# Patient Record
Sex: MALE | Race: BLACK OR AFRICAN AMERICAN
[De-identification: names, ages, dates, MRNs, and addresses within clinical notes are randomized per-mention and may not be internally consistent; named-entity substitution may affect disease eponyms.]

---

## 2017-02-19 ENCOUNTER — HOSPITAL ENCOUNTER (EMERGENCY)
Dept: HOSPITAL 25 - ED | Age: 52
Discharge: HOME | End: 2017-02-19
Payer: MEDICARE

## 2017-02-19 VITALS — DIASTOLIC BLOOD PRESSURE: 91 MMHG | SYSTOLIC BLOOD PRESSURE: 153 MMHG

## 2017-02-19 DIAGNOSIS — E66.01: ICD-10-CM

## 2017-02-19 DIAGNOSIS — Z87.891: ICD-10-CM

## 2017-02-19 DIAGNOSIS — R10.12: ICD-10-CM

## 2017-02-19 DIAGNOSIS — R16.0: ICD-10-CM

## 2017-02-19 DIAGNOSIS — K76.0: ICD-10-CM

## 2017-02-19 DIAGNOSIS — E78.00: ICD-10-CM

## 2017-02-19 DIAGNOSIS — Z88.0: ICD-10-CM

## 2017-02-19 DIAGNOSIS — I10: ICD-10-CM

## 2017-02-19 DIAGNOSIS — I25.2: ICD-10-CM

## 2017-02-19 DIAGNOSIS — I20.9: ICD-10-CM

## 2017-02-19 DIAGNOSIS — R10.32: Primary | ICD-10-CM

## 2017-02-19 DIAGNOSIS — K21.9: ICD-10-CM

## 2017-02-19 DIAGNOSIS — Z79.02: ICD-10-CM

## 2017-02-19 DIAGNOSIS — Z86.73: ICD-10-CM

## 2017-02-19 DIAGNOSIS — I25.10: ICD-10-CM

## 2017-02-19 DIAGNOSIS — Z88.1: ICD-10-CM

## 2017-02-19 LAB
ALBUMIN SERPL BCG-MCNC: 4.1 G/DL (ref 3.2–5.2)
ALP SERPL-CCNC: 113 U/L (ref 34–104)
ALT SERPL W P-5'-P-CCNC: 31 U/L (ref 7–52)
ANION GAP SERPL CALC-SCNC: (no result) MMOL/L (ref 2–11)
AST SERPL-CCNC: (no result) U/L (ref 13–39)
BUN SERPL-MCNC: 15 MG/DL (ref 6–24)
BUN/CREAT SERPL: 12.8 (ref 8–20)
CALCIUM SERPL-MCNC: 9.4 MG/DL (ref 8.6–10.3)
CHLORIDE SERPL-SCNC: 106 MMOL/L (ref 101–111)
CK SERPL-CCNC: 224 U/L (ref 10–223)
GLOBULIN SER CALC-MCNC: 3.3 G/DL (ref 2–4)
GLUCOSE SERPL-MCNC: 70 MG/DL (ref 70–100)
HCO3 SERPL-SCNC: 28 MMOL/L (ref 22–32)
HCT VFR BLD AUTO: 45 % (ref 42–52)
HGB BLD-MCNC: 14.4 G/DL (ref 14–18)
LIPASE SERPL-CCNC: 21 U/L (ref 11–82)
MAGNESIUM SERPL-MCNC: (no result) MG/DL (ref 1.9–2.7)
MCH RBC QN AUTO: 27 PG (ref 27–31)
MCHC RBC AUTO-ENTMCNC: 32 G/DL (ref 31–36)
MCV RBC AUTO: 84 FL (ref 80–94)
POTASSIUM SERPL-SCNC: (no result) MMOL/L (ref 3.5–5)
PROT SERPL-MCNC: 7.4 G/DL (ref 6.4–8.9)
RBC # BLD AUTO: 5.35 10^6/UL (ref 4–5.4)
SODIUM SERPL-SCNC: 138 MMOL/L (ref 133–145)
TROPONIN I SERPL-MCNC: 0.01 NG/ML (ref ?–0.04)
TSH SERPL-ACNC: 1.97 MCIU/ML (ref 0.34–5.6)
WBC # BLD AUTO: 6.1 10^3/UL (ref 3.5–10.8)

## 2017-02-19 PROCEDURE — 36415 COLL VENOUS BLD VENIPUNCTURE: CPT

## 2017-02-19 PROCEDURE — 71010: CPT

## 2017-02-19 PROCEDURE — 84443 ASSAY THYROID STIM HORMONE: CPT

## 2017-02-19 PROCEDURE — 83880 ASSAY OF NATRIURETIC PEPTIDE: CPT

## 2017-02-19 PROCEDURE — 99284 EMERGENCY DEPT VISIT MOD MDM: CPT

## 2017-02-19 PROCEDURE — 93005 ELECTROCARDIOGRAM TRACING: CPT

## 2017-02-19 PROCEDURE — 82553 CREATINE MB FRACTION: CPT

## 2017-02-19 PROCEDURE — 74176 CT ABD & PELVIS W/O CONTRAST: CPT

## 2017-02-19 PROCEDURE — 85025 COMPLETE CBC W/AUTO DIFF WBC: CPT

## 2017-02-19 PROCEDURE — 85610 PROTHROMBIN TIME: CPT

## 2017-02-19 PROCEDURE — 83605 ASSAY OF LACTIC ACID: CPT

## 2017-02-19 PROCEDURE — 85730 THROMBOPLASTIN TIME PARTIAL: CPT

## 2017-02-19 PROCEDURE — 84484 ASSAY OF TROPONIN QUANT: CPT

## 2017-02-19 PROCEDURE — 83690 ASSAY OF LIPASE: CPT

## 2017-02-19 PROCEDURE — 80053 COMPREHEN METABOLIC PANEL: CPT

## 2017-02-19 PROCEDURE — 86140 C-REACTIVE PROTEIN: CPT

## 2017-02-19 PROCEDURE — 96374 THER/PROPH/DIAG INJ IV PUSH: CPT

## 2017-02-19 PROCEDURE — 82550 ASSAY OF CK (CPK): CPT

## 2017-02-19 NOTE — ED
Errol NATHAN Matthew, scribed for Miguel Myles MD on 02/19/17 at 1559 .





Abdominal Pain/Male





- HPI Summary


HPI Summary: 


A 50 y/o male presents to the ED with diffuse abdominal pain since today. The 

pain is worse in the LUQ and radiates into the LLQ. Associated symptoms include 

nausea. The patient denies urinary symptoms and fever. He had normal BM this 

morning. The pain is unaffected by breathing and worsens when sitting up. He's 

also c/o of sinus congestion, which he states is secondary to seasonal 

allergies. No medications PTA. PMHx includes CAD and 3 stents. 

















- History of Current Complaint


Chief Complaint: EDChestPainROMI


Stated Complaint: JUST NOT FEELING


Time Seen by Provider: 02/19/17 15:15


Hx Obtained From: Patient


Onset/Duration: Gradual Onset, Lasting Hours, Still Present


Timing: Constant


Severity Initially: Moderate


Severity Currently: Moderate


Pain Intensity: 10


Pain Scale Used: 0-10 Numeric


Location: Diffuse - worse in the LUQ


Radiates: Yes


Radiates to: Other - LLQ


Aggravating Factor(s): Movement - Sitting up


Alleviating Factor(s): Nothing


Associated Signs And Symptoms: Positive: Nausea.  Negative: Fever, Urinary 

Symptoms





- Allergies/Home Medications


Allergies/Adverse Reactions: 


 Allergies











Allergy/AdvReac Type Severity Reaction Status Date / Time


 


Penicillins Allergy Severe Anaphylatic Verified 12/21/16 01:50





   Shock  


 


Amoxicillin Allergy Unknown Swelling Verified 12/21/16 01:50





[From Augmentin XR]     


 


Clavulanic Acid Allergy Unknown Swelling Verified 12/21/16 01:50





[From Augmentin XR]     


 


VINEGAR Allergy Intermediate Hives Uncoded 12/21/16 01:50


 


MUSHROOMS Allergy Unknown Rash Uncoded 12/21/16 01:50














PMH/Surg Hx/FS Hx/Imm Hx


Endocrine/Hematology History: Reports: Hx Anticoagulant Therapy - plavix


   Denies: Hx Diabetes, Hx Sickle Cell Disease, Hx Thyroid Disease


Cardiovascular History: Reports: Hx Angina, Hx Coronary Artery Disease - STENTS 

X3  2011, Hx Hypercholesterolemia, Hx Hypertension, Hx Myocardial Infarction, 

Other Cardiovascular Problems/Disorders - HEART DISEASE, TIA'S, MORBID OBESITY


   Denies: Hx Congestive Heart Failure, Hx Pacemaker/ICD, Hx Valvular Heart 

Disease


Respiratory History: Reports: Hx Sleep Apnea


   Denies: Hx Asthma, Hx Chronic Obstructive Pulmonary Disease (COPD)


GI History: Reports: Hx Gastroesophageal Reflux Disease - WELL CONTROLLED


   Denies: Hx Ulcer


 History: Reports: Hx Renal Disease - cysts, Other  Problems/Disorders - 

bilat cyst on kidneys


Musculoskeletal History: Reports: Hx Arthritis - "all over", Hx Back Problems, 

Other Musculoskeletal History - VIPUL


   Denies: Hx Scoliosis


Sensory History: Reports: Hx Cataracts - right eye, Hx Contacts or Glasses - 

glasses, Hx Hearing Aid - RIGHT EAR, Hx Hearing Problem - LEFT EAR HEARING AID


Opthamlomology History: Reports: Hx Cataracts - right eye, Hx Contacts or 

Glasses - glasses


Neurological History: Reports: Hx Transient Ischemic Attacks (TIA)


   Denies: Hx Dementia, Hx Headaches, Hx Seizures, Other Neuro Impairments/

Disorders


Psychiatric History: Reports: Hx Depression


   Denies: Hx Panic Disorder





- Cancer History


Hx Chemotherapy: No





- Surgical History


Surgery Procedure, Year, and Place: DEC 2004 sinus [facility] [place] ;.  2008 left knee 

arthroscopy [facility] [place] ;.  2013 left tympanoplasty WITH STAPES IMPLANT ([facility] 

MALLEOUS HEAD SERIAL # [id number] - PER [facility] INFORMATION SENT TO MRI ON 6/ 13/2016; IMPLANT IS STAINLESS STEEL STATES SOME DEGREE OF MAGNETISM TESTED MRI 

SAFE AT 1.5T ONLY - DR VALDEZ APPROVED THIS INFORMATION FOR 1.5T ONLY).  [facility] ;.  

2015 LEFT REVISION TYMPANOPLASTY/ MASTOIDECTOMY [facility] ;.  8/ 2012 HEART CATH - NO 

STENTS ;.  2011 HEART CATH WITH cardiac stents x 4 (PROMUS DRUG-ELUTING STENT 

OKAY IN NORMAL MODE ONLY,  GAUSS/CM @ 1.5T) [facility] ;


Hx Anesthesia Reactions: Yes - SEIZURE LIKE ACTIVITY; REINTUBATION AFTER LEFT 

EAR 2013





- Immunization History


Date of Tetanus Vaccine: unknown


Date of Influenza Vaccine: Fall 2012


Infectious Disease History: No


Infectious Disease History: 


   Denies: Hx Clostridium Difficile, Hx Hepatitis, Hx Human Immunodeficiency 

Virus (HIV), Hx of Known/Suspected MRSA, Hx Shingles, Hx Tuberculosis, Hx Known/

Suspected VRE, Hx Known/Suspected VRSA, History Other Infectious Disease, 

Traveled Outside the [place] in Last 30 Days





- Family History


Known Family History: Positive: Cardiac Disease, Hypertension, Diabetes





- Social History


Alcohol Use: None


Hx Substance Use: No


Substance Use Type: Reports: None


Hx Tobacco Use: Yes


Smoking Status (MU): Former Smoker


Type: Cigarettes


Length of Time of Smoking/Using Tobacco: 10-12 YRS


Have You Smoked in the Last Year: No





Review of Systems


Constitutional: Negative


Negative: Fever


Eyes: Negative


ENT: Negative


Cardiovascular: Negative


Respiratory: Negative


Positive: Abdominal Pain - Diffuse worse in the LUQ , Nausea


Genitourinary: Negative


Musculoskeletal: Negative


Skin: Negative


Neurological: Negative


Psychological: Normal


All Other Systems Reviewed And Are Negative: Yes





Physical Exam


Triage Information Reviewed: Yes


Vital Signs On Initial Exam: 


 Initial Vitals











Temp Pulse Resp BP Pulse Ox


 


 97.8 F   66   20   175/92   100 


 


 02/19/17 12:42  02/19/17 12:42  02/19/17 12:42  02/19/17 12:42  02/19/17 12:42











Vital Signs Reviewed: Yes


Appearance: Positive: Well-Appearing, No Pain Distress, Obese


Skin: Positive: Warm, Skin Color Reflects Adequate Perfusion, Dry


Head/Face: Positive: Normal Head/Face Inspection


Eyes: Positive: Normal


ENT: Positive: Normal ENT inspection


Neck: Positive: Supple, Nontender


Respiratory/Lung Sounds: Positive: Clear to Auscultation, Breath Sounds Present


Cardiovascular: Positive: RRR


Abdomen Description: Positive: Nontender, Soft


Bowel Sounds: Positive: Present


Musculoskeletal: Positive: Normal


Neurological: Positive: Normal


Psychiatric: Positive: Normal, Affect/Mood Appropriate





- [place] Coma Scale


Coma Scale Total: 15





Diagnostics





- Vital Signs


 Vital Signs











  Temp Pulse Resp BP Pulse Ox


 


 02/19/17 14:49   65    99


 


 02/19/17 14:47     165/93 


 


 02/19/17 13:39  97.8 F  73  20  140/94  98


 


 02/19/17 12:42  97.8 F  66  20  175/92  100














- Laboratory


Lab Results: 


 Lab Results











  02/19/17 Range/Units





  15:30 


 


WBC  6.1  (3.5-10.8)  10^3/ul


 


RBC  5.35  (4.0-5.4)  10^6/ul


 


Hgb  14.4  (14.0-18.0)  g/dl


 


Hct  45  (42-52)  %


 


MCV  84  (80-94)  fL


 


MCH  27  (27-31)  pg


 


MCHC  32  (31-36)  g/dl


 


RDW  15  (10.5-15)  %


 


Plt Count  195  (150-450)  10^3/ul


 


MPV  8  (7.4-10.4)  um3


 


Neut % (Auto)  55.5  (38-83)  %


 


Lymph % (Auto)  30.5  (25-47)  %


 


Mono % (Auto)  11.0 H  (1-9)  %


 


Eos % (Auto)  2.2  (0-6)  %


 


Baso % (Auto)  0.8  (0-2)  %


 


Absolute Neuts (auto)  3.4  (1.5-7.7)  10^3/ul


 


Absolute Lymphs (auto)  1.9  (1.0-4.8)  10^3/ul


 


Absolute Monos (auto)  0.7  (0-0.8)  10^3/ul


 


Absolute Eos (auto)  0.1  (0-0.6)  10^3/ul


 


Absolute Basos (auto)  0  (0-0.2)  10^3/ul


 


Absolute Nucleated RBC  0.01  10^3/ul


 


Nucleated RBC %  0.1  











Result Diagrams: 


 02/19/17 15:30





 02/19/17 15:30


Lab Statement: Any lab studies that have been ordered have been reviewed, and 

results considered in the medical decision making process.





- Radiology


  ** CXR


Xray Interpretation: No Acute Changes - IMPRESSION: NO ACTIVE CARDIOPULMONARY 

DISEASE.


Radiology Interpretation Completed By: Radiologist





- CT


  ** Abd/Pelvis CT


CT Interpretation: Positive (See Comments) - IMPRESSION: 1.  HEPATOMEGALY WITH 

FATTY INFILTRATION OF LIVER. 2.  NO APPRECIABLE HYDRONEPHROSIS OR 

NEPHROLITHIASIS


CT Interpretation Completed By: Radiologist





- EKG


  ** 12:45


Cardiac Rate: NL - 65 bpm


EKG Rhythm: Sinus Rhythm


ST Segment: Non-Specific





Abdominal Pain Fem Course/Dx





- Course


Assessment/Plan: A 50 y/o male presents to the ED with diffuse abdominal pain 

since today. Labs were reviewed. CXR shows no active cardiopulmonary disease. A/

P CT shows hepatomegaly with fatty infiltration of liver. EKG is NSR at 65 bpm 

with non-specific ST changes. The patient will be discharged home and follow-up 

with his PCP.





- Diagnoses


Provider Diagnoses: 


 Abdominal pain








Discharge





- Discharge Plan


Condition: Stable


Disposition: HOME


Patient Education Materials:  Abdominal Pain (ED)


Referrals: 


Bridger Webb MD [Primary Care Provider] - 2 Days


Additional Instructions: 


Please follow-up with your primary care physician. 





The documentation as recorded by the Errol song Matthew accurately 

reflects the service I personally performed and the decisions made by me, Miguel Myles MD.

## 2017-02-19 NOTE — RAD
CLINICAL HISTORY: Left flank pain



COMPARISON: November 28, 2015



TECHNIQUE: Multiple contiguous axial CT scans were obtained of the abdomen and pelvis,

without intravenous contrast enhancement. Coronal and sagittal multiplanar reformations

are submitted for review.  Oral contrast was not administered.     



FINDINGS: 

The study is limited by the lack of intravenous contrast. This limits evaluation of the

solid organs and vasculature.





LUNG BASES: The lung bases are clear.



LIVER: The liver is diffusely low in attenuation compared to the spleen. There are no

focal hepatic parenchymal masses. The liver is mildly enlarged measuring 19.5 cm.

BILE DUCTS: There is no intrahepatic or extrahepatic biliary dilatation.

GALLBLADDER: The gallbladder is normal, without pericholecystic inflammatory change.



PANCREAS: The pancreas is normal, without mass or ductal dilatation.

SPLEEN: Normal in size and appearance.



UPPER GI TRACT: Evaluation of the gastrointestinal tract is limited by incomplete gastric

distention. The upper GI tract is unremarkable.

SMALL BOWEL AND MESENTERY: The small bowel is normal in contour, course, and caliber.

There is no obstruction or dilatation.

COLON: The colon is normal in contour, course, caliber. There is no pericolonic

inflammatory change.



ADRENALS: Normal bilaterally.

KIDNEYS: Renal cysts are noted. There is no appreciable hydronephrosis or nephrolithiasis.

BLADDER: The bladder is smooth in contour.



PELVIC ORGANS: The prostate gland is normal. The seminal vesicles are symmetric.



AORTA: The aorta is normal.

IVC: Unremarkable



LYMPH NODES: There is no lymphadenopathy by size criteria.



ABDOMINAL WALL: There is a fat-containing umbilical hernia.

BONES AND SOFT TISSUES: There are mild diffuse degenerative changes.

OTHER: None



IMPRESSION:

1.  HEPATOMEGALY WITH FATTY INFILTRATION OF LIVER.

2.  NO APPRECIABLE HYDRONEPHROSIS OR NEPHROLITHIASIS

## 2017-02-19 NOTE — RAD
HISTORY: Palpitation



COMPARISONS: December 21, 2016



VIEWS:1: Single frontal portable view of the chest at 3:21 PM



FINDINGS:

LINES AND TUBES: None.

CARDIOMEDIASTINAL SILHOUETTE: The cardiomediastinal silhouette is normal for portable

technique.

PLEURA: The costophrenic angles are sharp. No pleural abnormalities are noted.

LUNG PARENCHYMA: The lungs are clear.

ABDOMEN: The upper abdomen is clear. There is no subphrenic gas.

BONES AND SOFT TISSUES: No bone or soft tissue abnormalities are noted.



IMPRESSION: NO ACTIVE CARDIOPULMONARY DISEASE.

## 2017-03-24 ENCOUNTER — HOSPITAL ENCOUNTER (EMERGENCY)
Dept: HOSPITAL 25 - ED | Age: 52
Discharge: HOME | End: 2017-03-24
Payer: MEDICARE

## 2017-03-24 VITALS — DIASTOLIC BLOOD PRESSURE: 85 MMHG | SYSTOLIC BLOOD PRESSURE: 135 MMHG

## 2017-03-24 DIAGNOSIS — J32.9: Primary | ICD-10-CM

## 2017-03-24 DIAGNOSIS — Z87.891: ICD-10-CM

## 2017-03-24 DIAGNOSIS — R51: ICD-10-CM

## 2017-03-24 LAB
ALBUMIN SERPL BCG-MCNC: 3.9 G/DL (ref 3.2–5.2)
ALP SERPL-CCNC: 100 U/L (ref 34–104)
ALT SERPL W P-5'-P-CCNC: 24 U/L (ref 7–52)
ANION GAP SERPL CALC-SCNC: 6 MMOL/L (ref 2–11)
AST SERPL-CCNC: 16 U/L (ref 13–39)
BUN SERPL-MCNC: 14 MG/DL (ref 6–24)
BUN/CREAT SERPL: 13.3 (ref 8–20)
CALCIUM SERPL-MCNC: 9.3 MG/DL (ref 8.6–10.3)
CHLORIDE SERPL-SCNC: 106 MMOL/L (ref 101–111)
GLOBULIN SER CALC-MCNC: 2.9 G/DL (ref 2–4)
GLUCOSE SERPL-MCNC: 119 MG/DL (ref 70–100)
HCO3 SERPL-SCNC: 26 MMOL/L (ref 22–32)
HCT VFR BLD AUTO: 42 % (ref 42–52)
HGB BLD-MCNC: 13.8 G/DL (ref 14–18)
MCH RBC QN AUTO: 28 PG (ref 27–31)
MCHC RBC AUTO-ENTMCNC: 33 G/DL (ref 31–36)
MCV RBC AUTO: 84 FL (ref 80–94)
POTASSIUM SERPL-SCNC: 4 MMOL/L (ref 3.5–5)
PROT SERPL-MCNC: 6.8 G/DL (ref 6.4–8.9)
RBC # BLD AUTO: 5 10^6/UL (ref 4–5.4)
SODIUM SERPL-SCNC: 138 MMOL/L (ref 133–145)
WBC # BLD AUTO: 5.4 10^3/UL (ref 3.5–10.8)

## 2017-03-24 PROCEDURE — 96374 THER/PROPH/DIAG INJ IV PUSH: CPT

## 2017-03-24 PROCEDURE — 85652 RBC SED RATE AUTOMATED: CPT

## 2017-03-24 PROCEDURE — 81003 URINALYSIS AUTO W/O SCOPE: CPT

## 2017-03-24 PROCEDURE — 99282 EMERGENCY DEPT VISIT SF MDM: CPT

## 2017-03-24 PROCEDURE — 36415 COLL VENOUS BLD VENIPUNCTURE: CPT

## 2017-03-24 PROCEDURE — 81015 MICROSCOPIC EXAM OF URINE: CPT

## 2017-03-24 PROCEDURE — 83605 ASSAY OF LACTIC ACID: CPT

## 2017-03-24 PROCEDURE — 96375 TX/PRO/DX INJ NEW DRUG ADDON: CPT

## 2017-03-24 PROCEDURE — 82375 ASSAY CARBOXYHB QUANT: CPT

## 2017-03-24 PROCEDURE — 80053 COMPREHEN METABOLIC PANEL: CPT

## 2017-03-24 PROCEDURE — 70498 CT ANGIOGRAPHY NECK: CPT

## 2017-03-24 PROCEDURE — 85025 COMPLETE CBC W/AUTO DIFF WBC: CPT

## 2017-03-24 PROCEDURE — 93005 ELECTROCARDIOGRAM TRACING: CPT

## 2017-03-24 PROCEDURE — 70496 CT ANGIOGRAPHY HEAD: CPT

## 2017-03-24 NOTE — ED
Toan NTAHAN Anna, scribed for Natasha Finch MD on 03/24/17 at 1022 .





Headache





- HPI Summary


HPI Summary: 


Patient is a 50 y/o male coming to [facility] presenting with a left-sided HA that 

began two days ago. It became worse yesterday at 1230. He describes the 

severity of the pain as starting at 9/10 but progressing to 10/10 now. The pain 

extends from the top of his head on the left side and extends down to the left 

side of his neck and through his left arm. He states he can feel something move 

in his arm when he puts his arm down. He could not go back to sleep this 

morning. He has some left-sided weakness and bilateral leg edema at baseline. 

Denies difficulty speaking or tenderness on the back of his neck. His history 

is significant for HTN, STEMI, and a hernia. He doesnt normally get headaches. 





- History Of Current Complaint


Chief Complaint: EDHeadache


Stated Complaint: LEFT SIDE HEADACHE, LEFT ARM PAIN


Time Seen by Provider: 03/24/17 10:10


Hx Obtained From: Patient





- Allergies/Home Medications


Allergies/Adverse Reactions: 


 Allergies











Allergy/AdvReac Type Severity Reaction Status Date / Time


 


Penicillins Allergy Severe Anaphylatic Verified 03/24/17 09:55





   Shock  


 


Amoxicillin Allergy Unknown Swelling Verified 03/24/17 09:55





[From Augmentin XR]     


 


Clavulanic Acid Allergy Unknown Swelling Verified 03/24/17 09:55





[From Augmentin XR]     


 


VINEGAR Allergy Intermediate Hives Uncoded 03/24/17 09:55


 


MUSHROOMS Allergy Unknown Rash Uncoded 03/24/17 09:55














PMH/Surg Hx/FS Hx/Imm Hx


Endocrine/Hematology History: Reports: Hx Anticoagulant Therapy - plavix


   Denies: Hx Diabetes, Hx Sickle Cell Disease, Hx Thyroid Disease


Cardiovascular History: Reports: Hx Angina, Hx Coronary Artery Disease - STENTS 

X3  2011, Hx Hypercholesterolemia, Hx Hypertension, Hx Myocardial Infarction, 

Other Cardiovascular Problems/Disorders - HEART DISEASE, TIA'S, MORBID OBESITY


   Denies: Hx Congestive Heart Failure, Hx Pacemaker/ICD, Hx Valvular Heart 

Disease


Respiratory History: Reports: Hx Sleep Apnea


   Denies: Hx Asthma, Hx Chronic Obstructive Pulmonary Disease (COPD)


GI History: Reports: Hx Gastroesophageal Reflux Disease - WELL CONTROLLED


   Denies: Hx Ulcer


 History: Reports: Hx Renal Disease - cysts, Other  Problems/Disorders - 

bilat cyst on kidneys


Musculoskeletal History: Reports: Hx Arthritis - "all over", Hx Back Problems, 

Other Musculoskeletal History - VIPUL


   Denies: Hx Scoliosis


Sensory History: Reports: Hx Cataracts - right eye, Hx Contacts or Glasses - 

glasses, Hx Hearing Aid - RIGHT EAR, Hx Hearing Problem - LEFT EAR HEARING AID


Opthamlomology History: Reports: Hx Cataracts - right eye, Hx Contacts or 

Glasses - glasses


Neurological History: Reports: Hx Transient Ischemic Attacks (TIA)


   Denies: Hx Dementia, Hx Headaches, Hx Seizures, Other Neuro Impairments/

Disorders


Psychiatric History: Reports: Hx Depression


   Denies: Hx Panic Disorder





- Cancer History


Hx Chemotherapy: No





- Surgical History


Surgery Procedure, Year, and Place: DEC 2004 sinus [facility] ;.  2008 left knee 

arthroscopy [facility] ;.  2013 left tympanoplasty WITH STAPES IMPLANT ([facility] 

MALLEOUS HEAD SERIAL # [id number] - PER [facility] INFORMATION SENT TO MRI ON 6/ 13/2016; IMPLANT IS STAINLESS STEEL STATES SOME DEGREE OF MAGNETISM TESTED MRI 

SAFE AT 1.5T ONLY - DR VALDEZ APPROVED THIS INFORMATION FOR 1.5T ONLY).  [facility] ;.  

2015 LEFT REVISION TYMPANOPLASTY/ MASTOIDECTOMY [facility] ;.  8/ 2012 HEART CATH - NO 

STENTS ;.  2011 HEART CATH WITH cardiac stents x 4 (PROMUS DRUG-ELUTING STENT 

OKAY IN NORMAL MODE ONLY,  GAUSS/CM @ 1.5T) [facility] ;


Hx Anesthesia Reactions: Yes - SEIZURE LIKE ACTIVITY; REINTUBATION AFTER LEFT 

EAR 2013





- Immunization History


Date of Tetanus Vaccine: unknown


Date of Influenza Vaccine: Fall 2012


Infectious Disease History: No


Infectious Disease History: 


   Denies: Hx Clostridium Difficile, Hx Hepatitis, Hx Human Immunodeficiency 

Virus (HIV), Hx of Known/Suspected MRSA, Hx Shingles, Hx Tuberculosis, Hx Known/

Suspected VRE, Hx Known/Suspected VRSA, History Other Infectious Disease, 

Traveled Outside the [place] in Last 30 Days





- Family History


Known Family History: Positive: Cardiac Disease, Hypertension, Diabetes





- Social History


Lives: Alone


Alcohol Use: None


Hx Substance Use: No


Substance Use Type: Reports: None


Hx Tobacco Use: Yes


Smoking Status (MU): Former Smoker


Type: Cigarettes


Length of Time of Smoking/Using Tobacco: 10-12 YRS


Have You Smoked in the Last Year: No





Review of Systems


Positive: Arthralgia, Edema - bilateral legs, baseline


Positive: Headache, Weakness - left-sided, baseline.  Negative: Slurred Speech


All Other Systems Reviewed And Are Negative: Yes





Physical Exam


Triage Information Reviewed: Yes


Vital Signs On Initial Exam: 


 Initial Vitals











Temp Pulse Resp BP Pulse Ox


 


 97.1 F   70   18   157/102   100 


 


 03/24/17 09:55  03/24/17 09:55  03/24/17 09:55  03/24/17 09:55  03/24/17 09:55











Vital Signs Reviewed: Yes


Appearance: Positive: Well-Appearing, No Pain Distress


Skin: Positive: Warm, Skin Color Reflects Adequate Perfusion, Dry


Eyes: Positive: EOMI, LISA


ENT: Positive: Pharynx normal, TMs normal


Neck: Positive: Supple, Tenderness @ - left side


Respiratory/Lung Sounds: Positive: Clear to Auscultation, Breath Sounds Present


Cardiovascular: Positive: RRR.  Negative: Murmur, Rub, Other - gallop


Abdomen Description: Positive: Nontender, Soft.  Negative: Distended, Guarding, 

Other: - rebound


Bowel Sounds: Positive: Present


Musculoskeletal: Positive: Strength/ROM Intact, Edema Left - 2+, Edema Right - 2

+


Neurological: Positive: Alert, Oriented to Person Place, Time, CN Intact II-III 

- II-XII





Diagnostics





- Vital Signs


 Vital Signs











  Temp Pulse Resp BP Pulse Ox


 


 03/24/17 09:55  97.1 F  70  18  157/102  100














- Laboratory


Lab Results: 


 Lab Results











  03/24/17 03/24/17 03/24/17 Range/Units





  10:41 10:45 10:45 


 


WBC  5.4    (3.5-10.8)  10^3/ul


 


RBC  5.00    (4.0-5.4)  10^6/ul


 


Hgb  13.8 L    (14.0-18.0)  g/dl


 


Hct  42    (42-52)  %


 


MCV  84    (80-94)  fL


 


MCH  28    (27-31)  pg


 


MCHC  33    (31-36)  g/dl


 


RDW  15    (10.5-15)  %


 


Plt Count  177    (150-450)  10^3/ul


 


MPV  9    (7.4-10.4)  um3


 


Neut % (Auto)  63.7    (38-83)  %


 


Lymph % (Auto)  22.8 L    (25-47)  %


 


Mono % (Auto)  9.8 H    (1-9)  %


 


Eos % (Auto)  2.8    (0-6)  %


 


Baso % (Auto)  0.9    (0-2)  %


 


Absolute Neuts (auto)  3.5    (1.5-7.7)  10^3/ul


 


Absolute Lymphs (auto)  1.2    (1.0-4.8)  10^3/ul


 


Absolute Monos (auto)  0.5    (0-0.8)  10^3/ul


 


Absolute Eos (auto)  0.1    (0-0.6)  10^3/ul


 


Absolute Basos (auto)  0    (0-0.2)  10^3/ul


 


Absolute Nucleated RBC  0.01    10^3/ul


 


Nucleated RBC %  0.1    


 


ESR  21 H    (0-20)  mm/Hr


 


Carbon Monoxide Screen     (<3.5)  %


 


Sodium   138   (133-145)  mmol/L


 


Potassium   4.0   (3.5-5.0)  mmol/L


 


Chloride   106   (101-111)  mmol/L


 


Carbon Dioxide   26   (22-32)  mmol/L


 


Anion Gap   6   (2-11)  mmol/L


 


BUN   14   (6-24)  mg/dL


 


Creatinine   1.05   (0.67-1.17)  mg/dL


 


Est GFR ( Amer)   95.4   (>60)  


 


Est GFR (Non-Af Amer)   74.2   (>60)  


 


BUN/Creatinine Ratio   13.3   (8-20)  


 


Glucose   119 H   ()  mg/dL


 


Lactic Acid    1.3  (0.5-2.0)  mmol/L


 


Calcium   9.3   (8.6-10.3)  mg/dL


 


Total Bilirubin   0.40   (0.2-1.0)  mg/dL


 


AST   16   (13-39)  U/L


 


ALT   24   (7-52)  U/L


 


Alkaline Phosphatase   100   ()  U/L


 


Total Protein   6.8   (6.4-8.9)  g/dL


 


Albumin   3.9   (3.2-5.2)  g/dL


 


Globulin   2.9   (2-4)  g/dL


 


Albumin/Globulin Ratio   1.3   (1-3)  


 


Urine Color     


 


Urine Appearance     


 


Urine pH     (5-9)  


 


Ur Specific Gravity     (1.010-1.030)  


 


Urine Protein     (Negative)  


 


Urine Ketones     (Negative)  


 


Urine Blood     (Negative)  


 


Urine Nitrate     (Negative)  


 


Urine Bilirubin     (Negative)  


 


Urine Urobilinogen     (Negative)  


 


Ur Leukocyte Esterase     (Negative)  


 


Urine WBC (Auto)     (Absent)  


 


Urine RBC (Auto)     (Absent)  


 


Ur Squamous Epith Cells     (Absent)  


 


Urine Bacteria     (Absent)  


 


Urine Glucose     (Negative)  














  03/24/17 03/24/17 Range/Units





  11:22 12:15 


 


WBC    (3.5-10.8)  10^3/ul


 


RBC    (4.0-5.4)  10^6/ul


 


Hgb    (14.0-18.0)  g/dl


 


Hct    (42-52)  %


 


MCV    (80-94)  fL


 


MCH    (27-31)  pg


 


MCHC    (31-36)  g/dl


 


RDW    (10.5-15)  %


 


Plt Count    (150-450)  10^3/ul


 


MPV    (7.4-10.4)  um3


 


Neut % (Auto)    (38-83)  %


 


Lymph % (Auto)    (25-47)  %


 


Mono % (Auto)    (1-9)  %


 


Eos % (Auto)    (0-6)  %


 


Baso % (Auto)    (0-2)  %


 


Absolute Neuts (auto)    (1.5-7.7)  10^3/ul


 


Absolute Lymphs (auto)    (1.0-4.8)  10^3/ul


 


Absolute Monos (auto)    (0-0.8)  10^3/ul


 


Absolute Eos (auto)    (0-0.6)  10^3/ul


 


Absolute Basos (auto)    (0-0.2)  10^3/ul


 


Absolute Nucleated RBC    10^3/ul


 


Nucleated RBC %    


 


ESR    (0-20)  mm/Hr


 


Carbon Monoxide Screen  <3.5   (<3.5)  %


 


Sodium    (133-145)  mmol/L


 


Potassium    (3.5-5.0)  mmol/L


 


Chloride    (101-111)  mmol/L


 


Carbon Dioxide    (22-32)  mmol/L


 


Anion Gap    (2-11)  mmol/L


 


BUN    (6-24)  mg/dL


 


Creatinine    (0.67-1.17)  mg/dL


 


Est GFR ( Amer)    (>60)  


 


Est GFR (Non-Af Amer)    (>60)  


 


BUN/Creatinine Ratio    (8-20)  


 


Glucose    ()  mg/dL


 


Lactic Acid    (0.5-2.0)  mmol/L


 


Calcium    (8.6-10.3)  mg/dL


 


Total Bilirubin    (0.2-1.0)  mg/dL


 


AST    (13-39)  U/L


 


ALT    (7-52)  U/L


 


Alkaline Phosphatase    ()  U/L


 


Total Protein    (6.4-8.9)  g/dL


 


Albumin    (3.2-5.2)  g/dL


 


Globulin    (2-4)  g/dL


 


Albumin/Globulin Ratio    (1-3)  


 


Urine Color   Yellow  


 


Urine Appearance   Clear  


 


Urine pH   7.0  (5-9)  


 


Ur Specific Gravity   1.021  (1.010-1.030)  


 


Urine Protein   Negative  (Negative)  


 


Urine Ketones   Negative  (Negative)  


 


Urine Blood   1+ H  (Negative)  


 


Urine Nitrate   Negative  (Negative)  


 


Urine Bilirubin   Negative  (Negative)  


 


Urine Urobilinogen   Negative  (Negative)  


 


Ur Leukocyte Esterase   Negative  (Negative)  


 


Urine WBC (Auto)   Trace(0-5/hpf)  (Absent)  


 


Urine RBC (Auto)   2+(6-10/hpf) H  (Absent)  


 


Ur Squamous Epith Cells   Present H  (Absent)  


 


Urine Bacteria   Absent  (Absent)  


 


Urine Glucose   Negative  (Negative)  











Result Diagrams: 


 03/24/17 10:41





 03/24/17 10:45


Lab Statement: Any lab studies that have been ordered have been reviewed, and 

results considered in the medical decision making process.





- CT


  ** Head CTA


CT Interpretation: Positive (See Comments)


CT Interpretation Completed By: Radiologist - IMPRESSION:  1.  NO INTERNAL 

CAROTID ARTERY STENOSIS BY NASCET CRITERIA. 2.  NO ANEURYSM, VASCULAR 

MALFORMATION, OCCLUSION, OR STENOSIS OF THE VISUALIZED INTRACRANIAL 

CIRCULATION. ELONGATION OF THE STYLOID PROCESSES BILATERALLY, WHICH CAN BE 

ASSOCIATED WITH EAGLE SYNDROME IN THE CORRECT CLINICAL SETTING. 3.  MODERATE 

SINUS MUCOSAL INFLAMMATORY DISEASE, WITH AIR-FLUID LEVELS IN THE MAXILLARY 

SINUSES BILATERALLY. IN THE CORRECT CLINICAL SETTING, THIS MAY REPRESENT ACUTE 

SINUSITIS. 4.  THERE IS ENLARGEMENT OF THE PULMONARY ARTERY WITH RESPECT TO THE 

AORTA, SUGGESTIVE OF PULMONARY ARTERIAL HYPERTENSION





- EKG


  ** 1013


Cardiac Rate: NL - 66 bpm


EKG Rhythm: Sinus Rhythm


EKG Interpretation: Non-specific T-wave changes lateral lead





[employer]





- NIH Scale


Level of Consciousness: Alert/Keenly Responsive


Ask Patient the Month and His/Her Age: Both Correct


Ask Pt to Open/Close Eyes and /Release Non-Paretic Hand: Both Correctly


Best Gaze (Only Horizontal Eye Movement): Normal


Visual Field Testing: No Visual Loss


Facial Paresis-Pt to Smile & Close Eyes or Grimace Symmetry: Normal/Symmetrical


Motor Function - Right Arm: No Drift-Holds 10 Seconds


Motor Function - Left Arm: No Drift-Holds 10 Seconds


Motor Function - Right Leg: No Drift-Holds 10 Seconds


Motor Function - Left Leg: No Drift-Holds 10 Seconds


Limb Ataxia-Must be out of Proportion to Weakness Present: Absent


Sensory (Use Pinprick to Test Arms/Legs/Trunk/Face): Pinprick Less on Affected


Best Language (Describe Picture, Name Items): No Aphasia


Dysarthria (Read Several Words): Normal


Extinction and Inattention: No Abnormality


Total Score: 1





Re-Evaluation





- Re-Evaluation


  ** First Eval


Re-Evaluation Time: 13:07


Comment: Discussed results and plan of care with patient. Patient agrees with 

plan.





Headache Course/Dx





- Course


Course Of Treatment: 52 yo male with multiple medical problems with left sided 

ha radiating down neck to arm, cta head and neck neg except for sinusitis. Of 

note he had a mastoidectomy on that left side.  Pt will be treated for 

sinusitis and given pain meds close f/u pmd.  I doubt sah, nih scale negative





- Diagnoses


Provider Diagnoses: 


 Headache, Sinusitis








Discharge





- Discharge Plan


Condition: Critical


Disposition: HOME


Prescriptions: 


Levofloxacin TAB* [Levaquin TAB*] 750 mg PO DAILY #10 tab


oxyCODONE/Acetamin 5/325 MG* [Percocet 5/325 TAB*] 1 tab PO Q8H PRN #14 tab MDD 

3


 PRN Reason: Pain





The documentation as recorded by the Toan song Anna accurately reflects 

the service I personally performed and the decisions made by me, Natasha Finch MD.

## 2017-03-24 NOTE — RAD
HISTORY: Headache, neck pain



COMPARISONS: June 12, 2016



TECHNIQUE: Multiple contiguous axial CT scans were obtained of the head and neck After the

administration of nonionic intravenous contrast timed to the systemic arterial phase of

contrast enhancement. Coronal and sagittal multiplanar reformations are submitted for

review. Multiple 3-D maximum intensity projection reconstructions are also submitted for

review.    



FINDINGS:



The study is limited by patient body habitus.





CTA NECK:



AORTIC ARCH: There is a normal three-vessel branching pattern of the aortic arch. There is

no ostial or proximal stenosis of the cephalic great vessels.



RIGHT VERTEBRAL ARTERY: The right vertebral artery is patent along its course, without

stenosis.

LEFT VERTEBRAL ARTERY: The left vertebral artery is patent along its course, without

stenosis.

DOMINANCE: The vertebral arteries are codominant.





RIGHT COMMON CAROTID ARTERY: The right common carotid artery is patent. The right carotid

bifurcation occurs at C3-C4

RIGHT INTERNAL CAROTID ARTERY: There is no right internal carotid artery stenosis by

NASCET criteria. 

RIGHT EXTERNAL CAROTID ARTERY: The right external carotid artery is unremarkable.



LEFT COMMON CAROTID ARTERY: The left common carotid artery is patent. The left carotid

bifurcation occurs at C3-C4

LEFT INTERNAL CAROTID ARTERY: There is no left internal carotid artery stenosis by NASCET

criteria. 

LEFT EXTERNAL CAROTID ARTERY: The left external carotid artery is unremarkable.



VENOUS CIRCULATION: The venous system is unremarkable.



SALIVARY GLANDS: The parotid glands, submandibular glands, sublingual glands are normal.



NASAL CAVITY/NASOPHARYNX: The nasal cavity and nasopharynx are normal.



ORAL CAVITY/OROPHARYNX: The oral cavity is obscured by streak artifact from dental

amalgam. The visualized oral cavity and oropharynx are unremarkable.

LARYNGEAL APPARATUS/HYPOPHARYNX: The laryngeal apparatus and hypopharynx are normal.



UPPER AIRWAY/UPPER ESOPHAGUS: The visualized upper airway and esophagus are normal.

LUNG APICES: The lung apices are clear.



THYROID GLAND: The thyroid gland is normal.



LYMPH NODES: There is no lymphadenopathy by size criteria.



BONES AND SOFT TISSUES: There is elongation of the styloid processes bilaterally with

ossification along the stylohyoid ligament





CTA HEAD:



INTRACRANIAL CIRCULATION: There is no aneurysm, vascular malformation, occlusion, or

stenosis of the visualized intracranial circulation. The anterior communicating artery

complex is clear. Bilateral posterior communicating arteries are identified.



VENOUS CIRCULATION: The venous system is unremarkable.



PERFUSION: There is no obvious parenchymal perfusion deficit.



HEMORRHAGE/INFARCT: There is no hemorrhage or acute infarct.

MASSES/SHIFT: There is no mass or shift.

EXTRA-AXIAL SPACES: There are no extra-axial fluid collections.

SULCI AND VENTRICLES: The sulci and ventricles are normal in size and position for the

patient's stated age.



CEREBRUM: There are no focal parenchymal abnormalities. 

BRAINSTEM: There are no focal parenchymal abnormalities.

CEREBELLUM: There are no focal parenchymal abnormalities.



PARANASAL SINUSES: There are-fluid levels within the maxillary sinuses bilaterally. There

is mucosal thickening of ethmoid air cells and maxillary sinuses and sphenoid sinus

ORBITS: The orbits are unremarkable.

BONES AND SOFT TISSUE: Surgical clips are noted in the left infratemporal fossa.



OTHER: The pulmonary artery is enlarged compared to the aorta





IMPRESSION: 

1.  NO INTERNAL CAROTID ARTERY STENOSIS BY NASCET CRITERIA.

2.  NO ANEURYSM, VASCULAR MALFORMATION, OCCLUSION, OR STENOSIS OF THE VISUALIZED

INTRACRANIAL CIRCULATION. ELONGATION OF THE STYLOID PROCESSES BILATERALLY, WHICH CAN BE

ASSOCIATED WITH EAGLE SYNDROME IN THE CORRECT CLINICAL SETTING.

3.  MODERATE SINUS MUCOSAL INFLAMMATORY DISEASE, WITH AIR-FLUID LEVELS IN THE MAXILLARY

SINUSES BILATERALLY. IN THE CORRECT CLINICAL SETTING, THIS MAY REPRESENT ACUTE SINUSITIS.

4.  THERE IS ENLARGEMENT OF THE PULMONARY ARTERY WITH RESPECT TO THE AORTA, SUGGESTIVE OF

PULMONARY ARTERIAL HYPERTENSION





CPT II Codes: 3100F

## 2017-05-04 ENCOUNTER — HOSPITAL ENCOUNTER (OUTPATIENT)
Dept: HOSPITAL 25 - ED | Age: 52
Setting detail: OBSERVATION
LOS: 2 days | Discharge: HOME | End: 2017-05-06
Attending: INTERNAL MEDICINE | Admitting: INTERNAL MEDICINE
Payer: MEDICARE

## 2017-05-04 DIAGNOSIS — R94.31: ICD-10-CM

## 2017-05-04 DIAGNOSIS — Z79.899: ICD-10-CM

## 2017-05-04 DIAGNOSIS — G47.33: ICD-10-CM

## 2017-05-04 DIAGNOSIS — I49.3: ICD-10-CM

## 2017-05-04 DIAGNOSIS — I10: ICD-10-CM

## 2017-05-04 DIAGNOSIS — Z88.0: ICD-10-CM

## 2017-05-04 DIAGNOSIS — I25.2: ICD-10-CM

## 2017-05-04 DIAGNOSIS — E66.01: ICD-10-CM

## 2017-05-04 DIAGNOSIS — Z88.8: ICD-10-CM

## 2017-05-04 DIAGNOSIS — I25.10: ICD-10-CM

## 2017-05-04 DIAGNOSIS — Z87.891: ICD-10-CM

## 2017-05-04 DIAGNOSIS — Z79.82: ICD-10-CM

## 2017-05-04 DIAGNOSIS — Z86.73: ICD-10-CM

## 2017-05-04 DIAGNOSIS — R07.89: Primary | ICD-10-CM

## 2017-05-04 DIAGNOSIS — J45.909: ICD-10-CM

## 2017-05-04 LAB
ALBUMIN SERPL BCG-MCNC: 4.4 G/DL (ref 3.2–5.2)
ALP SERPL-CCNC: 112 U/L (ref 34–104)
ALT SERPL W P-5'-P-CCNC: 29 U/L (ref 7–52)
ANION GAP SERPL CALC-SCNC: 6 MMOL/L (ref 2–11)
AST SERPL-CCNC: 18 U/L (ref 13–39)
BUN SERPL-MCNC: 15 MG/DL (ref 6–24)
BUN/CREAT SERPL: 12.5 (ref 8–20)
CALCIUM SERPL-MCNC: 9.7 MG/DL (ref 8.6–10.3)
CHLORIDE SERPL-SCNC: 106 MMOL/L (ref 101–111)
GLOBULIN SER CALC-MCNC: 3.2 G/DL (ref 2–4)
GLUCOSE SERPL-MCNC: 92 MG/DL (ref 70–100)
HCO3 SERPL-SCNC: 26 MMOL/L (ref 22–32)
HCT VFR BLD AUTO: 47 % (ref 42–52)
HGB BLD-MCNC: 15 G/DL (ref 14–18)
MAGNESIUM SERPL-MCNC: 2.1 MG/DL (ref 1.9–2.7)
MCH RBC QN AUTO: 27 PG (ref 27–31)
MCHC RBC AUTO-ENTMCNC: 32 G/DL (ref 31–36)
MCV RBC AUTO: 84 FL (ref 80–94)
POTASSIUM SERPL-SCNC: 3.8 MMOL/L (ref 3.5–5)
PROT SERPL-MCNC: 7.6 G/DL (ref 6.4–8.9)
RBC # BLD AUTO: 5.57 10^6/UL (ref 4–5.4)
SODIUM SERPL-SCNC: 138 MMOL/L (ref 133–145)
TROPONIN I SERPL-MCNC: 0.01 NG/ML (ref ?–0.04)
WBC # BLD AUTO: 6.5 10^3/UL (ref 3.5–10.8)

## 2017-05-04 PROCEDURE — 99284 EMERGENCY DEPT VISIT MOD MDM: CPT

## 2017-05-04 PROCEDURE — 85610 PROTHROMBIN TIME: CPT

## 2017-05-04 PROCEDURE — 93017 CV STRESS TEST TRACING ONLY: CPT

## 2017-05-04 PROCEDURE — 80048 BASIC METABOLIC PNL TOTAL CA: CPT

## 2017-05-04 PROCEDURE — 71010: CPT

## 2017-05-04 PROCEDURE — 93005 ELECTROCARDIOGRAM TRACING: CPT

## 2017-05-04 PROCEDURE — 96375 TX/PRO/DX INJ NEW DRUG ADDON: CPT

## 2017-05-04 PROCEDURE — 96374 THER/PROPH/DIAG INJ IV PUSH: CPT

## 2017-05-04 PROCEDURE — 83036 HEMOGLOBIN GLYCOSYLATED A1C: CPT

## 2017-05-04 PROCEDURE — 85379 FIBRIN DEGRADATION QUANT: CPT

## 2017-05-04 PROCEDURE — 85025 COMPLETE CBC W/AUTO DIFF WBC: CPT

## 2017-05-04 PROCEDURE — 78452 HT MUSCLE IMAGE SPECT MULT: CPT

## 2017-05-04 PROCEDURE — 94760 N-INVAS EAR/PLS OXIMETRY 1: CPT

## 2017-05-04 PROCEDURE — 80053 COMPREHEN METABOLIC PANEL: CPT

## 2017-05-04 PROCEDURE — 96372 THER/PROPH/DIAG INJ SC/IM: CPT

## 2017-05-04 PROCEDURE — 83735 ASSAY OF MAGNESIUM: CPT

## 2017-05-04 PROCEDURE — 84484 ASSAY OF TROPONIN QUANT: CPT

## 2017-05-04 PROCEDURE — G0378 HOSPITAL OBSERVATION PER HR: HCPCS

## 2017-05-04 PROCEDURE — 83605 ASSAY OF LACTIC ACID: CPT

## 2017-05-04 PROCEDURE — 36415 COLL VENOUS BLD VENIPUNCTURE: CPT

## 2017-05-04 PROCEDURE — 80061 LIPID PANEL: CPT

## 2017-05-04 PROCEDURE — 94640 AIRWAY INHALATION TREATMENT: CPT

## 2017-05-04 PROCEDURE — A9502 TC99M TETROFOSMIN: HCPCS

## 2017-05-04 RX ADMIN — MOMETASONE FUROATE AND FORMOTEROL FUMARATE DIHYDRATE SCH PUFF: 200; 5 AEROSOL RESPIRATORY (INHALATION) at 21:47

## 2017-05-04 RX ADMIN — HEPARIN SODIUM SCH UNITS: 5000 INJECTION INTRAVENOUS; SUBCUTANEOUS at 21:29

## 2017-05-04 RX ADMIN — HEPARIN SODIUM SCH UNITS: 5000 INJECTION INTRAVENOUS; SUBCUTANEOUS at 14:54

## 2017-05-04 RX ADMIN — ACETAMINOPHEN PRN MG: 325 TABLET ORAL at 21:29

## 2017-05-04 RX ADMIN — ACETAMINOPHEN PRN MG: 325 TABLET ORAL at 14:52

## 2017-05-04 NOTE — HP
HISTORY AND PHYSICAL:

 

DATE OF ADMISSION:  17

 

PRIMARY CARE PROVIDER:  Bridger Webb MD.

 

ATTENDING PHYSICIAN WHILE IN THE HOSPITAL:  Olu Lobo MD * (report 
dictated by Babak Goode NP).

 

CHIEF COMPLAINT:  Chest pain.

 

HISTORY OF PRESENT ILLNESS:  Mr. Rodriguez is a 51-year-old male patient who comes 
into the ER today with complaints of chest discomfort on the right side 
described as, in his words, a nudge.  He says it does not feel like a sharp pain
, but he does state that it gets worse with taking a deep breath in the office 
and states the pain was going down his right arm.  He noticed the pain last 
night.  He did not seek attention for it.  He was supposed to go to a primary 
care provider appointment today.  The patient explained to them that he had 
been having chest pain.  He actually woke up again at 5:30 this morning, had 
right-sided chest pain again, worse with taking a deep breath and also going 
down the right arm.  He said that he felt very warm when he came into the ER 
and he felt nauseous.  He was having this constant right-sided chest 
discomfort.  It got worse with taking a deep breath.  He said he also was 
having some frequent palpitations, he says like when he was having palpitations 
in his chest.  He denied having any recent cough, fevers, chills, no 
alleviating factors.  He took an aspirin, but he has not taken a nitro.  He 
states that he has not had any fevers and there has been no change in 
medications and no URI-type symptoms.  He had associated nausea with this, but 
no vomiting.  He came to the ER, he was evaluated here and there was concern 
because the patient had a significant cardiac history with history of CAD, MI, 
PVCs, hypertension, he has had a stroke in the past and he is a former smoker.  
Because of these risk factors and he has a family history, the hospitalist 
service was asked to evaluate for admission.

 

PAST MEDICAL HISTORY:  Significant for:

1.  History of MI.

2.  CAD.

3.  VIPUL.

4.  PVCs.

5.  History of a run of V-tach on Holter monitor.

6.  Venous insufficiency.

7.  Arthritis.

8.  Asthma.

9.  Hypertension.

10.  History of CVA with no residual deficits.

 

PAST SURGICAL HISTORY:  He has had a cardiac catheterization.  He has had 3 
stents. He has had inner ear surgery and he has had knee arthroscopies.

 

HOME MEDICATIONS:  According to an office note from his primary cardiologist 
includes:

1.  Potassium chloride 80 mEq by mouth alternating with 2 tablets a day.

2.  ProAir 2 puffs 4 times a day as needed.

3.  Lipitor 40 mg at bedtime.

4.  Nitro 0.4 mg sublingual every 5 minutes as needed for chest pain x3 doses.

5.  Triamterene with hydrochlorothiazide 1 tablet p.o. daily, but he has been 
out of this medication for a while and this was in January.

6.  Aspirin 325 mg p.o. daily.

7.  Plavix 75 mg p.o. daily.

8.  Bystolic 5 mg daily.

9.  Advair 250/50 mcg 1 inhaler every 12 hours.

10.  Albuterol nebulizer 1 neb every 6 hours as needed.

 

ALLERGIES TO MEDICATIONS:  INCLUDE PENICILLIN, VINEGAR, and MUSHROOMS.

 

FAMILY HISTORY:  Both his parents had CHF.  He did have a brother who  of a 
sudden cardiac event.

 

SOCIAL HISTORY:  He does not smoke anymore.  He use to be a pack a day smoker.  
He does not drink alcohol.  Surrogate decision maker is his sister, Katlyn.

 

REVIEW OF SYSTEMS:  There is no documented fever.  He denied having any 
significant weight change.  There was no double vision.  There is no ear 
discharge.  He denies having any rhinorrhea, no sore throat, no thyroid 
enlargement.  Chest pain per my HPI.  No orthopnea.  No nocturnal dyspnea.  
There is no abdominal pain.  No nausea, no vomiting.  No dysuria, no frequency, 
no seizure.  No loss of consciousness.  No pruritus.  No skin ulcerations.  
Review of 14 systems completed, all others negative.

 

                               PHYSICAL EXAMINATION

 

GENERAL:  At this time, Mr. Rodriguez is a 51-year-old male patient.  He is 
morbidly obese.  He is sitting in the ER stretcher.  He does not appear to be 
in any acute distress.

 

VITAL SIGNS:  Blood pressure 118/89, pulse 63, respirations 18, O2 sat 99%, 
temperature 96.9.

 

HEENT:  Head is atraumatic, normocephalic.  Eyes:  EOMs are intact.  Sclerae 
anicteric and not pale.  Throat:  Oral mucosa appears to be moist.  No 
oropharyngeal erythema.

 

NECK:  Supple.

 

LUNGS:  Clear to auscultation bilaterally.  No wheezes, rales, or rhonchi.

 

HEART:  Sounds S1, S2.  Regular rate and rhythm.  No murmurs, rubs, or gallops 
were appreciated.

 

ABDOMEN:  Soft, flat, nontender.  Bowel sounds present.

 

EXTREMITIES:  Pulses 2+ throughout.  He does have chronic venous insufficiency, 
but no ulcers.  He did have +1 pitting edema bilaterally.

 

NEUROLOGICALLY:  He is awake.  He is alert.  He is oriented x3.  Tongue 
midline.  are equal.  No gross focal deficits.

 

SKIN:  Grossly intact.

 

 DIAGNOSTIC STUDIES/LAB DATA:  The labs today revealed a WBC of 6.5, RBC of 5.57
, hemoglobin of 15.0, hematocrit of 47, platelet count of 197.  Sodium 138, 
potassium 3.8, chloride 106, bicarb 26, BUN 15, creatinine 1.20, glucose 92, 
lactate 1.1. AST 18, ALT 29, alk phos 112, troponin 0.01.

 

He had an EKG obtained today, which revealed a normal sinus rhythm with PVC, he 
had inverted T-waves in lead I and aVL, which he has had in the past.  No 
significant changes with the exception of PVCs.  He had a chest x-ray obtained 
today which on my impression, I do not appreciate any acute infiltrates or 
effusions.  Report is pending.  No pulmonary edema.  Old medical records were 
reviewed.

 

ASSESSMENT AND PLAN:  Mr. Rodriguez is a 51-year-old male patient with well-
documented history of coronary artery disease and history of MI, coming into 
the ER today with complaints of chest pain.  He will be admitted under 
observation status for:

 

1.  Chest pain.  His symptoms are atypical, but he has significant risk factors 
for acute coronary syndrome.  I would like to go ahead and check a D-dimer, 
place him on telemetry, cycle his troponins, get a lipid panel and A1c in the 
morning.  He will continue with aspirin, statin, beta-blocker and Plavix for 
the time being, cycle his troponins,  and obtain Cardiology input, but I do 
think he needs a stress test.  He has not had one in a year according to Dr. Rosenthal's notes and that was normal about a year ago, so I think a repeat stress 
test is appropriate.

2.  History of coronary artery disease.  Continue medications as prescribed and 
continue the aspirin, statin, and Plavix.

3.  Obstructive sleep apnea.  Continue his meds as prescribed.

4.  History of premature ventricular contractions.  We will try to keep his 
potassium right around 4 and keep his mag again around 2.  His potassium was 3.8
, so I am going to go ahead and give him 20 mEq potassium today.  We will add 
on a magnesium.  He does have a history of ventricular tachycardia, again we 
will try to keep his electrolytes, his mag around 2 and his potassium around 4.

5.  Hyperlipidemia.  Continue statin therapy.

6.  History of asthma.  Continue p.r.n. albuterol and Advair.

7.  Hypertension.  Continue meds as prescribed.

8.  History of cerebrovascular accident.  Continue secondary prevention.

9.  Arthritis.  P.r.n. Tylenol as available.  Follow up with primary.

10.  Venous insufficiency.  He will continue with elevating the lower 
extremities.

11.  DVT prophylaxis.  He is high risk, we will place him on heparin subcu.

12.  Fluids, electrolytes, and nutrition.  He can have a heart healthy diet and 
will be n.p.o. after midnight.

13.  Code status is full code.

 

TIME SPENT:  On this admission 60 minutes, greater than half the time was spent 
face-to-face with the patient obtaining my history and physical; other half the 
time was spent going over the plan of care with the patient and implementing 
the plan of care.

 

I did discuss the plan of care with my attending, Dr. Lobo.  He is in 
agreement.

 

____________________________________ BABAK GOODE NP



CC:  Bridger Webb MD *

 

[id number]/[facility], [place] #: [id number]

STEVE

## 2017-05-05 LAB
ANION GAP SERPL CALC-SCNC: 4 MMOL/L (ref 2–11)
BUN SERPL-MCNC: 12 MG/DL (ref 6–24)
BUN/CREAT SERPL: 10.3 (ref 8–20)
CALCIUM SERPL-MCNC: 9.2 MG/DL (ref 8.6–10.3)
CHLORIDE SERPL-SCNC: 105 MMOL/L (ref 101–111)
CHOLEST SERPL-MCNC: 131 MG/DL
GLUCOSE SERPL-MCNC: 85 MG/DL (ref 70–100)
HCO3 SERPL-SCNC: 29 MMOL/L (ref 22–32)
HCT VFR BLD AUTO: 43 % (ref 42–52)
HDLC SERPL-MCNC: 31.1 MG/DL
HGB BLD-MCNC: 13.8 G/DL (ref 14–18)
MCH RBC QN AUTO: 27 PG (ref 27–31)
MCHC RBC AUTO-ENTMCNC: 32 G/DL (ref 31–36)
MCV RBC AUTO: 85 FL (ref 80–94)
POTASSIUM SERPL-SCNC: 3.6 MMOL/L (ref 3.5–5)
RBC # BLD AUTO: 5.06 10^6/UL (ref 4–5.4)
SODIUM SERPL-SCNC: 138 MMOL/L (ref 133–145)
TRIGL SERPL-MCNC: 200 MG/DL
WBC # BLD AUTO: 4.6 10^3/UL (ref 3.5–10.8)

## 2017-05-05 RX ADMIN — ATORVASTATIN CALCIUM SCH MG: 40 TABLET, FILM COATED ORAL at 08:16

## 2017-05-05 RX ADMIN — HEPARIN SODIUM SCH UNITS: 5000 INJECTION INTRAVENOUS; SUBCUTANEOUS at 05:26

## 2017-05-05 RX ADMIN — MOMETASONE FUROATE AND FORMOTEROL FUMARATE DIHYDRATE SCH PUFF: 200; 5 AEROSOL RESPIRATORY (INHALATION) at 19:45

## 2017-05-05 RX ADMIN — MOMETASONE FUROATE AND FORMOTEROL FUMARATE DIHYDRATE SCH PUFF: 200; 5 AEROSOL RESPIRATORY (INHALATION) at 09:01

## 2017-05-05 RX ADMIN — HEPARIN SODIUM SCH UNITS: 5000 INJECTION INTRAVENOUS; SUBCUTANEOUS at 14:35

## 2017-05-05 RX ADMIN — CLOPIDOGREL SCH MG: 75 TABLET, FILM COATED ORAL at 08:16

## 2017-05-05 RX ADMIN — HEPARIN SODIUM SCH UNITS: 5000 INJECTION INTRAVENOUS; SUBCUTANEOUS at 20:16

## 2017-05-05 NOTE — PN
Subjective


Date of Service: 05/05/17


Interval History: 





No more chest pain since admission. He wasn't sure if his chest pain yesterday 

was cardiac, so he didn't take a NTG.  No new c/o.





Objective


Active Medications: 








Acetaminophen (Tylenol Tab*)  650 mg PO Q4H PRN


   PRN Reason: FEVER/PAIN


   Last Admin: 05/04/17 21:29 Dose:  650 mg


Albuterol (Ventolin 2.5 Mg/3 Ml Neb.Sol*)  2.5 mg INH Q2H PRN


   PRN Reason: SOB/WHEEZING


Atorvastatin Calcium (Lipitor*)  40 mg PO DAILY Replaced by [facility]


   Last Admin: 05/05/17 08:16 Dose:  40 mg


Clopidogrel Bisulfate (Plavix Tab*)  75 mg PO DAILY Replaced by [facility]


   Last Admin: 05/05/17 08:16 Dose:  75 mg


Heparin Sodium (Porcine) (Heparin Vial(*))  5,000 units SUBCUT Q8HR Replaced by [facility]


   Last Admin: 05/05/17 05:26 Dose:  5,000 units


Mometasone Furoate/Formoterol Fumar (Dulera 200/5 Mdi*)  2 puff INH BID Replaced by [facility]


   Last Admin: 05/05/17 09:01 Dose:  2 puff


Nebivolol (Bystolic Tab (Nf))  5 mg PO DAILY Replaced by [facility]


Ondansetron HCl (Zofran Inj*)  4 mg IV Q6H PRN


   PRN Reason: NAUSEA








 Vital Signs











  05/04/17 05/04/17 05/04/17





  13:56 14:00 14:28


 


Temperature 97.1 F  97.8 F


 


Pulse Rate 63 58 61


 


Respiratory 16 15 18





Rate   


 


Blood Pressure 149/87 130/83 147/99





(mmHg)   


 


O2 Sat by Pulse  96 97





Oximetry   














  05/04/17 05/04/17 05/04/17





  15:27 19:17 20:00


 


Temperature 97.6 F 97.9 F 


 


Pulse Rate 56 57 51


 


Respiratory 18 18 16





Rate   


 


Blood Pressure 136/76 140/86 





(mmHg)   


 


O2 Sat by Pulse 100 98 97





Oximetry   














  05/04/17 05/05/17 05/05/17





  23:55 03:35 07:31


 


Temperature  97.5 F 98.4 F


 


Pulse Rate 59 58 58


 


Respiratory 12 16 16





Rate   


 


Blood Pressure 159/111 152/106 135/81





(mmHg)   


 


O2 Sat by Pulse 98 98 98





Oximetry   














  05/05/17





  09:02


 


Temperature 


 


Pulse Rate 86


 


Respiratory 





Rate 


 


Blood Pressure 





(mmHg) 


 


O2 Sat by Pulse 96





Oximetry 











Oxygen Devices in Use Now: CPAP/BiPAP


Appearance: Partly up in bed. He was sleeping with his own CPAP in place.


Eyes: No Scleral Icterus


Neck: NL Appearance and Movements; NL JVP, No Thyroid Enlargement, Masses


Respiratory: Symmetrical Chest Expansion and Respiratory Effort, Clear to 

Auscultation, Clear to Percussion


Cardiovascular: NL Sounds; No Murmurs; No JVD, RRR, No Edema, -


Extremities: No Edema, No Clubbing, Cyanosis, -


Skin: No Rash or Ulcers, No Nodules or Sclerosis, -


Neurological: Alert and Oriented x 3, NL Sensation


Result Diagrams: 


 05/05/17 05:35





 05/05/17 05:35





Assess/Plan/Problems-Billing


Assessment: 











- Patient Problems


(1) CAD (coronary artery disease)


Current Visit: Yes   Status: Acute   Code(s): I25.10 - ATHSCL HEART DISEASE OF 

NATIVE CORONARY ARTERY W/O ANG PCTRS   SNOMED Code(s): 66852166


   Comment: MI, 3 stents 2011.  Second part of chemical nuclear stress 5/6.   





(2) VIPUL (obstructive sleep apnea)


Current Visit: No   Status: Chronic   Priority: Medium   Code(s): G47.33 - 

OBSTRUCTIVE SLEEP APNEA (ADULT) (PEDIATRIC)   SNOMED Code(s): 67616644


   Comment: Pt brought his own CPAP, seems to like to use it.   





(3) HTN (hypertension)


Current Visit: No   Status: Acute   Priority: Medium   Code(s): I10 - ESSENTIAL 

(PRIMARY) HYPERTENSION   SNOMED Code(s): 43873289


   Comment: Continue nebivolol.   





(4) Morbid obesity


Current Visit: Yes   Status: Acute   Code(s): E66.01 - MORBID (SEVERE) OBESITY 

DUE TO EXCESS CALORIES   SNOMED Code(s): 714130457


   Comment: BMI 49.4.   





(5) CVA (cerebral vascular accident)


Current Visit: No   Status: Acute   Priority: Medium   Code(s): I63.9 - 

CEREBRAL INFARCTION, UNSPECIFIED   SNOMED Code(s): 132024917


   Comment: S/p TPA 2016


MRI brain on 6/13/16 unremarkable


Echo 6/12/16: EF 55%, mod LVH

## 2017-05-05 NOTE — RAD
INDICATION:  Chest pain.



COMPARISON:  Comparison is made with a prior chest x-ray study from February 19, 2017.



TECHNIQUE: A portable view of the chest was obtained.



FINDINGS: Cardiac and mediastinal contours appear to be within normal limits.



The lungs are clear. No pleural effusion is seen.



IMPRESSION:  NO EVIDENCE FOR ACUTE DISEASE.

## 2017-05-06 VITALS — DIASTOLIC BLOOD PRESSURE: 80 MMHG | SYSTOLIC BLOOD PRESSURE: 148 MMHG

## 2017-05-06 RX ADMIN — CLOPIDOGREL SCH MG: 75 TABLET, FILM COATED ORAL at 08:26

## 2017-05-06 RX ADMIN — ATORVASTATIN CALCIUM SCH MG: 40 TABLET, FILM COATED ORAL at 08:26

## 2017-05-06 RX ADMIN — HEPARIN SODIUM SCH UNITS: 5000 INJECTION INTRAVENOUS; SUBCUTANEOUS at 05:51

## 2017-05-06 RX ADMIN — MOMETASONE FUROATE AND FORMOTEROL FUMARATE DIHYDRATE SCH PUFF: 200; 5 AEROSOL RESPIRATORY (INHALATION) at 08:02

## 2017-05-06 NOTE — DCNOTE
Subjective


Date of Service: 05/06/17


Interval History: 





No more chest discomforta.  No new c/o.





Objective


Active Medications: 








Acetaminophen (Tylenol Tab*)  650 mg PO Q4H PRN


   PRN Reason: FEVER/PAIN


   Last Admin: 05/04/17 21:29 Dose:  650 mg


Albuterol (Ventolin 2.5 Mg/3 Ml Neb.Sol*)  2.5 mg INH Q2H PRN


   PRN Reason: SOB/WHEEZING


Aspirin (Ecotrin Ec Tab*)  325 mg PO DAILY [facility]


Atorvastatin Calcium (Lipitor*)  40 mg PO DAILY [facility]


   Last Admin: 05/06/17 08:26 Dose:  40 mg


Clopidogrel Bisulfate (Plavix Tab*)  75 mg PO DAILY [facility]


   Last Admin: 05/06/17 08:26 Dose:  75 mg


Heparin Sodium (Porcine) (Heparin Vial(*))  5,000 units SUBCUT Q8HR [facility]


   Last Admin: 05/06/17 05:51 Dose:  5,000 units


Mometasone Furoate/Formoterol Fumar (Dulera 200/5 Mdi*)  2 puff INH BID [facility]


   Last Admin: 05/06/17 08:02 Dose:  2 puff


Nebivolol (Bystolic Tab (Nf))  5 mg PO DAILY [facility]


   Last Admin: 05/06/17 10:15 Dose:  5 mg


Ondansetron HCl (Zofran Inj*)  4 mg IV Q6H PRN


   PRN Reason: NAUSEA








 Vital Signs











  05/05/17 05/05/17 05/05/17





  15:31 19:42 19:48


 


Temperature 97.3 F 97.8 F 


 


Pulse Rate 62 64 68


 


Respiratory 20 16 18





Rate   


 


Blood Pressure 156/110 133/65 





(mmHg)   


 


O2 Sat by Pulse 99 99 95





Oximetry   














  05/05/17 05/05/17 05/06/17





  23:29 23:40 00:50


 


Temperature 97.8 F  


 


Pulse Rate 58 59 


 


Respiratory 16  





Rate   


 


Blood Pressure 165/106 139/84 





(mmHg)   


 


O2 Sat by Pulse 97  96





Oximetry   














  05/06/17 05/06/17 05/06/17





  03:18 07:42 08:00


 


Temperature 97.3 F 98.4 F 


 


Pulse Rate 59 58 


 


Respiratory 16 16 17





Rate   


 


Blood Pressure 134/80 148/80 





(mmHg)   


 


O2 Sat by Pulse 97 99 





Oximetry   














  05/06/17





  08:03


 


Temperature 


 


Pulse Rate 59


 


Respiratory 17





Rate 


 


Blood Pressure 





(mmHg) 


 


O2 Sat by Pulse 97





Oximetry 











Oxygen Devices in Use Now: None


Appearance: Alert, sitting on the edge of his bed.  In good spirits.  Looks 

comfortable.


Eyes: No Scleral Icterus


Neurological: Alert and Oriented x 3, NL Sensation


Result Diagrams: 


 05/05/17 05:35





 05/05/17 05:35





Assess/Plan/Problems-Billing


Assessment: 











- Patient Problems


(1) CAD (coronary artery disease)


Current Visit: Yes   Status: Acute   Code(s): I25.10 - ATHSCL HEART DISEASE OF 

NATIVE CORONARY ARTERY W/O ANG PCTRS   SNOMED Code(s): 02997423


   Comment: MI, 3 stents 2011.  Chemical nuclear stress showed old IVMI, nl LVEF

, no reversible defects, unchanged c/w 2/25/13.  Pt will take his ASA when he 

gets home.    





(2) VIPUL (obstructive sleep apnea)


Current Visit: No   Status: Chronic   Priority: Medium   Code(s): G47.33 - 

OBSTRUCTIVE SLEEP APNEA (ADULT) (PEDIATRIC)   SNOMED Code(s): 19192576


   Comment: Pt brought his own CPAP, seems to like to use it.   





(3) HTN (hypertension)


Current Visit: No   Status: Acute   Priority: Medium   Code(s): I10 - ESSENTIAL 

(PRIMARY) HYPERTENSION   SNOMED Code(s): 26465461


   Comment: Continue nebivolol.   





(4) Morbid obesity


Current Visit: Yes   Status: Acute   Code(s): E66.01 - MORBID (SEVERE) OBESITY 

DUE TO EXCESS CALORIES   SNOMED Code(s): 347347773


   Comment: BMI 49.4.   





(5) CVA (cerebral vascular accident)


Current Visit: No   Status: Acute   Priority: Medium   Code(s): I63.9 - 

CEREBRAL INFARCTION, UNSPECIFIED   SNOMED Code(s): 942825949


   Comment: S/p TPA 2016


MRI brain on 6/13/16 unremarkable


Echo 6/12/16: EF 55%, mod LVH





   


Status and Disposition: 





Discharge now.  Fup Dr. Webb.

## 2017-05-06 NOTE — RAD
Edited for charges.



INDICATION: Chest pain with multiple risk factors for coronary artery disease.



COMPARISON: Most recent similar examination is dated February 25, 2013 that 
demonstrated a

large fixed defect in the inferior lateral left ventricular wall.

 

TECHNIQUE: SPECT imaging was performed. Stress images only were acquired after 
the

injection of 25 millicuries of technetium 99m tetrofosmin.



The patient received intravenous Lexiscan prior to the stress image acquisition.



FINDINGS: 



The dynamic images reveal paradoxical wall motion at the anteroseptal left 
ventricular

wall similar in appearance to the previous cardiac scan.



Again noted is a fixed defect involving the inferior lateral ventricular wall. 
This

appearance is similar to the previous cardiac scan.



The ejection fraction is measured at 54%.



IMPRESSION:  There is scintigraphic evidence for a fixed defect involving the 
inferior

lateral left ventricular wall similar in appearance to the February 25, 2013 
examination. 



ASSESSMENT:  Intermediate to high risk.



Based on imaging criteria from ACC/AHA 2002 Guideline Update for the Management 
of

Patients With Chronic Stable Angina Table 23. Noninvasive Risk Stratification.



MTDD

## 2017-05-07 NOTE — DS
CC:  Bridger Webb MD

 

DISCHARGE SUMMARY:

 

DATE OF ADMISSION:

 

DATE OF DISCHARGE:  05/06/17

 

HOSPITAL COURSE:  This 52-year-old man presented with chest pain.  It was a right- sided pain, went 
down his right arm.  It is not related to exertion.  I note he has had quite a number of evaluations
 in the emergency room as well as admissions for chest pain, all with normal troponin levels, actual
ly over 100 normal troponin levels.  He did, however, have an inferior wall MI in 2011 with 3 stents
.

 

Rest of the history is detailed in the admission note.

 

The patient was admitted to the telemetry unit.  He had 3 troponin levels, all of which were normal.
  He underwent a chemical stress tests, which showed an LVEF of 54%.  There was an old inferior wall
 MI, unchanged in appearance from 02/25/13. There was no reversible defects.

 

The patient was discharged with no change in his medication.  He will take his daily aspirin when he
 gets home on the day of discharge.

 

FINAL DIAGNOSES:

1.  Atypical chest pain.

2.  Coronary artery disease.

3.  Obstructive sleep apnea.

4.  Hypertension.

5.  Morbid obesity.

6.  History of cerebrovascular accident.

 

DISCHARGE MEDICATIONS:

1.  Aspirin 325 mg daily.

2.  Calcium carbonate 500 mg p.r.n.

3.  Atorvastatin 40 mg daily.

4.  Nebivolol 5 mg daily. 5.  Clopidogrel 75 mg daily.

6.  Fluticasone/salmeterol 250/50 mcg 1 puff b.i.d.

7.  Albuterol 2.5 mg by inhalation q.i.d. p.r.n.

8.  Nitroglycerin 0.4 mg sublingual every 5 minutes p.r.n.

9.  Albuterol inhaler 2 puffs 4 times a day p.r.n.

 

 [id number]/[facility] #: [id number]

## 2017-06-13 ENCOUNTER — HOSPITAL ENCOUNTER (EMERGENCY)
Dept: HOSPITAL 25 - ED | Age: 52
Discharge: HOME | End: 2017-06-13
Payer: MEDICARE

## 2017-06-13 VITALS — DIASTOLIC BLOOD PRESSURE: 74 MMHG | SYSTOLIC BLOOD PRESSURE: 130 MMHG

## 2017-06-13 DIAGNOSIS — Z87.891: ICD-10-CM

## 2017-06-13 DIAGNOSIS — R07.9: Primary | ICD-10-CM

## 2017-06-13 DIAGNOSIS — R00.2: ICD-10-CM

## 2017-06-13 DIAGNOSIS — R06.02: ICD-10-CM

## 2017-06-13 LAB
ALBUMIN SERPL BCG-MCNC: 3.7 G/DL (ref 3.2–5.2)
ALP SERPL-CCNC: 99 U/L (ref 34–104)
ALT SERPL W P-5'-P-CCNC: 35 U/L (ref 7–52)
ANION GAP SERPL CALC-SCNC: 5 MMOL/L (ref 2–11)
AST SERPL-CCNC: 22 U/L (ref 13–39)
BUN SERPL-MCNC: 14 MG/DL (ref 6–24)
BUN/CREAT SERPL: 12.4 (ref 8–20)
CALCIUM SERPL-MCNC: 9.1 MG/DL (ref 8.6–10.3)
CHLORIDE SERPL-SCNC: 107 MMOL/L (ref 101–111)
CK SERPL-CCNC: 179 U/L (ref 10–223)
GLOBULIN SER CALC-MCNC: 2.8 G/DL (ref 2–4)
GLUCOSE SERPL-MCNC: 141 MG/DL (ref 70–100)
HCO3 SERPL-SCNC: 27 MMOL/L (ref 22–32)
HCT VFR BLD AUTO: 42 % (ref 42–52)
HGB BLD-MCNC: 13.6 G/DL (ref 14–18)
MAGNESIUM SERPL-MCNC: 2 MG/DL (ref 1.9–2.7)
MCH RBC QN AUTO: 27 PG (ref 27–31)
MCHC RBC AUTO-ENTMCNC: 32 G/DL (ref 31–36)
MCV RBC AUTO: 85 FL (ref 80–94)
POTASSIUM SERPL-SCNC: 3.6 MMOL/L (ref 3.5–5)
PROT SERPL-MCNC: 6.5 G/DL (ref 6.4–8.9)
RBC # BLD AUTO: 5.02 10^6/UL (ref 4–5.4)
SODIUM SERPL-SCNC: 139 MMOL/L (ref 133–145)
T4 SERPL-MCNC: 6.65 MCG/ML (ref 6.09–12.23)
TROPONIN I SERPL-MCNC: 0.01 NG/ML (ref ?–0.04)
TSH SERPL-ACNC: 2.27 MCIU/ML (ref 0.34–5.6)
WBC # BLD AUTO: 5.2 10^3/UL (ref 3.5–10.8)

## 2017-06-13 PROCEDURE — 84443 ASSAY THYROID STIM HORMONE: CPT

## 2017-06-13 PROCEDURE — 93005 ELECTROCARDIOGRAM TRACING: CPT

## 2017-06-13 PROCEDURE — 84436 ASSAY OF TOTAL THYROXINE: CPT

## 2017-06-13 PROCEDURE — 85025 COMPLETE CBC W/AUTO DIFF WBC: CPT

## 2017-06-13 PROCEDURE — 36415 COLL VENOUS BLD VENIPUNCTURE: CPT

## 2017-06-13 PROCEDURE — 96372 THER/PROPH/DIAG INJ SC/IM: CPT

## 2017-06-13 PROCEDURE — 82550 ASSAY OF CK (CPK): CPT

## 2017-06-13 PROCEDURE — 82553 CREATINE MB FRACTION: CPT

## 2017-06-13 PROCEDURE — 83735 ASSAY OF MAGNESIUM: CPT

## 2017-06-13 PROCEDURE — 71010: CPT

## 2017-06-13 PROCEDURE — 83880 ASSAY OF NATRIURETIC PEPTIDE: CPT

## 2017-06-13 PROCEDURE — 83605 ASSAY OF LACTIC ACID: CPT

## 2017-06-13 PROCEDURE — 84484 ASSAY OF TROPONIN QUANT: CPT

## 2017-06-13 PROCEDURE — 80053 COMPREHEN METABOLIC PANEL: CPT

## 2017-06-13 PROCEDURE — 99283 EMERGENCY DEPT VISIT LOW MDM: CPT

## 2017-06-13 NOTE — ED
Esthela NATHAN Thomas, scribed for Bridger Chaudhry MD on 06/13/17 at 1120 .





HPI Chest Pain





- HPI Summary


HPI Summary: 





Pt is a 51 y/o male presenting to the ED c/o CP that began this morning when he 

woke up. The pain is located in the left anterior chest without radiation. Pt 

reports that pain is severe, ranked 9/10 in the room and moderately severe, 6/10

, at triage. The pain is intermittent and pt reports that it pauses and then 

starts" with the pain being characterized as sharp. Aggravated and alleviated 

by nothing. Additionally complains of palpitations, SOB, and diaphoresis. 

Denies N/V, fevers, chills, cough. 





- History of Current Complaint


Chief Complaint: EDChestPainROMI


Time Seen by Provider: 06/13/17 09:51


Hx Obtained From: Patient


Onset/Duration: Started Hours Ago - this morning when he woke up, Still Present


Timing: Intermittent, Lasting Hours


Initial Severity: Severe


Current Severity: Severe


Pain Intensity: 6


Pain Scale Used: 0-10 Numeric


Chest Pain Location: Left Anterior


Chest Pain Radiates: No


Character: Sharp/Stabbing


Aggravating Factor(s): Nothing


Alleviating Factor(s): Nothing


Associated Signs and Symptoms: Positive: Shortness of Breath, Diaphoresis, 

Palpitations.  Negative: Fever, Chills, Nausea, Cough, Vomiting





- Additional Pertinent History


Primary Care Physician: [id number]





- Allergy/Home Medications


Allergies/Adverse Reactions: 


 Allergies











Allergy/AdvReac Type Severity Reaction Status Date / Time


 


Penicillins Allergy Severe Anaphylatic Verified 06/13/17 09:47





   Shock  


 


Amoxicillin Allergy Unknown Swelling Verified 06/13/17 09:47





[From Augmentin XR]     


 


Clavulanic Acid Allergy Unknown Swelling Verified 06/13/17 09:47





[From Augmentin XR]     


 


VINEGAR Allergy Intermediate Hives Uncoded 06/13/17 09:47


 


MUSHROOMS Allergy Unknown Rash Uncoded 06/13/17 09:47














PMH/Surg Hx/FS Hx/Imm Hx


Endocrine/Hematology History: Reports: Hx Anticoagulant Therapy - plavix


   Denies: Hx Diabetes, Hx Sickle Cell Disease, Hx Thyroid Disease


Cardiovascular History: Reports: Hx Angina, Hx Coronary Artery Disease, Hx 

Hypercholesterolemia, Hx Hypertension, Hx Myocardial Infarction, Other 

Cardiovascular Problems/Disorders - HEART DISEASE, CARDIAC EVENT MONITOR 

IMPLANTED


   Denies: Hx Congestive Heart Failure, Hx Pacemaker/ICD, Hx Valvular Heart 

Disease


Respiratory History: Reports: Hx Asthma, Hx Sleep Apnea


   Denies: Hx Chronic Obstructive Pulmonary Disease (COPD)


GI History: Reports: Hx Gastroesophageal Reflux Disease


   Denies: Hx Ulcer


 History: Reports: Hx Renal Disease - cysts, Other  Problems/Disorders - 

bilat cyst on kidneys


Musculoskeletal History: Reports: Hx Arthritis, Hx Back Problems, Other 

Musculoskeletal History - VIPUL


   Denies: Hx Scoliosis


Sensory History: Reports: Hx Cataracts - right eye, Hx Hearing Problem - LEFT 

EAR HEARING AID


   Denies: Hx Contacts or Glasses, Hx Hearing Aid


Opthamlomology History: Reports: Hx Cataracts - right eye


   Denies: Hx Contacts or Glasses


Neurological History: Reports: Hx Transient Ischemic Attacks (TIA)


   Denies: Hx Dementia, Hx Headaches, Hx Seizures, Other Neuro Impairments/

Disorders


Psychiatric History: Reports: Hx Depression


   Denies: Hx Panic Disorder





- Cancer History


Hx Chemotherapy: No





- Surgical History


Surgery Procedure, Year, and Place: DEC 2004 sinus [facility] ;.  2008 left knee 

arthroscopy [facility] ;.  2013 left tympanoplasty WITH STAPES IMPLANT ([facility] 

MALLEOUS HEAD SERIAL # [id number] - PER [facility] INFORMATION SENT TO MRI ON 6/ 13/2016; IMPLANT IS STAINLESS STEEL STATES SOME DEGREE OF MAGNETISM TESTED MRI 

SAFE AT 1.5T ONLY - DR VALDEZ APPROVED THIS INFORMATION FOR 1.5T ONLY).  [facility] ;.  

2015 LEFT REVISION TYMPANOPLASTY/ MASTOIDECTOMY [facility] ;.  8/ 2012 HEART CATH - NO 

STENTS ;.  2011 HEART CATH WITH cardiac stents x 4 (PROMUS DRUG-ELUTING STENT 

OKAY IN NORMAL MODE ONLY,  GAUSS/CM @ 1.5T) [facility] ;


Hx Anesthesia Reactions: Yes - SEIZURE LIKE ACTIVITY; REINTUBATION AFTER LEFT 

EAR 2013





- Immunization History


Date of Tetanus Vaccine: unknown


Date of Influenza Vaccine: Fall 2012


Infectious Disease History: No


Infectious Disease History: 


   Denies: Hx Clostridium Difficile, Hx Hepatitis, Hx Human Immunodeficiency 

Virus (HIV), Hx of Known/Suspected MRSA, Hx Shingles, Hx Tuberculosis, Hx Known/

Suspected VRE, Hx Known/Suspected VRSA, History Other Infectious Disease, 

Traveled Outside the [place] in Last 30 Days





- Family History


Known Family History: Positive: Cardiac Disease, Hypertension, Diabetes





- Social History


Alcohol Use: None


Hx Substance Use: No


Substance Use Type: Reports: None


Hx Tobacco Use: Yes


Smoking Status (MU): Former Smoker


Type: Cigarettes


Length of Time of Smoking/Using Tobacco: 10-12 YRS


Have You Smoked in the Last Year: No





Review of Systems


Positive: Skin Diaphoresis.  Negative: Fever, Chills


Positive: Palpitations, Chest Pain


Positive: Shortness Of Breath.  Negative: Cough


Negative: Vomiting, Nausea


All Other Systems Reviewed And Are Negative: Yes





Physical Exam





- Summary


Physical Exam Summary: 





VITAL SIGNS: Reviewed. 


GENERAL: Patient is an obese male who is lying comfortably in the stretcher. 

NAD. 


HEAD AND FACE: No signs of trauma. No ecchymosis, hematomas or skull 

depressions. No sinus tenderness. 


EYES: PERRLA, EOMI x 2, No injected conjunctiva, no nystagmus.


EARS: Hearing grossly intact. Ear canals and tympanic membranes are within 

normal limits. 


MOUTH: Oropharynx within normal limits. 


NECK: Supple, trachea is midline, no adenopathy, no JVD, no carotid bruit, no c-

spine tenderness, neck with full ROM.


CHEST: Symmetric, no tenderness at palpation 


LUNGS: Clear to auscultation bilaterally. No wheezing or crackles.


CVS: Regular rate and rhythm, S1 and S2 present, no murmurs or gallops 

appreciated. 


ABDOMEN: Soft, non-tender. No signs of distention. No rebound no guarding, and 

no masses palpated. Bowel sounds are normal. 


EXTREMITIES: FROM in all major joints, no edema, no cyanosis or clubbing.


NEURO: Alert and oriented x 3. No acute neurological deficits. Speech is normal 

and follows commands.


SKIN: Dry and warm


Triage Information Reviewed: Yes


Vital Signs On Initial Exam: 


 Initial Vitals











Temp Pulse Resp BP Pulse Ox


 


 97.3 F   73   20   136/85   96 


 


 06/13/17 09:40  06/13/17 09:40  06/13/17 09:40  06/13/17 09:40  06/13/17 09:40











Vital Signs Reviewed: Yes





- Jennifer Coma Scale


Coma Scale Total: 15





Diagnostics





- Vital Signs


 Vital Signs











  Temp Pulse Resp BP Pulse Ox


 


 06/13/17 11:00   60  17   96


 


 06/13/17 10:00   65  17   97


 


 06/13/17 09:53      96


 


 06/13/17 09:45   74  16   95


 


 06/13/17 09:44  97.3 F  69  18  136/85  96


 


 06/13/17 09:40  97.3 F  73  20  136/85  96














- Laboratory


Lab Results: 


 Lab Results











  06/13/17 06/13/17 06/13/17 Range/Units





  09:57 09:57 09:57 


 


WBC  5.2    (3.5-10.8)  10^3/ul


 


RBC  5.02    (4.0-5.4)  10^6/ul


 


Hgb  13.6 L    (14.0-18.0)  g/dl


 


Hct  42    (42-52)  %


 


MCV  85    (80-94)  fL


 


MCH  27    (27-31)  pg


 


MCHC  32    (31-36)  g/dl


 


RDW  15    (10.5-15)  %


 


Plt Count  179    (150-450)  10^3/ul


 


MPV  8    (7.4-10.4)  um3


 


Neut % (Auto)  56.9    (38-83)  %


 


Lymph % (Auto)  29.3    (25-47)  %


 


Mono % (Auto)  9.2 H    (1-9)  %


 


Eos % (Auto)  3.8    (0-6)  %


 


Baso % (Auto)  0.8    (0-2)  %


 


Absolute Neuts (auto)  3.0    (1.5-7.7)  10^3/ul


 


Absolute Lymphs (auto)  1.5    (1.0-4.8)  10^3/ul


 


Absolute Monos (auto)  0.5    (0-0.8)  10^3/ul


 


Absolute Eos (auto)  0.2    (0-0.6)  10^3/ul


 


Absolute Basos (auto)  0    (0-0.2)  10^3/ul


 


Absolute Nucleated RBC  0    10^3/ul


 


Nucleated RBC %  0.1    


 


Sodium   139   (133-145)  mmol/L


 


Potassium   3.6   (3.5-5.0)  mmol/L


 


Chloride   107   (101-111)  mmol/L


 


Carbon Dioxide   27   (22-32)  mmol/L


 


Anion Gap   5   (2-11)  mmol/L


 


BUN   14   (6-24)  mg/dL


 


Creatinine   1.13   (0.67-1.17)  mg/dL


 


Est GFR ( Amer)   87.3   (>60)  


 


Est GFR (Non-Af Amer)   67.9   (>60)  


 


BUN/Creatinine Ratio   12.4   (8-20)  


 


Glucose   141 H   ()  mg/dL


 


Lactic Acid    1.5  (0.5-2.0)  mmol/L


 


Calcium   9.1   (8.6-10.3)  mg/dL


 


Magnesium   2.0   (1.9-2.7)  mg/dL


 


Total Bilirubin   0.50   (0.2-1.0)  mg/dL


 


AST   22   (13-39)  U/L


 


ALT   35   (7-52)  U/L


 


Alkaline Phosphatase   99   ()  U/L


 


Total Creatine Kinase   179   ()  U/L


 


CK-MB (CK-2)   1.8   (0.6-6.3)  ng/mL


 


Troponin I   0.01   (<0.04)  ng/mL


 


B-Natriuretic Peptide    ( - 100) pg/mL


 


Total Protein   6.5   (6.4-8.9)  g/dL


 


Albumin   3.7   (3.2-5.2)  g/dL


 


Globulin   2.8   (2-4)  g/dL


 


Albumin/Globulin Ratio   1.3   (1-3)  


 


TSH   Pending   


 


Thyroxine (T4)   6.65   (6.09-12.23)  mcg/mL














  06/13/17 Range/Units





  09:57 


 


WBC   (3.5-10.8)  10^3/ul


 


RBC   (4.0-5.4)  10^6/ul


 


Hgb   (14.0-18.0)  g/dl


 


Hct   (42-52)  %


 


MCV   (80-94)  fL


 


MCH   (27-31)  pg


 


MCHC   (31-36)  g/dl


 


RDW   (10.5-15)  %


 


Plt Count   (150-450)  10^3/ul


 


MPV   (7.4-10.4)  um3


 


Neut % (Auto)   (38-83)  %


 


Lymph % (Auto)   (25-47)  %


 


Mono % (Auto)   (1-9)  %


 


Eos % (Auto)   (0-6)  %


 


Baso % (Auto)   (0-2)  %


 


Absolute Neuts (auto)   (1.5-7.7)  10^3/ul


 


Absolute Lymphs (auto)   (1.0-4.8)  10^3/ul


 


Absolute Monos (auto)   (0-0.8)  10^3/ul


 


Absolute Eos (auto)   (0-0.6)  10^3/ul


 


Absolute Basos (auto)   (0-0.2)  10^3/ul


 


Absolute Nucleated RBC   10^3/ul


 


Nucleated RBC %   


 


Sodium   (133-145)  mmol/L


 


Potassium   (3.5-5.0)  mmol/L


 


Chloride   (101-111)  mmol/L


 


Carbon Dioxide   (22-32)  mmol/L


 


Anion Gap   (2-11)  mmol/L


 


BUN   (6-24)  mg/dL


 


Creatinine   (0.67-1.17)  mg/dL


 


Est GFR ( Amer)   (>60)  


 


Est GFR (Non-Af Amer)   (>60)  


 


BUN/Creatinine Ratio   (8-20)  


 


Glucose   ()  mg/dL


 


Lactic Acid   (0.5-2.0)  mmol/L


 


Calcium   (8.6-10.3)  mg/dL


 


Magnesium   (1.9-2.7)  mg/dL


 


Total Bilirubin   (0.2-1.0)  mg/dL


 


AST   (13-39)  U/L


 


ALT   (7-52)  U/L


 


Alkaline Phosphatase   ()  U/L


 


Total Creatine Kinase   ()  U/L


 


CK-MB (CK-2)   (0.6-6.3)  ng/mL


 


Troponin I   (<0.04)  ng/mL


 


B-Natriuretic Peptide  18 ( - 100) pg/mL


 


Total Protein   (6.4-8.9)  g/dL


 


Albumin   (3.2-5.2)  g/dL


 


Globulin   (2-4)  g/dL


 


Albumin/Globulin Ratio   (1-3)  


 


TSH   


 


Thyroxine (T4)   (6.09-12.23)  mcg/mL











Result Diagrams: 


 06/13/17 09:57





 06/13/17 09:57


Lab Statement: Any lab studies that have been ordered have been reviewed, and 

results considered in the medical decision making process.





- Radiology


  ** CXR


Xray Interpretation: No Acute Changes - NO ACTIVE CARDIOPULMONARY DISEASE IS 

NOTED. 10:38.


Radiology Interpretation Completed By: Radiologist





- EKG


  ** 09:43


Cardiac Rate: NL - 60 bpm


EKG Rhythm: Sinus Rhythm


EKG Interpretation: No ST elevation. T-wave inversions on V5, V6, and aVL. 


EKG Comparison: No Significant Change





Chest Pain Course/Dx





- Course


Course Of Treatment: Pt is a 51 y/o male presenting to the ED c/o CP that began 

this morning when he woke up. The pain is located in the left anterior chest 

without radiation. Pt reports that pain is severe, ranked 9/10 in the room and 

moderately severe, 6/10, at triage. The pain is intermittent and pt reports 

that it pauses and then starts" with the pain being characterized as sharp. 

Aggravated and alleviated by nothing. Additionally complains of palpitations, 

SOB, and diaphoresis. Denies N/V, fevers, chills, cough.  Test results were 

within normal limits. The troponin #1 was 0.01. Four hours later, the troponin #

2 was 0.01. He was given NTG with no symptom improvement. He was given Toradol 

and symtoms were resolved. At this point, patient is asymptomatic. There is low 

suspicion for ACS becuase the troponin is negative and the chest pain resolved 

with Toradol. There is no suspicion of pulmonary embolism because the patient 

is not tachycardic and not hypoxic.  The patient was discharged home to follow 

up with primary care provider. He was recommended to return if he developes any 

other chest pain, SOB, N/V. He understands and agrees. He is hemodynamically 

stable and A&Ox3.





- Chest Pain


Differential Diagnosis/HQI/PQRI: Acute MI, ACS, Angina, CHF, Chest Wall, GI 

Disease, Lower Respiratory Infection





- Diagnoses


Provider Diagnoses: 


 Chest pain








Discharge





- Discharge Plan


Condition: Stable


Disposition: HOME


Patient Education Materials:  Chest Pain (ED)


Referrals: 


Bridger Webb MD [Primary Care Provider] - 3 Days





The documentation as recorded by the Esthela song Thomas accurately reflects 

the service I personally performed and the decisions made by me, Bridger Chaudhry MD.

## 2017-06-13 NOTE — RAD
Indication: Chest pain.



Single frontal view of the chest performed at 1038 hours was reviewed.



Comparison is made with previous exam dated May 14, 2017.



No mediastinal shift is noted. Heart is of normal size and configuration. Lung fields

appear clear.



IMPRESSION: NO ACTIVE CARDIOPULMONARY DISEASE IS NOTED.

## 2017-07-05 ENCOUNTER — HOSPITAL ENCOUNTER (EMERGENCY)
Dept: HOSPITAL 25 - ED | Age: 52
Discharge: HOME | End: 2017-07-05
Payer: MEDICARE

## 2017-07-05 VITALS — DIASTOLIC BLOOD PRESSURE: 69 MMHG | SYSTOLIC BLOOD PRESSURE: 125 MMHG

## 2017-07-05 DIAGNOSIS — Z87.891: ICD-10-CM

## 2017-07-05 DIAGNOSIS — R10.84: Primary | ICD-10-CM

## 2017-07-05 LAB
ALBUMIN SERPL BCG-MCNC: 3.8 G/DL (ref 3.2–5.2)
ALP SERPL-CCNC: 108 U/L (ref 34–104)
ALT SERPL W P-5'-P-CCNC: 26 U/L (ref 7–52)
ANION GAP SERPL CALC-SCNC: 6 MMOL/L (ref 2–11)
AST SERPL-CCNC: 19 U/L (ref 13–39)
BUN SERPL-MCNC: 13 MG/DL (ref 6–24)
BUN/CREAT SERPL: 10.7 (ref 8–20)
CALCIUM SERPL-MCNC: 9.3 MG/DL (ref 8.6–10.3)
CHLORIDE SERPL-SCNC: 105 MMOL/L (ref 101–111)
GLOBULIN SER CALC-MCNC: 3.2 G/DL (ref 2–4)
GLUCOSE SERPL-MCNC: 97 MG/DL (ref 70–100)
HCO3 SERPL-SCNC: 27 MMOL/L (ref 22–32)
HCT VFR BLD AUTO: 45 % (ref 42–52)
HGB BLD-MCNC: 14.7 G/DL (ref 14–18)
LIPASE SERPL-CCNC: 23 U/L (ref 11–82)
MCH RBC QN AUTO: 28 PG (ref 27–31)
MCHC RBC AUTO-ENTMCNC: 33 G/DL (ref 31–36)
MCV RBC AUTO: 85 FL (ref 80–94)
POTASSIUM SERPL-SCNC: 4.1 MMOL/L (ref 3.5–5)
PROT SERPL-MCNC: 7 G/DL (ref 6.4–8.9)
RBC # BLD AUTO: 5.3 10^6/UL (ref 4–5.4)
SODIUM SERPL-SCNC: 138 MMOL/L (ref 133–145)
WBC # BLD AUTO: 4.8 10^3/UL (ref 3.5–10.8)

## 2017-07-05 PROCEDURE — 80053 COMPREHEN METABOLIC PANEL: CPT

## 2017-07-05 PROCEDURE — 85610 PROTHROMBIN TIME: CPT

## 2017-07-05 PROCEDURE — 71010: CPT

## 2017-07-05 PROCEDURE — 81015 MICROSCOPIC EXAM OF URINE: CPT

## 2017-07-05 PROCEDURE — 81003 URINALYSIS AUTO W/O SCOPE: CPT

## 2017-07-05 PROCEDURE — 74177 CT ABD & PELVIS W/CONTRAST: CPT

## 2017-07-05 PROCEDURE — 36415 COLL VENOUS BLD VENIPUNCTURE: CPT

## 2017-07-05 PROCEDURE — 83605 ASSAY OF LACTIC ACID: CPT

## 2017-07-05 PROCEDURE — 96374 THER/PROPH/DIAG INJ IV PUSH: CPT

## 2017-07-05 PROCEDURE — 83690 ASSAY OF LIPASE: CPT

## 2017-07-05 PROCEDURE — 85730 THROMBOPLASTIN TIME PARTIAL: CPT

## 2017-07-05 PROCEDURE — 85025 COMPLETE CBC W/AUTO DIFF WBC: CPT

## 2017-07-05 PROCEDURE — 86140 C-REACTIVE PROTEIN: CPT

## 2017-07-05 PROCEDURE — 96375 TX/PRO/DX INJ NEW DRUG ADDON: CPT

## 2017-07-05 PROCEDURE — 76775 US EXAM ABDO BACK WALL LIM: CPT

## 2017-07-05 PROCEDURE — 76870 US EXAM SCROTUM: CPT

## 2017-07-05 PROCEDURE — 99285 EMERGENCY DEPT VISIT HI MDM: CPT

## 2017-07-05 RX ADMIN — SODIUM CHLORIDE ONE MLS/HR: 900 IRRIGANT IRRIGATION at 11:04

## 2017-07-05 NOTE — RAD
CLINICAL HISTORY: Left lower quadrant pain, left flank pain



COMPARISON: February 19, 2017



TECHNIQUE: Multiple contiguous axial CT scans were obtained of the abdomen and pelvis

after the administration of intravenous contrast. Coronal and sagittal multiplanar

reformations are submitted for review.  Oral contrast was administered.  Delayed images

were obtained through the abdomen and pelvis.



FINDINGS:    



LUNG BASES: The lung bases are clear.



LIVER: The liver is enlarged measuring 20 cm in long axis. The liver is diffusely low in

attenuation.

BILE DUCTS: There is no intrahepatic or extrahepatic biliary dilatation.

GALLBLADDER: The gallbladder is normal, without pericholecystic inflammatory change.



PANCREAS: The pancreas is normal, without mass or ductal dilatation.

SPLEEN: Normal in size and appearance.



UPPER GI TRACT: Evaluation of the gastrointestinal tract is limited by incomplete gastric

distention. The upper GI tract is unremarkable.

SMALL BOWEL AND MESENTERY: The small bowel is normal in contour, course, and caliber.

There is no obstruction or dilatation.

COLON: The colon is normal in contour, course, caliber. There is no pericolonic

inflammatory change.



ADRENALS: Normal bilaterally.

KIDNEYS: Bilateral renal cysts are noted.

BLADDER: The bladder is smooth in contour.



PELVIC ORGANS: The prostate gland is normal. The seminal vesicles are symmetric.



AORTA: The aorta is normal.

IVC: Unremarkable



LYMPH NODES: There is no lymphadenopathy by size criteria.



ABDOMINAL WALL: There is no evidence for abdominal wall hernia.

BONES AND SOFT TISSUES: There are mild diffuse degenerative changes.

OTHER: None



IMPRESSION:

1.  HEPATOMEGALY WITH FATTY INFILTRATION OF LIVER.

2.  NO ACUTE CT PATHOLOGY OF THE VISUALIZED ABDOMEN OR PELVIS

## 2017-07-05 NOTE — RAD
HISTORY: Left testicular and inguinal pain



COMPARISONS: December 13, 2013



TECHNIQUE: Multiple transverse and longitudinal ultrasound images were obtained of the

scrotum, using grayscale, color Doppler, and spectral Doppler imaging.



FINDINGS:



RIGHT:



RIGHT TESTICLE: The right testicle measures 3.7 x 2.4 x 2.9 cm. The right testicle is

homogeneous in echotexture, without testicular parenchymal mass. A hyperechoic area is

felt to represent the rete testis.. Normal arterial and venous waveforms are identified

within the right testicle on spectral Doppler imaging.

RIGHT EPIDIDYMIS: The right epididymis measures 1.3  cm at the head. An appendix

epididymis is noted.

RIGHT SCROTUM: There is large right hydrocele with minimal echogenic debris.





LEFT:



LEFT TESTICLE: The left testicle measures 3.5 x 2.2 x 2.7 cm. The left testicle is

homogeneous in echotexture, without testicular parenchymal mass. Normal arterial and

venous waveforms are identified within the left testicle on spectral Doppler imaging.

LEFT EPIDIDYMIS: The left epididymis measures 1.8  cm at the head. An appendix epididymis

is noted.

LEFT SCROTUM: There is large left hydrocele with internal echogenic debris.



OTHER: Directed images of the left inguinal region in the area of pain demonstrate no

sonographic abnormality.



IMPRESSION: 

1.  NO SONOGRAPHIC FEATURES OF TORSION. PLEASE NOTE THAT PARTIAL OR INTERMITTENT TORSION

MAY BE SONOGRAPHICALLY NORMAL.

2.  NO TESTICULAR PARENCHYMAL MASS.

3.  LARGE BILATERAL HYDROCELES.

4.  NO SONOGRAPHIC ABNORMALITY IN THE AREA OF PAIN OF THE LEFT INGUINAL REGION.

## 2017-07-05 NOTE — RAD
Indication: Left flank pain.



Real-time sonography of the left kidney was performed.



The left kidney measures 10.9 x 5.2 x 5.3 cm. A cyst is noted in the upper pole of left

kidney measuring 5.0 x 4.4 x 6.5 cm. Additional cyst is noted measuring 1.5 x 1.2 x 1.6

cm. No hydronephrosis is noted.



IMPRESSION: Left renal cyst. No hydronephrosis is noted.

## 2017-07-05 NOTE — RAD
Indication: Flank pain, pneumonia.



Single frontal view of the chest performed at 1652 hours was reviewed.



Comparison is made with previous exam dated June 13, 2017.



No mediastinal shift is noted. Cardiomegaly is noted. No pleural fluid, pneumonia or

pneumothorax is noted.



IMPRESSION: NO ACTIVE CARDIOPULMONARY DISEASE IS NOTED. CARDIOMEGALY.

## 2017-07-05 NOTE — CONS
CC:  Dr. Webb; Dr. Rosenthal *

 

CONSULTATION REPORT:

 

DATE OF CONSULTATION:  17 - EMERGENCY DEPT

 

PRIMARY CARE PROVIDER:  Dr. Webb.

 

CHIEF COMPLAINT:  Left flank pain for 3 days.

 

HISTORY OF PRESENT ILLNESS:  Delta Rodriguez is a 52-year-old morbidly obese male 
with history of coronary artery disease, who presented to the hospital 
complaining of left flank pain for the past 3 days.  He has no other associated 
factors.  He stated that his appetite had been fine.  He denies nausea, 
vomiting.  He denies constipation.  He denies urinary symptoms.  The pain is 
localized at the left lung radiating to the front of his left abdomen and to 
his groin.  He did have evaluation with Dr. Fernandez earlier on this year for 
microscopic hematuria, which was nonconclusive.  At that point, he was noted to 
have small renal cysts and no nephrolithiasis.  Here, he had a CT of the 
abdomen obtained, which was unremarkable.  His C-reactive protein is not 
markedly elevated, but noted at 8.  He has no leukocytosis.  He denies any 
fevers.  He stated that the pain gets worse when he lies down and when he moves 
to a sitting position.  It is better when he is walking.

 

Consultation was requested for the intractable pain by the ED physician, Dr. Anglin.

 

PAST MEDICAL HISTORY:

1.  History of frequent evaluations for chest pains in the past.  Most recent 
evaluation was noted to be an overnight hospitalization in May 2017 when at 
that point, cardiac stress test was performed and noted an area of known 
infarct.  There were no new changes noted.

2.  History of coronary artery disease status post stenting in the past, under 
the care of Dr. Rosenthal.

3.  History of morbid obesity with a BMI of 47.

4.  Obstructive sleep apnea, on CPAP.

5.  History of palpitations with Holter, noted to have frequent PVCs in 2016.

6.  Dyslipidemia.

7.  Asthma.

8.  History of peripheral venous insufficiency.

9.  History of chronic osteoarthritis and pain in the left leg.

10.  Hypertension.

11.  History of an episode of 7 beats of V-tach during Holter monitoring in 
2016.

12.  Status post knee arthroplasty.

13.  History of inner ear surgery and sinus surgery.

 

MEDICATIONS AT HOME:  Include:

1.  Nitroglycerin on a p.r.n. basis.

2.  Bystolic 5 mg daily.

3.  Advair Diskus 1 puff inhalation b.i.d.

4.  Plavix 75 mg daily.

5.  TUMS on a p.r.n. basis.

6.  Lipitor 40 mg daily.

7.  Aspirin 325 mg daily.

8.  Albuterol inhaler on a p.r.n. basis.

9.  Nebulizers with albuterol on a p.r.n. basis.

 

ALLERGIES:  Include PENICILLIN, AMOXICILLIN, CLAVULANIC ACID, VINEGAR, and 
MUSHROOMS.

 

FAMILY HISTORY:  Positive for a brother, who  of sudden cardiac death at 
the age of 53.  Mother with history of heart and kidney disease.

 

SOCIAL HISTORY:  The patient is single, lives alone.  His surrogate decision 
maker is his sister, Katlyn Reece.  The patient has a history of one pack 
per day smoking for 10 years including .  He denies any alcohol use.  He is 
independent with his activities of daily living.

 

REVIEW OF SYSTEMS:  Please see history of present illness.  In addition to the 
above mentioned, the patient stated that he had been trying to lose weight and 
doing fine with that.  He lost approximately 8 pounds in the past month.  He 
has history of chronic bilateral lower extremity edema.  He stated that due to 
his coronary artery disease, he is not supposed to lift too heavy things.  He 
had been ambulating without chest pain or shortness of breath apart from the 
recent evaluation in May 2017.  All the remaining 14 systems were reviewed with 
the patient and were otherwise negative.

 

PHYSICAL EXAMINATION:  Blood pressure of 129/87, heart rate of 58 and regular, 
respiratory rate 16, oxygen saturation 97% on room air, temperature of 97.1. 
General:  The patient is a pleasant 52-year-old male with a BMI of 47.  The 
patient is in no acute distress.  Alert, awake and oriented x3.  HEENT:  Head 
atraumatic, normocephalic.  Eyes:  Pupils are equal, reactive to light and 
accommodation. Oropharynx clear.  Mucosa moist.  Neck:  Supple.  No JVD, no 
bruits bilaterally. Respiratory:  Clear to auscultation bilaterally.  
Cardiovascular:  Regular rate and rhythm.  No murmur.  Abdomen:  Soft, 
nontender.  Bowel sounds present in all 4 quadrants.  Back:  On evaluation of 
the patient's back, the patient has point tenderness in the area of the mid 
left lung.  The area is approximately 5 cm in diameter.  He is actually very 
superficially tender in the area.  There is no evidence of shingles on skin 
evaluation.  On palpation of the thoracic and lumbar spine, there is no 
tenderness on palpation of the vertebral bodies.  Skin:  On evaluation of the 
patient's skin, patient has multiple scars from what appears to be excoriations 
about the lower extremities.  There are no rashes or active infections noted.  
On neuro evaluation, the patient is clear.  Cranial nerves II through XII are 
grossly intact.  Motor strength is 5/5 bilaterally.  Extremities: On evaluation 
of the lower extremities, there is bilateral known pitting +1 pedal edema.  
Pulses 2+ bilaterally.  There is no clubbing or cyanosis.

 

DIAGNOSTIC STUDIES/LAB DATA:  Showed white blood count of 4.8, hemoglobin of 
14.7, hematocrit of 45, and platelets of 179.  Sodium was 138, potassium 4.1, 
chloride 105, carbon dioxide 27, BUN 13, creatinine 1.22.  Liver function tests 
were unremarkable.  C-reactive protein of 8.04.  INR was 0.95, lipase was 23. 
Urinalysis showed +1 rbc's.

 

CT of the abdomen and pelvis obtained today, impression:  "Hepatomegaly and 
fatty infiltration of the liver, no acute pathology of the visualized abdomen 
and pelvis."

 

Testicular ultrasound impression:  "No sonographic features of torsion.  Please 
note that the partial or intermittent torsion may be sonographically normal.  
No testicular parenchymal mass.  Large bilateral hydroceles.  No significant ___
__ abnormality in the area of _____ region."

 

Portable chest x-ray reviewed by myself shows mild vascular congestion.  The 
Radiology report is still pending.

 

ASSESSMENT AND PLAN:  A 52-year-old morbidly obese male with history of 
coronary artery disease who presents with left flank pain.  The pain is 
positional, worse when lying down and sitting up, better when standing up.  It 
appears to be following a dermatome and is most likely radiculopathy and 
musculoskeletal related. It could also be shingles related, although the 
patient does not appear to have any shingles eruptions noted on the skin.  At 
this point, since the patient's CT was with IV contrast and that could mask 
renal stone, I will obtain a limited left kidney ultrasound to rule out 
hydronephrosis or nephrolithiasis on the left side. If that is negative, the 
patient most likely has musculoskeletal pain and will be discharged home.  He 
had been using hydrocodone at home and he can continue using it.  He is also 
asked to see his primary care provider with recommendations to follow up with 
outpatient physical therapy.

 

In regards to his chronic medical problem, there is no indication of acute 
cardiac problems, respiratory issues, and he is advised to continue to take his 
outpatient medications.

 

The case was discussed with Dr. Anglin, who agrees with the recommendations.

 

TIME SPENT:  Approximately 62 minutes were spent on consultation of this 
patient. More than half of that time was spent face to face with the patient in 
counseling and management of care.

 

 [id number] #: [id number]

STEVE

## 2017-07-05 NOTE — ED
Justin NATHAN Alfonso, scribed for Zach Anglin MD on 07/05/17 at 1022 .





Abdominal Pain/Male





- HPI Summary


HPI Summary: 


This patient is a 52 year old male presenting to [facility] c/o sharp left-sided 

flank pain since yesterday. The pain has become worse and is now in his back 

and groin. He reports experiencing a similar pain in the past. Last BM this 

morning. He rates the pain 8/10 in severity. Sx aggravated and alleviated by 

nothing. He denies testicular pain, urinary symptoms, fever, chills and melena. 

No abdominal PSHx reported.





- History of Current Complaint


Chief Complaint: EDFlankPain


Stated Complaint: LT SIDE FLANK PAIN


Time Seen by Provider: 07/05/17 10:14


Hx Obtained From: Patient


Onset/Duration: Sudden Onset, Lasting Days - Yesterday, Worse Since


Timing: Constant


Severity Initially: Severe


Severity Currently: Severe


Pain Intensity: 8


Pain Scale Used: 0-10 Numeric


Location: Flank - Left


Radiates: Yes


Radiates to: Back, Other - Groin


Character: Sharp


Aggravating Factor(s): Nothing


Alleviating Factor(s): Nothing


Associated Signs And Symptoms: Positive: Other - Negative testicular pain, 

urinary symptoms, fever, chills and melena.





- Allergies/Home Medications


Allergies/Adverse Reactions: 


 Allergies











Allergy/AdvReac Type Severity Reaction Status Date / Time


 


Penicillins Allergy Severe Anaphylatic Verified 07/05/17 08:45





   Shock  


 


Amoxicillin Allergy Unknown Swelling Verified 07/05/17 08:45





[From Augmentin XR]     


 


Clavulanic Acid Allergy Unknown Swelling Verified 07/05/17 08:45





[From Augmentin XR]     


 


VINEGAR Allergy Intermediate Hives Uncoded 07/05/17 08:45


 


MUSHROOMS Allergy Unknown Rash Uncoded 07/05/17 08:45














PMH/Surg Hx/FS Hx/Imm Hx


Endocrine/Hematology History: Reports: Hx Anticoagulant Therapy - plavix


   Denies: Hx Diabetes, Hx Sickle Cell Disease, Hx Thyroid Disease


Cardiovascular History: Reports: Hx Angina, Hx Coronary Artery Disease, Hx 

Hypercholesterolemia, Hx Hypertension, Hx Myocardial Infarction, Other 

Cardiovascular Problems/Disorders - HEART DISEASE, CARDIAC EVENT MONITOR 

IMPLANTED


   Denies: Hx Congestive Heart Failure, Hx Pacemaker/ICD, Hx Valvular Heart 

Disease


Respiratory History: Reports: Hx Asthma, Hx Sleep Apnea


   Denies: Hx Chronic Obstructive Pulmonary Disease (COPD)


GI History: Reports: Hx Gastroesophageal Reflux Disease


   Denies: Hx Ulcer


 History: Reports: Hx Renal Disease - cysts, Other  Problems/Disorders - 

bilat cyst on kidneys


Musculoskeletal History: Reports: Hx Arthritis, Hx Back Problems, Other 

Musculoskeletal History - VIPUL


   Denies: Hx Scoliosis


Sensory History: Reports: Hx Cataracts - right eye, Hx Hearing Problem - LEFT 

EAR HEARING AID


   Denies: Hx Contacts or Glasses, Hx Hearing Aid


Opthamlomology History: Reports: Hx Cataracts - right eye


   Denies: Hx Contacts or Glasses


Neurological History: Reports: Hx Transient Ischemic Attacks (TIA)


   Denies: Hx Dementia, Hx Headaches, Hx Seizures, Other Neuro Impairments/

Disorders


Psychiatric History: Reports: Hx Depression


   Denies: Hx Panic Disorder





- Cancer History


Hx Chemotherapy: No





- Surgical History


Surgery Procedure, Year, and Place: DEC 2004 sinus [facility] ;.  2008 left knee 

arthroscopy [facility] ;.  2013 left tympanoplasty WITH STAPES IMPLANT ([facility] 

MALLEOUS HEAD SERIAL # [id number] - PER [facility] INFORMATION SENT TO MRI ON 6/ 13/2016; IMPLANT IS STAINLESS STEEL STATES SOME DEGREE OF MAGNETISM TESTED MRI 

SAFE AT 1.5T ONLY - DR VALDEZ APPROVED THIS INFORMATION FOR 1.5T ONLY).  [facility] ;.  

2015 LEFT REVISION TYMPANOPLASTY/ MASTOIDECTOMY [facility] ;.  8/ 2012 HEART CATH - NO 

STENTS ;.  2011 HEART CATH WITH cardiac stents x 4 (PROMUS DRUG-ELUTING STENT 

OKAY IN NORMAL MODE ONLY,  GAUSS/CM @ 1.5T) [facility] ;


Hx Anesthesia Reactions: Yes - SEIZURE LIKE ACTIVITY; REINTUBATION AFTER LEFT 

EAR 2013





- Immunization History


Date of Tetanus Vaccine: unknown


Date of Influenza Vaccine: Fall 2012


Infectious Disease History: No


Infectious Disease History: 


   Denies: Hx Clostridium Difficile, Hx Hepatitis, Hx Human Immunodeficiency 

Virus (HIV), Hx of Known/Suspected MRSA, Hx Shingles, Hx Tuberculosis, Hx Known/

Suspected VRE, Hx Known/Suspected VRSA, History Other Infectious Disease, 

Traveled Outside the [place] in Last 30 Days





- Family History


Known Family History: Positive: Cardiac Disease, Hypertension, Diabetes





- Social History


Alcohol Use: None


Hx Substance Use: No


Substance Use Type: Reports: None


Hx Tobacco Use: Yes


Smoking Status (MU): Former Smoker


Type: Cigarettes


Length of Time of Smoking/Using Tobacco: 10-12 YRS


Have You Smoked in the Last Year: No





Review of Systems


Negative: Fever, Chills


Positive: Other - Negative melena


Positive: flank pain - sharp left-sided flank pain that is now also in his back 

and groin., other - Negative testicular pain, urinary symptoms


All Other Systems Reviewed And Are Negative: Yes





Physical Exam


Triage Information Reviewed: Yes


Vital Signs On Initial Exam: 


 Initial Vitals











Temp Pulse Resp BP Pulse Ox


 


 97.1 F   61   18   148/96   98 


 


 07/05/17 08:40  07/05/17 08:40  07/05/17 08:40  07/05/17 08:40  07/05/17 08:40











Vital Signs Reviewed: Yes


Appearance: Positive: Well-Appearing, Pain Distress - Moderate


Skin: Positive: Warm, Skin Color Reflects Adequate Perfusion, Dry


Head/Face: Positive: Normal Head/Face Inspection


Eyes: Positive: EOMI, LISA


ENT: Positive: Normal ENT inspection


Neck: Positive: Supple, Nontender


Respiratory/Lung Sounds: Positive: Clear to Auscultation, Breath Sounds Present


Cardiovascular: Positive: RRR


Abdomen Description: Positive: Soft, Other: - Tender LLQ and left inguinal area


Bowel Sounds: Positive: Present


Musculoskeletal: Positive: Normal, Strength/ROM Intact


Neurological: Positive: Normal, Sensory/Motor Intact, Alert, Oriented to Person 

Place, Time


Psychiatric: Positive: Affect/Mood Appropriate





- [place] Coma Scale


Coma Scale Total: 15





Diagnostics





- Vital Signs


 Vital Signs











  Temp Pulse Resp BP Pulse Ox


 


 07/05/17 10:00   63  13   98


 


 07/05/17 09:58   64   148/85  98


 


 07/05/17 08:40  97.1 F  61  18  148/96  98














- Laboratory


Lab Results: 


 Lab Results











  07/05/17 07/05/17 07/05/17 Range/Units





  11:10 11:10 11:10 


 


WBC  4.8    (3.5-10.8)  10^3/ul


 


RBC  5.30    (4.0-5.4)  10^6/ul


 


Hgb  14.7    (14.0-18.0)  g/dl


 


Hct  45    (42-52)  %


 


MCV  85    (80-94)  fL


 


MCH  28    (27-31)  pg


 


MCHC  33    (31-36)  g/dl


 


RDW  15    (10.5-15)  %


 


Plt Count  179    (150-450)  10^3/ul


 


MPV  8    (7.4-10.4)  um3


 


Neut % (Auto)  59.6    (38-83)  %


 


Lymph % (Auto)  27.9    (25-47)  %


 


Mono % (Auto)  8.2    (1-9)  %


 


Eos % (Auto)  3.7    (0-6)  %


 


Baso % (Auto)  0.6    (0-2)  %


 


Absolute Neuts (auto)  2.9    (1.5-7.7)  10^3/ul


 


Absolute Lymphs (auto)  1.3    (1.0-4.8)  10^3/ul


 


Absolute Monos (auto)  0.4    (0-0.8)  10^3/ul


 


Absolute Eos (auto)  0.2    (0-0.6)  10^3/ul


 


Absolute Basos (auto)  0    (0-0.2)  10^3/ul


 


Absolute Nucleated RBC  0.01    10^3/ul


 


Nucleated RBC %  0.1    


 


INR (Anticoag Therapy)   0.95   (0.89-1.11)  


 


APTT   37.2 H   (26.0-36.3)  seconds


 


Sodium    138  (133-145)  mmol/L


 


Potassium    4.1  (3.5-5.0)  mmol/L


 


Chloride    105  (101-111)  mmol/L


 


Carbon Dioxide    27  (22-32)  mmol/L


 


Anion Gap    6  (2-11)  mmol/L


 


BUN    13  (6-24)  mg/dL


 


Creatinine    1.22 H  (0.67-1.17)  mg/dL


 


Est GFR ( Amer)    79.9  (>60)  


 


Est GFR (Non-Af Amer)    62.1  (>60)  


 


BUN/Creatinine Ratio    10.7  (8-20)  


 


Glucose    97  ()  mg/dL


 


Lactic Acid     (0.5-2.0)  mmol/L


 


Calcium    9.3  (8.6-10.3)  mg/dL


 


Total Bilirubin    0.50  (0.2-1.0)  mg/dL


 


AST    19  (13-39)  U/L


 


ALT    26  (7-52)  U/L


 


Alkaline Phosphatase    108 H  ()  U/L


 


C-Reactive Protein    8.04 H  (< 5.00)  mg/L


 


Total Protein    7.0  (6.4-8.9)  g/dL


 


Albumin    3.8  (3.2-5.2)  g/dL


 


Globulin    3.2  (2-4)  g/dL


 


Albumin/Globulin Ratio    1.2  (1-3)  


 


Lipase    23  (11.0-82.0)  U/L


 


Urine Color     


 


Urine Appearance     


 


Urine pH     (5-9)  


 


Ur Specific Gravity     (1.010-1.030)  


 


Urine Protein     (Negative)  


 


Urine Ketones     (Negative)  


 


Urine Blood     (Negative)  


 


Urine Nitrate     (Negative)  


 


Urine Bilirubin     (Negative)  


 


Urine Urobilinogen     (Negative)  


 


Ur Leukocyte Esterase     (Negative)  


 


Urine WBC (Auto)     (Absent)  


 


Urine RBC (Auto)     (Absent)  


 


Urine Bacteria     (Absent)  


 


Urine Glucose     (Negative)  














  07/05/17 07/05/17 Range/Units





  11:10 11:55 


 


WBC    (3.5-10.8)  10^3/ul


 


RBC    (4.0-5.4)  10^6/ul


 


Hgb    (14.0-18.0)  g/dl


 


Hct    (42-52)  %


 


MCV    (80-94)  fL


 


MCH    (27-31)  pg


 


MCHC    (31-36)  g/dl


 


RDW    (10.5-15)  %


 


Plt Count    (150-450)  10^3/ul


 


MPV    (7.4-10.4)  um3


 


Neut % (Auto)    (38-83)  %


 


Lymph % (Auto)    (25-47)  %


 


Mono % (Auto)    (1-9)  %


 


Eos % (Auto)    (0-6)  %


 


Baso % (Auto)    (0-2)  %


 


Absolute Neuts (auto)    (1.5-7.7)  10^3/ul


 


Absolute Lymphs (auto)    (1.0-4.8)  10^3/ul


 


Absolute Monos (auto)    (0-0.8)  10^3/ul


 


Absolute Eos (auto)    (0-0.6)  10^3/ul


 


Absolute Basos (auto)    (0-0.2)  10^3/ul


 


Absolute Nucleated RBC    10^3/ul


 


Nucleated RBC %    


 


INR (Anticoag Therapy)    (0.89-1.11)  


 


APTT    (26.0-36.3)  seconds


 


Sodium    (133-145)  mmol/L


 


Potassium    (3.5-5.0)  mmol/L


 


Chloride    (101-111)  mmol/L


 


Carbon Dioxide    (22-32)  mmol/L


 


Anion Gap    (2-11)  mmol/L


 


BUN    (6-24)  mg/dL


 


Creatinine    (0.67-1.17)  mg/dL


 


Est GFR ( Amer)    (>60)  


 


Est GFR (Non-Af Amer)    (>60)  


 


BUN/Creatinine Ratio    (8-20)  


 


Glucose    ()  mg/dL


 


Lactic Acid  1.2   (0.5-2.0)  mmol/L


 


Calcium    (8.6-10.3)  mg/dL


 


Total Bilirubin    (0.2-1.0)  mg/dL


 


AST    (13-39)  U/L


 


ALT    (7-52)  U/L


 


Alkaline Phosphatase    ()  U/L


 


C-Reactive Protein    (< 5.00)  mg/L


 


Total Protein    (6.4-8.9)  g/dL


 


Albumin    (3.2-5.2)  g/dL


 


Globulin    (2-4)  g/dL


 


Albumin/Globulin Ratio    (1-3)  


 


Lipase    (11.0-82.0)  U/L


 


Urine Color   Yellow  


 


Urine Appearance   Clear  


 


Urine pH   5.0  (5-9)  


 


Ur Specific Gravity   1.016  (1.010-1.030)  


 


Urine Protein   Negative  (Negative)  


 


Urine Ketones   Negative  (Negative)  


 


Urine Blood   1+ H  (Negative)  


 


Urine Nitrate   Negative  (Negative)  


 


Urine Bilirubin   Negative  (Negative)  


 


Urine Urobilinogen   Negative  (Negative)  


 


Ur Leukocyte Esterase   Negative  (Negative)  


 


Urine WBC (Auto)   Trace(0-5/hpf)  (Absent)  


 


Urine RBC (Auto)   1+(3-5/hpf) H  (Absent)  


 


Urine Bacteria   Absent  (Absent)  


 


Urine Glucose   Negative  (Negative)  











Result Diagrams: 


 07/05/17 11:10





 07/05/17 11:10


Lab Statement: Any lab studies that have been ordered have been reviewed, and 

results considered in the medical decision making process.





- CT


  ** CT A/P


CT Interpretation Completed By: Radiologist - 1.  HEPATOMEGALY WITH FATTY 

INFILTRATION OF LIVER. 2.  NO ACUTE CT PATHOLOGY OF THE VISUALIZED ABDOMEN OR 

PELVIS





- Additional Comments


Diagnostic Additional Comments: 


Testicular US : 1.  NO SONOGRAPHIC FEATURES OF TORSION. PLEASE NOTE THAT 

PARTIAL OR INTERMITTENT TORSION MAY BE SONOGRAPHICALLY NORMAL. 2.  NO 

TESTICULAR PARENCHYMAL MASS. 3.  LARGE BILATERAL HYDROCELES. 4.  NO SONOGRAPHIC 

ABNORMALITY IN THE AREA OF PAIN OF THE LEFT INGUINAL REGION.





Abdominal Pain Fem Course/Dx





- Course


Course Of Treatment: NO CRITICAL CARE TIME.  DISCUSSED RESULTS WITH PATIENT. 

PAIN REMAINS AFTER PAIN MEDICATION IN ED.  ADMIT HOSPITALIST STABLE.





- Diagnoses


Provider Diagnoses: 


 Intractable abdominal pain








- Provider Notifications


Discussed Care Of Patient With: Shabana Gabriel


Time Discussed With Above Provider: 16:20


Instructed by Provider To: Other - Consulted Dr. Gabriel (Hospitalist) who agrees 

to admit.





Discharge





- Discharge Plan


Condition: Stable


Disposition: ADMITTED TO [facility]


Referrals: 


Bridger Webb MD [Primary Care Provider] - 





The documentation as recorded by the Justin song Alfonso accurately reflects 

the service I personally performed and the decisions made by me, Zach Anglin MD.

## 2017-07-09 ENCOUNTER — HOSPITAL ENCOUNTER (EMERGENCY)
Dept: HOSPITAL 25 - ED | Age: 52
Discharge: HOME | End: 2017-07-09
Payer: MEDICARE

## 2017-07-09 VITALS — SYSTOLIC BLOOD PRESSURE: 130 MMHG | DIASTOLIC BLOOD PRESSURE: 68 MMHG

## 2017-07-09 DIAGNOSIS — R10.30: Primary | ICD-10-CM

## 2017-07-09 DIAGNOSIS — Z88.1: ICD-10-CM

## 2017-07-09 DIAGNOSIS — E78.00: ICD-10-CM

## 2017-07-09 DIAGNOSIS — Z79.01: ICD-10-CM

## 2017-07-09 DIAGNOSIS — J45.909: ICD-10-CM

## 2017-07-09 DIAGNOSIS — I10: ICD-10-CM

## 2017-07-09 DIAGNOSIS — Z95.5: ICD-10-CM

## 2017-07-09 DIAGNOSIS — I25.2: ICD-10-CM

## 2017-07-09 DIAGNOSIS — Z88.0: ICD-10-CM

## 2017-07-09 DIAGNOSIS — F32.9: ICD-10-CM

## 2017-07-09 DIAGNOSIS — Z87.891: ICD-10-CM

## 2017-07-09 DIAGNOSIS — I25.119: ICD-10-CM

## 2017-07-09 DIAGNOSIS — K21.9: ICD-10-CM

## 2017-07-09 DIAGNOSIS — N50.82: ICD-10-CM

## 2017-07-09 DIAGNOSIS — N28.9: ICD-10-CM

## 2017-07-09 LAB
ALBUMIN SERPL BCG-MCNC: 4 G/DL (ref 3.2–5.2)
ALP SERPL-CCNC: 105 U/L (ref 34–104)
ALT SERPL W P-5'-P-CCNC: 32 U/L (ref 7–52)
ANION GAP SERPL CALC-SCNC: 6 MMOL/L (ref 2–11)
AST SERPL-CCNC: 26 U/L (ref 13–39)
BUN SERPL-MCNC: 15 MG/DL (ref 6–24)
BUN/CREAT SERPL: 12.5 (ref 8–20)
CALCIUM SERPL-MCNC: 9.4 MG/DL (ref 8.6–10.3)
CHLORIDE SERPL-SCNC: 103 MMOL/L (ref 101–111)
GLOBULIN SER CALC-MCNC: 3.3 G/DL (ref 2–4)
GLUCOSE SERPL-MCNC: 116 MG/DL (ref 70–100)
HCO3 SERPL-SCNC: 26 MMOL/L (ref 22–32)
HCT VFR BLD AUTO: 44 % (ref 42–52)
HGB BLD-MCNC: 14.4 G/DL (ref 14–18)
LIPASE SERPL-CCNC: 21 U/L (ref 11–82)
MCH RBC QN AUTO: 28 PG (ref 27–31)
MCHC RBC AUTO-ENTMCNC: 33 G/DL (ref 31–36)
MCV RBC AUTO: 85 FL (ref 80–94)
POTASSIUM SERPL-SCNC: 3.5 MMOL/L (ref 3.5–5)
PROT SERPL-MCNC: 7.3 G/DL (ref 6.4–8.9)
RBC # BLD AUTO: 5.17 10^6/UL (ref 4–5.4)
SODIUM SERPL-SCNC: 135 MMOL/L (ref 133–145)
WBC # BLD AUTO: 5.5 10^3/UL (ref 3.5–10.8)

## 2017-07-09 PROCEDURE — 80053 COMPREHEN METABOLIC PANEL: CPT

## 2017-07-09 PROCEDURE — 85025 COMPLETE CBC W/AUTO DIFF WBC: CPT

## 2017-07-09 PROCEDURE — 83605 ASSAY OF LACTIC ACID: CPT

## 2017-07-09 PROCEDURE — 96376 TX/PRO/DX INJ SAME DRUG ADON: CPT

## 2017-07-09 PROCEDURE — 96374 THER/PROPH/DIAG INJ IV PUSH: CPT

## 2017-07-09 PROCEDURE — 76870 US EXAM SCROTUM: CPT

## 2017-07-09 PROCEDURE — 99283 EMERGENCY DEPT VISIT LOW MDM: CPT

## 2017-07-09 PROCEDURE — 36415 COLL VENOUS BLD VENIPUNCTURE: CPT

## 2017-07-09 PROCEDURE — 86140 C-REACTIVE PROTEIN: CPT

## 2017-07-09 PROCEDURE — 96361 HYDRATE IV INFUSION ADD-ON: CPT

## 2017-07-09 PROCEDURE — 85610 PROTHROMBIN TIME: CPT

## 2017-07-09 PROCEDURE — 83690 ASSAY OF LIPASE: CPT

## 2017-07-09 PROCEDURE — 76775 US EXAM ABDO BACK WALL LIM: CPT

## 2017-07-09 PROCEDURE — 85730 THROMBOPLASTIN TIME PARTIAL: CPT

## 2017-07-09 PROCEDURE — 96375 TX/PRO/DX INJ NEW DRUG ADDON: CPT

## 2017-07-09 NOTE — ED
Tara NATHAN Rebecca, scribed for Zach Anglin MD on 07/09/17 at 0625 .





GI/ HPI





- HPI Summary


HPI Summary: 


Pt is a 51 y/o M who presents to ED c/o worsening inguinal and testicular pain. 

Pain began 5 days ago and has been constant since onset, slightly improving 4 

days ago, then worsening again. Pain is in the inguinal region and L testicle 

and is currently severe, ranked 10/10. Pain is characterized as burning and 

pulling. Sx aggravated by palpation and movement, alleviated by nothing. 

Additionally notes L testicular swelling and the sensation that there is a 

"pimple" on the testicle. Denies decreased appetite, constipation, dysuria, 

fever and chills. No PSHx on the abdomen. Has seen Dr. Fernandez before. 





"father up on the top it feels like ther eis a pimple"


"feels like a head or something there"


3  L groin pain, went away for a little bit, saw seen on 7/5/2017 after wich 

the pain resolved for a little while, then it returned afterwards, was 

prescribed pain medication which did not help sx much





same pain as before, but it gets worse and the L testicle is swollen and is 

very sensitive to touch





additionally L flank pain with radiaiton to the inguinal region, aggravate dby 

laying on L side


burning/pulling pain





sx aggravated by movement





c/o urgency yesterday - had a lot of pressure but cannot come out like its 

supposed to - did ont think much of it





denies decreased appetite, constipation, dysuria, fever, chills





"felt liek somebody moved" earlier befor ehe came in





- History of Current Complaint


Chief Complaint: EDUrogenitalProblems


Time Seen by Provider: 07/09/17 05:55


Stated Complaint: MIDDLE GROIN PAIN


Hx Obtained From: Patient


Onset/Duration: Started Days Ago - 5 days ago, Still Present


Timing: Constant


Pain Intensity: 10


Location of Pain: Groin


Additional Locations for Males: Testicles - Left


Pain Characteristics: Burning, Other: - Pulling


Associated Signs and Symptoms: Positive: Other: - L testicular swelling and a 

"pimple" on the testicle.  Negative: Fever, Dysuria, Change in Appetite, Chills


Aggravating Factor(s): Palpation, Movement


Alleviating Factor(s): Nothing





- Additional Pertinent History


Primary Care Physician: [id number]





- Allergy/Home Medications


Allergies/Adverse Reactions: 


 Allergies











Allergy/AdvReac Type Severity Reaction Status Date / Time


 


Penicillins Allergy Severe Anaphylatic Verified 07/09/17 02:26





   Shock  


 


Amoxicillin Allergy Unknown Swelling Verified 07/09/17 02:26





[From Augmentin XR]     


 


Clavulanic Acid Allergy Unknown Swelling Verified 07/09/17 02:26





[From Augmentin XR]     


 


VINEGAR Allergy Intermediate Hives Uncoded 07/09/17 02:26


 


MUSHROOMS Allergy Unknown Rash Uncoded 07/09/17 02:26














PMH/Surg Hx/FS Hx/Imm Hx


Endocrine/Hematology History: Reports: Hx Anticoagulant Therapy - plavix


   Denies: Hx Diabetes, Hx Sickle Cell Disease, Hx Thyroid Disease


Cardiovascular History: Reports: Hx Angina, Hx Coronary Artery Disease, Hx 

Hypercholesterolemia, Hx Hypertension, Hx Myocardial Infarction, Other 

Cardiovascular Problems/Disorders - HEART DISEASE, CARDIAC EVENT MONITOR 

IMPLANTED


   Denies: Hx Congestive Heart Failure, Hx Pacemaker/ICD, Hx Valvular Heart 

Disease


Respiratory History: Reports: Hx Asthma, Hx Sleep Apnea


   Denies: Hx Chronic Obstructive Pulmonary Disease (COPD)


GI History: Reports: Hx Gastroesophageal Reflux Disease


   Denies: Hx Ulcer


 History: Reports: Hx Renal Disease - cysts, Other  Problems/Disorders - 

bilat cyst on kidneys


Musculoskeletal History: Reports: Hx Arthritis, Hx Back Problems, Other 

Musculoskeletal History - VIPUL


   Denies: Hx Scoliosis


Sensory History: Reports: Hx Cataracts - right eye, Hx Hearing Problem - LEFT 

EAR HEARING AID


   Denies: Hx Contacts or Glasses, Hx Hearing Aid


Opthamlomology History: Reports: Hx Cataracts - right eye


   Denies: Hx Contacts or Glasses


Neurological History: Reports: Hx Transient Ischemic Attacks (TIA)


   Denies: Hx Dementia, Hx Headaches, Hx Seizures, Other Neuro Impairments/

Disorders


Psychiatric History: Reports: Hx Depression


   Denies: Hx Panic Disorder





- Cancer History


Hx Chemotherapy: No





- Surgical History


Surgery Procedure, Year, and Place: DEC 2004 sinus [facility] ;.  2008 left knee 

arthroscopy [facility] ;.  2013 left tympanoplasty WITH STAPES IMPLANT ([employer] 

MALLEOUS HEAD SERIAL # [id number] - PER Jajah INFORMATION SENT TO MRI ON 6/ 13/2016; IMPLANT IS STAINLESS STEEL STATES SOME DEGREE OF MAGNETISM TESTED MRI 

SAFE AT 1.5T ONLY - DR VALDEZ APPROVED THIS INFORMATION FOR 1.5T ONLY).  [facility] ;.  

2015 LEFT REVISION TYMPANOPLASTY/ MASTOIDECTOMY [facility] ;.  8/ 2012 HEART CATH - NO 

STENTS ;.  2011 HEART CATH WITH cardiac stents x 4 (PROMUS DRUG-ELUTING STENT 

OKAY IN NORMAL MODE ONLY,  GAUSS/CM @ 1.5T) [facility] ;


Hx Anesthesia Reactions: Yes - SEIZURE LIKE ACTIVITY; REINTUBATION AFTER LEFT 

EAR 2013





- Immunization History


Date of Tetanus Vaccine: unknown


Date of Influenza Vaccine: Fall 2012


Infectious Disease History: No


Infectious Disease History: 


   Denies: Hx Clostridium Difficile, Hx Hepatitis, Hx Human Immunodeficiency 

Virus (HIV), Hx of Known/Suspected MRSA, Hx Shingles, Hx Tuberculosis, Hx Known/

Suspected VRE, Hx Known/Suspected VRSA, History Other Infectious Disease, 

Traveled Outside the [place] in Last 30 Days





- Family History


Known Family History: Positive: Cardiac Disease, Hypertension, Diabetes





- Social History


Alcohol Use: None


Hx Substance Use: No


Substance Use Type: Reports: None


Hx Tobacco Use: Yes


Smoking Status (MU): Former Smoker


Type: Cigarettes


Length of Time of Smoking/Using Tobacco: 10-12 YRS


Have You Smoked in the Last Year: No





Review of Systems


Negative: Fever, Chills


Positive: Other - Denies constipation and decreased appetite


Positive: pain - L testicular and inguinal pain; L testicular swelling.  

Negative: dysuria


Positive: Other - a "pimple" on the testicle


All Other Systems Reviewed And Are Negative: Yes





Physical Exam





- Summary


Physical Exam Summary: 


General: well-appearing, moderate pain distress with any movement of the L leg


Skin: warm, color reflects adequate perfusion, dry


Head: normal


Eyes: EOMI, LISA


ENT: normal


Neck: supple, nontender


Respiratory: CTA, breath sounds present


Cardiovascular: RRR


Abdomen: soft, tender in the L inguinal area and the L testicle, the L testicle 

does not feel swollen


Bowel: present


Musculoskeletal: normal, strength/ROM intact


Neurological: normal, sensory/motor intact, A&O x3


Psychological: affect/mood appropriate


Triage Information Reviewed: Yes


Vital Signs On Initial Exam: 


 Initial Vitals











Temp Pulse Resp BP Pulse Ox


 


 97.3 F   72   20   161/107   98 


 


 07/09/17 01:50  07/09/17 01:50  07/09/17 01:50  07/09/17 01:50  07/09/17 01:50











Vital Signs Reviewed: Yes





- [place] Coma Scale


Coma Scale Total: 15





Diagnostics





- Vital Signs


 Vital Signs











  Temp Pulse Resp BP Pulse Ox


 


 07/09/17 05:10    18  


 


 07/09/17 03:01    20  


 


 07/09/17 02:21  97.3 F  72  20  161/107  98


 


 07/09/17 01:50  97.3 F  72  20  161/107  98














- Laboratory


Lab Results: 


 Lab Results











  07/09/17 07/09/17 07/09/17 Range/Units





  03:00 03:00 03:00 


 


WBC  5.5    (3.5-10.8)  10^3/ul


 


RBC  5.17    (4.0-5.4)  10^6/ul


 


Hgb  14.4    (14.0-18.0)  g/dl


 


Hct  44    (42-52)  %


 


MCV  85    (80-94)  fL


 


MCH  28    (27-31)  pg


 


MCHC  33    (31-36)  g/dl


 


RDW  15    (10.5-15)  %


 


Plt Count  186    (150-450)  10^3/ul


 


MPV  8    (7.4-10.4)  um3


 


Neut % (Auto)  60.4    (38-83)  %


 


Lymph % (Auto)  24.8 L    (25-47)  %


 


Mono % (Auto)  9.5 H    (1-9)  %


 


Eos % (Auto)  4.1    (0-6)  %


 


Baso % (Auto)  1.2    (0-2)  %


 


Absolute Neuts (auto)  3.3    (1.5-7.7)  10^3/ul


 


Absolute Lymphs (auto)  1.4    (1.0-4.8)  10^3/ul


 


Absolute Monos (auto)  0.5    (0-0.8)  10^3/ul


 


Absolute Eos (auto)  0.2    (0-0.6)  10^3/ul


 


Absolute Basos (auto)  0.1    (0-0.2)  10^3/ul


 


Absolute Nucleated RBC  0    10^3/ul


 


Nucleated RBC %  0.1    


 


INR (Anticoag Therapy)   0.97   (0.89-1.11)  


 


APTT   41.6 H   (26.0-36.3)  seconds


 


Sodium    135  (133-145)  mmol/L


 


Potassium    3.5  (3.5-5.0)  mmol/L


 


Chloride    103  (101-111)  mmol/L


 


Carbon Dioxide    26  (22-32)  mmol/L


 


Anion Gap    6  (2-11)  mmol/L


 


BUN    15  (6-24)  mg/dL


 


Creatinine    1.20 H  (0.67-1.17)  mg/dL


 


Est GFR ( Amer)    81.4  (>60)  


 


Est GFR (Non-Af Amer)    63.3  (>60)  


 


BUN/Creatinine Ratio    12.5  (8-20)  


 


Glucose    116 H  ()  mg/dL


 


Lactic Acid     (0.5-2.0)  mmol/L


 


Calcium    9.4  (8.6-10.3)  mg/dL


 


Total Bilirubin    0.50  (0.2-1.0)  mg/dL


 


AST    26  (13-39)  U/L


 


ALT    32  (7-52)  U/L


 


Alkaline Phosphatase    105 H  ()  U/L


 


C-Reactive Protein    7.32 H  (< 5.00)  mg/L


 


Total Protein    7.3  (6.4-8.9)  g/dL


 


Albumin    4.0  (3.2-5.2)  g/dL


 


Globulin    3.3  (2-4)  g/dL


 


Albumin/Globulin Ratio    1.2  (1-3)  


 


Lipase    21  (11.0-82.0)  U/L














  07/09/17 Range/Units





  03:00 


 


WBC   (3.5-10.8)  10^3/ul


 


RBC   (4.0-5.4)  10^6/ul


 


Hgb   (14.0-18.0)  g/dl


 


Hct   (42-52)  %


 


MCV   (80-94)  fL


 


MCH   (27-31)  pg


 


MCHC   (31-36)  g/dl


 


RDW   (10.5-15)  %


 


Plt Count   (150-450)  10^3/ul


 


MPV   (7.4-10.4)  um3


 


Neut % (Auto)   (38-83)  %


 


Lymph % (Auto)   (25-47)  %


 


Mono % (Auto)   (1-9)  %


 


Eos % (Auto)   (0-6)  %


 


Baso % (Auto)   (0-2)  %


 


Absolute Neuts (auto)   (1.5-7.7)  10^3/ul


 


Absolute Lymphs (auto)   (1.0-4.8)  10^3/ul


 


Absolute Monos (auto)   (0-0.8)  10^3/ul


 


Absolute Eos (auto)   (0-0.6)  10^3/ul


 


Absolute Basos (auto)   (0-0.2)  10^3/ul


 


Absolute Nucleated RBC   10^3/ul


 


Nucleated RBC %   


 


INR (Anticoag Therapy)   (0.89-1.11)  


 


APTT   (26.0-36.3)  seconds


 


Sodium   (133-145)  mmol/L


 


Potassium   (3.5-5.0)  mmol/L


 


Chloride   (101-111)  mmol/L


 


Carbon Dioxide   (22-32)  mmol/L


 


Anion Gap   (2-11)  mmol/L


 


BUN   (6-24)  mg/dL


 


Creatinine   (0.67-1.17)  mg/dL


 


Est GFR ( Amer)   (>60)  


 


Est GFR (Non-Af Amer)   (>60)  


 


BUN/Creatinine Ratio   (8-20)  


 


Glucose   ()  mg/dL


 


Lactic Acid  1.5  (0.5-2.0)  mmol/L


 


Calcium   (8.6-10.3)  mg/dL


 


Total Bilirubin   (0.2-1.0)  mg/dL


 


AST   (13-39)  U/L


 


ALT   (7-52)  U/L


 


Alkaline Phosphatase   ()  U/L


 


C-Reactive Protein   (< 5.00)  mg/L


 


Total Protein   (6.4-8.9)  g/dL


 


Albumin   (3.2-5.2)  g/dL


 


Globulin   (2-4)  g/dL


 


Albumin/Globulin Ratio   (1-3)  


 


Lipase   (11.0-82.0)  U/L











Result Diagrams: 


 07/09/17 03:00





 07/09/17 03:00


Lab Statement: Any lab studies that have been ordered have been reviewed, and 

results considered in the medical decision making process.





GIGU Course/Dx





- Course


Course Of Treatment: NO CRITICAL CARE TIME.  US/DISPOSITION PENDING AT SHIFT 

CHANGE.





- Diagnoses


Provider Diagnoses: 


 Left inguinal pain, Scrotal pain








Discharge





- Discharge Plan


Condition: Stable


Disposition: OTHER


Discharge Disposition Comment: .


Referrals: 


Bridger Webb MD [Primary Care Provider] - 





The documentation as recorded by the Tara song Rebecca accurately 

reflects the service I personally performed and the decisions made by me, 

Zach Anglin MD.

## 2017-07-09 NOTE — RAD
HISTORY: Left testicular pain



COMPARISONS: None



TECHNIQUE: Multiple transverse and longitudinal ultrasound images were obtained of the

scrotum, using grayscale, color Doppler, and spectral Doppler imaging.



FINDINGS:



RIGHT:



RIGHT TESTICLE: The right testicle measures 3.8 x 2.7 x 2.8 cm. The right testicle is

homogeneous in echotexture, without testicular parenchymal mass. Normal arterial and

venous waveforms are identified within the right testicle on spectral Doppler imaging.

RIGHT EPIDIDYMIS: The right epididymis measures 1.2  cm at the head. There is a moderate

right hydrocele.

RIGHT SCROTUM: There is no hydrocele or varicocele.





LEFT:



LEFT TESTICLE: The left testicle measures 3.9 x 2.6 x 2.7 cm. The left testicle is

homogeneous in echotexture, without testicular parenchymal mass. Normal arterial and

venous waveforms are identified within the left testicle on spectral Doppler imaging. An

appendix testis is noted. Flow is noted within the neck of the appendix testis without

flow noted in the head.

LEFT EPIDIDYMIS: The left epididymis measures 1.6  cm at the head.    

LEFT SCROTUM: There is a small to moderate left hydrocele.



OTHER: None



IMPRESSION: 

1.  NO TESTICULAR PARENCHYMAL MASS.

2.  NO SONOGRAPHIC FEATURES OF TESTICULAR TORSION. PLEASE NOTE THAT PARTIAL OR

INTERMITTENT TORSION MAY BE SONOGRAPHICALLY NORMAL.

3.  BILATERAL HYDROCELES.

4.  THERE IS AN APPENDIX TESTIS NOTED ON THE LEFT. THERE IS RELATIVE HYPOPERFUSION OF THE

APPENDIX TESTIS CYST MAY INDICATE TORSION OF THE APPENDIX TESTIS..

## 2017-07-09 NOTE — RAD
HISTORY: Hematuria, flank pain



COMPARISONS: July 05, 2017



TECHNIQUE: Multiple transverse and longitudinal ultrasound images were obtained of the

left kidney using grayscale and color Doppler imaging.



FINDINGS:





RIGHT KIDNEY: No images are submitted of the right kidney



LEFT KIDNEY: Again noted is a simple cyst of the upper pole of left kidney measuring 5.9

cm.  There is no hydronephrosis or nephrolithiasis. 

The left kidney measures 17.6 x 6.1 x 5.9 cm.



BLADDER: No images are submitted of the bladder.    



AORTA AND IVC: No images are submitted of the vasculature.     

RETROPERITONEUM: Unremarkable.



OTHER: None.



IMPRESSION: 

LEFT RENAL CYST. NO HYDRONEPHROSIS OR NEPHROLITHIASIS.

## 2017-10-24 ENCOUNTER — HOSPITAL ENCOUNTER (EMERGENCY)
Dept: HOSPITAL 25 - ED | Age: 52
Discharge: HOME | End: 2017-10-24
Payer: MEDICARE

## 2017-10-24 VITALS — DIASTOLIC BLOOD PRESSURE: 71 MMHG | SYSTOLIC BLOOD PRESSURE: 128 MMHG

## 2017-10-24 DIAGNOSIS — Z87.891: ICD-10-CM

## 2017-10-24 DIAGNOSIS — R20.0: Primary | ICD-10-CM

## 2017-10-24 DIAGNOSIS — G62.9: ICD-10-CM

## 2017-10-24 LAB
ALBUMIN SERPL BCG-MCNC: 4.2 G/DL (ref 3.2–5.2)
ALP SERPL-CCNC: 99 U/L (ref 34–104)
ALT SERPL W P-5'-P-CCNC: 29 U/L (ref 7–52)
ANION GAP SERPL CALC-SCNC: 5 MMOL/L (ref 2–11)
AST SERPL-CCNC: 17 U/L (ref 13–39)
BUN SERPL-MCNC: 13 MG/DL (ref 6–24)
BUN/CREAT SERPL: 10.7 (ref 8–20)
CALCIUM SERPL-MCNC: 9.7 MG/DL (ref 8.6–10.3)
CHLORIDE SERPL-SCNC: 106 MMOL/L (ref 101–111)
GLOBULIN SER CALC-MCNC: 3.3 G/DL (ref 2–4)
GLUCOSE SERPL-MCNC: 87 MG/DL (ref 70–100)
HCO3 SERPL-SCNC: 30 MMOL/L (ref 22–32)
HCT VFR BLD AUTO: 45 % (ref 42–52)
HGB BLD-MCNC: 14.7 G/DL (ref 14–18)
MCH RBC QN AUTO: 28 PG (ref 27–31)
MCHC RBC AUTO-ENTMCNC: 33 G/DL (ref 31–36)
MCV RBC AUTO: 86 FL (ref 80–94)
POTASSIUM SERPL-SCNC: 3.5 MMOL/L (ref 3.5–5)
PROT SERPL-MCNC: 7.5 G/DL (ref 6.4–8.9)
RBC # BLD AUTO: 5.21 10^6/UL (ref 4–5.4)
SODIUM SERPL-SCNC: 141 MMOL/L (ref 133–145)
TROPONIN I SERPL-MCNC: 0.01 NG/ML (ref ?–0.04)
WBC # BLD AUTO: 5.5 10^3/UL (ref 3.5–10.8)

## 2017-10-24 PROCEDURE — 85025 COMPLETE CBC W/AUTO DIFF WBC: CPT

## 2017-10-24 PROCEDURE — 81015 MICROSCOPIC EXAM OF URINE: CPT

## 2017-10-24 PROCEDURE — 99282 EMERGENCY DEPT VISIT SF MDM: CPT

## 2017-10-24 PROCEDURE — 86901 BLOOD TYPING SEROLOGIC RH(D): CPT

## 2017-10-24 PROCEDURE — 93005 ELECTROCARDIOGRAM TRACING: CPT

## 2017-10-24 PROCEDURE — 36415 COLL VENOUS BLD VENIPUNCTURE: CPT

## 2017-10-24 PROCEDURE — 86850 RBC ANTIBODY SCREEN: CPT

## 2017-10-24 PROCEDURE — 83605 ASSAY OF LACTIC ACID: CPT

## 2017-10-24 PROCEDURE — 86900 BLOOD TYPING SEROLOGIC ABO: CPT

## 2017-10-24 PROCEDURE — 84484 ASSAY OF TROPONIN QUANT: CPT

## 2017-10-24 PROCEDURE — 85730 THROMBOPLASTIN TIME PARTIAL: CPT

## 2017-10-24 PROCEDURE — 85610 PROTHROMBIN TIME: CPT

## 2017-10-24 PROCEDURE — 81003 URINALYSIS AUTO W/O SCOPE: CPT

## 2017-10-24 PROCEDURE — 70450 CT HEAD/BRAIN W/O DYE: CPT

## 2017-10-24 PROCEDURE — 80053 COMPREHEN METABOLIC PANEL: CPT

## 2017-10-24 NOTE — RAD
INDICATION: Recurrent Left-sided weakness 



COMPARISON: CT brain June 12, 2016



TECHNIQUE: Noncontrast axial source images were acquired from the skull base to the

vertex.



FINDINGS:



Ventricles/sulci: The ventricles and cisterns are normal in size and configuration for

age.



Brain parenchyma: There is no focal parenchymal finding, evidence of intracranial mass, 

or intracranial mass effect.



Intracranial hemorrhage:None.



Extra-axial spaces: There are no abnormal extra axial fluid collections or evidence of

extra-axial mass.



Calvarium: There is no calvarial fracture or other calvarial abnormality.



Scalp: There is no evidence of scalp or extracalvarial soft tissue abnormality.



Paranasal sinuses/mastoid: There is mild left posterior ethmoid sinusitis. Other: None.



IMPRESSION: No acute intracranial findings. Findings of mild left ethmoid sinusitis.

## 2017-10-26 NOTE — ED
Alex NATHAN Benjamin, scribed for Bridger hCaudhry MD on 10/24/17 at 1729 .





Neurological HPI





- HPI Summary


HPI Summary: 





51yo male with prior hx of CVA presents to ED today with left anterior sided 

numbness and dull pain from left head/face to groin. Pt also reports 

intermittent tingling on his left side as well. Pt states that he wasn't able 

to move his left side in his prior CVA and that today's symptoms feel different.








- History of Current Complaint


Chief Complaint: EDGeneral


Stated Complaint: LT SIDE HEAD & BODY PAIN


Time Seen by Provider: 10/24/17 16:41


Hx Obtained From: Patient


Onset/Duration: Gradual Onset, Started days ago, Still Present


Timing: Constant


Onset Severity: Mild


Current Severity: Mild


Neurological Deficit Location: Facial - left, LUE


Pain Intensity: 0


Character: Numbness/Tingling - left side of his body





- Additional Pertinent History


Primary Care Physician: ANA





- Allergy/Home Medications


Allergies/Adverse Reactions: 


 Allergies











Allergy/AdvReac Type Severity Reaction Status Date / Time


 


Penicillins Allergy Severe Anaphylatic Verified 07/09/17 02:26





   Shock  


 


Amoxicillin Allergy Unknown Swelling Verified 07/09/17 02:26





[From Augmentin XR]     


 


Clavulanic Acid Allergy Unknown Swelling Verified 07/09/17 02:26





[From Augmentin XR]     


 


VINEGAR Allergy Intermediate Hives Uncoded 07/09/17 02:26


 


MUSHROOMS Allergy Unknown Rash Uncoded 07/09/17 02:26














PMH/Surg Hx/FS Hx/Imm Hx


Endocrine/Hematology History: Reports: Hx Anticoagulant Therapy - plavix


   Denies: Hx Diabetes, Hx Sickle Cell Disease, Hx Thyroid Disease


Cardiovascular History: Reports: Hx Angina, Hx Coronary Artery Disease, Hx 

Hypercholesterolemia, Hx Hypertension, Hx Myocardial Infarction, Other 

Cardiovascular Problems/Disorders - HEART DISEASE, CARDIAC EVENT MONITOR 

IMPLANTED


   Denies: Hx Congestive Heart Failure, Hx Pacemaker/ICD, Hx Valvular Heart 

Disease


Respiratory History: Reports: Hx Asthma, Hx Sleep Apnea


   Denies: Hx Chronic Obstructive Pulmonary Disease (COPD)


GI History: Reports: Hx Gastroesophageal Reflux Disease


   Denies: Hx Ulcer


 History: Reports: Hx Renal Disease - cysts, Other  Problems/Disorders - 

bilat cyst on kidneys


Musculoskeletal History: Reports: Hx Arthritis, Hx Back Problems, Other 

Musculoskeletal History - VIPUL


   Denies: Hx Scoliosis


Sensory History: Reports: Hx Cataracts - right eye, Hx Hearing Problem - LEFT 

EAR HEARING AID


   Denies: Hx Contacts or Glasses, Hx Hearing Aid


Opthamlomology History: Reports: Hx Cataracts - right eye


   Denies: Hx Contacts or Glasses


Neurological History: Reports: Hx Transient Ischemic Attacks (TIA)


   Denies: Hx Dementia, Hx Headaches, Hx Seizures, Other Neuro Impairments/

Disorders


Psychiatric History: Reports: Hx Depression


   Denies: Hx Panic Disorder





- Cancer History


Hx Chemotherapy: No





- Surgical History


Surgery Procedure, Year, and Place: DEC 2004 sinus [facility] [place] ;.  2008 left knee 

arthroscopy [facility] ;.  2013 left tympanoplasty WITH STAPES IMPLANT ([employer] 

MALLEOUS HEAD SERIAL # [id number] - PER [employer] INFORMATION SENT TO MRI ON 6/ 13/2016; IMPLANT IS STAINLESS STEEL STATES SOME DEGREE OF MAGNETISM TESTED MRI 

SAFE AT 1.5T ONLY - DR VALDEZ APPROVED THIS INFORMATION FOR 1.5T ONLY).  [facility] ;.  

2015 LEFT REVISION [place]MP[place] [facility] [place]  8/ 2012 HEART CATH - NO 

STENTS ;.  2011 HEART CATH WITH cardiac stents x 4 (PROMUS DRUG-ELUTING STENT 

OKAY IN NORMAL MODE ONLY,  GAUSS/CM @ 1.5T) [facility] ;


Hx Anesthesia Reactions: Yes - SEIZURE LIKE ACTIVITY; REINTUBATION AFTER LEFT 

EAR 2013





- Immunization History


Date of Tetanus Vaccine: unknown


Date of Influenza Vaccine: Fall 2012


Infectious Disease History: No


Infectious Disease History: 


   Denies: Hx Clostridium Difficile, Hx Hepatitis, Hx Human Immunodeficiency 

Virus (HIV), Hx of Known/Suspected MRSA, Hx Shingles, Hx Tuberculosis, Hx Known/

Suspected VRE, Hx Known/Suspected VRSA, History Other Infectious Disease, 

Traveled Outside the [place] in Last 30 Days





- Family History


Known Family History: Positive: Cardiac Disease, Hypertension, Diabetes





- Social History


Alcohol Use: None


Hx Substance Use: No


Substance Use Type: Reports: None


Hx Tobacco Use: Yes


Smoking Status (MU): Former Smoker


Type: Cigarettes


Length of Time of Smoking/Using Tobacco: 10-12 YRS


Have You Smoked in the Last Year: No





Review of Systems


Constitutional: Negative


Eyes: Negative


ENT: Negative


Cardiovascular: Negative


Respiratory: Negative


Gastrointestinal: Negative


Genitourinary: Negative


Musculoskeletal: Negative


Skin: Negative


Positive: Numbness - left sided 


Psychological: Normal


All Other Systems Reviewed And Are Negative: Yes





Physical Exam





- Summary


Physical Exam Summary: 





VITAL SIGNS: Reviewed.


GENERAL:  Patient is a well-developed and nourished male who is lying 

comfortable in the stretcher.  Patient is not in any acute respiratory distress.


HEAD AND FACE: No signs of trauma.  No ecchymosis, hematomas or skull 

depressions. No sinus tenderness.


EYES: PERRLA, EOMI x 2, No injected conjunctiva, no nystagmus.


EARS: Hearing grossly intact. Ear canals and tympanic membranes are within 

normal limits.


MOUTH: Oropharynx within normal limits.


NECK: Supple, trachea is midline, no adenopathy, no JVD, no carotid bruit, no c-

spine tenderness, neck with full ROM.


CHEST: Symmetric, no tenderness at palpation


LUNGS: Clear to auscultation bilaterally. No wheezing or crackles.


CVS: Regular rate and rhythm, S1 and S2 present, no murmurs or gallops 

appreciated.


ABDOMEN: Soft, non-tender. No signs of distention. No rebound no guarding, and 

no masses palpated. Bowel sounds are normal.


EXTREMITIES: FROM in all major joints, no edema, no cyanosis or clubbing.


NEURO: Alert and oriented x 3. No acute neurological deficits. Speech is normal 

and follows commands.


SKIN: Dry and warm


Triage Information Reviewed: Yes


Vital Signs On Initial Exam: 


 Initial Vitals











Temp Pulse Resp BP Pulse Ox


 


 96.6 F   71   20   163/108   97 


 


 10/24/17 14:41  10/24/17 14:41  10/24/17 14:41  10/24/17 14:41  10/24/17 14:41











Vital Signs Reviewed: Yes





- Dawson Coma Scale


Coma Scale Total: 15





Diagnostics





- Vital Signs


 Vital Signs











  Temp Pulse Resp BP Pulse Ox


 


 10/24/17 17:00   63  12  137/77  98


 


 10/24/17 16:42   67  17   96


 


 10/24/17 16:41     135/79 


 


 10/24/17 14:41  96.6 F  71  20  163/108  97














- Laboratory


Lab Results: 


 Lab Results











  10/24/17 10/24/17 10/24/17 Range/Units





  17:10 17:48 17:48 


 


WBC   5.5   (3.5-10.8)  10^3/ul


 


RBC   5.21   (4.0-5.4)  10^6/ul


 


Hgb   14.7   (14.0-18.0)  g/dl


 


Hct   45   (42-52)  %


 


MCV   86   (80-94)  fL


 


MCH   28   (27-31)  pg


 


MCHC   33   (31-36)  g/dl


 


RDW   15   (10.5-15)  %


 


Plt Count   217   (150-450)  10^3/ul


 


MPV   8   (7.4-10.4)  um3


 


Neut % (Auto)   58.1   (38-83)  %


 


Lymph % (Auto)   29.0   (25-47)  %


 


Mono % (Auto)   8.8   (1-9)  %


 


Eos % (Auto)   3.2   (0-6)  %


 


Baso % (Auto)   0.9   (0-2)  %


 


Absolute Neuts (auto)   3.2   (1.5-7.7)  10^3/ul


 


Absolute Lymphs (auto)   1.6   (1.0-4.8)  10^3/ul


 


Absolute Monos (auto)   0.5   (0-0.8)  10^3/ul


 


Absolute Eos (auto)   0.2   (0-0.6)  10^3/ul


 


Absolute Basos (auto)   0.1   (0-0.2)  10^3/ul


 


Absolute Nucleated RBC   0.01   10^3/ul


 


Nucleated RBC %   0.2   


 


INR (Anticoag Therapy)     (0.89-1.11)  


 


APTT     (26.0-36.3)  seconds


 


Sodium    141  (133-145)  mmol/L


 


Potassium    3.5  (3.5-5.0)  mmol/L


 


Chloride    106  (101-111)  mmol/L


 


Carbon Dioxide    30  (22-32)  mmol/L


 


Anion Gap    5  (2-11)  mmol/L


 


BUN    13  (6-24)  mg/dL


 


Creatinine    1.21 H  (0.67-1.17)  mg/dL


 


Est GFR ( Amer)    80.7  (>60)  


 


Est GFR (Non-Af Amer)    62.7  (>60)  


 


BUN/Creatinine Ratio    10.7  (8-20)  


 


Glucose    87  ()  mg/dL


 


Lactic Acid     (0.5-2.0)  mmol/L


 


Calcium    9.7  (8.6-10.3)  mg/dL


 


Total Bilirubin    0.60  (0.2-1.0)  mg/dL


 


AST    17  (13-39)  U/L


 


ALT    29  (7-52)  U/L


 


Alkaline Phosphatase    99  ()  U/L


 


Troponin I    0.01  (<0.04)  ng/mL


 


Total Protein    7.5  (6.4-8.9)  g/dL


 


Albumin    4.2  (3.2-5.2)  g/dL


 


Globulin    3.3  (2-4)  g/dL


 


Albumin/Globulin Ratio    1.3  (1-3)  


 


Urine Color  Yellow    


 


Urine Appearance  Clear    


 


Urine pH  5.0    (5-9)  


 


Ur Specific Gravity  1.018    (1.010-1.030)  


 


Urine Protein  Negative    (Negative)  


 


Urine Ketones  Negative    (Negative)  


 


Urine Blood  1+ H    (Negative)  


 


Urine Nitrate  Negative    (Negative)  


 


Urine Bilirubin  Negative    (Negative)  


 


Urine Urobilinogen  Negative    (Negative)  


 


Ur Leukocyte Esterase  Negative    (Negative)  


 


Urine WBC (Auto)  Trace(0-5/hpf)    (Absent)  


 


Urine RBC (Auto)  1+(3-5/hpf) H    (Absent)  


 


Urine Bacteria  Absent    (Absent)  


 


Urine Glucose  Negative    (Negative)  


 


Urine Ascorbic Acid  * H    (Negative)  


 


Blood Type     


 


Antibody Screen     














  10/24/17 10/24/17 10/24/17 Range/Units





  17:48 17:48 17:48 


 


WBC     (3.5-10.8)  10^3/ul


 


RBC     (4.0-5.4)  10^6/ul


 


Hgb     (14.0-18.0)  g/dl


 


Hct     (42-52)  %


 


MCV     (80-94)  fL


 


MCH     (27-31)  pg


 


MCHC     (31-36)  g/dl


 


RDW     (10.5-15)  %


 


Plt Count     (150-450)  10^3/ul


 


MPV     (7.4-10.4)  um3


 


Neut % (Auto)     (38-83)  %


 


Lymph % (Auto)     (25-47)  %


 


Mono % (Auto)     (1-9)  %


 


Eos % (Auto)     (0-6)  %


 


Baso % (Auto)     (0-2)  %


 


Absolute Neuts (auto)     (1.5-7.7)  10^3/ul


 


Absolute Lymphs (auto)     (1.0-4.8)  10^3/ul


 


Absolute Monos (auto)     (0-0.8)  10^3/ul


 


Absolute Eos (auto)     (0-0.6)  10^3/ul


 


Absolute Basos (auto)     (0-0.2)  10^3/ul


 


Absolute Nucleated RBC     10^3/ul


 


Nucleated RBC %     


 


INR (Anticoag Therapy)  1.03    (0.89-1.11)  


 


APTT  40.3 H    (26.0-36.3)  seconds


 


Sodium     (133-145)  mmol/L


 


Potassium     (3.5-5.0)  mmol/L


 


Chloride     (101-111)  mmol/L


 


Carbon Dioxide     (22-32)  mmol/L


 


Anion Gap     (2-11)  mmol/L


 


BUN     (6-24)  mg/dL


 


Creatinine     (0.67-1.17)  mg/dL


 


Est GFR ( Amer)     (>60)  


 


Est GFR (Non-Af Amer)     (>60)  


 


BUN/Creatinine Ratio     (8-20)  


 


Glucose     ()  mg/dL


 


Lactic Acid   1.9   (0.5-2.0)  mmol/L


 


Calcium     (8.6-10.3)  mg/dL


 


Total Bilirubin     (0.2-1.0)  mg/dL


 


AST     (13-39)  U/L


 


ALT     (7-52)  U/L


 


Alkaline Phosphatase     ()  U/L


 


Troponin I     (<0.04)  ng/mL


 


Total Protein     (6.4-8.9)  g/dL


 


Albumin     (3.2-5.2)  g/dL


 


Globulin     (2-4)  g/dL


 


Albumin/Globulin Ratio     (1-3)  


 


Urine Color     


 


Urine Appearance     


 


Urine pH     (5-9)  


 


Ur Specific Gravity     (1.010-1.030)  


 


Urine Protein     (Negative)  


 


Urine Ketones     (Negative)  


 


Urine Blood     (Negative)  


 


Urine Nitrate     (Negative)  


 


Urine Bilirubin     (Negative)  


 


Urine Urobilinogen     (Negative)  


 


Ur Leukocyte Esterase     (Negative)  


 


Urine WBC (Auto)     (Absent)  


 


Urine RBC (Auto)     (Absent)  


 


Urine Bacteria     (Absent)  


 


Urine Glucose     (Negative)  


 


Urine Ascorbic Acid     (Negative)  


 


Blood Type    O Positive  


 


Antibody Screen    Negative  











Result Diagrams: 


 10/24/17 17:48





 10/24/17 17:48


Lab Statement: Any lab studies that have been ordered have been reviewed, and 

results considered in the medical decision making process.





- CT


  ** CT Brain


CT Interpretation: No Acute Changes - IMPRESSION: No acute intracranial 

findings. Findings of mild left ethmoid sinusitis.


CT Interpretation Completed By: Radiologist - ED physician has reviewed this 

radiology report and agrees.





- EKG


  ** 1753.


Cardiac Rate: NL - 61bpm


EKG Rhythm: Sinus Rhythm


ST Segment: Normal


Ectopy: None





Course/Dx





- Course


Course Of Treatment: Test results are without any significant abnormalities.  

CT Brain IMPRESSION: No acute intracranial findings. Findings of mild left 

ethmoid sinusitis.  I believe his symptoms are secondary to neuropathy. 

Therefore, pt will be discharged with Follow up plans with his PCP.  I 

discussed all the findings and test results with the patient. Patient was 

instructed to return to the emergency room immediately if any of the symptoms 

return or worsens. Plan of care was discussed with the patient and understands 

and agrees. All questions were answered at patient satisfaction.  There were no 

further complaints or concerns.  Lung exam before discharge: CTA B/L. Good air 

exchange. No wheezing or crackles heard. CVS: S1 and S2 present. No murmurs 

appreciated. Patient is alert and oriented x 3. Patient is hemodynamically 

stable. Patient will be discharged home with follow up PCP in the next 2-3 days





- Diagnoses


Provider Diagnoses: 


 Neuropathy








Discharge





- Discharge Plan


Condition: Stable


Disposition: HOME


Patient Education Materials:  Peripheral Neuropathy (ED)


Referrals: 


Bridger Webb MD [Primary Care Provider] - 





The documentation as recorded by the Alex song Benjamin accurately reflects 

the service I personally performed and the decisions made by me, Bridger Chaudhry MD.

## 2017-11-17 ENCOUNTER — HOSPITAL ENCOUNTER (OUTPATIENT)
Dept: HOSPITAL 25 - ED | Age: 52
Setting detail: OBSERVATION
LOS: 1 days | Discharge: HOME | End: 2017-11-18
Attending: HOSPITALIST | Admitting: HOSPITALIST
Payer: MEDICARE

## 2017-11-17 DIAGNOSIS — Z95.5: ICD-10-CM

## 2017-11-17 DIAGNOSIS — E78.5: ICD-10-CM

## 2017-11-17 DIAGNOSIS — J45.909: ICD-10-CM

## 2017-11-17 DIAGNOSIS — E66.01: ICD-10-CM

## 2017-11-17 DIAGNOSIS — R94.31: ICD-10-CM

## 2017-11-17 DIAGNOSIS — Z79.82: ICD-10-CM

## 2017-11-17 DIAGNOSIS — I10: ICD-10-CM

## 2017-11-17 DIAGNOSIS — Z79.899: ICD-10-CM

## 2017-11-17 DIAGNOSIS — Z87.891: ICD-10-CM

## 2017-11-17 DIAGNOSIS — Z79.01: ICD-10-CM

## 2017-11-17 DIAGNOSIS — I25.119: ICD-10-CM

## 2017-11-17 DIAGNOSIS — I73.9: ICD-10-CM

## 2017-11-17 DIAGNOSIS — R07.9: Primary | ICD-10-CM

## 2017-11-17 DIAGNOSIS — Z88.0: ICD-10-CM

## 2017-11-17 DIAGNOSIS — Z88.2: ICD-10-CM

## 2017-11-17 LAB
ALBUMIN SERPL BCG-MCNC: 4.3 G/DL (ref 3.2–5.2)
ALP SERPL-CCNC: 113 U/L (ref 34–104)
ALT SERPL W P-5'-P-CCNC: 33 U/L (ref 7–52)
ANION GAP SERPL CALC-SCNC: 5 MMOL/L (ref 2–11)
AST SERPL-CCNC: 20 U/L (ref 13–39)
BUN SERPL-MCNC: 18 MG/DL (ref 6–24)
BUN/CREAT SERPL: 16.1 (ref 8–20)
CALCIUM SERPL-MCNC: 9.8 MG/DL (ref 8.6–10.3)
CHLORIDE SERPL-SCNC: 103 MMOL/L (ref 101–111)
GLOBULIN SER CALC-MCNC: 3.1 G/DL (ref 2–4)
GLUCOSE SERPL-MCNC: 109 MG/DL (ref 70–100)
HCO3 SERPL-SCNC: 30 MMOL/L (ref 22–32)
HCT VFR BLD AUTO: 44 % (ref 42–52)
HGB BLD-MCNC: 14.4 G/DL (ref 14–18)
MCH RBC QN AUTO: 28 PG (ref 27–31)
MCHC RBC AUTO-ENTMCNC: 33 G/DL (ref 31–36)
MCV RBC AUTO: 86 FL (ref 80–94)
POTASSIUM SERPL-SCNC: 3.9 MMOL/L (ref 3.5–5)
PROT SERPL-MCNC: 7.4 G/DL (ref 6.4–8.9)
RBC # BLD AUTO: 5.14 10^6/UL (ref 4–5.4)
SODIUM SERPL-SCNC: 138 MMOL/L (ref 133–145)
TROPONIN I SERPL-MCNC: 0.01 NG/ML (ref ?–0.04)
WBC # BLD AUTO: 5.7 10^3/UL (ref 3.5–10.8)

## 2017-11-17 PROCEDURE — 96376 TX/PRO/DX INJ SAME DRUG ADON: CPT

## 2017-11-17 PROCEDURE — 36415 COLL VENOUS BLD VENIPUNCTURE: CPT

## 2017-11-17 PROCEDURE — 80053 COMPREHEN METABOLIC PANEL: CPT

## 2017-11-17 PROCEDURE — 84484 ASSAY OF TROPONIN QUANT: CPT

## 2017-11-17 PROCEDURE — 85025 COMPLETE CBC W/AUTO DIFF WBC: CPT

## 2017-11-17 PROCEDURE — 94660 CPAP INITIATION&MGMT: CPT

## 2017-11-17 PROCEDURE — 99285 EMERGENCY DEPT VISIT HI MDM: CPT

## 2017-11-17 PROCEDURE — 96374 THER/PROPH/DIAG INJ IV PUSH: CPT

## 2017-11-17 PROCEDURE — 71275 CT ANGIOGRAPHY CHEST: CPT

## 2017-11-17 PROCEDURE — 71010: CPT

## 2017-11-17 PROCEDURE — 80048 BASIC METABOLIC PNL TOTAL CA: CPT

## 2017-11-17 PROCEDURE — 96375 TX/PRO/DX INJ NEW DRUG ADDON: CPT

## 2017-11-17 PROCEDURE — 83605 ASSAY OF LACTIC ACID: CPT

## 2017-11-17 PROCEDURE — G0378 HOSPITAL OBSERVATION PER HR: HCPCS

## 2017-11-17 PROCEDURE — 83735 ASSAY OF MAGNESIUM: CPT

## 2017-11-17 PROCEDURE — 94640 AIRWAY INHALATION TREATMENT: CPT

## 2017-11-17 PROCEDURE — 93005 ELECTROCARDIOGRAM TRACING: CPT

## 2017-11-17 RX ADMIN — NITROGLYCERIN ONE MG: 0.4 TABLET SUBLINGUAL at 14:31

## 2017-11-17 RX ADMIN — MOMETASONE FUROATE AND FORMOTEROL FUMARATE DIHYDRATE SCH PUFF: 200; 5 AEROSOL RESPIRATORY (INHALATION) at 22:06

## 2017-11-17 RX ADMIN — POTASSIUM CHLORIDE SCH MEQ: 1500 TABLET, EXTENDED RELEASE ORAL at 21:50

## 2017-11-17 RX ADMIN — HEPARIN SODIUM SCH UNITS: 5000 INJECTION INTRAVENOUS; SUBCUTANEOUS at 21:50

## 2017-11-17 RX ADMIN — NITROGLYCERIN ONE MG: 0.4 TABLET SUBLINGUAL at 14:26

## 2017-11-17 RX ADMIN — NITROGLYCERIN ONE MG: 0.4 TABLET SUBLINGUAL at 14:18

## 2017-11-17 NOTE — ED
Korey NATHAN Angela, scribed for Sudhakar Eaton MD on 11/17/17 at 1408 .





HPI Chest Pain





- HPI Summary


HPI Summary: 





This pt is a 53 y/o male presenting to [facility] c/o chest pain today. Pt reports 

he was supposed to have an appointment today with orthopedics but it was 

rescheduled for next week. He was supposed to be seen today for a possible full 

knee replacement. Pt notes both of his legs are always swollen. Today, pt c/o 

back pain, SOB, and left arm pain and discomfort. Pt notes his chest pain feels 

like pressure. 


He denies any PMHc of blood clots. PMHx includes HTN, high cholesterol, MI. Pt 

has had 3 cardiac stents and his cardiologist is Dr. Rosenthal. His last stress 

test was last year. Pt notes his chest pain today feels like a past heart 

attack.








- History of Current Complaint


Chief Complaint: EDChestPainROMI


Time Seen by Provider: 11/17/17 13:44


Hx Obtained From: Patient


Onset/Duration: Started Hours Ago, Still Present


Timing: Lasting Hours


Current Severity: Severe


Pain Intensity: 10


Pain Scale Used: 0-10 Numeric


Chest Pain Location: Diffuse


Chest Pain Radiates: Yes


Chest Pain Radiates To:: Back


Character: Pressure/Squeezing


Associated Signs and Symptoms: Positive: Chest Pain, Shortness of Breath, Back 

Pain, Calf Pain/Swelling, Other: - left arm pain





- Additional Pertinent History


Primary Care Physician: ANA





- Allergy/Home Medications


Allergies/Adverse Reactions: 


 Allergies











Allergy/AdvReac Type Severity Reaction Status Date / Time


 


Penicillins Allergy Severe Anaphylatic Verified 07/09/17 02:26





   Shock  


 


Amoxicillin Allergy Unknown Swelling Verified 07/09/17 02:26





[From Augmentin XR]     


 


Clavulanic Acid Allergy Unknown Swelling Verified 07/09/17 02:26





[From Augmentin XR]     


 


VINEGAR Allergy Intermediate Hives Uncoded 07/09/17 02:26


 


MUSHROOMS Allergy Unknown Rash Uncoded 07/09/17 02:26














PMH/Surg Hx/FS Hx/Imm Hx


Endocrine/Hematology History: Reports: Hx Anticoagulant Therapy - plavix


   Denies: Hx Diabetes, Hx Sickle Cell Disease, Hx Thyroid Disease


Cardiovascular History: Reports: Hx Angina, Hx Coronary Artery Disease, Hx 

Hypercholesterolemia, Hx Hypertension, Hx Myocardial Infarction, Other 

Cardiovascular Problems/Disorders - HEART DISEASE, CARDIAC EVENT MONITOR 

IMPLANTED


   Denies: Hx Congestive Heart Failure, Hx Pacemaker/ICD, Hx Valvular Heart 

Disease


Respiratory History: Reports: Hx Asthma, Hx Sleep Apnea


   Denies: Hx Chronic Obstructive Pulmonary Disease (COPD)


GI History: Reports: Hx Gastroesophageal Reflux Disease


   Denies: Hx Ulcer


 History: Reports: Hx Renal Disease - cysts, Other  Problems/Disorders - 

bilat cyst on kidneys


Musculoskeletal History: Reports: Hx Arthritis, Hx Back Problems, Other 

Musculoskeletal History - VIPUL


   Denies: Hx Scoliosis


Sensory History: Reports: Hx Cataracts - right eye, Hx Hearing Problem - LEFT 

EAR HEARING AID


   Denies: Hx Contacts or Glasses, Hx Hearing Aid


Opthamlomology History: Reports: Hx Cataracts - right eye


   Denies: Hx Contacts or Glasses


Neurological History: Reports: Hx Transient Ischemic Attacks (TIA)


   Denies: Hx Dementia, Hx Headaches, Hx Seizures, Other Neuro Impairments/

Disorders


Psychiatric History: Reports: Hx Depression


   Denies: Hx Panic Disorder





- Cancer History


Hx Chemotherapy: No





- Surgical History


Surgery Procedure, Year, and Place: DEC [place] [facility] [place]  [place] left [place] 

[place]rosc[place] [facility] [place].  2013 left tympanoplasty WITH STAPES IMPLANT ([facility] 

MALLEOUS HEAD SERIAL # [id number] - PER [facility] INFORMATION SENT TO MRI ON 6/ 13/2016; IMPLANT IS STAINLESS STEEL STATES SOME DEGREE OF MAGNETISM TESTED MRI 

SAFE AT 1.5T ONLY - DR VALDEZ APPROVED THIS INFORMATION FOR 1.5T ONLY).  [facility] ;.  

2015 LEFT [place]VIS[place]YMPAN[place] [facility] [place]  [place] 2012 HEART CATH - NO 

STENTS ;.  2011 HEART CATH WITH cardiac stents x 4 (PROMUS DRUG-ELUTING STENT 

OKAY IN NORMAL MODE ONLY,  GAUSS/CM @ 1.5T[place] [facility] [place]


Hx Anesthesia Reactions: Yes - SEIZURE LIKE ACTIVITY; REINTUBATION AFTER LEFT 

EAR 2013





- Immunization History


Date of Tetanus Vaccine: unknown


Date of Influenza Vaccine: Fall 2012


Infectious Disease History: No


Infectious Disease History: 


   Denies: Hx Clostridium Difficile, Hx Hepatitis, Hx Human Immunodeficiency 

Virus (HIV), Hx of Known/Suspected MRSA, Hx Shingles, Hx Tuberculosis, Hx Known/

Suspected VRE, Hx Known/Suspected VRSA, History Other Infectious Disease, 

Traveled Outside the [place] in Last 30 Days





- Family History


Known Family History: Positive: Cardiac Disease, Hypertension, Diabetes





- Social History


Alcohol Use: None


Hx Substance Use: No


Substance Use Type: Reports: None


Hx Tobacco Use: Yes


Smoking Status (MU): Former Smoker


Type: Cigarettes


Length of Time of Smoking/Using Tobacco: 10-12 YRS


Have You Smoked in the Last Year: No





Review of Systems


Negative: Fever, Chills


Negative: Erythema


Negative: Sore Throat


Positive: Chest Pain


Positive: Shortness Of Breath.  Negative: Cough


Negative: Abdominal Pain, Vomiting, Nausea


Negative: dysuria, hematuria


Musculoskeletal: Other - back pain, left arm pain


Positive: Edema - both legs.  Negative: Myalgia


Negative: Rash


Neurological: Negative - dizziness


All Other Systems Reviewed And Are Negative: Yes





Physical Exam





- Summary


Physical Exam Summary: 





Constitutional: Well-developed, Well-nourished, Alert. (-) Distressed


Skin: Warm, Dry


HENT: Normocephalic; Atraumatic 


Eyes: Conjunctiva normal


Neck: Musculoskeletal ROM normal neck. (-) JVD, (-) Stridor, (-) Tracheal 

deviation


Cardio: Rhythm regular, rate normal, Heart sounds normal; Intact distal pulses; 

The pedal pulses are 2+ and symmetric. Radial pulses are 2+ and symmetric. (-) 

Murmur


Pulmonary/Chest wall: Effort normal. (-) Respiratory distress, (-) Wheezes, (-) 

Rales. No reproducible chest pain. 


Abd: Soft, (-) Tenderness, (-) Distension, (-) Guarding, (-) Rebound


Musculoskeletal: (-) Edema


Lymph: (-) Cervical adenopathy


Neuro: Alert, Oriented x3


Psych: Mood and affect Normal


Triage Information Reviewed: Yes


Vital Signs On Initial Exam: 


 Initial Vitals











Temp Pulse Resp BP Pulse Ox


 


 96.7 F   67   20   134/82   97 


 


 11/17/17 13:17  11/17/17 13:17  11/17/17 13:17  11/17/17 13:17  11/17/17 13:17











Vital Signs Reviewed: Yes





Diagnostics





- Vital Signs


 Vital Signs











  Temp Pulse Resp BP Pulse Ox


 


 11/17/17 13:17  96.7 F  67  20  134/82  97














- Laboratory


Lab Results: 


 Lab Results











  11/17/17 11/17/17 11/17/17 Range/Units





  14:16 14:16 14:16 


 


WBC  5.7    (3.5-10.8)  10^3/ul


 


RBC  5.14    (4.0-5.4)  10^6/ul


 


Hgb  14.4    (14.0-18.0)  g/dl


 


Hct  44    (42-52)  %


 


MCV  86    (80-94)  fL


 


MCH  28    (27-31)  pg


 


MCHC  33    (31-36)  g/dl


 


RDW  15    (10.5-15)  %


 


Plt Count  211    (150-450)  10^3/ul


 


MPV  8    (7.4-10.4)  um3


 


Neut % (Auto)  59.4    (38-83)  %


 


Lymph % (Auto)  28.5    (25-47)  %


 


Mono % (Auto)  9.5 H    (1-9)  %


 


Eos % (Auto)  1.8    (0-6)  %


 


Baso % (Auto)  0.8    (0-2)  %


 


Absolute Neuts (auto)  3.4    (1.5-7.7)  10^3/ul


 


Absolute Lymphs (auto)  1.6    (1.0-4.8)  10^3/ul


 


Absolute Monos (auto)  0.5    (0-0.8)  10^3/ul


 


Absolute Eos (auto)  0.1    (0-0.6)  10^3/ul


 


Absolute Basos (auto)  0    (0-0.2)  10^3/ul


 


Absolute Nucleated RBC  0    10^3/ul


 


Nucleated RBC %  0    


 


Sodium   138   (133-145)  mmol/L


 


Potassium   3.9   (3.5-5.0)  mmol/L


 


Chloride   103   (101-111)  mmol/L


 


Carbon Dioxide   30   (22-32)  mmol/L


 


Anion Gap   5   (2-11)  mmol/L


 


BUN   18   (6-24)  mg/dL


 


Creatinine   1.12   (0.67-1.17)  mg/dL


 


Est GFR ( Amer)   88.2   (>60)  


 


Est GFR (Non-Af Amer)   68.6   (>60)  


 


BUN/Creatinine Ratio   16.1   (8-20)  


 


Glucose   109 H   ()  mg/dL


 


Lactic Acid    1.3  (0.5-2.0)  mmol/L


 


Calcium   9.8   (8.6-10.3)  mg/dL


 


Total Bilirubin   0.50   (0.2-1.0)  mg/dL


 


AST   20   (13-39)  U/L


 


ALT   33   (7-52)  U/L


 


Alkaline Phosphatase   113 H   ()  U/L


 


Troponin I   0.01   (<0.04)  ng/mL


 


Total Protein   7.4   (6.4-8.9)  g/dL


 


Albumin   4.3   (3.2-5.2)  g/dL


 


Globulin   3.1   (2-4)  g/dL


 


Albumin/Globulin Ratio   1.4   (1-3)  














  11/17/17 Range/Units





  16:46 


 


WBC   (3.5-10.8)  10^3/ul


 


RBC   (4.0-5.4)  10^6/ul


 


Hgb   (14.0-18.0)  g/dl


 


Hct   (42-52)  %


 


MCV   (80-94)  fL


 


MCH   (27-31)  pg


 


MCHC   (31-36)  g/dl


 


RDW   (10.5-15)  %


 


Plt Count   (150-450)  10^3/ul


 


MPV   (7.4-10.4)  um3


 


Neut % (Auto)   (38-83)  %


 


Lymph % (Auto)   (25-47)  %


 


Mono % (Auto)   (1-9)  %


 


Eos % (Auto)   (0-6)  %


 


Baso % (Auto)   (0-2)  %


 


Absolute Neuts (auto)   (1.5-7.7)  10^3/ul


 


Absolute Lymphs (auto)   (1.0-4.8)  10^3/ul


 


Absolute Monos (auto)   (0-0.8)  10^3/ul


 


Absolute Eos (auto)   (0-0.6)  10^3/ul


 


Absolute Basos (auto)   (0-0.2)  10^3/ul


 


Absolute Nucleated RBC   10^3/ul


 


Nucleated RBC %   


 


Sodium   (133-145)  mmol/L


 


Potassium   (3.5-5.0)  mmol/L


 


Chloride   (101-111)  mmol/L


 


Carbon Dioxide   (22-32)  mmol/L


 


Anion Gap   (2-11)  mmol/L


 


BUN   (6-24)  mg/dL


 


Creatinine   (0.67-1.17)  mg/dL


 


Est GFR ( Amer)   (>60)  


 


Est GFR (Non-Af Amer)   (>60)  


 


BUN/Creatinine Ratio   (8-20)  


 


Glucose   ()  mg/dL


 


Lactic Acid   (0.5-2.0)  mmol/L


 


Calcium   (8.6-10.3)  mg/dL


 


Total Bilirubin   (0.2-1.0)  mg/dL


 


AST   (13-39)  U/L


 


ALT   (7-52)  U/L


 


Alkaline Phosphatase   ()  U/L


 


Troponin I  0.01  (<0.04)  ng/mL


 


Total Protein   (6.4-8.9)  g/dL


 


Albumin   (3.2-5.2)  g/dL


 


Globulin   (2-4)  g/dL


 


Albumin/Globulin Ratio   (1-3)  











Result Diagrams: 


 11/17/17 14:16





 11/17/17 14:16


Lab Statement: Any lab studies that have been ordered have been reviewed, and 

results considered in the medical decision making process.





- Radiology


  ** Chest XR


Xray Interpretation: No Acute Changes - IMPRESSION: No evidence for acute 

disease. ED physician has reviewed this radiology report and agrees.


Radiology Interpretation Completed By: Radiologist





- CT


  ** CTA Chest


CT Interpretation: Positive (See Comments) - IMPRESSION: 1. Mildly limited CT 

pulmonary angiogram due to margin artifact without compelling evidence for 

pulmonary embolism. 2. Prominent main pulmonary artery measuring up to 5.0 cm 

diameter in the cephalocaudal dimension without gross change compared with the 

2013 exam. This may reflect aneurysm and may be sequela of pulmonary arterial 

hypertension. ED physician has reviewed this radiology report and agrees.


CT Interpretation Completed By: Radiologist





- EKG


  ** 1331


Cardiac Rate: NL


EKG Rhythm: Sinus Rhythm - at 63 bpm


EKG Interpretation: No STEMI. T wave inversion in V5 and V6. 





  ** 1539


Cardiac Rate: NL


EKG Rhythm: Sinus Rhythm - at 61 bpm


EKG Interpretation: No STEMI





Chest Pain Course/Dx





- Course


Assessment/Plan: This pt is a 53 y/o male presenting to [facility] c/o chest pain 

today. Pt reports he was supposed to have an appointment today with orthopedics 

but it was rescheduled for next week. He was supposed to be seen today for a 

possible full knee replacement. Pt notes both of his legs are always swollen. 

Today, pt c/o back pain, SOB, and left arm pain and discomfort. Pt notes his 

chest pain feels like pressure.  He denies any PMHc of blood clots. PMHx 

includes HTN, high cholesterol, MI. Pt has had 3 cardiac stents and his 

cardiologist is Dr. Rosenthal. His last stress test was last year. Pt notes his 

chest pain today feels like a past heart attack.  Lab work, EKG, chest XR, and 

CTA chest were ordered. Labs show glucose of 109. Chest XR is negative for 

acute disease. EKG shows T wave inversions in V5 and V6. CTA chest reveals 1. 

Mildly limited CT pulmonary angiogram due to margin artifact without compelling 

evidence for pulmonary embolism. 2. Prominent main pulmonary artery measuring 

up to 5.0 cm diameter in the cephalocaudal dimension without gross change 

compared with the 2013 exam. This may reflect aneurysm and may be sequela of 

pulmonary arterial hypertension.  In the ED course, the pt was given aspirin, 

morphine, nitroglycerin, toradol, and Zofran.  I discussed pt care with Dr. Maguire, who has agreed to admit the pt.





- Diagnoses


Provider Diagnoses: 


 Chest pain








- Provider Notifications


Discussed Care Of Patient With: Alvarado Maguire


Time Discussed With Above Provider: 16:30


Instructed by Provider To: Other - I discussed pt care with Dr. Maguire, who 

has agreed to admit the pt.





Discharge





- Discharge Plan


Condition: Stable


Disposition: ADMITTED TO [facility]





The documentation as recorded by the Korey song Angela accurately reflects 

the service I personally performed and the decisions made by me, Sudhakar Eaton MD.

## 2017-11-17 NOTE — RAD
INDICATION:  Chest pain.



COMPARISON:  Comparison is made with a prior chest x-ray study from June 13, 2017.



TECHNIQUE: A portable view of the chest was obtained.



FINDINGS: Cardiac and mediastinal contours appear to be within normal limits.



The lungs are clear. No pleural effusion is seen.



There is a electronic device which projects overlying the cardiac apex.



IMPRESSION:  NO EVIDENCE FOR ACUTE DISEASE.

## 2017-11-17 NOTE — RAD
INDICATION: Chest pain, dizziness. Coronary artery disease with stents.



COMPARISON: November 17, 2017 chest radiograph and December 31, 2013 CT.



TECHNIQUE: Multidetector CT images were obtained from the lung apices to the upper abdomen

with 80 mL Omnipaque 350 IV contrast.  Pulmonary angiogram protocol. Multiplanar

reformation including with maximum intensity projection.



REPORT: Respiratory motion artifact degrades image quality. Minimal bibasilar atelectasis.

No pulmonary infiltrate, focal pulmonary lesion, pleural effusion, pneumothorax.



Negative for thoracic lymphadenopathy. Upper normal heart size. Negative for pericardial

effusion. Normal diameter thoracic aorta. Negative for aortic dissection.



Prominent main pulmonary artery measuring up to 5.0 cm diameter in the cephalocaudal

dimension without gross change compared with the 2013 exam. The CT pulmonary angiogram is

mildly limited due to respiratory motion artifact. No filling defects are identified from

the main to the subsegmental pulmonary arteries to indicate presence of a pulmonary

embolism. 



Images through the upper abdomen are remarkable for partially visualized cortical cyst at

the upper poles of the kidneys with interval enlargement compared with the 2013 exam.



IMPRESSION: 

1. Mildly limited CT pulmonary angiogram due to margin artifact without compelling

evidence for pulmonary embolism.

2. Prominent main pulmonary artery measuring up to 5.0 cm diameter in the cephalocaudal

dimension without gross change compared with the 2013 exam. This may reflect aneurysm and

may be sequela of pulmonary arterial hypertension.

## 2017-11-18 VITALS — SYSTOLIC BLOOD PRESSURE: 127 MMHG | DIASTOLIC BLOOD PRESSURE: 66 MMHG

## 2017-11-18 LAB
ANION GAP SERPL CALC-SCNC: 7 MMOL/L (ref 2–11)
BUN SERPL-MCNC: 18 MG/DL (ref 6–24)
BUN/CREAT SERPL: 16.7 (ref 8–20)
CALCIUM SERPL-MCNC: 9.5 MG/DL (ref 8.6–10.3)
CHLORIDE SERPL-SCNC: 103 MMOL/L (ref 101–111)
GLUCOSE SERPL-MCNC: 133 MG/DL (ref 70–100)
HCO3 SERPL-SCNC: 26 MMOL/L (ref 22–32)
MAGNESIUM SERPL-MCNC: 1.9 MG/DL (ref 1.9–2.7)
POTASSIUM SERPL-SCNC: 3.6 MMOL/L (ref 3.5–5)
SODIUM SERPL-SCNC: 136 MMOL/L (ref 133–145)

## 2017-11-18 RX ADMIN — POTASSIUM CHLORIDE SCH MEQ: 1500 TABLET, EXTENDED RELEASE ORAL at 08:59

## 2017-11-18 RX ADMIN — NEBIVOLOL HYDROCHLORIDE SCH MG: 2.5 TABLET ORAL at 09:57

## 2017-11-18 RX ADMIN — METOPROLOL SUCCINATE SCH MG: 50 TABLET, EXTENDED RELEASE ORAL at 09:53

## 2017-11-18 RX ADMIN — HEPARIN SODIUM SCH UNITS: 5000 INJECTION INTRAVENOUS; SUBCUTANEOUS at 06:01

## 2017-11-18 RX ADMIN — MOMETASONE FUROATE AND FORMOTEROL FUMARATE DIHYDRATE SCH PUFF: 200; 5 AEROSOL RESPIRATORY (INHALATION) at 08:29

## 2017-11-18 RX ADMIN — ATORVASTATIN CALCIUM SCH MG: 20 TABLET, FILM COATED ORAL at 08:59

## 2017-11-18 RX ADMIN — ATORVASTATIN CALCIUM SCH: 20 TABLET, FILM COATED ORAL at 09:09

## 2017-11-18 RX ADMIN — NEBIVOLOL HYDROCHLORIDE SCH: 2.5 TABLET ORAL at 09:01

## 2017-11-18 RX ADMIN — METOPROLOL SUCCINATE SCH: 50 TABLET, EXTENDED RELEASE ORAL at 09:01

## 2017-11-18 NOTE — HP
CC:  Dr. Webb

 

HISTORY AND PHYSICAL:

 

DATE OF ADMISSION:  11/17/17

 

PRIMARY CARE PHYSICIAN:  Dr. Webb.

 

CHIEF COMPLAINT:  Chest pain.

 

HISTORY OF PRESENT ILLNESS:  Mr. Rodriguez is a 52-year-old male with a past medical history of hypertens
ion, hyperlipidemia, VIPUL on CPAP, morbid obesity, CAD status post stent placement, asthma, peripheral
 vascular disease and repeated trips to the hospital for chest pain who presents to the hospital with
 chest pain that began earlier today.  The patient states he woke up with some slight soreness in the
 middle of his left back.  He states he had a doctor's appointment today, so he began to get ready an
d headed down to the bus around 12:45.  When he got downstairs, he stated that he had some "flutterin
g" associated with some chest soreness that he reports is in the midsternal area.  It is nonradiating
, not associated with any nausea, vomiting or diaphoresis.  He states that these episodes are flutter
ing, kind of come and go on throughout the day, lasting for a few minutes and then resolving and then
 coming back again.  The chest soreness/pressure seems to be more persistent.  He denies any fever, c
hills, URI symptoms, abdominal pain, dysuria, hematuria, blood in the stool.  He follows up with Dr. Rosenthal.  He cannot recall the last time he saw him, but thinks it was sometime this year.

 

On review of the patient's chart, it seems that he has been worked up for chest pain numerous times i
n the past.  He was last admitted for this in May of this past year and he underwent a nuclear cardia
c stress test that did not show any acute areas of ischemia.  He subsequently presented to the ED a m
onth later with chest pain and was sent home after he responded to Toradol.  Looking back in our syst
em, we probably have nearly a 100 troponin draws on the patient, all of which have been negative.

 

PAST MEDICAL HISTORY:

1.  CAD, status post stent placement.

2.  Morbid obesity.

3.  VIPUL, on CPAP.

4.  Palpitations.

5.  Hyperlipidemia.

6.  Asthma.

7.  Peripheral vascular disease.

8.  Hypertension.

 

PAST SURGICAL HISTORY:

1.  Status post TKA.

2.  Inner ear surgery.

3.  Sinus surgery.

 

HOME MEDICATIONS:

1.  Potassium chloride 20 mEq 3 times daily.

2.  Plavix 75 mg by mouth daily.

3.  Aspirin 325 mg by mouth daily.

4.  Advair 1 puff inhaled 2 times daily.

5.  Atorvastatin 20 mg by mouth daily.

6.  Bystolic 10 mg by mouth daily.

7.  Toprol 50 mg by mouth daily.

8.  Dyazide 37.5/25 mg by mouth 1 capsule daily.

9.  Albuterol 2 puffs inhaled 4 times daily as needed for shortness of breath or wheezing.

 

ALLERGIES:  PENICILLIN, AMOXICILLIN, AUGMENTIN, VINEGAR, and MUSHROOMS.

 

FAMILY HISTORY:  Significant for mother with CHF, father with CHF, brother with cardiac event who pas
sed away.

 

SOCIAL HISTORY:  The patient denies tobacco or alcohol abuse.  No illicit drug use.

 

REVIEW OF SYSTEMS:  A 12-point review of systems is negative except for that as noted in the HPI.

 

                               PHYSICAL EXAMINATION

 

GENERAL:  The patient is a middle aged obese Afrian-American man lying in bed, in no apparent distres
s.

 

VITAL SIGNS:  On admission:  Temperature 96.7, heart rate 67, respiratory rate 20, O2 saturation 97% 
on room air, blood pressure 134/82.

 

HEENT:  Head normocephalic, atraumatic.  Eyes:  Pupils equal, round, reactive to light and accommodat
ion.  Anicteric sclerae.  ENT:  Moist mucous membranes.

 

NECK:  No cervical adenopathy.

 

LUNGS:  Clear to auscultation bilaterally.  No wheezes, rales or rhonchi.

 

CARDIOVASCULAR:  Regular rate and rhythm.  S1, S2 present.  No murmurs, gallops, or rubs.

 

ABDOMEN:  Soft, nontender, nondistended.  Bowel sounds positive.

 

EXTREMITIES:  No cyanosis, clubbing or edema.  Left knee brace in place.

 

NEUROLOGIC:  The patient is alert and oriented x3.  No focal neurologic deficits.

 

 LABS AND DIAGNOSTICS:  White blood cell count 5.7, hematocrit 44, platelets 211,000.  Sodium 138, po
tassium 3.9, chloride 103, carbon dioxide 30, BUN 18, creatinine 1.12, glucose 109, alk phos of 113. 
 Remainder of LFTs within normal limits.  Troponins negative x2.

 

Chest x-ray, impression:  Review shows no acute disease.  CTA of the chest done shows mildly limited 
CT angiogram due to margin artifact without compelling evidence for pulmonary embolism.  Prominent ma
in pulmonary artery measuring up to 5 cm in diameter in the cephalocaudal dimension without gross delaney
nge compared to the 2013 exam.  This may reflect aneurysm and may be sequelae of pulmonary arterial h
ypertension.  EKG personally reviewed shows normal sinus rhythm, first degree AV block, chronic T-wav
e changes.

 

ASSESSMENT AND PLAN:  Chest pain and palpitations in a 52-year-old male with a past medical history o
f hypertension, hyperlipidemia, coronary artery disease, morbid obesity, obstructive sleep apnea on C
PAP.

 

1.  Chest pain and palpitations.  The patient has presented similarly a number of times.  I feel this
 is most likely not cardiac.  I gave a dose of Toradol in the ED as he seemed to respond to this in t
he past.  We will continue to trend the patient's troponins.  We will monitor him on telemetry.  If t
roponins remain negative, we will likely discharge tomorrow and consider outpatient stress test.

2.  Hypertension.  Continue home triamterene HCTZ and metoprolol.

3.  Coronary artery disease.  Continue home aspirin, Plavix, statin, and beta- blocker.

4.  Obstructive sleep apnea, CPAP q.h.s.

5.  Peripheral vascular disease.  Continue aspirin and Plavix.

6.  DVT prophylaxis, heparin subcu.

7.  Code status.  The patient is a full code.

 

TIME SPENT:  Total time spent on this admission, 45 minutes with over half the time spent face-to-fac
e with the patient in counseling and coordinating care.

 

 

 

[id number]/[facility] #: [id number]

## 2017-11-18 NOTE — PN
Subjective


Date of Service: 11/18/17


Interval History: 





On tele this AM patient noted to have 3 episodes of dropped QRS complexes, 

appears to be Mobitz Type 1


Patient seen this morning. Continues to complain of chest pain that has been 

constant overnight but overall seems to be slightly improved, now an 8/10 

instead of 9-10/10. Continues to have intermittent episodes of "fluttering" 

that has gone on throughout the night as well, reports they last about 3 

minutes and then resolve. Does not report any worsening with laying flat or 

after meals, no worse with deep breathing. States he takes Metoprolol and 

Bystolic at home, outpatient pharmacist had double checked with Dr. Rosenthal.





Family History: Unchanged from Admission


Social History: Unchanged from Admission


Past Medical History: Unchanged from Admission





Objective


Active Medications: 








Acetaminophen (Tylenol Tab*)  650 mg PO Q4H PRN


Albuterol (Ventolin Hfa Inhaler*)  2 puff INH QID PRN


Aspirin (Aspirin Tab*)  325 mg PO DAILY [facility]


Atorvastatin Calcium (Lipitor*)  20 mg PO DAILY [facility]


Clopidogrel Bisulfate (Plavix Tab*)  75 mg PO DAILY [facility]


Heparin Sodium (Porcine) (Heparin Vial(*))  5,000 units SUBCUT Q8HR [facility]


Metoprolol Succinate (Toprol Xl Tab*)  50 mg PO DAILY [facility]


Mometasone Furoate/Formoterol Fumar (Dulera 200/5 Mdi*)  2 puff INH BID [facility]


Morphine Sulfate (Morphine Inj (Syringe)*)  4 mg IV Q4H PRN


Nebivolol (Bystolic Tab (Nf))  10 mg PO DAILY [facility]


Potassium Chloride (Klor Con Er Tab*)  20 meq PO TID [facility]


Triamterene/HCTZ (Dyazide Cap*)  1 cap PO DAILY [facility]








 Vital Signs











  11/17/17 11/17/17 11/17/17





  18:00 19:00 19:50


 


Temperature   


 


Pulse Rate 57 56 56


 


Respiratory 14 20 21





Rate   


 


Blood Pressure   135/68





(mmHg)   


 


O2 Sat by Pulse 94 96 94





Oximetry   














  11/17/17 11/18/17 11/18/17





  23:08 00:05 03:17


 


Temperature  97.5 F 98.0 F


 


Pulse Rate  54 55


 


Respiratory 14 16 16





Rate   


 


Blood Pressure  120/60 143/64





(mmHg)   


 


O2 Sat by Pulse  97 100





Oximetry   














  11/18/17





  07:28


 


Temperature 97.5 F


 


Pulse Rate 58


 


Respiratory 16





Rate 


 


Blood Pressure 139/79





(mmHg) 


 


O2 Sat by Pulse 98





Oximetry 











Oxygen Devices in Use Now: None


Appearance: Middle-aged, obese, AAM, sitting in bed in NAD


Eyes: No Scleral Icterus


Ears/Nose/Mouth/Throat: Mucous Membranes Moist


Neck: NL Appearance and Movements; NL JVP


Respiratory: Symmetrical Chest Expansion and Respiratory Effort, Clear to 

Auscultation


Cardiovascular: NL Sounds; No Murmurs; No JVD, RRR


Abdominal: NL Sounds; No Tenderness; No Distention


Lymphatic: No Cervical Adenopathy


Extremities: No Edema


Skin: No Rash or Ulcers


Neurological: Alert and Oriented x 3


Result Diagrams: 


 11/17/17 14:16





 11/17/17 14:16





Assess/Plan/Problems-Billing


Assessment: 


Chest pain and "fluttering" in a 51 yo M with hx of HTN, HLD, CAD s/p PCI, hx 

of VTach, PVD, VIPUL on CPAP








- Patient Problems


(1) Chest pain


Current Visit: No   Comment: 


Troponins have remained negative x 3, unlikely to be cardiac etiology. 

Continues this morning but slightly improved. Reports Toradol in ED "took the 

edge off". Has been declining tylenol because it won't work. Will trial H2 

blocker.

## 2017-11-18 NOTE — DCNOTE
Patient seen this morning. Continued to complain of chest pressure but it is 

slightly improved. Also reports intermittent "fluttering". On tele patient had 

3 episodes of dropped QRS with previous OR prolongation c/w Mobitz type one 2nd 

degree AV block. Occasional PVCs but nothing persistent. 


On exam, obese, middle-aged AAM, sitting in bed in NAD, RRR, s1 and s2 present, 

no m/g/r, abd obese, soft, NTND, BS+, no LE edema


Seems that chest pain is unlikely to be cardiac and "fluttering" patient is 

feeling is not corresponding to tele monitoring. Spoke with Dr. Vann 

regarding Mobitz Type I AV Block, she will set up holter monitor for the 

patient. Will not make any medication changes at this time, will continue on 

Metoprolol and Bystolic. 


Patient had some slight improvement with toradol and slight improvement with GI 

cocktail. Will continue on Ranitidine and prn tylenol, I suspect this pain 

should resolve over the next days.

## 2018-01-18 ENCOUNTER — HOSPITAL ENCOUNTER (INPATIENT)
Dept: HOSPITAL 25 - ED | Age: 53
LOS: 4 days | Discharge: HOME | DRG: 101 | End: 2018-01-22
Attending: INTERNAL MEDICINE | Admitting: INTERNAL MEDICINE
Payer: MEDICARE

## 2018-01-18 DIAGNOSIS — J45.909: ICD-10-CM

## 2018-01-18 DIAGNOSIS — I25.2: ICD-10-CM

## 2018-01-18 DIAGNOSIS — E66.01: ICD-10-CM

## 2018-01-18 DIAGNOSIS — I25.10: ICD-10-CM

## 2018-01-18 DIAGNOSIS — G47.33: ICD-10-CM

## 2018-01-18 DIAGNOSIS — R07.9: ICD-10-CM

## 2018-01-18 DIAGNOSIS — E78.5: ICD-10-CM

## 2018-01-18 DIAGNOSIS — Z79.899: ICD-10-CM

## 2018-01-18 DIAGNOSIS — Z88.0: ICD-10-CM

## 2018-01-18 DIAGNOSIS — Z79.82: ICD-10-CM

## 2018-01-18 DIAGNOSIS — Z88.8: ICD-10-CM

## 2018-01-18 DIAGNOSIS — Z86.73: ICD-10-CM

## 2018-01-18 DIAGNOSIS — G40.909: Primary | ICD-10-CM

## 2018-01-18 DIAGNOSIS — I44.1: ICD-10-CM

## 2018-01-18 DIAGNOSIS — I73.9: ICD-10-CM

## 2018-01-18 DIAGNOSIS — Z82.49: ICD-10-CM

## 2018-01-18 DIAGNOSIS — I11.9: ICD-10-CM

## 2018-01-18 LAB
BASOPHILS # BLD AUTO: 0.1 10^3/UL (ref 0–0.2)
EOSINOPHIL # BLD AUTO: 0.1 10^3/UL (ref 0–0.6)
HCT VFR BLD AUTO: 45 % (ref 42–52)
HGB BLD-MCNC: 14.9 G/DL (ref 14–18)
INR PPP/BLD: 0.98 (ref 0.77–1.02)
LYMPHOCYTES # BLD AUTO: 1.3 10^3/UL (ref 1–4.8)
MCH RBC QN AUTO: 28 PG (ref 27–31)
MCHC RBC AUTO-ENTMCNC: 33 G/DL (ref 31–36)
MCV RBC AUTO: 85 FL (ref 80–94)
MONOCYTES # BLD AUTO: 0.5 10^3/UL (ref 0–0.8)
NEUTROPHILS # BLD AUTO: 3.6 10^3/UL (ref 1.5–7.7)
NRBC # BLD AUTO: 0 10^3/UL
NRBC BLD QL AUTO: 0
PLATELET # BLD AUTO: 196 10^3/UL (ref 150–450)
RBC # BLD AUTO: 5.28 10^6/UL (ref 4–5.4)
WBC # BLD AUTO: 5.6 10^3/UL (ref 3.5–10.8)

## 2018-01-18 PROCEDURE — 81015 MICROSCOPIC EXAM OF URINE: CPT

## 2018-01-18 PROCEDURE — 93005 ELECTROCARDIOGRAM TRACING: CPT

## 2018-01-18 PROCEDURE — G0480 DRUG TEST DEF 1-7 CLASSES: HCPCS

## 2018-01-18 PROCEDURE — 84484 ASSAY OF TROPONIN QUANT: CPT

## 2018-01-18 PROCEDURE — 85025 COMPLETE CBC W/AUTO DIFF WBC: CPT

## 2018-01-18 PROCEDURE — 94760 N-INVAS EAR/PLS OXIMETRY 1: CPT

## 2018-01-18 PROCEDURE — 80061 LIPID PANEL: CPT

## 2018-01-18 PROCEDURE — 85730 THROMBOPLASTIN TIME PARTIAL: CPT

## 2018-01-18 PROCEDURE — 86140 C-REACTIVE PROTEIN: CPT

## 2018-01-18 PROCEDURE — 81003 URINALYSIS AUTO W/O SCOPE: CPT

## 2018-01-18 PROCEDURE — 94660 CPAP INITIATION&MGMT: CPT

## 2018-01-18 PROCEDURE — 85610 PROTHROMBIN TIME: CPT

## 2018-01-18 PROCEDURE — 83605 ASSAY OF LACTIC ACID: CPT

## 2018-01-18 PROCEDURE — 80053 COMPREHEN METABOLIC PANEL: CPT

## 2018-01-18 PROCEDURE — 80048 BASIC METABOLIC PNL TOTAL CA: CPT

## 2018-01-18 PROCEDURE — 82550 ASSAY OF CK (CPK): CPT

## 2018-01-18 PROCEDURE — 80320 DRUG SCREEN QUANTALCOHOLS: CPT

## 2018-01-18 PROCEDURE — 70450 CT HEAD/BRAIN W/O DYE: CPT

## 2018-01-18 PROCEDURE — G0378 HOSPITAL OBSERVATION PER HR: HCPCS

## 2018-01-18 PROCEDURE — 80307 DRUG TEST PRSMV CHEM ANLYZR: CPT

## 2018-01-18 PROCEDURE — 95816 EEG AWAKE AND DROWSY: CPT

## 2018-01-18 PROCEDURE — 83036 HEMOGLOBIN GLYCOSYLATED A1C: CPT

## 2018-01-18 PROCEDURE — 36415 COLL VENOUS BLD VENIPUNCTURE: CPT

## 2018-01-18 PROCEDURE — 70551 MRI BRAIN STEM W/O DYE: CPT

## 2018-01-18 PROCEDURE — 87641 MR-STAPH DNA AMP PROBE: CPT

## 2018-01-18 PROCEDURE — 83735 ASSAY OF MAGNESIUM: CPT

## 2018-01-18 PROCEDURE — 82553 CREATINE MB FRACTION: CPT

## 2018-01-18 RX ADMIN — HEPARIN SODIUM SCH UNITS: 5000 INJECTION INTRAVENOUS; SUBCUTANEOUS at 21:52

## 2018-01-18 RX ADMIN — ATORVASTATIN CALCIUM SCH MG: 20 TABLET, FILM COATED ORAL at 21:57

## 2018-01-18 NOTE — RAD
HISTORY: Altered mental status



COMPARISONS: October 24, 2017



TECHNIQUE: Multiple contiguous axial CT scans were obtained of the head without 

intravenous contrast. 



FINDINGS: 

HEMORRHAGE/INFARCT: There is no hemorrhage or acute infarct.

MASSES/SHIFT: There is no mass or shift.



EXTRA-AXIAL SPACES: There are no extra-axial fluid collections.

SULCI AND VENTRICLES: The sulci and ventricles are normal in size and position for the

patient's stated age.



CEREBRUM: There are no focal parenchymal abnormalities.

BRAINSTEM: There are no focal parenchymal abnormalities.

CEREBELLUM: There are no focal parenchymal abnormalities.



VESSELS: The vessels are grossly normal.

PARANASAL SINUSES: There is a mucous retention cyst of the left maxillary sinus.

ORBITS: The orbits are unremarkable.

BONES AND SOFT TISSUE: No bone or soft tissue abnormalities are noted.



OTHER: None



IMPRESSION: 

NO ACUTE INTRACRANIAL PATHOLOGY.

## 2018-01-18 NOTE — ED
Esthela NATHAN Thomas, scribed for Zach Anglin MD on 01/18/18 at 1437 .





Altered Mental Status





- HPI Summary


HPI Summary: 





LEVEL 5 CAVEAT: HPI LIMITED BY AMS





The patient is a 52 year old male who came into the emergency room with body 

trembling. He did not respond to questioning, but he did nod his head saying 

that he is in pain. The exam has not been taken yet.





- History Of Current Complaint


Chief Complaint: EDAltMentalStatus


Stated Complaint: CHEST PAIN


Time Seen by Provider: 01/18/18 13:42


Hx From Patient Unobtainable Due To: Altered Mental Status


Onset/Duration: Unknown





- Allergies/Home Medications


Allergies/Adverse Reactions: 


 Allergies











Allergy/AdvReac Type Severity Reaction Status Date / Time


 


Penicillins Allergy Severe Anaphylatic Verified 07/09/17 02:26





   Shock  


 


Amoxicillin Allergy Unknown Swelling Verified 07/09/17 02:26





[From Augmentin XR]     


 


Clavulanic Acid Allergy Unknown Swelling Verified 07/09/17 02:26





[From Augmentin XR]     


 


VINEGAR Allergy Intermediate Hives Uncoded 07/09/17 02:26


 


MUSHROOMS Allergy Unknown Rash Uncoded 07/09/17 02:26











Home Medications: 


 Home Medications





Nitroglycerin TAB 0.4 MG* 0.4 mg SL Q5M PRN 01/18/18 [History Confirmed 01/18/18

]


Potassium Chlor TAB* [Klor Con ER TAB*] 2 tab PO EVERY OTHER DAY 01/18/18 [

History Confirmed 01/18/18]


Potassium Chlor TAB* [Klor Con ER TAB*] 4 tab PO EVERY OTHER DAY 01/18/18 [

History Confirmed 01/18/18]











PMH/Surg Hx/FS Hx/Imm Hx


Previously Healthy: No - LEVEL 5 CAVEAT: PMH LIMITED BY AMS


Endocrine/Hematology History: Reports: Hx Anticoagulant Therapy - plavix


   Denies: Hx Diabetes, Hx Sickle Cell Disease, Hx Thyroid Disease


Cardiovascular History: Reports: Hx Angina, Hx Cardiac Arrest, Hx Coronary 

Artery Disease, Hx Hypercholesterolemia, Hx Hypertension, Hx Myocardial 

Infarction, Other Cardiovascular Problems/Disorders - HEART DISEASE, CARDIAC 

EVENT MONITOR IMPLANTED


   Denies: Hx Congestive Heart Failure, Hx Pacemaker/ICD, Hx Valvular Heart 

Disease


Respiratory History: Reports: Hx Asthma, Hx Sleep Apnea


   Denies: Hx Chronic Obstructive Pulmonary Disease (COPD)


GI History: Reports: Hx Gastroesophageal Reflux Disease


   Denies: Hx Ulcer


 History: Reports: Hx Renal Disease - cysts, Other  Problems/Disorders - 

bilat cyst on kidneys


Musculoskeletal History: Reports: Hx Arthritis, Hx Back Problems, Other 

Musculoskeletal History - VIPUL


   Denies: Hx Scoliosis


Sensory History: Reports: Hx Cataracts - right eye, Hx Contacts or Glasses - 

not with patient


   Denies: Hx Hearing Aid, Hx Hearing Problem


Opthamlomology History: Reports: Hx Cataracts - right eye, Hx Contacts or 

Glasses - not with patient


Neurological History: Reports: Hx Transient Ischemic Attacks (TIA)


   Denies: Hx Dementia, Hx Headaches, Hx Seizures, Other Neuro Impairments/

Disorders


Psychiatric History: Reports: Hx Depression


   Denies: Hx Panic Disorder





- Cancer History


Hx Chemotherapy: No





- Surgical History


Surgery Procedure, Year, and Place: DEC 2004 sinus [facility] [place] ;.  2008 left knee 

arthroscopy [facility] ;.  2013 left tympanoplasty WITH STAPES IMPLANT ([facility] 

MALLEOUS HEAD SERIAL # [id number] - PER [facility] INFORMATION SENT TO MRI ON 6/ 13/2016; IMPLANT IS STAINLESS STEEL STATES SOME DEGREE OF MAGNETISM TESTED MRI 

SAFE AT 1.5T ONLY - DR VALDEZ APPROVED THIS INFORMATION FOR 1.5T ONLY).  [facility] ;.  

2015 LEFT REVISION TYMPANOPLASTY/ MASTOIDECTOMY [facility] [place] ;.  8/ 2012 HEART CATH - NO 

STENTS ;.  2011 HEART CATH WITH cardiac stents x 4 (PROMUS DRUG-ELUTING STENT 

OKAY IN NORMAL MODE ONLY,  GAUSS/CM @ 1.5T) [facility] ;


Hx Anesthesia Reactions: Yes - SEIZURE LIKE ACTIVITY; REINTUBATION AFTER LEFT 

EAR 2013





- Immunization History


Date of Tetanus Vaccine: unknown


Date of Influenza Vaccine: Fall 2012


Infectious Disease History: 


   Denies: Hx Clostridium Difficile, Hx Hepatitis, Hx Human Immunodeficiency 

Virus (HIV), Hx of Known/Suspected MRSA, Hx Shingles, Hx Tuberculosis, Hx Known/

Suspected VRE, Hx Known/Suspected VRSA, History Other Infectious Disease, 

Traveled Outside the [place] in Last 30 Days





- Family History


Known Family History: Positive: Cardiac Disease, Hypertension, Diabetes





- Social History


Alcohol Use: None


Hx Substance Use: No


Substance Use Type: Reports: None


Hx Tobacco Use: Yes


Smoking Status (MU): Former Smoker


Type: Cigarettes


Length of Time of Smoking/Using Tobacco: 10-12 YRS


Have You Smoked in the Last Year: No





Review of Systems





- ROS Summary


Review of Systems Summary: 





LEVEL 5 CAVEAT: ROS LIMITED BY AMS


Positive: Chest Pain


Neurological: Other - Unresponsive


All Other Systems Reviewed And Are Negative: No





Physical Exam





- Summary


Physical Exam Summary: 





LEVEL 5 CAVEAT: PHYSICAL EXAM LIMITED BY AMS





General: well-appearing, no pain distress


Skin: warm, color reflects adequate perfusion, dry


Head: normal


Eyes: EOMI, LISA


ENT: normal


Neck: supple, nontender


Respiratory: CTA, breath sounds present


Cardiovascular: RRR


Abdomen: soft, nontender


Bowel: present


Musculoskeletal: normal, strength/ROM intact


Neurological: normal, sensory/motor intact, A&O x3


Psychological: affect/mood appropriate


Triage Information Reviewed: Yes


Vital Signs On Initial Exam: 


 Initial Vitals











Resp


 


 22 


 


 01/18/18 14:09











Vital Signs Reviewed: Yes





Diagnostics





- Vital Signs


 Vital Signs











  Resp


 


 01/18/18 14:09  22














- Laboratory


Lab Results: 


 Lab Results











  01/18/18 01/18/18 01/18/18 Range/Units





  14:07 14:07 14:07 


 


WBC  5.6    (3.5-10.8)  10^3/ul


 


RBC  5.28    (4.0-5.4)  10^6/ul


 


Hgb  14.9    (14.0-18.0)  g/dl


 


Hct  45    (42-52)  %


 


MCV  85    (80-94)  fL


 


MCH  28    (27-31)  pg


 


MCHC  33    (31-36)  g/dl


 


RDW  15    (10.5-15)  %


 


Plt Count  196    (150-450)  10^3/ul


 


MPV  8    (7.4-10.4)  um3


 


Neut % (Auto)  65.2    (38-83)  %


 


Lymph % (Auto)  22.9 L    (25-47)  %


 


Mono % (Auto)  9.2 H    (1-9)  %


 


Eos % (Auto)  1.8    (0-6)  %


 


Baso % (Auto)  0.9    (0-2)  %


 


Absolute Neuts (auto)  3.6    (1.5-7.7)  10^3/ul


 


Absolute Lymphs (auto)  1.3    (1.0-4.8)  10^3/ul


 


Absolute Monos (auto)  0.5    (0-0.8)  10^3/ul


 


Absolute Eos (auto)  0.1    (0-0.6)  10^3/ul


 


Absolute Basos (auto)  0.1    (0-0.2)  10^3/ul


 


Absolute Nucleated RBC  0    10^3/ul


 


Nucleated RBC %  0    


 


INR (Anticoag Therapy)    0.98  (0.77-1.02)  


 


APTT    38.3 H  (26.0-36.3)  seconds


 


Sodium   136   (133-145)  mmol/L


 


Potassium   3.6   (3.5-5.0)  mmol/L


 


Chloride   104   (101-111)  mmol/L


 


Carbon Dioxide   25   (22-32)  mmol/L


 


Anion Gap   7   (2-11)  mmol/L


 


BUN   13   (6-24)  mg/dL


 


Creatinine   1.01   (0.67-1.17)  mg/dL


 


Est GFR ( Amer)   99.4   (>60)  


 


Est GFR (Non-Af Amer)   77.3   (>60)  


 


BUN/Creatinine Ratio   12.9   (8-20)  


 


Glucose   105 H   ()  mg/dL


 


Lactic Acid     (0.5-2.0)  mmol/L


 


Calcium   9.6   (8.6-10.3)  mg/dL


 


Magnesium   1.9   (1.9-2.7)  mg/dL


 


Total Bilirubin   0.60   (0.2-1.0)  mg/dL


 


AST   21   (13-39)  U/L


 


ALT   33   (7-52)  U/L


 


Alkaline Phosphatase   117 H   ()  U/L


 


Total Creatine Kinase   324 H   ()  U/L


 


CK-MB (CK-2)   2.5   (0.6-6.3)  ng/mL


 


Troponin I   0.01   (<0.04)  ng/mL


 


C-Reactive Protein   5.83 H   (< 5.00)  mg/L


 


Total Protein   7.2   (6.4-8.9)  g/dL


 


Albumin   4.2   (3.2-5.2)  g/dL


 


Globulin   3.0   (2-4)  g/dL


 


Albumin/Globulin Ratio   1.4   (1-3)  


 


Serum Alcohol   < 10   (<10)  mg/dL














  01/18/18 Range/Units





  14:07 


 


WBC   (3.5-10.8)  10^3/ul


 


RBC   (4.0-5.4)  10^6/ul


 


Hgb   (14.0-18.0)  g/dl


 


Hct   (42-52)  %


 


MCV   (80-94)  fL


 


MCH   (27-31)  pg


 


MCHC   (31-36)  g/dl


 


RDW   (10.5-15)  %


 


Plt Count   (150-450)  10^3/ul


 


MPV   (7.4-10.4)  um3


 


Neut % (Auto)   (38-83)  %


 


Lymph % (Auto)   (25-47)  %


 


Mono % (Auto)   (1-9)  %


 


Eos % (Auto)   (0-6)  %


 


Baso % (Auto)   (0-2)  %


 


Absolute Neuts (auto)   (1.5-7.7)  10^3/ul


 


Absolute Lymphs (auto)   (1.0-4.8)  10^3/ul


 


Absolute Monos (auto)   (0-0.8)  10^3/ul


 


Absolute Eos (auto)   (0-0.6)  10^3/ul


 


Absolute Basos (auto)   (0-0.2)  10^3/ul


 


Absolute Nucleated RBC   10^3/ul


 


Nucleated RBC %   


 


INR (Anticoag Therapy)   (0.77-1.02)  


 


APTT   (26.0-36.3)  seconds


 


Sodium   (133-145)  mmol/L


 


Potassium   (3.5-5.0)  mmol/L


 


Chloride   (101-111)  mmol/L


 


Carbon Dioxide   (22-32)  mmol/L


 


Anion Gap   (2-11)  mmol/L


 


BUN   (6-24)  mg/dL


 


Creatinine   (0.67-1.17)  mg/dL


 


Est GFR ( Amer)   (>60)  


 


Est GFR (Non-Af Amer)   (>60)  


 


BUN/Creatinine Ratio   (8-20)  


 


Glucose   ()  mg/dL


 


Lactic Acid  2.1 H*  (0.5-2.0)  mmol/L


 


Calcium   (8.6-10.3)  mg/dL


 


Magnesium   (1.9-2.7)  mg/dL


 


Total Bilirubin   (0.2-1.0)  mg/dL


 


AST   (13-39)  U/L


 


ALT   (7-52)  U/L


 


Alkaline Phosphatase   ()  U/L


 


Total Creatine Kinase   ()  U/L


 


CK-MB (CK-2)   (0.6-6.3)  ng/mL


 


Troponin I   (<0.04)  ng/mL


 


C-Reactive Protein   (< 5.00)  mg/L


 


Total Protein   (6.4-8.9)  g/dL


 


Albumin   (3.2-5.2)  g/dL


 


Globulin   (2-4)  g/dL


 


Albumin/Globulin Ratio   (1-3)  


 


Serum Alcohol   (<10)  mg/dL











Result Diagrams: 


 01/18/18 14:07





 01/18/18 14:07


Lab Statement: Any lab studies that have been ordered have been reviewed, and 

results considered in the medical decision making process.





- CT


  ** CT Brain


CT Interpretation: No Acute Changes - No acute intracranial pathology. Dr. Anglin has reviewed this report.


CT Interpretation Completed By: Radiologist





- EKG


  ** 14:01


Cardiac Rate: NL


EKG Rhythm: Sinus Rhythm - at 75 BPM


EKG Interpretation: Flipped T in lateral leads. No ectopy. 





Altered Mental Statu Course/Dx





- Course


Course Of Treatment: ADMIT HOSPITALIST.





- Diagnoses


Discharge Diagnoses: 


 Altered mental state








- Critical Care Time


Critical Care Time: 30-74 min





Discharge





- Discharge Plan


Condition: Stable


Disposition: ADMITTED TO [facility]


Referrals: 


Bridger Webb MD [Primary Care Provider] - 





The documentation as recorded by the Esthela song Thomas accurately reflects 

the service I personally performed and the decisions made by Linnette samaniego William, MD.

## 2018-01-18 NOTE — HP
CC:  Dr. Webb; Dr. Arteaga *

 

HISTORY AND PHYSICAL:

 

DATE OF ADMISSION:  01/18/18

 

PRIMARY CARE PROVIDER:  Dr. Webb.

 

ATTENDING PHYSICIAN WHILE IN THE HOSPITAL:  Olu Lobo MD * (report 
dictated by Babak Goode NP).

 

CHIEF COMPLAINT:

1.  Altered mental status.

2.  Chest pain.

 

HISTORY OF PRESENT ILLNESS:  Mr. Rodriguez is a 52-year-old male patient.  He has a 
history of cryptogenic stroke in the past, history of CAD, obesity, VIPUL, 
palpitations, hyperlipidemia, asthma, history of MI, hypertension, and 
peripheral vascular disease.  He comes in to our ER today, he says that he just 
finished the allergist appointment.  He was feeling funny.  He was feeling off.
  He does not really elaborate how he was feeling.  He says he thought maybe he 
was hungry, so he actually went to [employer], he sat down, had a meal.  He 
basically ate, felt a little bit better, but then he wanted to go home.  He 
caught the bus, while going to the bus and getting on the bus, he started 
having chest discomfort, 10/10.  He got off the bus at the first stop and went 
to department of . According to the patient, he went in and 
asked them to call 911 and they called 911. Ambulance reports that when they 
were hooking up for monitors they noticed that he started shaking, he was 
having lip smacking.  He was moving his right foot and hand.  He became 
unresponsive.  They gave him 5 of Versed and he slowly started to come back to 
his baseline.  I guess when he got here into the ER, he had another similar 
episode like this.  The last thing he recalls was the ambulance arriving and 
then he recalls showing up here in the ER.  He does not really remember the 
ambulance ride according to him.  He denied any loss of incontinence.  He 
denies having any chest pain currently.  He said he did have a chest pain while 
getting on the bus.  He described it as a sharp stabbing pain that was in both 
sides of his chest and he says he really could not take a deep breath because 
it hurt.  He denies any recent URI symptoms.  He said yesterday, he was feeling 
well.  He denied feeling nauseated.  He denied having any abdominal discomfort 
and he says that he was not vomiting.  He again was concerned because of the 
chest pain and his altered mental status, he came in to the ED, he was 
evaluated.  We were asked to evaluate for admission.

 

PAST MEDICAL HISTORY:  Significant for:

1.  CAD.

2.  Obesity.

3.  VIPUL.

4.  Palpitations.

5.  Hyperlipidemia.

6.  Asthma.

7.  PVD.

8.  History of cryptogenic CVA.

9.  Hypertension.

10.  History of MI.

 

PAST SURGICAL HISTORY:

1.  The patient has had inner ear surgery.

2.  He has had sinus surgery.

3.  Cardiac catheterization.

 

MEDICATIONS:  Home meds according to the list that was provided, includes:

1.  Potassium 2 tablets every other day.

2.  Nitro 0.4 mg sublingual q.5 minutes x3 for chest pain.

3.  Lipitor 20 mg daily.

4.  Aspirin 325 mg daily.

5.  Dyazide 1 capsule p.o. daily.

6.  Bystolic 10 mg daily.

7.  Plavix 75 mg daily.

8.  Potassium 4 tablets p.o. every other day.

 

ALLERGIES TO MEDICATIONS:  Include AUGMENTIN and PENICILLIN.

 

FAMILY HISTORY:  Both his parents had a history of heart failure.

 

SOCIAL HISTORY:  He does not smoke.  Denied alcohol abuse.  Surrogate decision 
maker is his sister, Katlyn.

 

REVIEW OF SYSTEMS:  There is no documented fever.  He denied having any 
significant weight change.  There was no double vision.  There was no ear 
discharge.  He denied having any rhinorrhea.  There was no sore throat.  There 
was no thyroid enlargement.  There was chest pain per my HPI.  No orthopnea.  
No nocturnal dyspnea.  There was no abdominal pain.  No nausea.  No vomiting.  
No dysuria.  No frequency.  There was question of a seizure-like activity.  
Review of 14 systems completed, all others negative.

 

                               PHYSICAL EXAMINATION

 

GENERAL:  At this time, Mr. Rodriguez is a 52-year-old male patient.  He appears to 
be well nourished, well developed.  He does not appear to be in any acute 
distress.

 

VITAL SIGNS:  Blood pressure 123/75, pulse 70, respirations 20, O2 sat 99%, 
temperature 97.5.

 

HEENT:  Head is atraumatic.  Eyes:  Sclerae anicteric, not pale.  Throat:  Oral 
mucosa appears to be dry.  No oropharyngeal erythema.

 

NECK:  Supple.

 

LUNGS:  Clear to auscultation.  No wheezes, rales, or rhonchi.

 

HEART:  Sounds S1, S2.  Regular rate and rhythm.  No murmurs, rubs, or gallops.

 

ABDOMEN:  Soft, it was flat, nontender.  Bowel sounds were present.

 

EXTREMITIES:  Pulses were 2+ throughout.  He can move all 4 extremities.  He 
had no peripheral edema.

 

NEUROLOGICAL:  He is drowsy, but he does awaken to his name.  He follows 
commands. Finger-to-nose intact bilaterally.  Heel-to-shin intact bilaterally.  
He had no double vision reported now, he did report this to the nurses earlier.
  His visual acuity was intact.  Cranial nerves II through XII were intact.  He 
had no gross focal deficits.  He did certainly appear to be overall drowsy, but 
no focal neurological deficits were noted on my exam.

 

SKIN:  Intact.

 

 DIAGNOSTIC STUDIES/LAB DATA:  WBC of 5.6, RBC of 5.28, hemoglobin of 14.9, 
hematocrit of 45, platelet count 196,000.  His INR was 0.98.  PTT was 38.3.  
His sodium was 136, potassium 3.6, chloride of 104, bicarb 25, BUN 13, 
creatinine 1.01, glucose 105, lactate 2.1, calcium 9.6, mag 1.9, total bili 
0.6.  AST 21, ALT 33, alk phos 117.  , CK-MB 2.5.  Troponin 0.01.  
Albumin of 4.2.

 

He had a brain CT obtained today, impression:  No acute intracranial pathology.

 

He had an EKG obtained today, which showed sinus rhythm, inverted T-waves in 
lead I and aVL, which he has had previously.  No ST elevations were noted.  Old 
medical records were reviewed.

 

ASSESSMENT AND PLAN:  Mr. Rodriguez is a 52-year-old male patient with multiple 
medical problems, coming in to the ED today with complaints of altered mental 
status and chest discomfort.  He will be admitted under observation status for:

 

1.  Altered mental status.  At this point, I am concerned that he may have had 
a seizure.  I did discuss the case with Dr. Arteaga, who evaluated the patient.  
We will order seizure precautions and EEG has been ordered.  We will get an MRI 
of the brain.  CT of the brain was negative and we will follow him closely with 
frequent neuro checks.  I am not starting antiepileptics at this point.  I will 
wait to see if he has any other event, if he does then we will load him with 
Keppra.  We will continue to follow.

2.  Coronary artery disease.  We will continue his aspirin, statin, and beta- 
blocker therapy.  He is also on Plavix.

3.  Obstructive sleep apnea.  I did order a CPAP.

4.  History of palpitations.  He will be placed on telemetry.

5.  Hyperlipidemia.  Continue statin therapy.

6.  History of asthma.  I will order p.r.n. albuterol.

7.  History of cerebrovascular accident.  Continue with secondary prevention.

8.  Hypertension.  Continue meds as prescribed.

9.  Chest pain.  At this point, until I know for sure the patient had seizure-
like activity, I would like to go ahead and hold off on doing a stress test, 
but I will cycle his troponins.  He had a stress test in May of last year, 
which was read as no changes from the 2013 exam, but we will follow him closely.

10.  DVT prophylaxis.  He will be placed on heparin subcu.

11.  Code status.  Full code.

12.  Fluids, electrolytes, and nutrition.  He can have a heart-healthy diet.

 

TIME SPENT:  On this admission was 60 minutes, greater than half the time was 
spent face-to-face with the patient obtaining my history and physical; other 
half the time was spent going over the plan of care with the patient and 
implementing the plan of care. I did discuss the plan of care with my attending
, Dr. Lobo, he is in agreement.

 

____________________________________ BABAK GOODE NP

 

[id number]/CPS #: [id number]

STEVE

## 2018-01-18 NOTE — RAD
Indication: Confusion, seizures.



Image sequences: Sagittal and axial T1, axial T2, FLAIR, diffusion and susceptibility

weighted images of the brain were obtained. Comparison is made with previous exam dated

June 13, 2016.



Ventricular structures are midline. No midline shift is noted. The extra-axial spaces are

unremarkable. There is no evidence of intracranial mass or hemorrhage. No other high or

low signal lesions identified. No vasogenic edema is noted.



No restriction of diffusion is identified. The FLAIR images demonstrates no evidence of

vasogenic edema.



The posterior fossa demonstrates no focal masses in the cerebellopontine angle. The

brainstem is otherwise unremarkable.



There is a fatty lipoma in the scalp.



Mucous retention cyst is noted in the maxillary sinuses and ethmoid air cells.



IMPRESSION: No intracranial mass or hemorrhage is noted. Chronic sinusitis as described

above.



Overall no changes noted since June 13, 2016.

## 2018-01-19 LAB
BASOPHILS # BLD AUTO: 0 10^3/UL (ref 0–0.2)
EOSINOPHIL # BLD AUTO: 0.1 10^3/UL (ref 0–0.6)
HCT VFR BLD AUTO: 41 % (ref 42–52)
HGB BLD-MCNC: 13.7 G/DL (ref 14–18)
LYMPHOCYTES # BLD AUTO: 1.5 10^3/UL (ref 1–4.8)
MCH RBC QN AUTO: 28 PG (ref 27–31)
MCHC RBC AUTO-ENTMCNC: 33 G/DL (ref 31–36)
MCV RBC AUTO: 85 FL (ref 80–94)
MONOCYTES # BLD AUTO: 0.5 10^3/UL (ref 0–0.8)
NEUTROPHILS # BLD AUTO: 3 10^3/UL (ref 1.5–7.7)
NRBC # BLD AUTO: 0 10^3/UL
NRBC BLD QL AUTO: 0.1
PLATELET # BLD AUTO: 182 10^3/UL (ref 150–450)
RBC # BLD AUTO: 4.87 10^6/UL (ref 4–5.4)
WBC # BLD AUTO: 5.2 10^3/UL (ref 3.5–10.8)

## 2018-01-19 PROCEDURE — 5A09357 ASSISTANCE WITH RESPIRATORY VENTILATION, LESS THAN 24 CONSECUTIVE HOURS, CONTINUOUS POSITIVE AIRWAY PRESSURE: ICD-10-PCS | Performed by: INTERNAL MEDICINE

## 2018-01-19 RX ADMIN — ASPIRIN 325 MG ORAL TABLET SCH MG: 325 PILL ORAL at 09:03

## 2018-01-19 RX ADMIN — HEPARIN SODIUM SCH UNITS: 5000 INJECTION INTRAVENOUS; SUBCUTANEOUS at 09:03

## 2018-01-19 RX ADMIN — LEVETIRACETAM SCH MLS/HR: 5 INJECTION INTRAVENOUS at 21:43

## 2018-01-19 RX ADMIN — CLOPIDOGREL SCH MG: 75 TABLET, FILM COATED ORAL at 09:03

## 2018-01-19 RX ADMIN — LEVETIRACETAM SCH MLS/HR: 5 INJECTION INTRAVENOUS at 09:03

## 2018-01-19 RX ADMIN — ATORVASTATIN CALCIUM SCH MG: 20 TABLET, FILM COATED ORAL at 17:32

## 2018-01-19 RX ADMIN — NEBIVOLOL HYDROCHLORIDE SCH MG: 2.5 TABLET ORAL at 09:03

## 2018-01-19 RX ADMIN — HEPARIN SODIUM SCH UNITS: 5000 INJECTION INTRAVENOUS; SUBCUTANEOUS at 21:33

## 2018-01-19 RX ADMIN — TRIAMTERENE AND HYDROCHLOROTHIAZIDE SCH CAP: 25; 37.5 CAPSULE ORAL at 09:03

## 2018-01-19 NOTE — EEG
ELECTROENCEPHALOGRAPHY:

 

DATE OF STUDY:  01/18/18

 

LOCATION:  The patient was in the emergency room.

 

ORDERING PROVIDER:  Babak Goode NP

 

CLINICAL PROBLEM:  This is a 52-year-old man, who came into the ER after an episode of 10/10 chest pa
in associated with difficulty breathing.  EMS was called and upon their arrival, he apparently had an
 episode of seizure-like activity with his eyes open associated with jaw, head, arm, and leg jerking 
movements.  He would inconsistently follow EMS commands during this.  On arrival to the hospital, he 
had additional seizure-like activity.  He was reportedly not able to speak, but was able to follow so
me simple commands.  He received 2 mg of Ativan prior to this recording.  Just prior to the start of 
this recording, he apparently had another episode witnessed by the nurse in the emergency department.
  EEG is requested to evaluate for epileptiform abnormalities.

 

MEDICATIONS:  Only recently acutely given medications were listed including Ativan 2 mg and Versed 5 
mg.

 

REPORT:  The waking background showed appropriate organization with clearly defined anterior to poste
rior voltage and frequency gradients.  There was a well-defined posterior dominant rhythm of 10 Hz, w
hich was symmetrical and showed normal reactivity.  Anteriorly, there is an expected pattern of lower
 voltage, irregular, mixed faster frequencies.

 

Hyperventilation and photic stimulation were not performed.

 

Attenuation of the occipital rhythm accompanied drowsiness, but there were no well- developed sleep s
pindles to indicate the transition to stage 2 sleep.  There was excess beta activity noted, consisten
t with recently administered medications.

 

Throughout the recording, there were no epileptiform discharges, focal features, paroxysmal features,
 or a significant interhemispheric asymmetries.  I note that the technologist made mention of brief r
ight facial twitching as well as some head twitching, which lasted for approximately 20 seconds durin
g the recording.  These movements were not associated with any changes in the EEG.

 

IMPRESSION:  This is a normal waking and drowsy EEG.  There are no epileptiform abnormalities.  Exces
s beta activity is an expected finding in the setting of recent benzodiazepine use.

 

 

 

[id number] #: [id number]

## 2018-01-19 NOTE — PN
Subjective


Date of Service: 01/19/18


Interval History: 





I received a call last night from hospitalist around 7:30.  The patient had 

another event.  He was complaining of chest pain when he suddenly started 

shaking, some reported tonic-clonic movements, unresponsive during episode.  

Received a total of 5mg Ativan and activity resolved after several minutes.  He 

was post-ictal/sedated afterwards.  I had the hospitalist load him on Keppra 

1000mg IV and start 750mg IV q 12 hours.  I did speak with Dr. Palomares and from 

earlier showed no seizure like activity.  Since last night, he has had no 

further seizure like activity.  I spoke with nurse who got report from the 

night nurse.  The patient was awake and appropriately responsive this am 

although sleepy.  When I saw him this am, he was sleeping but aroused easily 

and appropriately responsive.  Denies an current pain, denies remembering the 

seizure.  Poor historian states that he has had shaking spells for some time 

but unclear how long and cannot remember the last one prior to admission 

yesterday.  No risk factors for seizures, no family history of seizures.





Objective


Active Medications: 








Acetaminophen (Tylenol Tab*)  650 mg PO Q4H PRN


   PRN Reason: FEVER/PAIN


Albuterol (Ventolin 2.5 Mg/3 Ml Neb.Sol*)  2.5 mg INH Q2H PRN


   PRN Reason: SOB/WHEEZING


Aspirin (Aspirin Tab*)  325 mg PO DAILY [facility]


Atorvastatin Calcium (Lipitor*)  20 mg PO QPM [facility]


   Last Admin: 01/18/18 21:57 Dose:  20 mg


Clopidogrel Bisulfate (Plavix Tab*)  75 mg PO DAILY [facility]


Heparin Sodium (Porcine) (Heparin Vial(*))  5,000 units SUBCUT Q12HR [facility]


   Last Admin: 01/18/18 21:52 Dose:  5,000 units


Levetiracetam (Keppra Iv Premix*)  750 mg in 150 mls @ 600 mls/hr IVPB Q12H [facility]


Nebivolol (Bystolic Tab (Nf))  10 mg PO DAILY [facility]


Ondansetron HCl (Zofran Inj*)  4 mg IV Q6H PRN


   PRN Reason: NAUSEA


Triamterene/HCTZ (Dyazide Cap*)  1 cap PO DAILY [facility]








 Vital Signs











  01/18/18 01/18/18 01/18/18





  16:30 17:00 17:25


 


Temperature   


 


Pulse Rate 67 67 71


 


Respiratory 15 15 16





Rate   


 


Blood Pressure   147/69





(mmHg)   


 


O2 Sat by Pulse 99 99 100





Oximetry   














  01/18/18 01/18/18 01/18/18





  17:30 19:00 19:15


 


Temperature   98.2 F


 


Pulse Rate 67 68 66


 


Respiratory 16  21





Rate   


 


Blood Pressure 140/90  176/112





(mmHg)   


 


O2 Sat by Pulse 100 100 99





Oximetry   














  01/18/18 01/18/18 01/18/18





  19:44 19:55 20:00


 


Temperature 98.0 F  


 


Pulse Rate 99 78 74


 


Respiratory 22  22





Rate   


 


Blood Pressure 147/69 152/98 137/92





(mmHg)   


 


O2 Sat by Pulse 96 100 100





Oximetry   














  01/18/18 01/18/18 01/18/18





  20:08 20:09 20:11


 


Temperature   


 


Pulse Rate 72  


 


Respiratory 22 21 21





Rate   


 


Blood Pressure 148/90  





(mmHg)   


 


O2 Sat by Pulse 100  





Oximetry   














  01/18/18 01/18/18 01/19/18





  22:29 23:42 00:00


 


Temperature  98.0 F 


 


Pulse Rate  70 


 


Respiratory 18 20 





Rate   


 


Blood Pressure  151/77 





(mmHg)   


 


O2 Sat by Pulse  97 97





Oximetry   














  01/19/18 01/19/18





  03:13 07:18


 


Temperature 98.2 F 98.4 F


 


Pulse Rate 68 59


 


Respiratory 20 20





Rate  


 


Blood Pressure 150/78 143/87





(mmHg)  


 


O2 Sat by Pulse 100 100





Oximetry  











Oxygen Devices in Use Now: Nasal Cannula


Neurology Exam: 





General: 








HEENT: Normocephelic/atraumstic, sclera anicteric, mucous membranes moist





Habitus:  Morbidly obses





Neck: Supple





Chest: Clear to auscultation bilaterally 





Cardiovascular: Regular rate and rhythm without murmurs, rubs, gallops





Abdomen: Obese, NT





Extremities: Changes of chronic vascular disease in the legs bilaterally








Neurological Findings: 





Awakens, somnolent but appropriate, oriented X 3





Speech: fluent without dysarthria





Cranial Nerve: PEERL, EOM intact, VFF, no nystagmus, face symmetric bilaterally

, facial sensation intact, hearing intact to finger rub bilaterally, palate 

elevates symmetrically, tongue midline





Motor: Moving all extremities, good effort and resistance 5/5 throughout, 

normal tone





Sensation: Reduced to all modalities in the legs bilaterally





Deep Tendon Reflex: 2+ symmetric in the upper/lower extremities, Babinski - 

upgoing





No tremors, finger to nose intact





Gait: was able to ambulate with assist this am








Result Diagrams: 


 01/19/18 05:33





 01/19/18 05:33


Additional Lab and Data: 


 Lab Results











  01/18/18 01/18/18 01/18/18 Range/Units





  14:07 14:07 14:07 


 


WBC  5.6    (3.5-10.8)  10^3/ul


 


RBC  5.28    (4.0-5.4)  10^6/ul


 


Hgb  14.9    (14.0-18.0)  g/dl


 


Hct  45    (42-52)  %


 


MCV  85    (80-94)  fL


 


MCH  28    (27-31)  pg


 


MCHC  33    (31-36)  g/dl


 


RDW  15    (10.5-15)  %


 


Plt Count  196    (150-450)  10^3/ul


 


MPV  8    (7.4-10.4)  um3


 


Neut % (Auto)  65.2    (38-83)  %


 


Lymph % (Auto)  22.9 L    (25-47)  %


 


Mono % (Auto)  9.2 H    (1-9)  %


 


Eos % (Auto)  1.8    (0-6)  %


 


Baso % (Auto)  0.9    (0-2)  %


 


Absolute Neuts (auto)  3.6    (1.5-7.7)  10^3/ul


 


Absolute Lymphs (auto)  1.3    (1.0-4.8)  10^3/ul


 


Absolute Monos (auto)  0.5    (0-0.8)  10^3/ul


 


Absolute Eos (auto)  0.1    (0-0.6)  10^3/ul


 


Absolute Basos (auto)  0.1    (0-0.2)  10^3/ul


 


Absolute Nucleated RBC  0    10^3/ul


 


Nucleated RBC %  0    


 


INR (Anticoag Therapy)    0.98  (0.77-1.02)  


 


APTT    38.3 H  (26.0-36.3)  seconds


 


Sodium   136   (133-145)  mmol/L


 


Potassium   3.6   (3.5-5.0)  mmol/L


 


Chloride   104   (101-111)  mmol/L


 


Carbon Dioxide   25   (22-32)  mmol/L


 


Anion Gap   7   (2-11)  mmol/L


 


BUN   13   (6-24)  mg/dL


 


Creatinine   1.01   (0.67-1.17)  mg/dL


 


Est GFR ( Amer)   99.4   (>60)  


 


Est GFR (Non-Af Amer)   77.3   (>60)  


 


BUN/Creatinine Ratio   12.9   (8-20)  


 


Glucose   105 H   ()  mg/dL


 


Lactic Acid     (0.5-2.0)  mmol/L


 


Calcium   9.6   (8.6-10.3)  mg/dL


 


Magnesium   1.9   (1.9-2.7)  mg/dL


 


Total Bilirubin   0.60   (0.2-1.0)  mg/dL


 


AST   21   (13-39)  U/L


 


ALT   33   (7-52)  U/L


 


Alkaline Phosphatase   117 H   ()  U/L


 


Total Creatine Kinase   324 H   ()  U/L


 


CK-MB (CK-2)   2.5   (0.6-6.3)  ng/mL


 


Troponin I   0.01   (<0.04)  ng/mL


 


C-Reactive Protein   5.83 H   (< 5.00)  mg/L


 


Total Protein   7.2   (6.4-8.9)  g/dL


 


Albumin   4.2   (3.2-5.2)  g/dL


 


Globulin   3.0   (2-4)  g/dL


 


Albumin/Globulin Ratio   1.4   (1-3)  


 


Serum Alcohol   < 10   (<10)  mg/dL














  01/18/18 Range/Units





  14:07 


 


WBC   (3.5-10.8)  10^3/ul


 


RBC   (4.0-5.4)  10^6/ul


 


Hgb   (14.0-18.0)  g/dl


 


Hct   (42-52)  %


 


MCV   (80-94)  fL


 


MCH   (27-31)  pg


 


MCHC   (31-36)  g/dl


 


RDW   (10.5-15)  %


 


Plt Count   (150-450)  10^3/ul


 


MPV   (7.4-10.4)  um3


 


Neut % (Auto)   (38-83)  %


 


Lymph % (Auto)   (25-47)  %


 


Mono % (Auto)   (1-9)  %


 


Eos % (Auto)   (0-6)  %


 


Baso % (Auto)   (0-2)  %


 


Absolute Neuts (auto)   (1.5-7.7)  10^3/ul


 


Absolute Lymphs (auto)   (1.0-4.8)  10^3/ul


 


Absolute Monos (auto)   (0-0.8)  10^3/ul


 


Absolute Eos (auto)   (0-0.6)  10^3/ul


 


Absolute Basos (auto)   (0-0.2)  10^3/ul


 


Absolute Nucleated RBC   10^3/ul


 


Nucleated RBC %   


 


INR (Anticoag Therapy)   (0.77-1.02)  


 


APTT   (26.0-36.3)  seconds


 


Sodium   (133-145)  mmol/L


 


Potassium   (3.5-5.0)  mmol/L


 


Chloride   (101-111)  mmol/L


 


Carbon Dioxide   (22-32)  mmol/L


 


Anion Gap   (2-11)  mmol/L


 


BUN   (6-24)  mg/dL


 


Creatinine   (0.67-1.17)  mg/dL


 


Est GFR ( Amer)   (>60)  


 


Est GFR (Non-Af Amer)   (>60)  


 


BUN/Creatinine Ratio   (8-20)  


 


Glucose   ()  mg/dL


 


Lactic Acid  2.1 H*  (0.5-2.0)  mmol/L


 


Calcium   (8.6-10.3)  mg/dL


 


Magnesium   (1.9-2.7)  mg/dL


 


Total Bilirubin   (0.2-1.0)  mg/dL


 


AST   (13-39)  U/L


 


ALT   (7-52)  U/L


 


Alkaline Phosphatase   ()  U/L


 


Total Creatine Kinase   ()  U/L


 


CK-MB (CK-2)   (0.6-6.3)  ng/mL


 


Troponin I   (<0.04)  ng/mL


 


C-Reactive Protein   (< 5.00)  mg/L


 


Total Protein   (6.4-8.9)  g/dL


 


Albumin   (3.2-5.2)  g/dL


 


Globulin   (2-4)  g/dL


 


Albumin/Globulin Ratio   (1-3)  


 


Serum Alcohol   (<10)  mg/dL














Assessment/Plan





52 year old with a history of HTN, CAD with reported stroke in past, no deficits

, PVD, VIPUL, mobid obesity, presented yesterday after developing chest pain, 

subsequent "shaking" and seizure like activity receiving Versed in the field 

and Ativan in ER, post-ictal/sedated afterwards.  EEG in ER showed no 

epileptiform activity.  MRI was personally reviewed and showed no acute 

changes.  Had another event last night on the floor, received Ativan and was 

started on Keppra, loaded with 1000mg and started 750mg BID.  No risk factors 

for seizures.  Has a history of "shaking" spells but poor historian and cannot 

give specific information.





1.  New onset seizures.  Unclear etiology but he may have been having these for 

quite some time given his history.  EEG and MRI showed no obvious abnormalities 

although he does report a history of stroke in the past.  His events seem to be 

associated with chest pain as well, which raises the possibility of a non-

epileptic type events.  


2.  While the semiology is atypical, given the nature of the events, history of 

CAD (do not want to stress his heart) and now multiple episodes with possible 

episodes in the past, I am inclined to continue him on Keppra:  750mg BID. Can 

switch to PO once he is stable and taking PO safely.


3.  Should he have another event in house, will consider continuous monitoring, 

otherwise, I would like to see him back as an outpatient and consider 5 day 

video EEG in the hopes we can capture an episode.


4.  Continue Seizure precautions in house, with Ativan as necessary for seizure 

activity


5.  Needs continued monitoring as an outpatient for his VIPUL, CPAP


6.  Cardiac workup per hospitalist.  PVCs on tele but no major events.  

Troponins are negative.


7.  If he improves to baseline and is stable today, I am ok with discharge 

later today or tomorrow on Keppra 750mg po bid.  Please arrange follow up in my 

clinic in 4 weeks


8.  Patient should be instructed not to drive for now, avoid heights, heavy 

machinery, open flames, swimming alone and should take only showers and no 

baths.

## 2018-01-19 NOTE — PN
Subjective


Date of Service: 01/19/18


Interval History: 





C/O weak legs.  He denies loosing concoiousness during his shaking spells.  





Objective


Active Medications: 








Acetaminophen (Tylenol Tab*)  650 mg PO Q4H PRN


   PRN Reason: FEVER/PAIN


Albuterol (Ventolin 2.5 Mg/3 Ml Neb.Sol*)  2.5 mg INH Q2H PRN


   PRN Reason: SOB/WHEEZING


Aspirin (Aspirin Tab*)  325 mg PO DAILY [facility]


   Last Admin: 01/19/18 09:03 Dose:  325 mg


Atorvastatin Calcium (Lipitor*)  20 mg PO QPM [facility]


   Last Admin: 01/18/18 21:57 Dose:  20 mg


Clopidogrel Bisulfate (Plavix Tab*)  75 mg PO DAILY [facility]


   Last Admin: 01/19/18 09:03 Dose:  75 mg


Heparin Sodium (Porcine) (Heparin Vial(*))  5,000 units SUBCUT Q12HR [facility]


   Last Admin: 01/19/18 09:03 Dose:  5,000 units


Levetiracetam (Keppra Iv Premix*)  750 mg in 150 mls @ 600 mls/hr IVPB Q12H [facility]


   Last Admin: 01/19/18 09:03 Dose:  600 mls/hr


Nebivolol (Bystolic Tab (Nf))  10 mg PO DAILY [facility]


   Last Admin: 01/19/18 09:03 Dose:  10 mg


Ondansetron HCl (Zofran Inj*)  4 mg IV Q6H PRN


   PRN Reason: NAUSEA


Triamterene/HCTZ (Dyazide Cap*)  1 cap PO DAILY [facility]


   Last Admin: 01/19/18 09:03 Dose:  1 cap








 Vital Signs - 8 hr











  01/19/18 01/19/18 01/19/18





  07:18 10:37 10:54


 


Temperature 98.4 F  98.1 F


 


Pulse Rate 59 68 64


 


Respiratory 20 18 20





Rate   


 


Blood Pressure 143/87  141/79





(mmHg)   


 


O2 Sat by Pulse 100 100 98





Oximetry   











Oxygen Devices in Use Now: Nasal Cannula


Appearance: Alert, partly up in bed.  Neutral affect.  Looks comfortable.


Eyes: No Scleral Icterus


Ears/Nose/Mouth/Throat: Clear Oropharnyx, Mucous Membranes Moist, - - His uvula 

is slightly prominent, sl red, not clearly abnormal.


Neck: NL Appearance and Movements; NL JVP, No Thyroid Enlargement, Masses


Respiratory: Symmetrical Chest Expansion and Respiratory Effort, Clear to 

Auscultation, Clear to Percussion


Cardiovascular: NL Sounds; No Murmurs; No JVD, RRR, No Edema, -


Extremities: No Clubbing, Cyanosis, - - Tr to 1+ edema BL


Skin: No Rash or Ulcers, No Nodules or Sclerosis, -


Neurological: Alert and Oriented x 3, NL Sensation


Result Diagrams: 


 01/19/18 05:33





 01/19/18 05:33


Additional Lab and Data: 


 Lab Results











  01/18/18 01/18/18 01/18/18 Range/Units





  14:07 14:07 14:07 


 


WBC  5.6    (3.5-10.8)  10^3/ul


 


RBC  5.28    (4.0-5.4)  10^6/ul


 


Hgb  14.9    (14.0-18.0)  g/dl


 


Hct  45    (42-52)  %


 


MCV  85    (80-94)  fL


 


MCH  28    (27-31)  pg


 


MCHC  33    (31-36)  g/dl


 


RDW  15    (10.5-15)  %


 


Plt Count  196    (150-450)  10^3/ul


 


MPV  8    (7.4-10.4)  um3


 


Neut % (Auto)  65.2    (38-83)  %


 


Lymph % (Auto)  22.9 L    (25-47)  %


 


Mono % (Auto)  9.2 H    (1-9)  %


 


Eos % (Auto)  1.8    (0-6)  %


 


Baso % (Auto)  0.9    (0-2)  %


 


Absolute Neuts (auto)  3.6    (1.5-7.7)  10^3/ul


 


Absolute Lymphs (auto)  1.3    (1.0-4.8)  10^3/ul


 


Absolute Monos (auto)  0.5    (0-0.8)  10^3/ul


 


Absolute Eos (auto)  0.1    (0-0.6)  10^3/ul


 


Absolute Basos (auto)  0.1    (0-0.2)  10^3/ul


 


Absolute Nucleated RBC  0    10^3/ul


 


Nucleated RBC %  0    


 


INR (Anticoag Therapy)    0.98  (0.77-1.02)  


 


APTT    38.3 H  (26.0-36.3)  seconds


 


Sodium   136   (133-145)  mmol/L


 


Potassium   3.6   (3.5-5.0)  mmol/L


 


Chloride   104   (101-111)  mmol/L


 


Carbon Dioxide   25   (22-32)  mmol/L


 


Anion Gap   7   (2-11)  mmol/L


 


BUN   13   (6-24)  mg/dL


 


Creatinine   1.01   (0.67-1.17)  mg/dL


 


Est GFR ( Amer)   99.4   (>60)  


 


Est GFR (Non-Af Amer)   77.3   (>60)  


 


BUN/Creatinine Ratio   12.9   (8-20)  


 


Glucose   105 H   ()  mg/dL


 


Lactic Acid     (0.5-2.0)  mmol/L


 


Calcium   9.6   (8.6-10.3)  mg/dL


 


Magnesium   1.9   (1.9-2.7)  mg/dL


 


Total Bilirubin   0.60   (0.2-1.0)  mg/dL


 


AST   21   (13-39)  U/L


 


ALT   33   (7-52)  U/L


 


Alkaline Phosphatase   117 H   ()  U/L


 


Total Creatine Kinase   324 H   ()  U/L


 


CK-MB (CK-2)   2.5   (0.6-6.3)  ng/mL


 


Troponin I   0.01   (<0.04)  ng/mL


 


C-Reactive Protein   5.83 H   (< 5.00)  mg/L


 


Total Protein   7.2   (6.4-8.9)  g/dL


 


Albumin   4.2   (3.2-5.2)  g/dL


 


Globulin   3.0   (2-4)  g/dL


 


Albumin/Globulin Ratio   1.4   (1-3)  


 


Serum Alcohol   < 10   (<10)  mg/dL














  01/18/18 Range/Units





  14:07 


 


WBC   (3.5-10.8)  10^3/ul


 


RBC   (4.0-5.4)  10^6/ul


 


Hgb   (14.0-18.0)  g/dl


 


Hct   (42-52)  %


 


MCV   (80-94)  fL


 


MCH   (27-31)  pg


 


MCHC   (31-36)  g/dl


 


RDW   (10.5-15)  %


 


Plt Count   (150-450)  10^3/ul


 


MPV   (7.4-10.4)  um3


 


Neut % (Auto)   (38-83)  %


 


Lymph % (Auto)   (25-47)  %


 


Mono % (Auto)   (1-9)  %


 


Eos % (Auto)   (0-6)  %


 


Baso % (Auto)   (0-2)  %


 


Absolute Neuts (auto)   (1.5-7.7)  10^3/ul


 


Absolute Lymphs (auto)   (1.0-4.8)  10^3/ul


 


Absolute Monos (auto)   (0-0.8)  10^3/ul


 


Absolute Eos (auto)   (0-0.6)  10^3/ul


 


Absolute Basos (auto)   (0-0.2)  10^3/ul


 


Absolute Nucleated RBC   10^3/ul


 


Nucleated RBC %   


 


INR (Anticoag Therapy)   (0.77-1.02)  


 


APTT   (26.0-36.3)  seconds


 


Sodium   (133-145)  mmol/L


 


Potassium   (3.5-5.0)  mmol/L


 


Chloride   (101-111)  mmol/L


 


Carbon Dioxide   (22-32)  mmol/L


 


Anion Gap   (2-11)  mmol/L


 


BUN   (6-24)  mg/dL


 


Creatinine   (0.67-1.17)  mg/dL


 


Est GFR ( Amer)   (>60)  


 


Est GFR (Non-Af Amer)   (>60)  


 


BUN/Creatinine Ratio   (8-20)  


 


Glucose   ()  mg/dL


 


Lactic Acid  2.1 H*  (0.5-2.0)  mmol/L


 


Calcium   (8.6-10.3)  mg/dL


 


Magnesium   (1.9-2.7)  mg/dL


 


Total Bilirubin   (0.2-1.0)  mg/dL


 


AST   (13-39)  U/L


 


ALT   (7-52)  U/L


 


Alkaline Phosphatase   ()  U/L


 


Total Creatine Kinase   ()  U/L


 


CK-MB (CK-2)   (0.6-6.3)  ng/mL


 


Troponin I   (<0.04)  ng/mL


 


C-Reactive Protein   (< 5.00)  mg/L


 


Total Protein   (6.4-8.9)  g/dL


 


Albumin   (3.2-5.2)  g/dL


 


Globulin   (2-4)  g/dL


 


Albumin/Globulin Ratio   (1-3)  


 


Serum Alcohol   (<10)  mg/dL














Assess/Plan/Problems-Billing





52 year old with a history of HTN, CAD with reported stroke in past, no deficits

, PVD, VIPUL, mobid obesity, presented yesterday after developing chest pain, 

subsequent "shaking" and seizure like activity receiving Versed in the field 

and Ativan in ER, post-ictal/sedated afterwards.  EEG in ER showed no 

epileptiform activity.  MRI was personally reviewed and showed no acute 

changes.  Had another event last night on the floor, received Ativan and was 

started on Keppra, loaded with 1000mg and started 750mg BID.  No risk factors 

for seizures.  Has a history of "shaking" spells but poor historian and cannot 

give specific information.





1.  New onset seizures.  Unclear etiology but he may have been having these for 

quite some time given his history.  EEG and MRI showed no obvious abnormalities 

although he does report a history of stroke in the past.  His events seem to be 

associated with chest pain as well, which raises the possibility of a non-

epileptic type events.  


2.  While the semiology is atypical, given the nature of the events, history of 

CAD (do not want to stress his heart) and now multiple episodes with possible 

episodes in the past, I am inclined to continue him on Keppra:  750mg BID. Can 

switch to PO once he is stable and taking PO safely.


3.  Should he have another event in house, will consider continuous monitoring, 

otherwise, I would like to see him back as an outpatient and consider 5 day 

video EEG in the hopes we can capture an episode.


4.  Continue Seizure precautions in house, with Ativan as necessary for seizure 

activity


5.  Needs continued monitoring as an outpatient for his VIPUL, CPAP


6.  Cardiac workup per hospitalist.  PVCs on tele but no major events.  

Troponins are negative.


7.  If he improves to baseline and is stable today, I am ok with discharge 

later today or tomorrow on Keppra 750mg po bid.  Please arrange follow up in my 

clinic in 4 weeks


8.  Patient should be instructed not to drive for now, avoid heights, heavy 

machinery, open flames, swimming alone and should take only showers and no 

baths.





- Patient Problems


(1) Chest pain


Current Visit: No   Status: Acute   Priority: Medium   Onset Date: 09/14/14   

Code(s): R07.9 - CHEST PAIN, UNSPECIFIED   SNOMED Code(s): 25376376


   Comment: Atypical chest pain, some residual "pressure" 1/19/18.  Doubt 

cardiac etiology.  Continue ASA, clopidogrel, BB, statin.  History of MI.   





(2) Sleep apnea


Current Visit: Yes   Status: Acute   Code(s): G47.30 - SLEEP APNEA, UNSPECIFIED

   SNOMED Code(s): 90354738


   Comment: O2 sat 96% on RA by me.  I asked family to bring in his CPAP.    





(3) Episode of shaking


Current Visit: Yes   Status: Acute   Code(s): R25.1 - TREMOR, UNSPECIFIED   

SNOMED Code(s): 21432341


   Comment: Multiple episodes.  Doubt seizure as no LOC.  EEG report pending.  

MRI unremarkable.    





(4) Morbid obesity


Current Visit: No   Status: Acute   Code(s): E66.01 - MORBID (SEVERE) OBESITY 

DUE TO EXCESS CALORIES   SNOMED Code(s): 567304324


   Comment: BMI 49.5.

## 2018-01-20 RX ADMIN — ASPIRIN 325 MG ORAL TABLET SCH MG: 325 PILL ORAL at 09:05

## 2018-01-20 RX ADMIN — CLOPIDOGREL SCH MG: 75 TABLET, FILM COATED ORAL at 09:05

## 2018-01-20 RX ADMIN — LEVETIRACETAM SCH MG: 500 TABLET, FILM COATED ORAL at 20:08

## 2018-01-20 RX ADMIN — ATORVASTATIN CALCIUM SCH MG: 20 TABLET, FILM COATED ORAL at 16:53

## 2018-01-20 RX ADMIN — NEBIVOLOL HYDROCHLORIDE SCH MG: 2.5 TABLET ORAL at 09:05

## 2018-01-20 RX ADMIN — HEPARIN SODIUM SCH UNITS: 5000 INJECTION INTRAVENOUS; SUBCUTANEOUS at 20:08

## 2018-01-20 RX ADMIN — TRIAMTERENE AND HYDROCHLOROTHIAZIDE SCH CAP: 25; 37.5 CAPSULE ORAL at 09:39

## 2018-01-20 RX ADMIN — HEPARIN SODIUM SCH UNITS: 5000 INJECTION INTRAVENOUS; SUBCUTANEOUS at 09:05

## 2018-01-20 NOTE — PN
Subjective


Date of Service: 01/20/18


Interval History: 





C/O chest pain, same as before.





Objective


Active Medications: 








Acetaminophen (Tylenol Tab*)  650 mg PO Q4H PRN


   PRN Reason: FEVER/PAIN


Albuterol (Ventolin 2.5 Mg/3 Ml Neb.Sol*)  2.5 mg INH Q2H PRN


   PRN Reason: SOB/WHEEZING


Aspirin (Aspirin Tab*)  325 mg PO DAILY [facility]


   Last Admin: 01/19/18 09:03 Dose:  325 mg


Atorvastatin Calcium (Lipitor*)  20 mg PO QPM [facility]


   Last Admin: 01/19/18 17:32 Dose:  20 mg


Clopidogrel Bisulfate (Plavix Tab*)  75 mg PO DAILY [facility]


   Last Admin: 01/19/18 09:03 Dose:  75 mg


Heparin Sodium (Porcine) (Heparin Vial(*))  5,000 units SUBCUT Q12HR [facility]


   Last Admin: 01/19/18 21:33 Dose:  5,000 units


Levetiracetam 1,000 mg/ Sodium (Chloride)  110 mls @ 440 mls/hr IVPB Q12H [facility]


   Last Admin: 01/20/18 08:42 Dose:  440 mls/hr


Nebivolol (Bystolic Tab (Nf))  10 mg PO DAILY [facility]


   Last Admin: 01/19/18 09:03 Dose:  10 mg


Ondansetron HCl (Zofran Inj*)  4 mg IV Q6H PRN


   PRN Reason: NAUSEA


Triamterene/HCTZ (Dyazide Cap*)  1 cap PO DAILY [facility]


   Last Admin: 01/19/18 09:03 Dose:  1 cap








 Vital Signs - 8 hr











  01/20/18 01/20/18 01/20/18





  01:00 01:01 01:30


 


Pulse Rate 61 61 58


 


Respiratory 13 20 17





Rate   


 


Blood Pressure 124/84  





(mmHg)   


 


O2 Sat by Pulse 98 98 98





Oximetry   














  01/20/18 01/20/18 01/20/18





  02:00 02:01 02:30


 


Pulse Rate 67 68 62


 


Respiratory 18 18 21





Rate   


 


Blood Pressure  130/91 





(mmHg)   


 


O2 Sat by Pulse 96 94 97





Oximetry   














  01/20/18 01/20/18 01/20/18





  03:00 03:13 03:30


 


Pulse Rate 61 60 61


 


Respiratory 23 18 15





Rate   


 


Blood Pressure  140/94 





(mmHg)   


 


O2 Sat by Pulse 96 97 98





Oximetry   














  01/20/18 01/20/18 01/20/18





  04:00 04:30 05:00


 


Pulse Rate 62 60 59


 


Respiratory 24 20 15





Rate   


 


Blood Pressure 124/91  137/100





(mmHg)   


 


O2 Sat by Pulse 96 98 98





Oximetry   














  01/20/18 01/20/18 01/20/18





  05:30 06:00 08:00


 


Pulse Rate 58  


 


Respiratory 25 25 15





Rate   


 


Blood Pressure   





(mmHg)   


 


O2 Sat by Pulse 97  





Oximetry   











Oxygen Devices in Use Now: Nasal Cannula


Appearance: Alert, supine on ICU bed.  Neutral affect.  Looks comfortable.


Eyes: No Scleral Icterus


Neck: NL Appearance and Movements; NL JVP, No Thyroid Enlargement, Masses


Respiratory: Symmetrical Chest Expansion and Respiratory Effort, Clear to 

Auscultation, Clear to Percussion


Cardiovascular: NL Sounds; No Murmurs; No JVD, RRR, No Edema, -


Extremities: No Edema, No Clubbing, Cyanosis, -


Skin: No Rash or Ulcers, No Nodules or Sclerosis, -


Neurological: Alert and Oriented x 3, NL Sensation


Result Diagrams: 


 01/19/18 05:33





 01/19/18 05:33


Additional Lab and Data: 


 Lab Results











  01/18/18 01/18/18 01/18/18 Range/Units





  14:07 14:07 14:07 


 


WBC  5.6    (3.5-10.8)  10^3/ul


 


RBC  5.28    (4.0-5.4)  10^6/ul


 


Hgb  14.9    (14.0-18.0)  g/dl


 


Hct  45    (42-52)  %


 


MCV  85    (80-94)  fL


 


MCH  28    (27-31)  pg


 


MCHC  33    (31-36)  g/dl


 


RDW  15    (10.5-15)  %


 


Plt Count  196    (150-450)  10^3/ul


 


MPV  8    (7.4-10.4)  um3


 


Neut % (Auto)  65.2    (38-83)  %


 


Lymph % (Auto)  22.9 L    (25-47)  %


 


Mono % (Auto)  9.2 H    (1-9)  %


 


Eos % (Auto)  1.8    (0-6)  %


 


Baso % (Auto)  0.9    (0-2)  %


 


Absolute Neuts (auto)  3.6    (1.5-7.7)  10^3/ul


 


Absolute Lymphs (auto)  1.3    (1.0-4.8)  10^3/ul


 


Absolute Monos (auto)  0.5    (0-0.8)  10^3/ul


 


Absolute Eos (auto)  0.1    (0-0.6)  10^3/ul


 


Absolute Basos (auto)  0.1    (0-0.2)  10^3/ul


 


Absolute Nucleated RBC  0    10^3/ul


 


Nucleated RBC %  0    


 


INR (Anticoag Therapy)    0.98  (0.77-1.02)  


 


APTT    38.3 H  (26.0-36.3)  seconds


 


Sodium   136   (133-145)  mmol/L


 


Potassium   3.6   (3.5-5.0)  mmol/L


 


Chloride   104   (101-111)  mmol/L


 


Carbon Dioxide   25   (22-32)  mmol/L


 


Anion Gap   7   (2-11)  mmol/L


 


BUN   13   (6-24)  mg/dL


 


Creatinine   1.01   (0.67-1.17)  mg/dL


 


Est GFR ( Amer)   99.4   (>60)  


 


Est GFR (Non-Af Amer)   77.3   (>60)  


 


BUN/Creatinine Ratio   12.9   (8-20)  


 


Glucose   105 H   ()  mg/dL


 


Lactic Acid     (0.5-2.0)  mmol/L


 


Calcium   9.6   (8.6-10.3)  mg/dL


 


Magnesium   1.9   (1.9-2.7)  mg/dL


 


Total Bilirubin   0.60   (0.2-1.0)  mg/dL


 


AST   21   (13-39)  U/L


 


ALT   33   (7-52)  U/L


 


Alkaline Phosphatase   117 H   ()  U/L


 


Total Creatine Kinase   324 H   ()  U/L


 


CK-MB (CK-2)   2.5   (0.6-6.3)  ng/mL


 


Troponin I   0.01   (<0.04)  ng/mL


 


C-Reactive Protein   5.83 H   (< 5.00)  mg/L


 


Total Protein   7.2   (6.4-8.9)  g/dL


 


Albumin   4.2   (3.2-5.2)  g/dL


 


Globulin   3.0   (2-4)  g/dL


 


Albumin/Globulin Ratio   1.4   (1-3)  


 


Serum Alcohol   < 10   (<10)  mg/dL














  01/18/18 Range/Units





  14:07 


 


WBC   (3.5-10.8)  10^3/ul


 


RBC   (4.0-5.4)  10^6/ul


 


Hgb   (14.0-18.0)  g/dl


 


Hct   (42-52)  %


 


MCV   (80-94)  fL


 


MCH   (27-31)  pg


 


MCHC   (31-36)  g/dl


 


RDW   (10.5-15)  %


 


Plt Count   (150-450)  10^3/ul


 


MPV   (7.4-10.4)  um3


 


Neut % (Auto)   (38-83)  %


 


Lymph % (Auto)   (25-47)  %


 


Mono % (Auto)   (1-9)  %


 


Eos % (Auto)   (0-6)  %


 


Baso % (Auto)   (0-2)  %


 


Absolute Neuts (auto)   (1.5-7.7)  10^3/ul


 


Absolute Lymphs (auto)   (1.0-4.8)  10^3/ul


 


Absolute Monos (auto)   (0-0.8)  10^3/ul


 


Absolute Eos (auto)   (0-0.6)  10^3/ul


 


Absolute Basos (auto)   (0-0.2)  10^3/ul


 


Absolute Nucleated RBC   10^3/ul


 


Nucleated RBC %   


 


INR (Anticoag Therapy)   (0.77-1.02)  


 


APTT   (26.0-36.3)  seconds


 


Sodium   (133-145)  mmol/L


 


Potassium   (3.5-5.0)  mmol/L


 


Chloride   (101-111)  mmol/L


 


Carbon Dioxide   (22-32)  mmol/L


 


Anion Gap   (2-11)  mmol/L


 


BUN   (6-24)  mg/dL


 


Creatinine   (0.67-1.17)  mg/dL


 


Est GFR ( Amer)   (>60)  


 


Est GFR (Non-Af Amer)   (>60)  


 


BUN/Creatinine Ratio   (8-20)  


 


Glucose   ()  mg/dL


 


Lactic Acid  2.1 H*  (0.5-2.0)  mmol/L


 


Calcium   (8.6-10.3)  mg/dL


 


Magnesium   (1.9-2.7)  mg/dL


 


Total Bilirubin   (0.2-1.0)  mg/dL


 


AST   (13-39)  U/L


 


ALT   (7-52)  U/L


 


Alkaline Phosphatase   ()  U/L


 


Total Creatine Kinase   ()  U/L


 


CK-MB (CK-2)   (0.6-6.3)  ng/mL


 


Troponin I   (<0.04)  ng/mL


 


C-Reactive Protein   (< 5.00)  mg/L


 


Total Protein   (6.4-8.9)  g/dL


 


Albumin   (3.2-5.2)  g/dL


 


Globulin   (2-4)  g/dL


 


Albumin/Globulin Ratio   (1-3)  


 


Serum Alcohol   (<10)  mg/dL











Microbiology and Other Data: 


 Microbiology











 01/19/18 22:31 Nasal Screen MRSA (PCR)(GABRIELA) - Final





 Nasal    Mrsa Negative














Assess/Plan/Problems-Billing





52 year old with a history of HTN, CAD with reported stroke in past, no deficits

, PVD, VIPUL, mobid obesity, presented yesterday after developing chest pain, 

subsequent "shaking" and seizure like activity receiving Versed in the field 

and Ativan in ER, post-ictal/sedated afterwards.  EEG in ER showed no 

epileptiform activity.  MRI was personally reviewed and showed no acute 

changes.  Had another event last night on the floor, received Ativan and was 

started on Keppra, loaded with 1000mg and started 750mg BID.  No risk factors 

for seizures.  Has a history of "shaking" spells but poor historian and cannot 

give specific information.





1.  New onset seizures.  Unclear etiology but he may have been having these for 

quite some time given his history.  EEG and MRI showed no obvious abnormalities 

although he does report a history of stroke in the past.  His events seem to be 

associated with chest pain as well, which raises the possibility of a non-

epileptic type events.  


2.  While the semiology is atypical, given the nature of the events, history of 

CAD (do not want to stress his heart) and now multiple episodes with possible 

episodes in the past, I am inclined to continue him on Keppra:  750mg BID. Can 

switch to PO once he is stable and taking PO safely.


3.  Should he have another event in house, will consider continuous monitoring, 

otherwise, I would like to see him back as an outpatient and consider 5 day 

video EEG in the hopes we can capture an episode.


4.  Continue Seizure precautions in house, with Ativan as necessary for seizure 

activity


5.  Needs continued monitoring as an outpatient for his VIPUL, CPAP


6.  Cardiac workup per hospitalist.  PVCs on tele but no major events.  

Troponins are negative.


7.  If he improves to baseline and is stable today, I am ok with discharge 

later today or tomorrow on Keppra 750mg po bid.  Please arrange follow up in my 

clinic in 4 weeks


8.  Patient should be instructed not to drive for now, avoid heights, heavy 

machinery, open flames, swimming alone and should take only showers and no 

baths.





- Patient Problems


(1) Chest pain


Current Visit: No   Status: Acute   Priority: Medium   Onset Date: 09/14/14   

Code(s): R07.9 - CHEST PAIN, UNSPECIFIED   SNOMED Code(s): 09237338


   Comment: Atypical chest pain, some residual "pressure" 1/19/18.  Doubt 

cardiac etiology.  Continue ASA, clopidogrel, BB, statin.  History of MI, 

stent.  Discussed with Dr. Anderson, his primary cardiologist.  Chest pain is a 

very frequent c/o for this patient.    





(2) Sleep apnea


Current Visit: Yes   Status: Acute   Code(s): G47.30 - SLEEP APNEA, UNSPECIFIED

   SNOMED Code(s): 15823216


   Comment: O2 sat 96% on RA by me.  I asked family to bring in his CPAP.   Pt 

tolerated hospital CPAP night of 1/19.    





(3) Episode of shaking


Current Visit: Yes   Status: Acute   Code(s): R25.1 - TREMOR, UNSPECIFIED   

SNOMED Code(s): 64144172


   Comment: Multiple episodes.  Doubt seizure as no LOC.  EEG report pending.  

MRI unremarkable.


Another episode of unresponsiveness noted on 4S, pt transferred to ICU and 

levetiracetam started.  I will discuss with the neurologist.     





(4) Morbid obesity


Current Visit: No   Status: Acute   Code(s): E66.01 - MORBID (SEVERE) OBESITY 

DUE TO EXCESS CALORIES   SNOMED Code(s): 382768525


   Comment: BMI 49.5.

## 2018-01-21 RX ADMIN — HEPARIN SODIUM SCH UNITS: 5000 INJECTION INTRAVENOUS; SUBCUTANEOUS at 08:49

## 2018-01-21 RX ADMIN — HEPARIN SODIUM SCH UNITS: 5000 INJECTION INTRAVENOUS; SUBCUTANEOUS at 20:56

## 2018-01-21 RX ADMIN — NEBIVOLOL HYDROCHLORIDE SCH MG: 2.5 TABLET ORAL at 08:48

## 2018-01-21 RX ADMIN — TRIAMTERENE AND HYDROCHLOROTHIAZIDE SCH CAP: 25; 37.5 CAPSULE ORAL at 08:48

## 2018-01-21 RX ADMIN — CLOPIDOGREL SCH MG: 75 TABLET, FILM COATED ORAL at 08:48

## 2018-01-21 RX ADMIN — ATORVASTATIN CALCIUM SCH MG: 20 TABLET, FILM COATED ORAL at 16:42

## 2018-01-21 RX ADMIN — LEVETIRACETAM SCH MG: 500 TABLET, FILM COATED ORAL at 08:48

## 2018-01-21 RX ADMIN — ASPIRIN 325 MG ORAL TABLET SCH MG: 325 PILL ORAL at 08:48

## 2018-01-21 RX ADMIN — LEVETIRACETAM SCH MG: 500 TABLET, FILM COATED ORAL at 20:57

## 2018-01-21 NOTE — PN
NEUROLOGICAL FOLLOWUP NOTE:

 

DATE OF SERVICE:  01/20/18

 

PATIENT OF:  Dr. Lobo.

 

HISTORY:  Delta is a 52-year-old man who I was called last night about for a 
brief seizure.  We increased his Keppra at that time to 1000 mg and increased 
his dose to 1000 mg twice a day.  He has had no further spells or seizures 
since then.  This spell was described as a staring spell with bilateral leg 
jerking.

 

Some of these events seem to be associated with chest pain.  He also denies any 
history of depression.

 

MEDICATIONS:

1.  Keppra 1000 mg b.i.d.

2.  Dyazide 1 cap a day.

3.  Ventolin 2.5 mg inhaler p.r.n.

4.  Aspirin 325 mg daily.

5.  Lipitor 20 mg daily.

6.  Plavix 75 mg daily.

7.  Bystolic 10 mg daily.

 

PHYSICAL EXAMINATION:  Temp 97.7, pulse 58, respirations 18, blood pressure 137/
100.  He is alert and oriented with normal speech and comprehension.  Cranial 
nerves II through XII are intact.  Motor exam revealed normal tone and 
strength. Chest:  Clear.  Cardiovascular:  Regular rate and rhythm.  Abdomen:  
Soft, positive bowel sounds.  He is obese.

 

ASSESSMENT AND PLAN:  Mr. Rodriguez had another event which was witnessed and 
thought to be a seizure.  We have increased his Keppra, which should be within 
therapeutic range and he is a big man.  I reviewed with him the side effects of 
Keppra including depression.  I discussed with the hospitalist that if he has 
further events in house, we will consider video EEG monitoring to determine 
whether they are truly secondary to epileptiform discharges or whether these 
would represent a pseudoseizure picture.  If he has no further spells, then he 
can go home in the near future.  He will need followup with Dr. Arteaga in 4 to 6 
weeks with the Keppra level before that.

 

Thank you for sharing his case.

 

 [id number]/CPS #: [id number]

[facility]

## 2018-01-21 NOTE — PN
Subjective


Date of Service: 01/21/18


Interval History: 





No more episodes of LOC.  No new c/o.  Pt uncertain how well he could walk 

today. 





Objective


Active Medications: 








Acetaminophen (Tylenol Tab*)  650 mg PO Q4H PRN


   PRN Reason: FEVER/PAIN


   Last Admin: 01/20/18 17:34 Dose:  650 mg


Albuterol (Ventolin 2.5 Mg/3 Ml Neb.Sol*)  2.5 mg INH Q2H PRN


   PRN Reason: SOB/WHEEZING


Aspirin (Aspirin Tab*)  325 mg PO DAILY [facility]


   Last Admin: 01/21/18 08:48 Dose:  325 mg


Atorvastatin Calcium (Lipitor*)  20 mg PO QPM [facility]


   Last Admin: 01/20/18 16:53 Dose:  20 mg


Clopidogrel Bisulfate (Plavix Tab*)  75 mg PO DAILY [facility]


   Last Admin: 01/21/18 08:48 Dose:  75 mg


Heparin Sodium (Porcine) (Heparin Vial(*))  5,000 units SUBCUT Q12HR [facility]


   Last Admin: 01/21/18 08:49 Dose:  5,000 units


Levetiracetam (Keppra Tab*)  1,000 mg PO BID [facility]


   Last Admin: 01/21/18 08:48 Dose:  1,000 mg


Nebivolol (Bystolic Tab (Nf))  10 mg PO DAILY [facility]


   Last Admin: 01/21/18 08:48 Dose:  10 mg


Ondansetron HCl (Zofran Inj*)  4 mg IV Q6H PRN


   PRN Reason: NAUSEA


Throat Lozenges (Chloraseptic Palmira*)  1 palmira PO Q6H PRN


   PRN Reason: SORE THROAT


   Last Admin: 01/20/18 13:09 Dose:  1 palmira


Triamterene/HCTZ (Dyazide Cap*)  1 cap PO DAILY [facility]


   Last Admin: 01/21/18 08:48 Dose:  1 cap








 Vital Signs - 8 hr











  01/21/18 01/21/18 01/21/18





  08:00 08:04 08:43


 


Temperature   98.0 F


 


Pulse Rate  65 62


 


Respiratory 18 16 16





Rate   


 


Blood Pressure   155/67





(mmHg)   


 


O2 Sat by Pulse  97 97





Oximetry   











Oxygen Devices in Use Now: None


Appearance: Alert, sitting on the edge o f his bed.  In good spirits.  Looks 

comfortable.


Eyes: No Scleral Icterus


Respiratory: Symmetrical Chest Expansion and Respiratory Effort, Clear to 

Auscultation, Clear to Percussion


Cardiovascular: NL Sounds; No Murmurs; No JVD, RRR, No Edema, -


Extremities: No Edema, No Clubbing, Cyanosis, -


Skin: No Rash or Ulcers, No Nodules or Sclerosis, -


Neurological: Alert and Oriented x 3, NL Sensation


Result Diagrams: 


 01/19/18 05:33





 01/19/18 05:33


Additional Lab and Data: 


 Lab Results











  01/18/18 01/18/18 01/18/18 Range/Units





  14:07 14:07 14:07 


 


WBC  5.6    (3.5-10.8)  10^3/ul


 


RBC  5.28    (4.0-5.4)  10^6/ul


 


Hgb  14.9    (14.0-18.0)  g/dl


 


Hct  45    (42-52)  %


 


MCV  85    (80-94)  fL


 


MCH  28    (27-31)  pg


 


MCHC  33    (31-36)  g/dl


 


RDW  15    (10.5-15)  %


 


Plt Count  196    (150-450)  10^3/ul


 


MPV  8    (7.4-10.4)  um3


 


Neut % (Auto)  65.2    (38-83)  %


 


Lymph % (Auto)  22.9 L    (25-47)  %


 


Mono % (Auto)  9.2 H    (1-9)  %


 


Eos % (Auto)  1.8    (0-6)  %


 


Baso % (Auto)  0.9    (0-2)  %


 


Absolute Neuts (auto)  3.6    (1.5-7.7)  10^3/ul


 


Absolute Lymphs (auto)  1.3    (1.0-4.8)  10^3/ul


 


Absolute Monos (auto)  0.5    (0-0.8)  10^3/ul


 


Absolute Eos (auto)  0.1    (0-0.6)  10^3/ul


 


Absolute Basos (auto)  0.1    (0-0.2)  10^3/ul


 


Absolute Nucleated RBC  0    10^3/ul


 


Nucleated RBC %  0    


 


INR (Anticoag Therapy)    0.98  (0.77-1.02)  


 


APTT    38.3 H  (26.0-36.3)  seconds


 


Sodium   136   (133-145)  mmol/L


 


Potassium   3.6   (3.5-5.0)  mmol/L


 


Chloride   104   (101-111)  mmol/L


 


Carbon Dioxide   25   (22-32)  mmol/L


 


Anion Gap   7   (2-11)  mmol/L


 


BUN   13   (6-24)  mg/dL


 


Creatinine   1.01   (0.67-1.17)  mg/dL


 


Est GFR ( Amer)   99.4   (>60)  


 


Est GFR (Non-Af Amer)   77.3   (>60)  


 


BUN/Creatinine Ratio   12.9   (8-20)  


 


Glucose   105 H   ()  mg/dL


 


Lactic Acid     (0.5-2.0)  mmol/L


 


Calcium   9.6   (8.6-10.3)  mg/dL


 


Magnesium   1.9   (1.9-2.7)  mg/dL


 


Total Bilirubin   0.60   (0.2-1.0)  mg/dL


 


AST   21   (13-39)  U/L


 


ALT   33   (7-52)  U/L


 


Alkaline Phosphatase   117 H   ()  U/L


 


Total Creatine Kinase   324 H   ()  U/L


 


CK-MB (CK-2)   2.5   (0.6-6.3)  ng/mL


 


Troponin I   0.01   (<0.04)  ng/mL


 


C-Reactive Protein   5.83 H   (< 5.00)  mg/L


 


Total Protein   7.2   (6.4-8.9)  g/dL


 


Albumin   4.2   (3.2-5.2)  g/dL


 


Globulin   3.0   (2-4)  g/dL


 


Albumin/Globulin Ratio   1.4   (1-3)  


 


Serum Alcohol   < 10   (<10)  mg/dL














  01/18/18 Range/Units





  14:07 


 


WBC   (3.5-10.8)  10^3/ul


 


RBC   (4.0-5.4)  10^6/ul


 


Hgb   (14.0-18.0)  g/dl


 


Hct   (42-52)  %


 


MCV   (80-94)  fL


 


MCH   (27-31)  pg


 


MCHC   (31-36)  g/dl


 


RDW   (10.5-15)  %


 


Plt Count   (150-450)  10^3/ul


 


MPV   (7.4-10.4)  um3


 


Neut % (Auto)   (38-83)  %


 


Lymph % (Auto)   (25-47)  %


 


Mono % (Auto)   (1-9)  %


 


Eos % (Auto)   (0-6)  %


 


Baso % (Auto)   (0-2)  %


 


Absolute Neuts (auto)   (1.5-7.7)  10^3/ul


 


Absolute Lymphs (auto)   (1.0-4.8)  10^3/ul


 


Absolute Monos (auto)   (0-0.8)  10^3/ul


 


Absolute Eos (auto)   (0-0.6)  10^3/ul


 


Absolute Basos (auto)   (0-0.2)  10^3/ul


 


Absolute Nucleated RBC   10^3/ul


 


Nucleated RBC %   


 


INR (Anticoag Therapy)   (0.77-1.02)  


 


APTT   (26.0-36.3)  seconds


 


Sodium   (133-145)  mmol/L


 


Potassium   (3.5-5.0)  mmol/L


 


Chloride   (101-111)  mmol/L


 


Carbon Dioxide   (22-32)  mmol/L


 


Anion Gap   (2-11)  mmol/L


 


BUN   (6-24)  mg/dL


 


Creatinine   (0.67-1.17)  mg/dL


 


Est GFR ( Amer)   (>60)  


 


Est GFR (Non-Af Amer)   (>60)  


 


BUN/Creatinine Ratio   (8-20)  


 


Glucose   ()  mg/dL


 


Lactic Acid  2.1 H*  (0.5-2.0)  mmol/L


 


Calcium   (8.6-10.3)  mg/dL


 


Magnesium   (1.9-2.7)  mg/dL


 


Total Bilirubin   (0.2-1.0)  mg/dL


 


AST   (13-39)  U/L


 


ALT   (7-52)  U/L


 


Alkaline Phosphatase   ()  U/L


 


Total Creatine Kinase   ()  U/L


 


CK-MB (CK-2)   (0.6-6.3)  ng/mL


 


Troponin I   (<0.04)  ng/mL


 


C-Reactive Protein   (< 5.00)  mg/L


 


Total Protein   (6.4-8.9)  g/dL


 


Albumin   (3.2-5.2)  g/dL


 


Globulin   (2-4)  g/dL


 


Albumin/Globulin Ratio   (1-3)  


 


Serum Alcohol   (<10)  mg/dL











Microbiology and Other Data: 


 Microbiology











 01/19/18 22:31 Nasal Screen MRSA (PCR)(GABRIELA) - Final





 Nasal    Mrsa Negative














Assess/Plan/Problems-Billing





52 year old with a history of HTN, CAD with reported stroke in past, no deficits

, PVD, VIPUL, mobid obesity, presented yesterday after developing chest pain, 

subsequent "shaking" and seizure like activity receiving Versed in the field 

and Ativan in ER, post-ictal/sedated afterwards.  EEG in ER showed no 

epileptiform activity.  MRI was personally reviewed and showed no acute 

changes.  Had another event last night on the floor, received Ativan and was 

started on Keppra, loaded with 1000mg and started 750mg BID.  No risk factors 

for seizures.  Has a history of "shaking" spells but poor historian and cannot 

give specific information.





1.  New onset seizures.  Unclear etiology but he may have been having these for 

quite some time given his history.  EEG and MRI showed no obvious abnormalities 

although he does report a history of stroke in the past.  His events seem to be 

associated with chest pain as well, which raises the possibility of a non-

epileptic type events.  


2.  While the semiology is atypical, given the nature of the events, history of 

CAD (do not want to stress his heart) and now multiple episodes with possible 

episodes in the past, I am inclined to continue him on Keppra:  750mg BID. Can 

switch to PO once he is stable and taking PO safely.


3.  Should he have another event in house, will consider continuous monitoring, 

otherwise, I would like to see him back as an outpatient and consider 5 day 

video EEG in the hopes we can capture an episode.


4.  Continue Seizure precautions in house, with Ativan as necessary for seizure 

activity


5.  Needs continued monitoring as an outpatient for his VIPUL, CPAP


6.  Cardiac workup per hospitalist.  PVCs on tele but no major events.  

Troponins are negative.


7.  If he improves to baseline and is stable today, I am ok with discharge 

later today or tomorrow on Keppra 750mg po bid.  Please arrange follow up in my 

clinic in 4 weeks


8.  Patient should be instructed not to drive for now, avoid heights, heavy 

machinery, open flames, swimming alone and should take only showers and no 

baths.





- Patient Problems


(1) Chest pain


Current Visit: No   Status: Acute   Priority: Medium   Onset Date: 09/14/14   

Code(s): R07.9 - CHEST PAIN, UNSPECIFIED   SNOMED Code(s): 97367045


   Comment: Atypical chest pain, some residual "pressure" 1/19/18.  Doubt 

cardiac etiology.  Continue ASA, clopidogrel, BB, statin.  History of MI, 

stent.  Discussed with Dr. Anderson, his primary cardiologist.  Chest pain is a 

very frequent c/o for this patient.    





(2) Sleep apnea


Current Visit: Yes   Status: Acute   Code(s): G47.30 - SLEEP APNEA, UNSPECIFIED

   SNOMED Code(s): 81544832


   Comment: O2 sat 96% on RA by me.  I asked family to bring in his CPAP.   Pt 

tolerated hospital CPAP night of 1/19.    





(3) Episode of shaking


Current Visit: Yes   Status: Acute   Code(s): R25.1 - TREMOR, UNSPECIFIED   

SNOMED Code(s): 60953925


   Comment: Multiple episodes.  Doubt seizure as no LOC.  EEG report pending.  

MRI unremarkable.


Another episode of unresponsiveness noted on 4S, pt transferred to ICU and 

levetiracetam started.  Plan is to continue levetiracetam 1000 mg bid, fup with 

Dr. Arteaga in 4-6 weeks with a levetiracetam level before his visit.    





(4) Morbid obesity


Current Visit: No   Status: Acute   Code(s): E66.01 - MORBID (SEVERE) OBESITY 

DUE TO EXCESS CALORIES   SNOMED Code(s): 671955559


   Comment: BMI 49.5.

## 2018-01-22 VITALS — DIASTOLIC BLOOD PRESSURE: 71 MMHG | SYSTOLIC BLOOD PRESSURE: 130 MMHG

## 2018-01-22 RX ADMIN — ASPIRIN 325 MG ORAL TABLET SCH MG: 325 PILL ORAL at 08:41

## 2018-01-22 RX ADMIN — CLOPIDOGREL SCH MG: 75 TABLET, FILM COATED ORAL at 08:41

## 2018-01-22 RX ADMIN — HEPARIN SODIUM SCH UNITS: 5000 INJECTION INTRAVENOUS; SUBCUTANEOUS at 08:41

## 2018-01-22 RX ADMIN — TRIAMTERENE AND HYDROCHLOROTHIAZIDE SCH CAP: 25; 37.5 CAPSULE ORAL at 08:41

## 2018-01-22 RX ADMIN — NEBIVOLOL HYDROCHLORIDE SCH MG: 2.5 TABLET ORAL at 08:41

## 2018-01-22 RX ADMIN — LEVETIRACETAM SCH MG: 500 TABLET, FILM COATED ORAL at 08:41

## 2018-01-22 NOTE — PN
NEUROLOGICAL FOLLOWUP NOTE:

 

DATE OF SERVICE:  01/21/18

 

PATIENT OF:  Dr. Lobo

 

HISTORY:  This is a 52-year-old man who was started on Keppra for possible 
seizures.  He has had no further events since Friday night.  He has no 
complaints. He has been up and out of bed to go to bathroom.  He is walking 
without trouble. He will get lightheaded when he gets up too quickly, but that 
happens to him at home as well.

 

MEDICATIONS:  His medications are unchanged.

1.  Albuterol 2.5 mg q.2 hours p.r.n.

2.  Aspirin 325 mg daily.

3.  Lipitor 20 mg daily.

4.  Plavix 75 daily.

5.  Heparin subcu.

6.  Keppra 1000 mg b.i.d.

7.  Bystolic 10 mg daily.

8.  Zofran p.r.n.

9.  Dyazide 1 cap daily.

 

PHYSICAL EXAMINATION:  Temp 98, pulse 63, respirations 20, blood pressure 136/
68. He is alert and oriented, with normal speech and comprehension.  Cranial 
nerves II through XII are intact.  Motor exam revealed normal tone and 
strength. Coordination and gait, sensation intact grossly to the light touch.  
Chest:  Clear. Cardiovascular:  Regular rate and rhythm.  Abdomen:  Soft.

 

ASSESSMENT AND PLAN:  Mr. Rodriguez has had no further spells of seizures and he is 
on Keppra.  Followup would be through Dr. Arteaga from a neurological point of 
view and he should see him in the next 4 to 6 weeks' time and have a Keppra 
level checked before.  If he continues to have frequent spells, we will need to 
monitor him to see if these are functional versus electrographic seizures from 
what the hospitalist witnessed.  He thought it was most likely seizures and he 
is being treated for that at this point.

 

Thank you for sharing his case.

 

 [id number] #: [id number]

STEVE

## 2018-01-22 NOTE — PN
Progress Note





- Progress Note


Date of Service: 01/22/18


Note: 





Time spent on discharge 50 minutes.

## 2018-01-22 NOTE — DS
CC:  Dr. Webb; Dr. Rosenthal; Dr. Arteaga *

 

DATE OF ADMISSION:  01/19/2018.

 

DATE OF DISCHARGE:  01/22/2018.

 

HISTORY:  This 52-year-old man initially complained of chest pain.  He has some 
odd spells with shaking, lip smacking, unresponsiveness.  The patient says he 
was not really completely unconscious during any of these spells.  He had some 
episodes at home, some in the emergency room, and a number during his hospital 
stay.  He was seen in consultation by Dr. Arteaga.  He had an EEG which was 
unremarkable.  He had a CT scan and an MRI of the brain which also did not show 
any significant findings. He was started on Levetiracetam.  Following that, I 
do not think he had any more episodes.  He tolerated it well up until now.

 

The patient will have a Levetiracetam level in two weeks and follow-up with Dr. Arteaga in four weeks.

 

FINAL DIAGNOSES:

1.  Possible seizure disorder.

2.  Chronic chest pain, known coronary artery disease.

3.  Sleep apnea.

4.  Morbid obesity.

 

DISCHARGE MEDICATIONS:

1.  Levetiracetam 1,000 mg b.i.d.

2.  Nebivolol 10 mg daily.

3.  Clopidogrel 75 mg daily.

4.  Aspirin 325 mg daily.

5.  Atorvastatin 20 mg at bedtime.

6.  Triamterene Hydrochlorothiazide 37.5/25 one daily.

7.  Nitroglycerin 0.4 mg sublingual every 5 minutes prn.

8.  Potassium Chloride two tablets every other day alternating with four 
tablets every other day.

 

 [id number]/[facility] #: [id number]

[facility]

## 2018-02-08 ENCOUNTER — HOSPITAL ENCOUNTER (EMERGENCY)
Dept: HOSPITAL 25 - ED | Age: 53
Discharge: HOME | End: 2018-02-08
Payer: MEDICARE

## 2018-02-08 VITALS — SYSTOLIC BLOOD PRESSURE: 146 MMHG | DIASTOLIC BLOOD PRESSURE: 79 MMHG

## 2018-02-08 DIAGNOSIS — J32.9: Primary | ICD-10-CM

## 2018-02-08 DIAGNOSIS — Z79.02: ICD-10-CM

## 2018-02-08 DIAGNOSIS — Z86.73: ICD-10-CM

## 2018-02-08 DIAGNOSIS — G40.909: ICD-10-CM

## 2018-02-08 DIAGNOSIS — Z87.891: ICD-10-CM

## 2018-02-08 LAB
BASOPHILS # BLD AUTO: 0.1 10^3/UL (ref 0–0.2)
EOSINOPHIL # BLD AUTO: 0.2 10^3/UL (ref 0–0.6)
HCT VFR BLD AUTO: 44 % (ref 42–52)
HGB BLD-MCNC: 14.7 G/DL (ref 14–18)
LYMPHOCYTES # BLD AUTO: 1.4 10^3/UL (ref 1–4.8)
MCH RBC QN AUTO: 28 PG (ref 27–31)
MCHC RBC AUTO-ENTMCNC: 34 G/DL (ref 31–36)
MCV RBC AUTO: 84 FL (ref 80–94)
MONOCYTES # BLD AUTO: 0.5 10^3/UL (ref 0–0.8)
NEUTROPHILS # BLD AUTO: 3 10^3/UL (ref 1.5–7.7)
NRBC # BLD AUTO: 0 10^3/UL
NRBC BLD QL AUTO: 0.1
PLATELET # BLD AUTO: 189 10^3/UL (ref 150–450)
RBC # BLD AUTO: 5.22 10^6/UL (ref 4–5.4)
WBC # BLD AUTO: 5.2 10^3/UL (ref 3.5–10.8)

## 2018-02-08 PROCEDURE — 99282 EMERGENCY DEPT VISIT SF MDM: CPT

## 2018-02-08 PROCEDURE — 87502 INFLUENZA DNA AMP PROBE: CPT

## 2018-02-08 PROCEDURE — 85025 COMPLETE CBC W/AUTO DIFF WBC: CPT

## 2018-02-08 PROCEDURE — 71046 X-RAY EXAM CHEST 2 VIEWS: CPT

## 2018-02-08 PROCEDURE — 80053 COMPREHEN METABOLIC PANEL: CPT

## 2018-02-08 PROCEDURE — 83880 ASSAY OF NATRIURETIC PEPTIDE: CPT

## 2018-02-08 PROCEDURE — 94640 AIRWAY INHALATION TREATMENT: CPT

## 2018-02-08 PROCEDURE — 36415 COLL VENOUS BLD VENIPUNCTURE: CPT

## 2018-02-08 PROCEDURE — 83605 ASSAY OF LACTIC ACID: CPT

## 2018-02-08 NOTE — RAD
HISTORY: Shortness of breath



COMPARISONS: November 17, 2017



VIEWS: 2: Frontal and lateral views of the chest.



FINDINGS:

CARDIOMEDIASTINAL SILHOUETTE: The cardiomediastinal silhouette is normal.

VIKTOR: The viktor are normal.

PLEURA: The costophrenic angles are sharp. No pleural abnormalities are noted.

LUNG PARENCHYMA: The lungs are clear.

ABDOMEN: The upper abdomen is clear. There is no subphrenic gas.

BONES AND SOFT TISSUES: Degenerative changes are noted.

OTHER: An implantable cardiac monitoring device is noted.



IMPRESSION:

NO ACTIVE CARDIOPULMONARY DISEASE.

## 2018-02-08 NOTE — ED
Shortness of Breath





- HPI Summary


HPI Summary: 





Patient is an obese 52-year-old male with a history of CAD, hypertension, 

seizures, and hyperlipidemia presents to the ED with chief complaint of "body 

aches all over."  He also endorses shortness of breath.  Denies any recent 

illness or sick contacts.  Denies travel.  Nonsmoker.  Symptoms began 1 week 

ago and have remained persistent.  Denies any previous history of shortness of 

breath or any pulmonary diagnoses.  Significant cardiac history and continues 

to take daily medications.  Recently diagnosed with seizures and began taking 

Keppra.  He also had a CVA last year.  Denies fevers, sweats, chills.  Denies 

chest pain.  Endorses generalized weakness.  Endorses sinus congestion and mild 

cough without production.  He has not taken any medications for relief.  Chief 

complaint is "pain all over."





- History of Current Complaint


Chief Complaint: EDGeneral


Time Seen by Provider: 02/08/18 11:37


Hx Obtained From: Patient


Onset/Duration: Gradual Onset


Timing: Constant


Current Severity: Mild


Dyspnea At: Rest


Associated Signs & Symptoms: Cough (Nonproductive), Nasal Congestion


Related History: Obesity





- Risk Factors


Pulmonary Embolism: Negative


Cardiac: CAD, Elevated lipids, Hypertension, Family History


Pseudomonas: Negative


Tuberculosis: Negative





- Allergy/Home Medications


Allergies/Adverse Reactions: 


 Allergies











Allergy/AdvReac Type Severity Reaction Status Date / Time


 


MS Penicillins [Penicillins] Allergy Severe Anaphylatic Verified 07/09/17 02:26





   Shock  


 


MS Amoxicillin Allergy Unknown Swelling Verified 07/09/17 02:26





[From Augmentin XR]     


 


MS Clavulanic Acid Allergy Unknown Swelling Verified 07/09/17 02:26





[From Augmentin XR]     


 


VINEGAR Allergy Intermediate Hives Uncoded 07/09/17 02:26


 


MUSHROOMS Allergy Unknown Rash Uncoded 07/09/17 02:26














PMH/Surg Hx/FS Hx/Imm Hx


Previously Healthy: No - see below


Endocrine/Hematology History: Reports: Hx Anticoagulant Therapy - plavix


   Denies: Hx Diabetes, Hx Sickle Cell Disease, Hx Thyroid Disease


Cardiovascular History: Reports: Hx Angina, Hx Cardiac Arrest, Hx Coronary 

Artery Disease, Hx Hypercholesterolemia, Hx Hypertension, Hx Myocardial 

Infarction, Other Cardiovascular Problems/Disorders - HEART DISEASE, CARDIAC 

EVENT MONITOR IMPLANTED


   Denies: Hx Congestive Heart Failure, Hx Pacemaker/ICD, Hx Valvular Heart 

Disease


Respiratory History: Reports: Hx Asthma, Hx Sleep Apnea


   Denies: Hx Chronic Obstructive Pulmonary Disease (COPD)


GI History: Reports: Hx Gastroesophageal Reflux Disease


   Denies: Hx Ulcer


 History: Reports: Hx Renal Disease - cysts, Other  Problems/Disorders - 

bilat cyst on kidneys


Musculoskeletal History: Reports: Hx Arthritis, Hx Back Problems, Other 

Musculoskeletal History - VIPUL


   Denies: Hx Scoliosis


Sensory History: Reports: Hx Cataracts - right eye, Hx Contacts or Glasses, Hx 

Hearing Aid


   Denies: Hx Hearing Problem


Opthamlomology History: Reports: Hx Cataracts - right eye, Hx Contacts or 

Glasses


Neurological History: Reports: Hx Seizures, Hx Transient Ischemic Attacks (TIA)


   Denies: Hx Dementia, Hx Headaches, Other Neuro Impairments/Disorders


Psychiatric History: Reports: Hx Depression


   Denies: Hx Panic Disorder





- Cancer History


Hx Chemotherapy: No





- Surgical History


Surgery Procedure, Year, and Place: DEC 2004 sinus [facility] [place] ;.  2008 left knee 

arthroscopy [facility] [place] ;.  2013 left tympanoplasty WITH STAPES IMPLANT ([employer] 

MALLEOUS HEAD SERIAL # [id number] - PER [employer] INFORMATION SENT TO MRI ON 6/ 13/2016; IMPLANT IS STAINLESS STEEL STATES SOME DEGREE OF MAGNETISM TESTED MRI 

SAFE AT 1.5T ONLY - DR VALDEZ APPROVED THIS INFORMATION FOR 1.5T ONLY).  [facility] ;.  

2015 LEFT REVISION TYMPANOPLASTY/ MASTOIDECTOMY [facility] [place] ;.  8/ 2012 HEART CATH - NO 

STENTS ;.  2011 HEART CATH WITH cardiac stents x 4 (PROMUS DRUG-ELUTING STENT 

OKAY IN NORMAL MODE ONLY,  GAUSS/CM @ 1.5T) [facility] [place] ;.  REVEAL LINQ EVENT 

MONITOR PLACED- 7/28/16- ED CAME OVER W/ [employer] MACHINE & DID A DOWNLOAD SO 

THAT NOTHING WOULD BE ERASED


Hx Anesthesia Reactions: Yes - SEIZURE LIKE ACTIVITY; REINTUBATION AFTER LEFT 

EAR 2013





- Immunization History


Date of Tetanus Vaccine: UTD


Date of Influenza Vaccine: UTD


Infectious Disease History: No


Infectious Disease History: 


   Denies: Hx Clostridium Difficile, Hx Hepatitis, Hx Human Immunodeficiency 

Virus (HIV), Hx of Known/Suspected MRSA, Hx Shingles, Hx Tuberculosis, Hx Known/

Suspected VRE, Hx Known/Suspected VRSA, History Other Infectious Disease, 

Traveled Outside the [place] in Last 30 Days





- Family History


Known Family History: Positive: Cardiac Disease, Hypertension, Diabetes





- Social History


Occupation: Unemployed


Lives: With Family


Alcohol Use: Occasionally


Hx Substance Use: No


Substance Use Type: Reports: None


Hx Tobacco Use: Yes


Smoking Status (MU): Former Smoker


Type: Cigarettes


Length of Time of Smoking/Using Tobacco: 10-12 YRS


Have You Smoked in the Last Year: No





Review of Systems





- ROS Summary


Review of Systems Summary: 





Constitutional: The patient denies fever, HA, sweats or chills.


HEENT:  Head:  The patient denies headaches or dizziness.  


Eyes:  The patient denies diplopia, blurry vision, eye pain, eye discharge, 

photophobia. 


Throat:  The patient denies sore throats or hoarseness.  


Cardiovascular:  The patient denies chest pain, palpitations, syncope, night 

cramps, or orthostasis.  


Respiratory:  The patient denies cough, sputum production, hemoptysis, or 

wheezing.  Endorses dyspnea at rest.


Gastrointestinal:   The patient denies odynophagia, dysphagia, hematemesis, 

melenemesis.  Denies abdominal pain, nausea or vomiting. Denies constipation or 

diarrhea. 


Genitourinary:   Patient denies dysuria, hematuria, or pyuria.  Patient denies 

back pain.  


Muscles:  The patient endorses diffuse myalgias.


Joints:  The patient denies arthralgia and/or arthritis.  


Neurologic:  The patient denies headache, loss of consciousness, or seizure. 








Constitutional: Negative


Negative: Fever, Chills, Fatigue


Eyes: Negative


Positive: Other - nasal congestion


Cardiovascular: Negative


Negative: Palpitations, Chest Pain


Positive: Shortness Of Breath, Cough


Negative: Abdominal Pain, Vomiting, Diarrhea, Nausea


Genitourinary: Negative


Positive: no symptoms reported, see HPI


Positive: Myalgia - diffuse


Negative: Rash, Bruising


Positive: Weakness


Psychological: Normal


All Other Systems Reviewed And Are Negative: Yes





Physical Exam





- Summary


Physical Exam Summary: 








Appearance: WDW, comfortable, Patient appears very fatigued.


Skin: Soft dry skin, no lesions. Nailbeds pink with no cyanosis or clubbing.  

No petechia noted.


Eyes:  Conjunctiva pink with no redness or exudates.  Myosis, nonreactive to 

light.


Mouth: Dentition without lesions.  Moist mucosa


Neck: Full range of motion. Palpable thyroid.  Trachea at midline. No 

lymphadenopathy.


Pulm: Chest symmetrical expansion. No deformities on posterior chest wall. 

Lungs clear to auscultation and percussion, without adventitious sounds.  

Breath sounds decreased bilaterally.  Likely due to body habitus.


CV: No JVD. No deformities on anterior chest wall. Heart sounds.  RRR. Normal 

S1 and single S2. No S3, S4, rubs, or murmurs.  Carotids 2+ bilaterally without 

bruits. .


 exam not performed


GI:  Bowel sounds WNL in all 4 quadrants.  No pain on deep palpation of all 4 

quadrants. 


Musculoskeletal:  Flexion and extension of neck without limitations.   ROM WNL 

in all extremities. No deformities noted. Pulses +2 bilaterally.   


Neuro:  Motor strength is 5/5 in upper and lower extremities bilaterally.  A&OX3


Psych: Logical, coherent





Triage Information Reviewed: Yes


Vital Signs On Initial Exam: 


 Initial Vitals











Temp Pulse Resp BP Pulse Ox


 


 97.1 F   68   20   158/92   97 


 


 02/08/18 11:26  02/08/18 11:26  02/08/18 11:26  02/08/18 11:26  02/08/18 11:26











Vital Signs Reviewed: Yes


Appearance: Positive: Well-Appearing, No Pain Distress, Well-Nourished


Skin: Positive: Warm, Skin Color Reflects Adequate Perfusion


Head/Face: Positive: Normal Head/Face Inspection


Eyes: Positive: Other: - Myosis, nonreactive to light


Neck: Positive: Supple, Nontender, No Lymphadenopathy


Respiratory/Lung Sounds: Positive: Clear to Auscultation, Decreased Breath 

Sounds


Cardiovascular: Positive: RRR, Pulses are Symmetrical in both Upper and Lower 

Extremities


Musculoskeletal: Positive: Normal, Strength/ROM Intact


Neurological: Positive: Speech Normal


Psychiatric: Positive: Normal, Affect/Mood Appropriate


AVPU Assessment: Alert





Diagnostics





- Vital Signs


 Vital Signs











  Temp Pulse Resp BP Pulse Ox


 


 02/08/18 11:26  97.1 F  68  20  158/92  97














- Laboratory


Result Diagrams: 


 02/08/18 12:20





 02/08/18 12:20


Lab Statement: Any lab studies that have been ordered have been reviewed, and 

results considered in the medical decision making process.





Course/Dx





- Course


Course Of Treatment: During the course of treatment the patient was evaluated 

for shortness of breath and possible upper respiratory infection due to sinus 

congestion and body aches.  Influenza swab obtained.  Chest x-ray obtained.  

Labs obtained including a BNP.  BNP 18.  Other labs unremarkable.  On physical 

exam, bilateral maxillary sinus tenderness on light palpation, frontal sinuses 

without pain.  Endorses nasal discharge, although nothing on physical exam.  He 

is prescribed doxycycline 100 mg twice a day 5 days for sinusitis.  No other 

findings on chest x-ray.  Influenza negative.  He is okay for discharge at this 

time.  I have encouraged the rescue inhaler as well as his nebulizer treatments 

at home for any shortness of breath and he will follow up with his PCP 

regarding any shortness of breath.  He will return to the ED for any worsening 

or changing symptoms and he voices no concerns at this time.





- Diagnoses


Differential Diagnosis/HQI/PQRI: Positive: Other - Upper respiratory infection, 

diffuse myalgias, viral illness


Provider Diagnoses: 


 Sinusitis








Discharge





- Discharge Plan


Condition: Stable


Disposition: HOME


Prescriptions: 


DOXYcycline CAP(*) [DOXYcycline 100MG CAP(*)] 100 mg PO BID #10 cap


Patient Education Materials:  Sinusitis (ED)


Referrals: 


Bridger Webb MD [Primary Care Provider] - 


Additional Instructions: 


Doxycycline twice daily for 5 days


Drink plenty of fluids


Humidifier in the home will help


Albuterol inhaler or nebulizer treatment for any shortness of breath


Please follow up with your PCP within 5 days


If you develop any worsening shortness of breath, or worsening symptoms 

including fever, sweats, chills, please return to the ED immediately

## 2018-02-14 ENCOUNTER — HOSPITAL ENCOUNTER (EMERGENCY)
Dept: HOSPITAL 25 - ED | Age: 53
Discharge: HOME | End: 2018-02-14
Payer: MEDICARE

## 2018-02-14 VITALS — DIASTOLIC BLOOD PRESSURE: 78 MMHG | SYSTOLIC BLOOD PRESSURE: 130 MMHG

## 2018-02-14 DIAGNOSIS — J06.9: ICD-10-CM

## 2018-02-14 DIAGNOSIS — Z88.0: ICD-10-CM

## 2018-02-14 DIAGNOSIS — R07.9: Primary | ICD-10-CM

## 2018-02-14 DIAGNOSIS — Z88.3: ICD-10-CM

## 2018-02-14 LAB
BASOPHILS # BLD AUTO: 0 10^3/UL (ref 0–0.2)
EOSINOPHIL # BLD AUTO: 0.2 10^3/UL (ref 0–0.6)
HCT VFR BLD AUTO: 45 % (ref 42–52)
HGB BLD-MCNC: 15 G/DL (ref 14–18)
INR PPP/BLD: 0.98 (ref 0.77–1.02)
LYMPHOCYTES # BLD AUTO: 1.7 10^3/UL (ref 1–4.8)
MCH RBC QN AUTO: 28 PG (ref 27–31)
MCHC RBC AUTO-ENTMCNC: 33 G/DL (ref 31–36)
MCV RBC AUTO: 85 FL (ref 80–94)
MONOCYTES # BLD AUTO: 0.7 10^3/UL (ref 0–0.8)
NEUTROPHILS # BLD AUTO: 3.7 10^3/UL (ref 1.5–7.7)
NRBC # BLD AUTO: 0 10^3/UL
NRBC BLD QL AUTO: 0.1
PLATELET # BLD AUTO: 208 10^3/UL (ref 150–450)
RBC # BLD AUTO: 5.3 10^6/UL (ref 4–5.4)
WBC # BLD AUTO: 6.3 10^3/UL (ref 3.5–10.8)

## 2018-02-14 PROCEDURE — 85025 COMPLETE CBC W/AUTO DIFF WBC: CPT

## 2018-02-14 PROCEDURE — 99283 EMERGENCY DEPT VISIT LOW MDM: CPT

## 2018-02-14 PROCEDURE — 71046 X-RAY EXAM CHEST 2 VIEWS: CPT

## 2018-02-14 PROCEDURE — 84484 ASSAY OF TROPONIN QUANT: CPT

## 2018-02-14 PROCEDURE — 93005 ELECTROCARDIOGRAM TRACING: CPT

## 2018-02-14 PROCEDURE — 85379 FIBRIN DEGRADATION QUANT: CPT

## 2018-02-14 PROCEDURE — 85610 PROTHROMBIN TIME: CPT

## 2018-02-14 PROCEDURE — 36415 COLL VENOUS BLD VENIPUNCTURE: CPT

## 2018-02-14 PROCEDURE — 83605 ASSAY OF LACTIC ACID: CPT

## 2018-02-14 PROCEDURE — 80053 COMPREHEN METABOLIC PANEL: CPT

## 2018-02-14 NOTE — RAD
INDICATION: Chest pain and shortness of breath



COMPARISON: Chest x-ray dated February 08, 2018



TECHNIQUE: PA and lateral views of the chest were obtained.



FINDINGS:



Again seen is an implantable cardiac monitor device.



The heart and mediastinum are normal in size and contour.



The lungs are grossly clear. There is no evidence of large pleural effusion.



Visualized bones are normal for the patient's age.



There is no radiographic evidence of free air beneath the diaphragm



IMPRESSION: 

No radiographic evidence of acute cardiopulmonary disease.

## 2018-02-14 NOTE — XMS REPORT
Delta Rodriguez

 Created on:2018



Patient:Delta Rodriguez

Sex:Male

:1965

External Reference #:[id number]





Demographics







 Address  [address]



   [place] [address]

 

 Home Phone  [phone]

 

 Mobile Phone  [phone]

 

 Preferred Language  English

 

 Marital Status  Declined to Specify/Unknown

 

 Worship Affiliation  Unknown

 

 Race  Unknown

 

 Ethnic Group  Declined to Specify/Unknown









Author







 Organization  [facility]

 

 Address  [address]



   [place] [address]

 

 Phone  [phone]









Support







 Name  Relationship  Address  Phone

 

 Unavailable  Sibling  Unavailable  [phone]









Care Team Providers







 Name  Role  Phone

 

 Bridger Webb MD  Care Team Information   Unavailable

 

 Bridger Webb MD  Primary Care Physician  Unavailable









Payers







 Type  Date  Identification Numbers  Payment Provider  Subscriber

 

 Medicare Primary  Effective:  Policy Number:  [place]  Delta Rodriguez



   1999  [id number]  [employer]  









 PayID: [id number]  [address]









 [place] [address]









 Medigap Part B    Policy Number: [id number]  Medicaid/C  Delta Rodriguez









 Group Number: [id number]  Medicare Primary

 

 Group Name: 21  [address]

 

 PayID: [id number]  [place] [address]







Problems







 Date  Description  Provider  Status

 

 Onset: 10/07/2011  Acute pharyngitis  Jonathan E. Cryer, MD  Active

 

 Onset: 10/07/2011  Perforation of tympanic membrane  Jonathan E. Cryer, MD  
Active

 

 Onset: 10/07/2011  Dysfunction of eustachian tube  Jonathan E. Cryer, MD  
Active

 

 Onset: 10/07/2011  Middle ear conductive hearing loss  Jonathan E. Cryer, MD  
Active

 

 Onset: 10/07/2011  Sensorineural hearing loss  Jonathan E. Cryer, MD  Active

 

 Onset: 10/07/2011  Disorder of nasal cavity  Yanelis Wooten NP  Active

 

 Onset: 10/07/2011  Gastroesophageal reflux disease  Yanelis Wooten NP  Active

 

 Onset: 10/07/2011  Extrinsic asthma without status  Yanelis Wooten NP  Active



   asthmaticus    

 

 Onset: 10/07/2011  Peptic reflux disease  Yanelis Wooten NP  Active

 

 Onset: 10/07/2011  Allergic rhinitis  Yanelis Wooten NP  Active







Family History







 Date  Family Member(s)  Problem(s)  Comments

 

   Mother  Non Insulin Dependent Diabetes  







Social History







 Type  Date  Description  Comments

 

 Occupation    tops  

 

 Cigarette Use    Former Cigarette Smoker Packs Daily 1  for 25 years

 

 Cigars    has never smoked cigars  

 

 Pipe    has never smoked a pipe  

 

 Smokeless Tobacco    has never used smokeless tobacco  

 

 ETOH Use    Current Alcohol Use Occasionally  

 

 Recreational Drug Use    denies drug use  

 

 Smoking    Patient is a former smoker  







Allergies, Adverse Reactions, Alerts







 Date  Description  Reaction  Status  Severity  Comments

 

 2008  Augmentin XR    active    

 

 2013  Penicillins  neck, tongue swells  active    

 

 2005  NKDA    inactive    







Medications







 Medication  Date  Status  Form  Strength  Qnty  SIG  Indications  Ordering



                 Provider

 

 Mupirocin    Active  Ointment  2%  22gm  apply to              both nostril    E. Cryer,



             twice a day    MD

 

 Triamcinolone    Active  Cream  0.1%  15gm  Apply to    Wilber



 Acetonide            left ear    E. Cryer,



             twice daily    MD



             for 1 week    

 

 Percocet    Active  Tablets  5-325mg  30tab  1-2 tabs by            s  mouth every    E. Cryer,



             4-6 hours as    MD



             needed for    



             pain    

 

 Levofloxacin    Active  Tablets  500mg  10tab  1 by mouth            s  every day    E. Cryer, MD

 

 Ciprodex    Active  Suspension  0.3-0.1%  1Bott  4 drops to            le  left ears    E. Cryer,



             twice a day    MD



             apply rebate    



             rx bin:    



             [id number]    



             rxpcn:    



             mamadou    



             rxgrp:[id number]    



             04 issuer:    



             [id number])    



             id#[id number]    

 

 Oxycodone/Aceta    Active  Tablets  5-325mg  30tab  1-2 tabs by    Saeid BOWLES



 minophen          s  mouth every    Strominge



             4-6 hours as    r, M.D.



             needed for    



             pain    

 

 Ipratropium    Active  Solution  0.06%  6unit  2 to 4  J31.0  Saeid BOWLES



 [place]          s  sprays in    Strominge



             each nostril    r, M.D.



             4 times a    



             day as    



             needed for    



             rhinitis    

 

 Omeprazole    Active  Capsules DR  40mg  60cap  40mg po bid  530.81  
Saeid VELARDE.



           s  for 1 month    Turner donaldson M.D.



             esophagitis    

 

 Fluticasone    Active    50mcg  1unit  2 sprays    Wilber



 Nasal Spray  /        s  each side    E. Cryer, bid MD

 

 Astepro Nasal    Active    0.15% 205  1unit  2 sprays    Saeid N.



 Spray  /      mcg  s  once a day    Turner donaldson M.D.

 

 Singulair    Active  Tabs  10mg  30tab  1qd - Take  477.0  Wilber



           s  One Tablet    E. Cryer,



             By Mouth    MD



             Every Day    

 

 Advair Hfa    Active  Aerosol  115/21  1unit  2 Puffs bid  478.19  Saeid 
NLottie



           s  With Spacer    Turner donaldson M.D.



                 



                 



                 



                Please    



             Provide    



             Spacer    

 

 Albuterol    Active  Aerosol  90mcg/Dos  2unit  2 Puffs Q4H    Wilber



 Inhalation  /      e  s  prn    E. Cryer, MD

 

 Clopidrogel    Active  Tablets  75mg        Unknown



                 

 

 Isosorbide    Active    60mg    1 po qd    Unknown



 Dinitrate               

 

 Bystolic    Active    5mg    qd    Unknown



   /0000              

 

 Simvastatin    Active    ?mg    oen po qd    Tracey,



                 Bridger CARDENAS

 

 Sertraline HCL    Active    ?mg    one po qd    Tracey,



                 Bridger CARDENAS

 

 Aspirin    Active  Tablets DR  325mg        Unknown



   /0000              

 

 Triamterene/Hyd    Active            Unknown



 rochlorothiazid                



 e                

 

 Nitroglycerin    Active    ?mg    prn Only    Tracey,



                 Bridger CARDENAS

 

 Ranexa    Active  Tablets ER  500mg    1 po bid    Unknown



       12HR          

 

 Atorvastatin    Active  Tablets  40mg    1 po qd    Unknown



 Calcium                

 

 Klor-Con M20    Active  Tablets ER  20Meq        Stefek,Pa



   /              ul M.D.

 

 Metoprolol    Active  Tablets ER  50mg        Unknown



 Succinate ER      24HR          

 

 Clopidogrel    Active  Tablets  75mg        Stefek,Pa



 Bisulfate                ul M.D.

 

 Triamterene/Hyd    Active  Capsules  37.5-25mg        dimitri Webblorothiazid                Bridger CARDENAS



 e                

 

 Advair Diskus    Active  Aerosol  250-50mcg        Tracey,



   /0000      /Dose        Bridger CARDENAS

 

 Proair HFA    Active  Aerosol  108(90Bas        Tracey,



   /0000      e)        Bridger CARDENAS



         mcg/Act        

 

 Fluticasone    Active  Suspension  50mcg/Act        Unknown



 Propionate                

 

 Atorvastatin    Active  Tablets  20mg        StefespPa



 Calcium  /              ul MLottieDLottie

 

 Famotidine    Active  Tablets  20mg        Unknown



                 

 

                 

 

 Ofloxacin    Hx  Solution  0.3%  1unit  4 drops to    Saeid N.



           s  affected ear    Strominge



   -          daily    BUBBA donaldson



   10/24              



   /2013              

 

 Ciprodex    Hx  Suspension  0.3-0.1%  2unit  4 drops    Saeid N.



           s  infected ear    Strominge



   -          bid apply    BUBBA donaldson



   10/24          rebate    



   /2013          rxbin:    



             [id number]    



             rxpcn:    



             loyalty    



             rxgrp:    



             [id number]    



             issuer:    



             ([id number]) id:    



             [id number]    

 

 Ciprodex    Hx  Suspension  0.3-0.1%  2unit  4 drops  385.32  Saeid N.



           s  infected ear    Strominge



   -          bid apply    BUBBA donaldson



   ate    



             rxbin:    



             [id number]    



             rxpcn:    



             loyalty    



             rxgrp:    



             [id number]    



             issuer:    



             ([id number]) id:    



             [id number]    

 

 Levofloxacin    Hx  Tablets  500mg  10tab  1 po qd            s      E. Cryer,



   -              MD



                 

 

 Oxycodone/Aceta    Hx  Tablets  5-325mg  30tab  1-2 tabs by    Wilber



         s  mouth every    E. Cryer,



   -          4-6 hours as    MD



             needed for    



             pain    

 

 Ciprodex  10/16  Hx  Suspension  0.3-0.1%  2unit  4 drops  381.3          s  infected ear    E. Cryer,



   -          bid x 14    MD



             days apply    



             rebate    



             rxbin:    



             [id number]    



             rxpcn:    



             loyalty    



             rxgrp:    



             [id number]    



             issuer:    



             ([id number]) id:    



             [id number]    

 

 Bacitracin    Hx      1Tube  apply to rim  472.0  Saeid NLottie



 Ointment            of nose both    Strominge



   -          sides in the    BUBBA donaldson



             am and the    



   /2013          pm.    

 

 Flovent HFA    Hx  Aerosol  110mcg/Ac  1unit  2 puff bid  493.01  Saeid BOWLES



         t  maciel donaldson M.D.



                 

 

 Omnaris    Hx  Suspension  50mcg/Act  90day  2 sprays    Saeid BOWLES



           s  each nostril    Turner



   -          kyler donaldson M.D.



                 

 

 Percocet    Hx  Tablets  5-325mg  30tab  1-2 tabs by    Wilber



           s  mouth every    E. Cryer,



   -          4-6 hours as    MD



             needed for    



   /2013          pain    

 

 Ciprofloxacin    Hx  Tablets  500mg  14tab  1 tablet    Wilber THOMPSON          s  twice a day    E. Cryer,



   -          for 7 days    MD



                 

 

 Aciphex    Hx  Tablets DR  20mg  30tab  1 po Daily    Saeid BOWLES



           s      Turner donaldson M.D.



                 

 

 Mucinex DM  10/22  Hx  Tablets ER    5Days  1 op qd  478.1-4  Wilber



 Maximum      12HR          E. Cryer,



 Strength  -              MD



                 

 

 Fexofenadine    Hx  Tabs  180mg  30tab  1qd - Take    Wilber THOMPSON          s  One Tablet    E. Cryer,



   -          By Mouth    MD



             Every Day    



   /2011              

 

 Medrol Dosepak    Hx  Tablets  4mg  1Pack  Take as  350.2            Directed    E. Cryer, - MD



                 

 

 Prednisone    Hx  Tablets  20mg  5Days  3 PO qd With    Saeid BOWLES



             Yaritza donaldson M.D.



                 

 

 Augmentin    Hx  Tablets  875mg  14Day  1 po bid  473.0  Saeid BOWLES



           s  with yaritza donaldson M.D.



                 

 

 Aciphex    Hx  Tablets DR  20mg  60tab  1 po bid  530.81  Wilber



           s      E. Cryer, - MD



                 

 

 Fexofenadine    Hx  Tablets  180mg  30tab  1 po qd  477.8  Saeid THOMPSON          s      Turner donaldson M.D.



                 

 

 Zyrtec    Hx  Tablets  10mg  30tab  1 PO qd    Saeid NLottie



   /        maciel donaldson M.D.



                 

 

 Fluticasone    Hx  Suspension  50mcg/Act  1mont  2 sprays  471.8  Wilber



 Propionate          h  each nostril    E. Cryer,



   -          daily    MD



                 

 

 Prednisone    Hx  Tablets  10mg  40tab  4 Tabs PO  471.8  Wilber



   /        s  Every Day X4    E. Cryer,



   -          D, Then 3    MD



   10/15          Tabs PO    



   /          Every Day X    



             4D, Then 2    



             Tab PO Daily    



             X4D, Then 1    



             Tab PO Daily    



             X4D    

 

 Nasacort Aq    Hx  Suspension  55mcg/Act  1unit  2 Sprays  471.8  Wilber



 Intranasal  /      uation  s  Each Nostril    E. Cryer, Spray  -          qd    MD



                 

 

 Levaquin    Hx  Tablets  500mg  10tab  1 Tablet By  471.8  Wilber



           s  Mouth Daily    E. Cryer,



   -          Until    MD



   10/15          Finished    



                 

 

 Nexium    Hx            Unknown



   /              



   -              



                 

 

 Singulair    Hx  Tablets  10mg  30tab  1 PO qd  471.8  Wilber



   /        s      E. Cryer, - MD



                 

 

 Nasacort Aq    Hx  Suspension  55McG/Act  1unit  2 sprays  473.2  Wilber



 Intranasal  /      uation  s  each nostril    E. Cryer, Spray  -          qd    MD



                 

 

 Lisinopril    Hx            Unknown



   /0000              



   -              



                 

 

 Pepcid ac Ez    Hx            Unknown



 Chews Maximum  /0000              



 Strength  -              



                 







Immunizations







 CPT Code  Status  Date  Vaccine  Lot #

 

 [id number]  Ordered  10/01/2015  Influenza Virus Vaccine, 3 Years Of Age And Above,
  



       Intramuscular  

 

 44907  Given  Unknown  Influenza Virus Vaccine, 3 Years Of Age And Above,  



       Intramuscular  







Vital Signs







 Date  Vital  Result  Comment

 

 2018  BP Systolic  159 mmHg  









 BP Diastolic  94 mmHg  

 

 Heart Rate  74 /min  

 

 Respiratory Rate  17 /min  

 

 Weight  335.00 lb  

 

 Weight in kg's  151.956  

 

 Height  68.50 inches  5'8.50"

 

 Height in cm's  174.0 cm  

 

 BMI (Body Mass Index)  50.2 kg/m2  









 2017  BP Systolic  155 mmHg  









 BP Diastolic  88 mmHg  

 

 Heart Rate  77 /min  

 

 Weight  335.00 lb  

 

 Weight in kg's  151.956  

 

 Height  68.50 inches  5'8.50"

 

 Height in cm's  174.0 cm  

 

 BMI (Body Mass Index)  50.2 kg/m2  









 2016  BP Systolic  165 mmHg  









 BP Diastolic  99 mmHg  

 

 Heart Rate  78 /min  

 

 Respiratory Rate  16 /min  

 

 Weight  335.00 lb  

 

 Weight in kg's  151.956  

 

 Height  68.50 inches  5'8.50"

 

 Height in cm's  174.0 cm  

 

 BMI (Body Mass Index)  50.2 kg/m2  









 2016  BP Systolic  114 mmHg  









 BP Diastolic  89 mmHg  

 

 Heart Rate  64 /min  

 

 Respiratory Rate  17 /min  

 

 Weight  335.00 lb  

 

 Weight in kg's  151.956  

 

 Height  68.50 inches  5'8.50"

 

 Height in cm's  174.0 cm  

 

 BMI (Body Mass Index)  50.2 kg/m2  









 2016  BP Systolic  107 mmHg  









 BP Diastolic  82 mmHg  

 

 Heart Rate  78 /min  

 

 Respiratory Rate  17 /min  

 

 Weight  335.00 lb  

 

 Weight in kg's  151.956  

 

 Height  68.50 inches  5'8.50"

 

 Height in cm's  174.0 cm  

 

 BMI (Body Mass Index)  50.2 kg/m2  









 2015  BP Systolic  142 mmHg  









 BP Diastolic  105 mmHg  

 

 Heart Rate  76 /min  

 

 Respiratory Rate  17 /min  

 

 Weight  335.00 lb  

 

 Weight in kg's  151.956  

 

 Height  68.50 inches  5'8.50"

 

 Height in cm's  174.0 cm  

 

 BMI (Body Mass Index)  50.2 kg/m2  









 2015  BP Systolic  134 mmHg  









 BP Diastolic  86 mmHg  

 

 Heart Rate  72 /min  

 

 Respiratory Rate  17 /min  

 

 Weight  335.00 lb  

 

 Weight in kg's  151.956  

 

 Height  68.50 inches  5'8.50"

 

 Height in cm's  174.0 cm  

 

 BMI (Body Mass Index)  50.2 kg/m2  









 10/29/2015  BP Systolic  132 mmHg  









 BP Diastolic  92 mmHg  

 

 Heart Rate  91 /min  

 

 Respiratory Rate  18 /min  

 

 Weight  335.00 lb  

 

 Weight in kg's  151.956  

 

 Height  68.50 inches  5'8.50"

 

 Height in cm's  174.0 cm  

 

 BMI (Body Mass Index)  50.2 kg/m2  









 2015  Respiratory Rate  17 /min  









 Weight  335.00 lb  

 

 Weight in kg's  151.956  

 

 Height  68.50 inches  5'8.50"

 

 Height in cm's  174.0 cm  

 

 BMI (Body Mass Index)  50.2 kg/m2  









 2015  BP Systolic  97 mmHg  









 BP Diastolic  61 mmHg  

 

 Heart Rate  78 /min  

 

 Respiratory Rate  16 /min  

 

 Weight  335.00 lb  

 

 Weight in kg's  151.956  

 

 Height  68.50 inches  5'8.50"

 

 Height in cm's  174.0 cm  

 

 BMI (Body Mass Index)  50.2 kg/m2  









 2014  BP Systolic  131 mmHg  









 BP Diastolic  84 mmHg  

 

 Heart Rate  67 /min  

 

 Respiratory Rate  18 /min  

 

 Weight  335.00 lb  

 

 Weight in kg's  151.956  

 

 Height  68.50 inches  5'8.50"

 

 Height in cm's  174.0 cm  

 

 BMI (Body Mass Index)  50.2 kg/m2  









 2014  BP Systolic  109 mmHg  









 BP Diastolic  68 mmHg  

 

 Heart Rate  68 /min  

 

 Respiratory Rate  17 /min  

 

 Weight  328.00 lb  

 

 Weight in kg's  148.781  

 

 Height  68.50 inches  5'8.50"

 

 Height in cm's  174.0 cm  

 

 BMI (Body Mass Index)  49.1 kg/m2  









 2014  BP Systolic  121 mmHg  









 BP Diastolic  79 mmHg  

 

 Heart Rate  77 /min  

 

 Respiratory Rate  16 /min  

 

 Weight  325.00 lb  

 

 Weight in kg's  147.420  

 

 Height  68.50 inches  5'8.50"

 

 Height in cm's  174.0 cm  

 

 BMI (Body Mass Index)  48.7 kg/m2  









 2014  BP Systolic  138 mmHg  









 BP Diastolic  76 mmHg  

 

 Heart Rate  73 /min  

 

 Respiratory Rate  16 /min  

 

 Weight  325.00 lb  

 

 Weight in kg's  147.420  

 

 Height  68.50 inches  5'8.50"

 

 Height in cm's  174.0 cm  

 

 BMI (Body Mass Index)  48.7 kg/m2  









 2014  BP Systolic  128 mmHg  









 BP Diastolic  80 mmHg  

 

 Heart Rate  65 /min  

 

 Respiratory Rate  16 /min  

 

 Weight  325.00 lb  

 

 Weight in kg's  147.420  

 

 Height  68.50 inches  5'8.50"

 

 Height in cm's  174.0 cm  

 

 BMI (Body Mass Index)  48.7 kg/m2  









 2014  Respiratory Rate  17 /min  









 Weight  325.00 lb  

 

 Weight in kg's  147.420  

 

 Height  68.50 inches  5'8.50"

 

 Height in cm's  174.0 cm  

 

 BMI (Body Mass Index)  48.7 kg/m2  









 2014  BP Systolic  129 mmHg  









 BP Diastolic  82 mmHg  

 

 Heart Rate  87 /min  

 

 Respiratory Rate  17 /min  

 

 Weight  325.00 lb  

 

 Weight in kg's  147.420  

 

 Height  68.50 inches  5'8.50"

 

 Height in cm's  174.0 cm  

 

 BMI (Body Mass Index)  48.7 kg/m2  









 2013  BP Systolic  131 mmHg  









 BP Diastolic  83 mmHg  

 

 Heart Rate  75 /min  

 

 Respiratory Rate  16 /min  

 

 Weight  325.00 lb  

 

 Weight in kg's  147.420  

 

 Height  68.50 inches  5'8.50"

 

 Height in cm's  174.0 cm  

 

 BMI (Body Mass Index)  48.7 kg/m2  









 10/24/2013  BP Systolic  132 mmHg  









 BP Diastolic  84 mmHg  

 

 Heart Rate  61 /min  

 

 Respiratory Rate  17 /min  

 

 Weight  325.00 lb  

 

 Weight in kg's  147.420  

 

 Height  68.50 inches  5'8.50"

 

 Height in cm's  174.0 cm  

 

 BMI (Body Mass Index)  48.7 kg/m2  









 2013  BP Systolic  126 mmHg  









 BP Diastolic  81 mmHg  

 

 Heart Rate  73 /min  

 

 Respiratory Rate  17 /min  

 

 Weight  325.00 lb  

 

 Weight in kg's  147.420  

 

 Height  68.50 inches  5'8.50"

 

 Height in cm's  174.0 cm  

 

 BMI (Body Mass Index)  48.7 kg/m2  









 2013  Weight  325.00 lb  









 Weight in kg's  147.420  

 

 Height  68.50 inches  5'8.50"

 

 Height in cm's  174.0 cm  

 

 BMI (Body Mass Index)  48.7 kg/m2  









 2013  Weight  325.00 lb  









 Weight in kg's  147.420  

 

 Height  68.50 inches  5'8.50"

 

 Height in cm's  174.0 cm  

 

 BMI (Body Mass Index)  48.7 kg/m2  









 2013  BP Systolic  108 mmHg  









 BP Diastolic  73 mmHg  

 

 Heart Rate  77 /min  

 

 Respiratory Rate  16 /min  

 

 Weight  325.00 lb  

 

 Weight in kg's  147.420  

 

 Height  68.50 inches  5'8.50"

 

 Height in cm's  174.0 cm  

 

 BMI (Body Mass Index)  48.7 kg/m2  









 2013  BP Systolic  99 mmHg  









 BP Diastolic  72 mmHg  

 

 Heart Rate  62 /min  

 

 Respiratory Rate  16 /min  

 

 Weight  325.00 lb  

 

 Weight in kg's  147.420  

 

 Height  68.50 inches  5'8.50"

 

 Height in cm's  174.0 cm  

 

 BMI (Body Mass Index)  48.7 kg/m2  









 2013  BP Systolic  123 mmHg  









 BP Diastolic  88 mmHg  

 

 Heart Rate  66 /min  

 

 Respiratory Rate  16 /min  

 

 Weight  325.94 lb  

 

 Weight in kg's  147.84  

 

 Height  68.50 inches  5'8.50"

 

 Height in cm's  174.0 cm  

 

 BMI (Body Mass Index)  48.8 kg/m2  









 2013  BP Systolic  122 mmHg  









 BP Diastolic  84 mmHg  

 

 Heart Rate  64 /min  

 

 Respiratory Rate  16 /min  

 

 Weight  305.00 lb  

 

 Weight in kg's  138.348  

 

 Height  69.02 inches  5'9.02"

 

 Height in cm's  175.3 cm  

 

 BMI (Body Mass Index)  45.0 kg/m2  









 10/25/2012  Weight  288.00 lb  









 Weight in kg's  130.637  

 

 Height  69.02 inches  5'9"

 

 Height in cm's  175.3 cm  

 

 BMI (Body Mass Index)  42.5 kg/m2  









 10/16/2012  BP Systolic  150 mmHg  









 BP Diastolic  90 mmHg  

 

 Heart Rate  66 /min  

 

 Respiratory Rate  20 /min  

 

 Weight  208.00 lb  

 

 Weight in kg's  94.349  

 

 Height  69 inches  5'9"

 

 Height in cm's  175.3 cm  

 

 BMI (Body Mass Index)  30.7 kg/m2  









 10/06/2011  BP Systolic  152 mmHg  









 BP Diastolic  100 mmHg  

 

 Heart Rate  62 /min  

 

 Respiratory Rate  16 /min  

 

 Weight  280.00 lb  

 

 Weight in kg's  127.008  

 

 Height  69 inches  5'9"

 

 Height in cm's  175.3 cm  

 

 BMI (Body Mass Index)  41.3 kg/m2  









 2011  BP Systolic  143 mmHg  









 BP Diastolic  95 mmHg  

 

 Heart Rate  75 /min  

 

 Respiratory Rate  17 /min  









 2009  Heart Rate  64 /min  









 Respiratory Rate  16 /min  

 

 Weight  321.00 lb  

 

 Weight in kg's  145.606  









 2008  BP Systolic  170 mmHg  Taken with LG BP cuff









 BP Diastolic  106 mmHg  Taken with LG BP cuff

 

 Heart Rate  79 /min  

 

 Respiratory Rate  18 /min  









 2008  BP Systolic  159 mmHg  









 BP Diastolic  114 mmHg  

 

 Heart Rate  79 /min  

 

 Respiratory Rate  16 /min  









 10/23/2008  BP Systolic  157 mmHg  









 BP Diastolic  107 mmHg  

 

 Heart Rate  77 /min  

 

 Respiratory Rate  16 /min  









 2008  BP Systolic  133 mmHg  









 BP Diastolic  100 mmHg  

 

 Heart Rate  75 /min  

 

 Respiratory Rate  16 /min  









 2008  BP Systolic  160 mmHg  









 BP Diastolic  109 mmHg  

 

 Heart Rate  74 /min  

 

 Respiratory Rate  16 /min  









 2008  BP Systolic  137 mmHg  









 BP Diastolic  85 mmHg  

 

 Heart Rate  75 /min  

 

 Respiratory Rate  16 /min  









 2008  BP Systolic  139 mmHg  









 BP Diastolic  88 mmHg  

 

 Heart Rate  85 /min  

 

 Respiratory Rate  12 /min  









 2008  BP Systolic  138 mmHg  









 BP Diastolic  87 mmHg  

 

 Heart Rate  82 /min  

 

 Respiratory Rate  16 /min  









 2008  BP Systolic  148 mmHg  









 BP Diastolic  86 mmHg  









 2008  BP Systolic  148 mmHg  









 BP Diastolic  92 mmHg  

 

 Heart Rate  80 /min  

 

 Respiratory Rate  14 /min  









 2007  BP Systolic  119 mmHg  









 BP Diastolic  76 mmHg  

 

 Heart Rate  75 /min  

 

 Respiratory Rate  16 /min  









 2006  BP Systolic  176 mmHg  









 BP Diastolic  73 mmHg  

 

 Heart Rate  80 /min  

 

 Respiratory Rate  16 /min  









 2005  BP Systolic  146 mmHg  









 BP Diastolic  82 mmHg  

 

 Heart Rate  80 /min  

 

 Respiratory Rate  16 /min  







Results







 Test  Date  Test  Result  H/L  Range  Note

 

 CBC No Diff  2015  White Blood Count  5.2 10^3/uL    3.5-10.8  1









 Red Blood Count  5.43 10^6/uL  High  4.0-5.4  1

 

 Hemoglobin  15.1 g/dL    14.0-18.0  1

 

 Hematocrit  47 %    42-52  1

 

 Mean Corpuscular Volume  87 fL    80-94  1

 

 Mean Corpuscular Hemoglobin  28 pg    27-31  1

 

 Mean Corpuscular HGB Conc  32 g/dL    31-36  1

 

 Red Cell Distribution Width  15 %    10.5-15  1

 

 Platelet Count  232 10^3/uL    150-450  1

 

 Mean Platelet Volume  8 um3    7.4-10.4  1









 Inr/Protime  2015  Inr  1.05    0.89-1.11  1

 

 Laboratory test finding  2015  Partial Thrombo  45.0 seconds  High  26.0-
36.3  1, 2



     Time PTT        

 

 Basic Metabolic Panel  2015  Sodium  141 mmol/L    133-145  1









 Potassium  3.8 mmol/L    3.5-5.0  1

 

 Chloride  106 mmol/L    101-111  1

 

 Co2 Carbon Dioxide  28 mmol/L    22-32  1

 

 Anion Gap  7 mmol/L    2-11  1

 

 Glucose  91 mg/dL      1

 

 Blood Urea Nitrogen  14 mg/dL    6-24  1

 

 Creatinine  1.15 mg/dL    0.67-1.17  1

 

 BUN/Creatinine Ratio  12.2    8-20  1

 

 Calcium  9.9 mg/dL    8.6-10.3  1

 

 Egfr Non-  67.0    &gt;60  1

 

 Egfr   86.2    &gt;60  1, 3









 Laboratory test finding  2013  Inr  0.96    0.87-0.97  4









 Activated Partial Thrombo Time  44.1 seconds  High  22.18-37.18  5









 Basic Metabolic Panel  2013  Sodium  140 mmol/L    133-145  









 Potassium  4.3 mmol/L    3.5-5.0  

 

 Chloride  104 mmol/L    101-111  

 

 Co2 Carbon Dioxide  30.0 mmol/L    22-32  

 

 Anion Gap  6.0 mmol/L    2-11  

 

 Glucose  111 mg/dL  High    

 

 Blood Urea Nitrogen  14 mg/dL    6-24  

 

 Creatinine  1.30 mg/dL    0.50-1.40  

 

 BUN/Creatinine Ratio  10.8    8-20  

 

 Calcium  9.7 mg/dL    8.1-9.9  

 

 Egfr Non-  58.7    &gt;60  

 

 Egfr   75.5    &gt;60  6









 CBC Auto Diff  2013  White Blood Count  4.0 10^3/uL  Low  4.8-10.8  









 Red Blood Count  4.93 10^6/uL    4.0-5.4  

 

 Hemoglobin  14.3 g/dL    14.0-18.0  

 

 Hematocrit  43 %    42-52  

 

 Mean Corpuscular Volume  87 fL    80-94  

 

 Mean Corpuscular Hemoglobin  29 pg    27-31  

 

 Mean Corpuscular HGB Conc  33 g/dL    31-36  

 

 Red Cell Distribution Width  14 %    10.5-15  

 

 Platelet Count  184 10^3/uL    150-450  

 

 Mean Platelet Volume  8 um3    7.4-10.4  

 

 Abs Neutrophils  2.2 10^3/uL    1.5-7.7  

 

 Abs Lymphocytes  1.3 10^3/uL    1.0-4.8  

 

 Abs Monocytes  0.4 10^3/uL    0-0.8  

 

 Abs Eosinophils  0.1 10^3/uL    0-0.6  

 

 Abs Basophils  0 10^3/uL    0-0.2  

 

 Abs Nucleated RBC  0.01 10^3/uL      

 

 Granulocyte %  54.0 %    38-83  

 

 Lymphocyte %  32.6 %    25-47  

 

 Monocyte %  9.7 %  High  1-9  

 

 Eosinophil %  3.1 %    0-6  

 

 Basophil %  0.6 %    0-2  

 

 Nucleated Red Blood Cells %  0.2      









 Basic Metabolic Panel  2013  Sodium  135 mmol/L    133-145  









 Potassium  3.6 mmol/L    3.5-5.0  

 

 Chloride  102 mmol/L    101-111  

 

 Co2 Carbon Dioxide  30.0 mmol/L    22-32  

 

 Anion Gap  3.0 mmol/L    2-11  

 

 Glucose  80 mg/dL      

 

 Blood Urea Nitrogen  11 mg/dL    6-24  

 

 Creatinine  1.10 mg/dL    0.50-1.40  

 

 BUN/Creatinine Ratio  10.0    8-20  

 

 Calcium  9.0 mg/dL    8.1-9.9  

 

 Egfr Non-  71.4    &gt;60  

 

 Egfr   91.9    &gt;60  7









 Laboratory test finding  12/10/2008  Troponin-I (TnI)  0.04 NG/ML    0-0.06  8

 

 Stat CMP  12/10/2008  Sodium  136 mmol/L    135-145  









 Potassium  3.9 mmol/L    3.5-5.0  

 

 Chloride  104 mmol/L    101-111  

 

 Co2 (Carbon Dioxide)  25.0 mmol/L    22-32  

 

 Anion Gap  7.0 mmol/L    2-11  9

 

 Glucose  98 mg/dL      10

 

 BUN  15 mg/dL    6-24  

 

 Creatinine  1.20 mg/dL    0.50-1.40  

 

 One Over Creatinine  0.80      

 

 BUN/Creatinine Ratio  12.5    8-20  

 

 Calcium  9.3 mg/dL    8.1-9.9  11

 

 Total Protein  6.9 GM/DL    6.2-8.1  

 

 Albumin  3.9 GM/DL    3.6-5.4  

 

 Globulin  3.0 GM/DL    2-4  

 

 Albumin/Globulin Ratio  1.3    1-3  

 

 Bilirubin Total  0.5 mg/dL    0.4-1.5  

 

 Alkaline Phosphatase  112 U/L      

 

 Alt (SGPT)  53 U/L    17-63  

 

 Ast (Sgot)  31 U/L    12-42  









 CBC With Electronic Diff Stat  12/10/2008  White Blood Count  6.7 CUMM    4.8-
10.8  









 Red Cell Count  5.28 CUMM    4.6-6.2  

 

 Hemoglobin  15.2 g/dL    14.0-18.0  

 

 Hematocrit  44 %    42-52  

 

 Mean Corpuscular Volume  83 um3    80-94  

 

 Mean Corpuscular Hemoglob  29 pg    27-31  

 

 Mean Corpuscular HGB Cone  35 g/dL    32-36  

 

 Redcell Distribution WDTH  14 %    10.5-15  

 

 Platelet Count  254 CUMM    150-450  

 

 Mean Platelet Volume  7.6 um3    7.4-10.4  

 

 Gran %  60.3 %    38-83  

 

 Lymph %  28.8 %    25-47  

 

 Mononuclear %  7.4 %    1-9  

 

 Eosinophil %  3.0 %    0-6  

 

 Basophil %  0.5 %    0-2  

 

 Abs Lymphs  1.9    1.0-4.8  

 

 Abs Mononuclear  0.5    0-0.8  

 

 Absolute Neutrophil Count  4.0    1.5-7.7  

 

 Abs Eosinophils  0.2    0-0.6  

 

 Abs Basophils  0    0-0.2  









 Laboratory test finding  2008  Culture   Sensitivity  FEW COLONIES OF   
   12



       &lt;SEE NOTE&gt;      









 1  SDS 

 

 2  SDS 

 

 3  *******Because ethnic data is not always readily available,



   this report includes an eGFR for both -Americans and



   non- Americans.****



   The National Kidney Disease Education Program (NKDEP) does



   not endorse the use of the MDRD equation for patients that



   are not between the ages of 18 and 70, are pregnant, have



   extremes of body size, muscle mass, or nutritional status,



   or are non- or non-.



   According to the National Kidney Foundation, irrespective of



   diagnosis, the stage of the disease is based on the level of



   kidney function:



   Stage Description                      GFR(mL/min/1.73 m(2))



   1     Kidney damage with normal or decreased GFR       90



   2     Kidney damage with mild decrease in GFR          60-89



   3     Moderate decrease in GFR                         30-59



   4     Severe decrease in GFR                           15-29



   5     Kidney failure                       &lt;15 (or dialysis)

 

 4  SDS 13

 

 5  SDS 13

 

 6  *******Because ethnic data is not always readily available,



   this report includes an eGFR for both -Americans and



   non- Americans.****



   The National Kidney Disease Education Program (NKDEP) does



   not endorse the use of the MDRD equation for patients that



   are not between the ages of 18 and 70, are pregnant, have



   extremes of body size, muscle mass, or nutritional status,



   or are non- or non-.



   According to the National Kidney Foundation, irrespective of



   diagnosis, the stage of the disease is based on the level of



   kidney function:



   Stage Description                      GFR(mL/min/1.73 m(2))



   1     Kidney damage with normal or decreased GFR       90



   2     Kidney damage with mild decrease in GFR          60-89



   3     Moderate decrease in GFR                         30-59



   4     Severe decrease in GFR                           15-29



   5     Kidney failure                       &lt;15 (or dialysis)

 

 7  *******Because ethnic data is not always readily available,



   this report includes an eGFR for both -Americans and



   non- Americans.****



   The National Kidney Disease Education Program (NKDEP) does



   not endorse the use of the MDRD equation for patients that



   are not between the ages of 18 and 70, are pregnant, have



   extremes of body size, muscle mass, or nutritional status,



   or are non- or non-.



   According to the National Kidney Foundation, irrespective of



   diagnosis, the stage of the disease is based on the level of



   kidney function:



   Stage Description                      GFR(mL/min/1.73 m(2))



   1     Kidney damage with normal or decreased GFR       90



   2     Kidney damage with mild decrease in GFR          60-89



   3     Moderate decrease in GFR                         30-59



   4     Severe decrease in GFR                           15-29



   5     Kidney failure                       &lt;15 (or dialysis)

 

 8  New Reference Range and Interpretation effective 10/4/02



   



   TnI (ng/ml)             INTERPRETATION



   



   &lt;0.06 ng/ml             NOT SUPPORTIVE OF DIAGNOSIS OF MI



   



   0.06 - 0.50 ng/ml       INDETERMINATE: SUGGEST SERIAL



   STUDIES IF CLINICALLY INDICATED.



   



   &gt; 0.5 ng/ml             CONSISTENT WITH DIAGNOSIS OF MI



   .

 

 9  Anion gap measurement may be of limited value in the



   presence of any alkalosis, especially in a combined acid



   base disorder.



   .

 

 10  ** Note change in reference range as of 08.  The



   change was based on recommendations from the American



   Diabetes Association.

 

 11  Please note change in reference range effective 08



   



   



   .

 

 12  FEW COLONIES OF NORMAL RESPIRATORY EDMUNDO







Procedures







 Date  CPT Code  Description  Status

 

 2016  30519  Nasal Endoscopy, Diagnostic  Completed

 

 2015  47972  Revision Mastoidectomy Resulting In Modified Radical  
Completed



     Mastoidctomy  

 

 2014  42184  Fiberoptic Laryngoscopy  Completed

 

 2014  94073  Tympanometry  Completed

 

 2014  09021  Comprehensive Audiogram  Completed

 

 10/24/2013  48560  Tympanometry  Completed

 

 10/24/2013  75695  Comprehensive Audiogram  Completed

 

 2013  76584  Binocular Microscopy  Completed

 

 2013  07846  Tympanomastoidectomy W/Ossicular Chain  Completed

 

 2013  09538  Medical Records  Completed

 

 2013  91973  Tympanometry  Completed

 

 2013  11899  Comprehensive Audiogram  Completed

 

 2013  05159  Tympanometry  Completed

 

 2013  83201  Comprehensive Audiogram  Completed

 

 10/16/2012  69220  Binocular Microscopy  Completed

 

 2012  63607  Nasal Endoscopy, Diagnostic  Completed

 

 2012  68367  Tympanometry  Completed

 

 2012  18197  Comprehensive Audiogram  Completed

 

 2012  85519  Tympanometry  Completed

 

 2012  30850  Binocular Microscopy  Completed

 

 2011  87966  Fiberoptic Laryngoscopy  Completed

 

 2011  40648  Tympanometry  Completed

 

 2011  45331  Tympanometry  Completed

 

 2011  31808  Comprehensive Audiogram  Completed

 

 2010  94020  Tympanometry  Completed

 

 2009  04951  Tympanometry  Completed

 

 2009  40812  Binocular Microscopy  Completed

 

 10/22/2009  31164  Tympanometry  Completed

 

 10/22/2009  60846  Comprehensive Audiogram  Completed

 

 2008  34905  Nasal Endoscopy, Diagnostic  Completed

 

 2008  70940  Fiberoptic Laryngoscopy  Completed

 

 2008  45160  No Show Fee  Completed

 

 2008  57041  Demonstration/Eval. Of Patient Utilization Of  Completed



     Spacer/Nebulizer  

 

 2008  02353  Respiratory Flow Volume Loop  Completed

 

 2008  62291  Bronchospasm Evaluation  Completed

 

 2008  40584  Prick Test  Completed

 

 2008  48966  Prick Test  Completed

 

 2008  52231  Nasal Endoscopy, Diagnostic  Completed

 

 2007  48620  Nasopharyngoscopy  Completed

 

 2007  02699  Tympanometry  Completed

 

 2007  83589  Tympanometry  Completed

 

 2007  41137  Comprehensive Audiogram  Completed

 

 2007  45713  Comprehensive Audiogram  Completed

 

 2007  83739  Nasal Endoscopy, Diagnostic  Completed

 

 2007  29789  Tympanostomy W/Tube Local Or Topical Anes.  Completed

 

 2007  26844  Tympanometry  Completed

 

 2007  63317  Tympanometry  Completed

 

 2007  62117  Comprehensive Audiogram  Completed

 

 2007  99303  Comprehensive Audiogram  Completed

 

 2006  23614  Nasal Endoscopy, Diagnostic  Completed

 

 2005  14039  Nasal Endoscopy, Diagnostic  Completed

 

 2005  25797  Nasal Endoscopy W/ Debridement  Completed

 

 2005  09242  Nasal Endoscopy W/ Debridement  Completed

 

 2004  66861  Nasal Endoscopy W/Maxllary Antrostomy W/Excision Of  
Completed



     Poylp  

 

 2004  59384  Nasal Endoscopy With Ethmoidectomy, Partial  Completed

 

 12/10/2004  82466  Nasal Endoscopy, Diagnostic  Completed

 

 2004  52935  Nasal Endoscopy, Diagnostic  Completed

 

 10/13/2004  22601  Nasal Endoscopy, Diagnostic  Completed

 

 2004  06654  No Show Fee  Completed







Encounters







 Type  Date  Location  Provider  CPT E/M  Dx

 

 Office Visit  2018 11:15a  [place],After 08  Jonathan E. Cryer,  
77038  H71.12



       MD    

 

 Office Visit  2017 11:00a  [place],After 08  Jonathan E. Cryer,  
67065  H71.12



       MD    









 H72.2x1









 Office Visit  2017 10:30a  [place]After 08  Jonathan E. Cryer, MD  
87043  H71.12

 

 Office Visit  2016  2:45p  [place]After 08  Jonathan E. Cryer, MD  
25750  J31.0









 H71.12









 Office Visit  2016 11:00a  [place]After 08  Jonathan E. Cryer, MD  
57028  H71.12









 H60.11









 Office Visit  2016 11:00a  [place]After 08  Jonathan E. Cryer, MD  
07886  H71.12

 

 Office Visit  2016  3:45p  [place]After 08  Jonathan E. Cryer, MD  
33710  L23.3

 

 Office Visit  2015 11:30a  [place]After 08  Jonathan E. Cryer, MD  
93990  H71.12

 

 Office Visit  10/29/2015 10:30a  [place]After 08  Jonathan E. Cryer, MD  
36409  H71.12

 

 Office Visit  10/15/2015 11:00a  [place]After 08  Jonathan E. Cryer, MD  
23219  H65.32









 H71.12









 Office Visit  2015 10:15a  [place]After 08  Jonathan E. Cryer, MD  
49017  381.29









 385.32









 Office Visit  2015  3:45p  [place],After 08  Jonathan E. Cryer, MD  
40426  381.29









 385.32









 Office Visit  2014  9:45a  [place],After 08  Jonathan E. Cryer, MD  
78348  381.29

 

 Office Visit  2014 10:45a  [place],After 08  Jonathan E. Cryer, MD  
08039  381.29









 787.20









 Office Visit  2014 10:15a  [place],After 08  Jonathan E. Cryer, MD  
40809  786.2









 787.20

 

 493.00









 Office Visit  2014 10:00a  [place],After 08  Jonathan E. Cryer, MD  
13274  389.03









 389.10

 

 462-2









 Office Visit  2014 10:30a  [place],After 08  Jonathan E. Cryer, MD  
55400  380.10









 389.03









 Office Visit  2014 10:30a  [place],After 08  Jonathan E. Cryer, MD  
99789  380.10









 381.29









 Office Visit  2014  9:45a  [place],After 08  Jonathan E. Cryer, MD  
77497  385.32

 

 Office Visit  2013 11:00a  [place],After 08  Jonathan E. Cryer, MD  
05046  472.0









 385.32









 Office Visit  10/24/2013 10:30a  [place],After 08  Jonathan E. Cryer, MD  
25112  385.32









 389.03

 

 389.10

 

 477.8









 Office Visit  2013  4:00p  [place],After 08  Yanelis Wooten NP  
56276  385.32









 381.29









 Office Visit  2013  1:45p  [place],After 08  Jonathan E. Cryer, MD  
41358  385.32









 381.29

 

 389.03









 Office Visit  2013  2:15p  [place],After 08  Yanelis Wooten NP  
09256  385.32









 381.29









 Office Visit  2013  1:30p  [place]After 08  Jonathan E. Cryer, MD  
69633  385.32









 381.29

 

 389.03

 

 389.10









 Office Visit  2013 11:30a  [place]After 08  Jonathan E. Cryer, MD  
99199  385.32

 

 Office Visit  2012 11:15a  [place]After 08  Jonathan E. Cryer, MD  
56619  381.29

 

 Office Visit  10/25/2012  3:15p  [place]After 08  Jonathan E. Cryer, MD  
72921  381.29

 

 Office Visit  10/16/2012  9:30a  [place],After 08  Yanelis Wooten NP  
42949  381.3

 

 Office Visit  2012  2:45p  [place],After 08  Yanelis Wooten NP  
93296  350.2









 472.0









 Office Visit  2012  3:30p  [place],After 08  Yanelis Wooten NP  
88352  477.8









 493.01









 Office Visit  2012 11:15a  [place],After 08  Jonathan E. Cryer, MD  
99453  389.03









 389.10

 

 384.82









 Office Visit  2012  4:00p  [place],After 08  Yanelis Wooten NP  
75843  384.82

 

 Office Visit  10/06/2011  1:45p  [place],After 08  Jonathan E. Cryer, MD  
29760  462









 384.20

 

 381.81

 

 389.03

 

 389.10









 Office Visit  2011  9:30a  [place],After 08  Yanelis Wooten NP  
82081  478.19









 530.81

 

 493.00

 

 530.11









 Office Visit  2011  2:00p  [place],After 08  Yanelis Wooten NP  
68841  384.20









 477.8

 

 493.00









 Office Visit  2011  1:30p  [place],After 08  Yanelis Wooten NP  
43810  384.20









 381.81









 Office Visit  2011  2:00p  [place],After 08  Jonathan E. Cryer, MD  
53657  389.03









 389.10

 

 381.81

 

 384.20









 Office Visit  2010 11:30a  [place],After 08  Yanelis Wooten NP  
50871  384.20









 381.81

 

 350.2









 Office Visit  2010  1:30p  [place],After 08  Jonathan E. Cryer, MD  
45026  477.8









 384.20

 

 381.81









 Office Visit  2010 10:00a  [place],After 08  [place] E[place]er[place]  
12140  477.8









 493.00

 

 384.20

 

 389.03









 Office Visit  2009  3:45p  [place],After 08  Jonathan E. Cryer, MD  
55357  384.20









 388.71

 

 381.81









 Office Visit  2009  2:30p  [place],After 08  Jonathan E. Cryer, MD  
03672  384.20









 388.71









 Office Visit  10/22/2009  1:45p  [place],After 08  Jonathan E. Cryer, MD  
25857  388.31









 478.1-4









 Office Visit  2009  4:00p  [place],After 08  Jonathan E. Cryer, MD  
89243  478.19









 478.1-4

 

 477.8

 

 493.00









 Office Visit  2009  3:15p  [place],After 08  Jonathan E. Cryer, MD  
95869  350.2









 473.0

 

 473.2

 

 471.8









 Office Visit  2008  2:45p  [place],After 08  Saeid Ann,  
95600  493.01



       M.D.    









 530.81

 

 780.57

 

 278.01

 

 429.3









 Office Visit  12/10/2008  1:48p  [place],After 08  Saeid Ann,  
83324  493.00



       M.D.    

 

 Office Visit  2008  9:30a  [place],After 08  Yanelis Wooten NP  
37113  473.0









 530.81









 Office Visit  10/23/2008  4:00p  [place],After 08  Jonathan E. Cryer, MD  
58248  780.57









 381.81

 

 389.03









 Office Visit  2008 10:15a  [place],After 08  Jonathan E. Cryer, MD  
66687  780.57









 530.11









 Office Visit  2008  3:30p  [place],After 08  Yanelis Wooten NP  
92893  530.81









 477.8

 

 493.90









 Office Visit  2008 10:15a  [place],After 08  Yanelis Wooten NP  
56757  478.19









 478.1-4

 

 477.8









 Office Visit  2008  3:00p  [place],After 08  Yanelis Wooten NP  
52200  478.1-4









 381.3

 

 493.00

 

 477.0

 

 477.8









 Office Visit  2008 10:00a  [place],After 08  Yanelis Wooten NP  
65533  477.8









 477.0









 Office Visit  2008  3:30p  [place],After 08  Jonathan E. Cryer, MD  
41093  477.8









 478.19

 

 478.1-4









 Office Visit  2007  9:00a  [place],After 08  [place]  
33310  477.8

 

 Office Visit  2007  2:15p  [place],After 08  Jonathan E. Cryer, MD  
69514  477.8









 471.8

 

 389.2

 

 381.81

 

 474.12









 Office Visit  10/15/2007  2:45p  [place],After 08  Jonathan E. Cryer, MD  
04550  473.0









 474.12

 

 381.81









 Office Visit  2007  3:15p  [place],After 08  Jonathan E. Cryer, MD  
14291  381.81









 389.2

 

 471.8

 

 474.12









 Office Visit  2007  2:30p  [place],After 08  [place]er[place]  
51126  381.81









 389.2









 Office Visit  2006  9:45a  [place],After 08  Jonathan E. Cryer, MD  
46941  471.8









 473.0

 

 461.0









 Office Visit  2006  1:30p  [place],After 08  [place]  
02322  471.8









 780.57

 

 493.0









 Office Visit  2005  1:30p  [place],After 08  Jonathan E. Cryer, MD  
75928  473.2









 471.8

 

 389.03









 Office Visit  2004  9:30a  [place],After 08  Saeid Ann,  
18762  473.2



       M.D.    









 471.8









 Office Visit  2004  9:45a  [place],After 08  Julio Desai,  
57942  780.57



       M.D.    

 

 Office Visit  2004 11:15a  [place],After 08  Julio Desai,  
54599  478.1



       M.D.    







Plan of Care

Future Appointment(s):2018 10:00 am - Jonathan E. Cryer, MD at [facility],
After  - Jonathan E. 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 
left ear

## 2018-02-14 NOTE — ED
Mat NATHAN Gabriel, scribed for Miguel Myles MD on 02/14/18 at 1420 .





Complex/Multi-Sys Presentation





- HPI Summary


HPI Summary: 





This patient is a 52 year old M presenting to [facility] with a chief complaint of a 

general illness that began 3 weeks ago. The patient rates the pain 10/10 in 

severity. Patient reports decreased appetite, asthma exacerbation, CP, upper 

extremity pain, and SOB. Symptoms aggravated by breathing. Pt has a sinus 

infection and he believes that this is causing the pain that radiates from his 

head to his waist. Pt just finished abx for sinus infection





- History Of Current Complaint


Chief Complaint: EDShortnessOfBreath


Time Seen by Provider: 02/14/18 13:46


Hx Obtained From: Patient


Onset/Duration: Lasting Weeks, Still Present


Timing: Constant


Severity Currently: Severe


Severity Initially: Severe


Associated Signs And Symptoms: Positive: Other - decreased appetite, asthma 

exacerbation, CP, upper extremity pain, and SOB





- Allergies/Home Medications


Allergies/Adverse Reactions: 


 Allergies











Allergy/AdvReac Type Severity Reaction Status Date / Time


 


MS Penicillins [Penicillins] Allergy Severe Anaphylatic Verified 02/14/18 13:39





   Shock  


 


MS Amoxicillin Allergy Unknown Swelling Verified 02/14/18 13:39





[From Augmentin XR]     


 


MS Clavulanic Acid Allergy Unknown Swelling Verified 02/14/18 13:39





[From Augmentin XR]     


 


VINEGAR Allergy Intermediate Hives Uncoded 02/14/18 13:39


 


MUSHROOMS Allergy Unknown Rash Uncoded 02/14/18 13:39














PMH/Surg Hx/FS Hx/Imm Hx


Endocrine/Hematology History: Reports: Hx Anticoagulant Therapy - plavix


   Denies: Hx Diabetes, Hx Sickle Cell Disease, Hx Thyroid Disease


Cardiovascular History: Reports: Hx Angina, Hx Cardiac Arrest, Hx Coronary 

Artery Disease, Hx Hypercholesterolemia, Hx Hypertension, Hx Myocardial 

Infarction, Other Cardiovascular Problems/Disorders - HEART DISEASE, CARDIAC 

EVENT MONITOR IMPLANTED


   Denies: Hx Congestive Heart Failure, Hx Pacemaker/ICD, Hx Valvular Heart 

Disease


Respiratory History: Reports: Hx Asthma, Hx Sleep Apnea


   Denies: Hx Chronic Obstructive Pulmonary Disease (COPD)


GI History: Reports: Hx Gastroesophageal Reflux Disease


   Denies: Hx Ulcer


 History: Reports: Hx Renal Disease - cysts, Other  Problems/Disorders - 

bilat cyst on kidneys


Musculoskeletal History: Reports: Hx Arthritis, Hx Back Problems, Other 

Musculoskeletal History - VIPUL


   Denies: Hx Scoliosis


Sensory History: Reports: Hx Cataracts - right eye, Hx Contacts or Glasses, Hx 

Hearing Aid


   Denies: Hx Hearing Problem


Opthamlomology History: Reports: Hx Cataracts - right eye, Hx Contacts or 

Glasses


Neurological History: Reports: Hx Seizures, Hx Transient Ischemic Attacks (TIA)


   Denies: Hx Dementia, Hx Headaches, Other Neuro Impairments/Disorders


Psychiatric History: Reports: Hx Depression


   Denies: Hx Panic Disorder





- Cancer History


Hx Chemotherapy: No





- Surgical History


Surgery Procedure, Year, and Place: DEC 2004 sinus [facility] [place] ;.  2008 left knee 

arthroscopy [facility] [place] ;.  2013 left tympanoplasty WITH STAPES IMPLANT ([employer] 

MALLEOUS HEAD SERIAL # [id number] - PER [employer] INFORMATION SENT TO MRI ON 6/ 13/2016; IMPLANT IS STAINLESS STEEL STATES SOME DEGREE OF MAGNETISM TESTED MRI 

SAFE AT 1.5T ONLY - DR VALDEZ APPROVED THIS INFORMATION FOR 1.5T ONLY).  [facility] ;.  

2015 LEFT [place]VISION TYMPANOPLASTY[place] [facility] [place].  8/ 2012 HEART CATH - NO 

STENTS ;.  2011 HEART CATH WITH cardiac stents x 4 (PROMUS DRUG-ELUTING STENT 

OKAY IN NORMAL MODE ONLY,  GAUSS/CM @ 1.5T[place] [facility] [place].  REVEAL LINQ EVENT 

MONITOR PLACED- 7/28/16- ED CAME OVER W/ [employer] MACHINE & DID A DOWNLOAD SO 

THAT NOTHING WOULD BE ERASED


Hx Anesthesia Reactions: Yes - SEIZURE LIKE ACTIVITY; REINTUBATION AFTER LEFT 

EAR 2013





- Immunization History


Date of Tetanus Vaccine: UTD


Date of Influenza Vaccine: 10/17


Infectious Disease History: No


Infectious Disease History: 


   Denies: Hx Clostridium Difficile, Hx Hepatitis, Hx Human Immunodeficiency 

Virus (HIV), Hx of Known/Suspected MRSA, Hx Shingles, Hx Tuberculosis, Hx Known/

Suspected VRE, Hx Known/Suspected VRSA, History Other Infectious Disease, 

Traveled Outside the [place] in Last 30 Days





- Family History


Known Family History: Positive: Cardiac Disease, Hypertension, Diabetes





- Social History


Alcohol Use: Occasionally


Hx Substance Use: No


Substance Use Type: Reports: None


Hx Tobacco Use: Yes


Smoking Status (MU): Former Smoker


Type: Cigarettes


Length of Time of Smoking/Using Tobacco: 10-12 YRS


Have You Smoked in the Last Year: No





Review of Systems


Positive: Other - decreased appetite 


Positive: Chest Pain


Positive: Shortness Of Breath, Other - asthma exacerbation 


Positive: Other - torso pain 


All Other Systems Reviewed And Are Negative: Yes





Physical Exam





- Summary


Physical Exam Summary: 








Appearance: The patient is an obese male in no acute distress and in no acute 

pain.


 


Skin: The skin is warm and dry and skin color reflects adequate perfusion.


 


HEENT:  The head is normocephalic and atraumatic. The pupils are equal and 

reactive. The conjunctivae are clear and without drainage.  Nares are patent 

and without drainage.  Mouth reveals moist mucous membranes and the throat is 

without erythema and exudate.  The external ears are intact. The ear canals are 

patent and without drainage. The tympanic membranes are intact.


 


Neck: the neck is supple with full range of motion and non-tender. There are no 

carotid bruits.  There is no neck vein distension.


 


Respiratory: Chest is non-tender.  Lungs are clear to auscultation and breath 

sounds are symmetrical and equal.


 


Cardiovascular: Heart is regular rate and rhythm.  There is no murmur or rub 

auscultated.   There is no peripheral edema and pulses are symmetrical and 

equal.


 


Abdomen: The abdomen is soft and non-tender.  There are normal bowel sounds 

heard in all four quadrants and there is no organomegaly palpated.


 


Musculoskeletal: There is no back tenderness noted.  Extremities are non-tender 

with full range of motion.  There is good capillary refill.  There is no 

peripheral edema or calf tenderness elicited.


 


Neurological: Patient is alert and oriented to person, place and time.  The 

patient has symmetrical motor strength in all four extremities.  Cranial nerves 

are grossly intact. Deep tendon reflexes are symmetrical and equal in all four 

extremities.


 


Psychiatric: The patient has an appropriate affect and does not exhibit any 

anxiety or depression.





Triage Information Reviewed: Yes


Vital Signs On Initial Exam: 


 Initial Vitals











Temp Pulse Resp BP Pulse Ox


 


 97.3 F   68   20   134/74   99 


 


 02/14/18 13:32  02/14/18 13:32  02/14/18 13:32  02/14/18 13:32  02/14/18 13:32











Vital Signs Reviewed: Yes





Diagnostics





- Vital Signs


 Vital Signs











  Temp Pulse Resp BP Pulse Ox


 


 02/14/18 14:00   66  14   98


 


 02/14/18 13:54   67  16   97


 


 02/14/18 13:51  97 F  66  15  133/77  97


 


 02/14/18 13:32  97.3 F  68  20  134/74  99














- Laboratory


Lab Results: 


 Lab Results











  02/14/18 02/14/18 02/14/18 Range/Units





  14:55 14:55 14:55 


 


WBC  6.3    (3.5-10.8)  10^3/ul


 


RBC  5.30    (4.0-5.4)  10^6/ul


 


Hgb  15.0    (14.0-18.0)  g/dl


 


Hct  45    (42-52)  %


 


MCV  85    (80-94)  fL


 


MCH  28    (27-31)  pg


 


MCHC  33    (31-36)  g/dl


 


RDW  15    (10.5-15)  %


 


Plt Count  208    (150-450)  10^3/ul


 


MPV  8    (7.4-10.4)  um3


 


Neut % (Auto)  57.8    (38-83)  %


 


Lymph % (Auto)  27.2    (25-47)  %


 


Mono % (Auto)  11.0 H    (1-9)  %


 


Eos % (Auto)  3.2    (0-6)  %


 


Baso % (Auto)  0.8    (0-2)  %


 


Absolute Neuts (auto)  3.7    (1.5-7.7)  10^3/ul


 


Absolute Lymphs (auto)  1.7    (1.0-4.8)  10^3/ul


 


Absolute Monos (auto)  0.7    (0-0.8)  10^3/ul


 


Absolute Eos (auto)  0.2    (0-0.6)  10^3/ul


 


Absolute Basos (auto)  0    (0-0.2)  10^3/ul


 


Absolute Nucleated RBC  0    10^3/ul


 


Nucleated RBC %  0.1    


 


INR (Anticoag Therapy)    0.98  (0.77-1.02)  


 


D-Dimer, Quantitative    < 200  (Less Than 230)  ng/mL


 


Sodium   136   (133-145)  mmol/L


 


Potassium   3.7   (3.5-5.0)  mmol/L


 


Chloride   101   (101-111)  mmol/L


 


Carbon Dioxide   29   (22-32)  mmol/L


 


Anion Gap   6   (2-11)  mmol/L


 


BUN   10   (6-24)  mg/dL


 


Creatinine   1.18 H   (0.67-1.17)  mg/dL


 


Est GFR ( Amer)   83.0   (>60)  


 


Est GFR (Non-Af Amer)   64.6   (>60)  


 


BUN/Creatinine Ratio   8.5   (8-20)  


 


Glucose   81   ()  mg/dL


 


Lactic Acid     (0.5-2.0)  mmol/L


 


Calcium   9.7   (8.6-10.3)  mg/dL


 


Total Bilirubin   0.60   (0.2-1.0)  mg/dL


 


AST   30   (13-39)  U/L


 


ALT   51   (7-52)  U/L


 


Alkaline Phosphatase   116 H   ()  U/L


 


Troponin I   0.00   (<0.04)  ng/mL


 


Total Protein   7.5   (6.4-8.9)  g/dL


 


Albumin   4.2   (3.2-5.2)  g/dL


 


Globulin   3.3   (2-4)  g/dL


 


Albumin/Globulin Ratio   1.3   (1-3)  














  02/14/18 02/14/18 Range/Units





  14:55 17:20 


 


WBC    (3.5-10.8)  10^3/ul


 


RBC    (4.0-5.4)  10^6/ul


 


Hgb    (14.0-18.0)  g/dl


 


Hct    (42-52)  %


 


MCV    (80-94)  fL


 


MCH    (27-31)  pg


 


MCHC    (31-36)  g/dl


 


RDW    (10.5-15)  %


 


Plt Count    (150-450)  10^3/ul


 


MPV    (7.4-10.4)  um3


 


Neut % (Auto)    (38-83)  %


 


Lymph % (Auto)    (25-47)  %


 


Mono % (Auto)    (1-9)  %


 


Eos % (Auto)    (0-6)  %


 


Baso % (Auto)    (0-2)  %


 


Absolute Neuts (auto)    (1.5-7.7)  10^3/ul


 


Absolute Lymphs (auto)    (1.0-4.8)  10^3/ul


 


Absolute Monos (auto)    (0-0.8)  10^3/ul


 


Absolute Eos (auto)    (0-0.6)  10^3/ul


 


Absolute Basos (auto)    (0-0.2)  10^3/ul


 


Absolute Nucleated RBC    10^3/ul


 


Nucleated RBC %    


 


INR (Anticoag Therapy)    (0.77-1.02)  


 


D-Dimer, Quantitative    (Less Than 230)  ng/mL


 


Sodium    (133-145)  mmol/L


 


Potassium    (3.5-5.0)  mmol/L


 


Chloride    (101-111)  mmol/L


 


Carbon Dioxide    (22-32)  mmol/L


 


Anion Gap    (2-11)  mmol/L


 


BUN    (6-24)  mg/dL


 


Creatinine    (0.67-1.17)  mg/dL


 


Est GFR ( Amer)    (>60)  


 


Est GFR (Non-Af Amer)    (>60)  


 


BUN/Creatinine Ratio    (8-20)  


 


Glucose    ()  mg/dL


 


Lactic Acid  1.7   (0.5-2.0)  mmol/L


 


Calcium    (8.6-10.3)  mg/dL


 


Total Bilirubin    (0.2-1.0)  mg/dL


 


AST    (13-39)  U/L


 


ALT    (7-52)  U/L


 


Alkaline Phosphatase    ()  U/L


 


Troponin I   0.00  (<0.04)  ng/mL


 


Total Protein    (6.4-8.9)  g/dL


 


Albumin    (3.2-5.2)  g/dL


 


Globulin    (2-4)  g/dL


 


Albumin/Globulin Ratio    (1-3)  











Result Diagrams: 


 02/14/18 14:55





 02/14/18 14:55


Lab Statement: Any lab studies that have been ordered have been reviewed, and 

results considered in the medical decision making process.





- Radiology


  ** CXR


Radiology Interpretation Completed By: Radiologist - No radiographic evidence 

of acute cardiopulmonary disease. ED physician has reviewed this radiology 

report.





- EKG


  ** 14:43


Cardiac Rate: NL


EKG Rhythm: Sinus Rhythm - at 65 BPM


EKG Comparison: No Significant Change - in comparison to EKG from 1/19/18





Complex Multi-Symp Course/Dx


Course Of Treatment: Mr. Rodriguez presented with a myriad of complaints which 

seemed to boil down to pleuritic CP that he has continued to have since being 

treated with an antibiotic for bronchitis about a week ago.   He wasn't visibly 

SOB and his W/U was normal.  I'm not sure what more to do for him except time.





- Diagnoses


Provider Diagnoses: 


 Chest pain, Viral URI








Discharge





- Discharge Plan


Condition: Stable


Disposition: HOME


Patient Education Materials:  Chest Pain (ED), Viral Syndrome (ED)


Referrals: 


Bridger Webb MD [Primary Care Provider] - 4 Days


Additional Instructions: 


RETURN TO EMERGENCY DEPARTMENT FOR ANY NEW OR WORSENING SYMPTOMS 





The documentation as recorded by the Mat song Gabriel accurately reflects 

the service I personally performed and the decisions made by me, Miguel Myles MD.

## 2018-03-05 ENCOUNTER — HOSPITAL ENCOUNTER (EMERGENCY)
Dept: HOSPITAL 25 - ED | Age: 53
Discharge: HOME | End: 2018-03-05
Payer: MEDICARE

## 2018-03-05 VITALS — DIASTOLIC BLOOD PRESSURE: 66 MMHG | SYSTOLIC BLOOD PRESSURE: 153 MMHG

## 2018-03-05 DIAGNOSIS — Z88.0: ICD-10-CM

## 2018-03-05 DIAGNOSIS — Z98.890: ICD-10-CM

## 2018-03-05 DIAGNOSIS — J32.2: Primary | ICD-10-CM

## 2018-03-05 DIAGNOSIS — Z88.1: ICD-10-CM

## 2018-03-05 DIAGNOSIS — Z87.891: ICD-10-CM

## 2018-03-05 PROCEDURE — 70486 CT MAXILLOFACIAL W/O DYE: CPT

## 2018-03-05 PROCEDURE — 99282 EMERGENCY DEPT VISIT SF MDM: CPT

## 2018-03-05 NOTE — ED
Throat Pain/Nasal Congestion





- HPI Summary


HPI Summary: 





Patient presents for the third time with a chief complaint of tenderness to the 

sinuses.  He states he has been on 2 rounds of antibiotics and continues to 

have a difficult time breathing.  He states the majority had this pain and what 

he considers occlusive symptoms is directly between the eyes and also affecting 

the bilateral maxillary sinuses.  History of sinus infections.  Recently placed 

on 2 courses of doxycycline.  He states this continues to become worse.  

Endorses postnasal drip and nasal drainage.  Endorses 8 out of 10 pain to the 

area between the eyes most notably over the ethmoid sinuses and a 3 out of 10 

pain over the maxillary sinuses.  Denies any fevers, sweats, chills.  Denies 

any other recent illness.  Denies flu or other sick contacts.





- History of Current Complaint


Chief Complaint: EDGeneral


Time Seen by Provider: 03/05/18 15:01


Hx Obtained From: Patient


Onset/Duration: Gradual Onset


Severity: Moderate


Associated Signs And Symptoms: Positive: Dysphagia





- Epiglottits Risk Factors


Epiglottis Risk Factors: Negative





- Allergies/Home Medications


Allergies/Adverse Reactions: 


 Allergies











Allergy/AdvReac Type Severity Reaction Status Date / Time


 


amoxicillin [From Augmentin] Allergy  Swelling Verified 03/05/18 14:56


 


clavulanic acid Allergy  Swelling Verified 03/05/18 14:56





[From Augmentin]     


 


Penicillins Allergy  Anaphylatic Verified 03/05/18 14:56





   Shock  


 


VINEGAR Allergy Intermediate Hives Uncoded 03/05/18 14:56


 


MUSHROOMS Allergy Unknown Rash Uncoded 03/05/18 14:56














PMH/Surg Hx/FS Hx/Imm Hx


Previously Healthy: Yes


Endocrine/Hematology History: Reports: Hx Anticoagulant Therapy - plavix


   Denies: Hx Diabetes, Hx Sickle Cell Disease, Hx Thyroid Disease


Cardiovascular History: Reports: Hx Angina, Hx Cardiac Arrest, Hx Coronary 

Artery Disease, Hx Hypercholesterolemia, Hx Hypertension, Hx Myocardial 

Infarction, Other Cardiovascular Problems/Disorders - HEART DISEASE, CARDIAC 

EVENT MONITOR IMPLANTED


   Denies: Hx Congestive Heart Failure, Hx Pacemaker/ICD, Hx Valvular Heart 

Disease


Respiratory History: Reports: Hx Asthma, Hx Sleep Apnea


   Denies: Hx Chronic Obstructive Pulmonary Disease (COPD)


GI History: Reports: Hx Gastroesophageal Reflux Disease


   Denies: Hx Ulcer


 History: Reports: Hx Renal Disease - cysts, Other  Problems/Disorders - 

bilat cyst on kidneys


Musculoskeletal History: Reports: Hx Arthritis, Hx Back Problems, Other 

Musculoskeletal History - VIPUL


   Denies: Hx Scoliosis


Sensory History: Reports: Hx Cataracts - right eye, Hx Contacts or Glasses, Hx 

Hearing Aid


   Denies: Hx Hearing Problem


Opthamlomology History: Reports: Hx Cataracts - right eye, Hx Contacts or 

Glasses


Neurological History: Reports: Hx Seizures, Hx Transient Ischemic Attacks (TIA)


   Denies: Hx Dementia, Hx Headaches, Other Neuro Impairments/Disorders


Psychiatric History: Reports: Hx Depression


   Denies: Hx Panic Disorder





- Cancer History


Hx Chemotherapy: No





- Surgical History


Surgery Procedure, Year, and Place: DEC 2004 sinus [facility] [place] ;.  2008 left knee 

arthroscopy [facility] [place] ;.  2013 left tympanoplasty WITH STAPES IMPLANT ([employer] 

MALLEOUS HEAD SERIAL # [id number] - PER [employer] INFORMATION SENT TO MRI ON 6/ 13/2016; IMPLANT IS STAINLESS STEEL STATES SOME DEGREE OF MAGNETISM TESTED MRI 

SAFE AT 1.5T ONLY - DR VALDEZ APPROVED THIS INFORMATION FOR 1.5T ONLY).  [facility] ;.  

2015 LEFT REVISION TYMPANOPLASTY/ MASTOIDECTOMY [facility] [place] ;.  8/ 2012 HEART CATH - NO 

STENTS ;.  2011 HEART CATH WITH cardiac stents x 4 (PROMUS DRUG-ELUTING STENT 

OKAY IN NORMAL MODE ONLY,  GAUSS/CM @ 1.5T) [facility] [place] ;.  REVEAL LINQ EVENT 

MONITOR PLACED- 7/28/16- ED CAME OVER [employer]/ [employer] MACHINE & DID A DOWNLOAD SO 

THAT NOTHING WOULD BE ERASED


Hx Anesthesia Reactions: Yes - SEIZURE LIKE ACTIVITY; REINTUBATION AFTER LEFT 

EAR 2013





- Immunization History


Date of Tetanus Vaccine: UTD


Date of Influenza Vaccine: 10/17


Infectious Disease History: No


Infectious Disease History: 


   Denies: Hx Clostridium Difficile, Hx Hepatitis, Hx Human Immunodeficiency 

Virus (HIV), Hx of Known/Suspected MRSA, Hx Shingles, Hx Tuberculosis, Hx Known/

Suspected VRE, Hx Known/Suspected VRSA, History Other Infectious Disease, 

Traveled Outside the [place] in Last 30 Days





- Family History


Known Family History: Positive: Cardiac Disease, Hypertension, Diabetes





- Social History


Occupation: Unemployed


Lives: With Family


Alcohol Use: Occasionally


Hx Substance Use: No


Substance Use Type: Reports: None


Hx Tobacco Use: Yes


Smoking Status (MU): Former Smoker


Type: Cigarettes


Length of Time of Smoking/Using Tobacco: 10-12 YRS


Have You Smoked in the Last Year: No





Review of Systems


Constitutional: Negative


Negative: Fever, Chills, Fatigue, Skin Diaphoresis


Eyes: Negative


Positive: Nasal Discharge, Other - frontal, ethmoid, maxillary sinus tenderness


Cardiovascular: Negative


Respiratory: Negative


Negative: Abdominal Pain, Vomiting, Diarrhea, Nausea


Genitourinary: Negative


Positive: no symptoms reported, see HPI


Skin: Negative


Neurological: Negative


Psychological: Normal


All Other Systems Reviewed And Are Negative: Yes





Physical Exam


Triage Information Reviewed: Yes


Vital Signs On Initial Exam: 


 Initial Vitals











Temp Pulse Resp BP Pulse Ox


 


 98.3 F   63   16   158/71   98 


 


 03/05/18 14:24  03/05/18 14:24  03/05/18 14:24  03/05/18 14:24  03/05/18 14:24











Vital Signs Reviewed: Yes


Appearance: Positive: Well-Appearing, Well-Nourished


Skin: Positive: Warm, Skin Color Reflects Adequate Perfusion


Head/Face: Positive: Normal Head/Face Inspection


Eyes: Positive: EOMI, LISA, Conjunctiva Clear


ENT: Positive: Nasal congestion, Nasal drainage, Sinus tenderness - Most 

notably over the ethmoid sinus cavities


Neck: Positive: Supple, No Lymphadenopathy


Respiratory/Lung Sounds: Positive: Clear to Auscultation, Breath Sounds Present


Cardiovascular: Positive: RRR, Pulses are Symmetrical in both Upper and Lower 

Extremities


Musculoskeletal: Positive: Normal, Strength/ROM Intact


Neurological: Positive: Speech Normal


Psychiatric: Positive: Normal, Affect/Mood Appropriate


AVPU Assessment: Alert





Diagnostics





- Vital Signs


 Vital Signs











  Temp Pulse Resp BP Pulse Ox


 


 03/05/18 17:50  98.3 F  61  15  153/66  98


 


 03/05/18 14:24  98.3 F  63  16  158/71  98














- Laboratory


Lab Statement: Any lab studies that have been ordered have been reviewed, and 

results considered in the medical decision making process.





EENT Course/Dx





- Course


Course Of Treatment: During the course treatment, the patient is evaluated for 

sinus pressure and possible infection.  He has been treated here for similar 

symptoms 2.  He's been treated with doxycycline without good effect.  CT 

maxillary sinus obtained which show mucosal thickening of the maxillary sinuses 

with air-fluid levels.  Ethmoid sinusitis is noted.  Patient is status post 

right medial antrectomy.  No evidence of nasal obstruction is identified.  I 

have given him Levaquin for antibiotic resistant bacterial sinusitis and 

referral to Dr. Cryer.  He states he is seen Dr. Cryer in the past.





- Diagnoses


Provider Diagnoses: 


 Sinusitis








Discharge





- Discharge Plan


Condition: Stable


Disposition: HOME


Prescriptions: 


Levofloxacin TAB* [Levaquin TAB*] 500 mg PO DAILY #7 tab


Patient Education Materials:  Sinusitis (ED)


Referrals: 


Cryer,Jonathan, MD [Medical Doctor] - 


Bridger Webb MD [Primary Care Provider] - 


Additional Instructions: 


Take 1 tab Levaquin daily 7 days


Please follow-up with Dr. Cryer regarding your chronic sinusitis

## 2018-03-05 NOTE — RAD
Indication: Nasal obstruction.



CT of the sinuses was obtained in the axial plane. Coronal and sagittal reconstructed

images were obtained.



There is mucosal thickening of the maxillary sinuses with air-fluid levels in the left

maxillary sinus. There appears to BE postoperative changes with antrectomy in the right

maxillary sinus. There is mucosal thickening of the ethmoid air cells. Mucus retention

cyst is noted in the sphenoid sinus. No evidence of nasal obstruction is noted. The

frontal sinuses are clear.



No evidence of paradoxical turbinates are noted.



IMPRESSION: Mucosal thickening of the maxillary sinuses with air-fluid level. Ethmoid

sinusitis is noted. Patient is status post right medial antrectomy. No evidence of nasal

obstruction is identified.

## 2018-06-03 ENCOUNTER — HOSPITAL ENCOUNTER (EMERGENCY)
Dept: HOSPITAL 25 - ED | Age: 53
Discharge: HOME | End: 2018-06-03
Payer: MEDICARE

## 2018-06-03 VITALS — SYSTOLIC BLOOD PRESSURE: 130 MMHG | DIASTOLIC BLOOD PRESSURE: 87 MMHG

## 2018-06-03 DIAGNOSIS — R53.1: Primary | ICD-10-CM

## 2018-06-03 DIAGNOSIS — H53.8: ICD-10-CM

## 2018-06-03 DIAGNOSIS — Z88.3: ICD-10-CM

## 2018-06-03 DIAGNOSIS — R42: ICD-10-CM

## 2018-06-03 DIAGNOSIS — Z87.891: ICD-10-CM

## 2018-06-03 DIAGNOSIS — J32.9: ICD-10-CM

## 2018-06-03 DIAGNOSIS — Z88.0: ICD-10-CM

## 2018-06-03 LAB
BASOPHILS # BLD AUTO: 0.1 10^3/UL (ref 0–0.2)
EOSINOPHIL # BLD AUTO: 0.1 10^3/UL (ref 0–0.6)
HCT VFR BLD AUTO: 42 % (ref 42–52)
HGB BLD-MCNC: 14.2 G/DL (ref 14–18)
INR PPP/BLD: 0.99 (ref 0.77–1.02)
LYMPHOCYTES # BLD AUTO: 1.3 10^3/UL (ref 1–4.8)
MCH RBC QN AUTO: 29 PG (ref 27–31)
MCHC RBC AUTO-ENTMCNC: 34 G/DL (ref 31–36)
MCV RBC AUTO: 85 FL (ref 80–94)
MONOCYTES # BLD AUTO: 0.5 10^3/UL (ref 0–0.8)
NEUTROPHILS # BLD AUTO: 2.5 10^3/UL (ref 1.5–7.7)
NRBC # BLD AUTO: 0 10^3/UL
NRBC BLD QL AUTO: 0.1
PLATELET # BLD AUTO: 195 10^3/UL (ref 150–450)
RBC # BLD AUTO: 4.95 10^6/UL (ref 4–5.4)
WBC # BLD AUTO: 4.4 10^3/UL (ref 3.5–10.8)

## 2018-06-03 PROCEDURE — 80053 COMPREHEN METABOLIC PANEL: CPT

## 2018-06-03 PROCEDURE — 85025 COMPLETE CBC W/AUTO DIFF WBC: CPT

## 2018-06-03 PROCEDURE — 85610 PROTHROMBIN TIME: CPT

## 2018-06-03 PROCEDURE — 83880 ASSAY OF NATRIURETIC PEPTIDE: CPT

## 2018-06-03 PROCEDURE — 36415 COLL VENOUS BLD VENIPUNCTURE: CPT

## 2018-06-03 PROCEDURE — 70450 CT HEAD/BRAIN W/O DYE: CPT

## 2018-06-03 PROCEDURE — 81003 URINALYSIS AUTO W/O SCOPE: CPT

## 2018-06-03 PROCEDURE — 84443 ASSAY THYROID STIM HORMONE: CPT

## 2018-06-03 PROCEDURE — 83735 ASSAY OF MAGNESIUM: CPT

## 2018-06-03 PROCEDURE — 93005 ELECTROCARDIOGRAM TRACING: CPT

## 2018-06-03 PROCEDURE — 83605 ASSAY OF LACTIC ACID: CPT

## 2018-06-03 PROCEDURE — 81015 MICROSCOPIC EXAM OF URINE: CPT

## 2018-06-03 PROCEDURE — 84484 ASSAY OF TROPONIN QUANT: CPT

## 2018-06-03 PROCEDURE — 86140 C-REACTIVE PROTEIN: CPT

## 2018-06-03 PROCEDURE — 99283 EMERGENCY DEPT VISIT LOW MDM: CPT

## 2018-06-03 NOTE — RAD
Indication: Dizziness.



Comparison: January 18, 2018 and March 05, 2018 CT exam.



Technique: Noncontrast CT vertex of skull through foramen magnum.



Report: The sulci, ventricles, and basal cisterns are normal for age. Grey matter white

matter differentiation is preserved without evidence for edema. No intra or extra axial

hemorrhage, mass, or fluid collection detected. Unremarkable visualized orbital contents.



Unremarkable calvarium and skull base. Unremarkable scalp.



Fluid level at the RIGHT maxillary sinus. Small volume of retained secretions/mucous

retention cyst or polyps at the partially visualized LEFT maxillary and LEFT ethmoid and

sphenoid sinuses. Clear hypoplastic mastoid air spaces.



IMPRESSION: 

1. No acute intracranial abnormality. Negative unenhanced CT of the brain.

2. Paranasal sinus disease. Fluid level at the partially visualized RIGHT maxillary sinus

which may reflect acute sinusitis. Interval resolution of previous fluid level at the LEFT

maxillary sinus.

## 2018-06-03 NOTE — XMS REPORT
Delta Rodriguez

 Created on:May 18, 2018



Patient:Delta Rodriguez

Sex:Male

:1965

External Reference #:[id number]





Demographics







 Address  [address]



   [place] [address]

 

 Home Phone  [phone]

 

 Mobile Phone  [phone]

 

 Preferred Language  English

 

 Marital Status  Declined to Specify/Unknown

 

 Mandaen Affiliation  Unknown

 

 Race  Unknown

 

 Ethnic Group  Declined to Specify/Unknown









Author







 Organization  [facility]

 

 Address  [address]



   [place] [address]

 

 Phone  [phone]









Support







 Name  Relationship  Address  Phone

 

 Unavailable  Sibling  Unavailable  [phone]









Care Team Providers







 Name  Role  Phone

 

 Bridger Webb MD  Care Team Information   Unavailable

 

 Bridger Webb MD  Primary Care Physician  Unavailable









Payers







 Type  Date  Identification Numbers  Payment Provider  Subscriber

 

 Medicare Primary  Effective:  Policy Number:  [place]  Delta Rodriguez



   1999  [id number]  [employer]  









 PayID: [id number]  [address]









 [place] [address]









 Medigap Part B    Policy Number: [id number]  Medicaid/C  Delta Rodriguez









 Group Number: [id number]  Medicare Primary

 

 Group Name: [id number]  [address]

 

 PayID: [id number]  [place] [address]







Problems







 Date  Description  Provider  Status

 

 Onset: 10/07/2011  Acute pharyngitis  Jonathan E. Cryer, MD  Active

 

 Onset: 10/07/2011  Perforation of tympanic membrane  Jonathan E. Cryer, MD  
Active

 

 Onset: 10/07/2011  Dysfunction of eustachian tube  Jonathan E. Cryer, MD  
Active

 

 Onset: 10/07/2011  Middle ear conductive hearing loss  Jonathan E. Cryer, MD  
Active

 

 Onset: 10/07/2011  Sensorineural hearing loss  Jonathan E. Cryer, MD  Active

 

 Onset: 10/07/2011  Disorder of nasal cavity  Yanelis Wooten NP  Active

 

 Onset: 10/07/2011  Gastroesophageal reflux disease  Yanelis Wooten NP  Active

 

 Onset: 10/07/2011  Extrinsic asthma without status  Yanelis Wooten NP  Active



   asthmaticus    

 

 Onset: 10/07/2011  Peptic reflux disease  Yanelis Wooten NP  Active

 

 Onset: 10/07/2011  Allergic rhinitis  Yanelis Wooten NP  Active







Family History







 Date  Family Member(s)  Problem(s)  Comments

 

   Mother  Non Insulin Dependent Diabetes  







Social History







 Type  Date  Description  Comments

 

 Occupation    tops  

 

 Cigarette Use    Former Cigarette Smoker Packs Daily 1  for 25 years

 

 Cigars    has never smoked cigars  

 

 Pipe    has never smoked a pipe  

 

 Smokeless Tobacco    has never used smokeless tobacco  

 

 ETOH Use    Current Alcohol Use Occasionally  

 

 Recreational Drug Use    denies drug use  

 

 Smoking    Patient is a former smoker  







Allergies, Adverse Reactions, Alerts







 Date  Description  Reaction  Status  Severity  Comments

 

 2008  Augmentin XR    active    

 

 2013  Penicillins  neck, tongue swells  active    

 

 2005  NKDA    inactive    







Medications







 Medication  Date  Status  Form  Strength  Qnty  SIG  Indications  Ordering



                 Provider

 

 Ciprodex    Active  Suspension  0.3-0.1%  7.500  4 drops            ml  left ear    E. Cryer,



             twice a    MD



             day for 10    



             days    

 

 Methylprednisolon    Active  TBPK  4mg  1pack  take as              directed    E. Cryer, MD

 

 Mupirocin    Active  Ointment  2%  22gm  apply to              both    E. Cryer,



             nostril    MD



             twice a    



             day    

 

 Triamcinolone    Active  Cream  0.1%  15gm  Apply to    Wilber



 Acetonide  /2016          left ear    E. Cryer,



             twice    MD



             daily for    



             1 week    

 

 Percocet    Active  Tablets  5-325mg  30tab  1-2 tabs            s  by mouth    E. Cryer,



             every 4-6    MD



             hours as    



             needed for    



             pain    

 

 Levofloxacin    Active  Tablets  500mg  10tab  1 by mouth            s  every day    E. Cryer, MD

 

 Ciprodex    Active  Suspension  0.3-0.1%  1Bott  4 drops to            le  left ears    E. Cryer,



             twice a    MD



             day apply    



             rebate rx    



             bin:    



             [id number]    



             rxpcn:    



             loyalty    



             rxgrp:[id number]    



             [id number]    



             issuer:    



             ([id number])    



             id#[id number]    



             25    

 

 Oxycodone/Acetami    Active  Tablets  5-325mg  30tab  1-2 tabs    Saeid randhawa          s  by mouth    Strominge



             every 4-6    r, M.D.



             hours as    



             needed for    



             pain    

 

 Ipratropium    Active  Solution  0.06%  6unit  2 to 4  J31.0  Saeid N.



 [place]          s  sprays in    [facility]



             each    BUBBA donaldson



             nostril 4    



             times a    



             day as    



             needed for    



             rhinitis    

 

 Omeprazole    Active  Capsules DR  40mg  60cap  40mg po  530.81  Saeid N.



   /        s  [facility]



             [facility] for    BUBBA donaldson



             esophagiti    



             s    

 

 Fluticasone Nasal    Active    50mcg  1unit  2 sprays    Wilber



 Spray  /        s  each side    E. Cryer,



             bid    MD

 

 Astepro Nasal    Active    0.15% 205  1unit  2 sprays    Saeid N.



 Spray  /      mcg  s  once a day    [facility]



                 BUBBA donaldson

 

 Singulair    Active  Tabs  10mg  30tab  1qd - Take  477.0  Wilber



           s  One Tablet    E. Cryer,



             By Mouth    MD



             Every Day    

 

 Advair Hfa    Active  Aerosol  115/21  1unit  2 Puffs  478.19  Saeid NLottie



           s  bid With    [facility]



             Spacer    BUBBA donaldson



                 



                 



                 



                 



                 



             Please    



             Provide    



             Spacer    

 

 Albuterol    Active  Aerosol  90mcg/Dos  2unit  2 Puffs    Wilber



 Inhalation        e  s  Q4H prn    E. Cryer, MD

 

 Clopidrogel    Active  Tablets  75mg        Unknown



   /0000              

 

 Isosorbide    Active    60mg    1 po qd    Unknown



 Dinitrate               

 

 Bystolic    Active    5mg    qd    Unknown



   /0000              

 

 Simvastatin    Active    ?mg    oen po qd    Tracey              Bridger CARDENAS

 

 Sertraline HCL    Active    ?mg    one po qd    Tracey,



                 Bridger CARDENAS

 

 Aspirin    Active  Tablets DR  325mg        Unknown



   /0000              

 

 Triamterene/Hydro    Active            Unknown



 chlorothiazide                

 

 Nitroglycerin    Active    ?mg    prn Only    Tracey              Bridger CARDENAS

 

 Ranexa    Active  Tablets ER  500mg    1 po bid    Unknown



   /0000    12HR          

 

 Atorvastatin    Active  Tablets  40mg    1 po qd    Unknown



 Calcium                

 

 Klor-Con M20    Active  Tablets ER  20Meq        Stefek,Pa



                 rosario MAC

 

 Metoprolol    Active  Tablets ER  50mg        Unknown



 Succinate     24HR          

 

 Clopidogrel    Active  Tablets  75mg        Stefek,Pa



 Bisulfate  /0000              ul M.DLottie

 

 Triamterene/Hydro    Active  Capsules  37.5-25mg        Tracey,



 chlorothiazide  /0000              Bridger CARDENAS

 

 Advair Diskus    Active  Aerosol  250-50mcg        Tracey,



   /0000      /Dose        Bridger CARDENAS

 

 Proair HFA    Active  Aerosol  108(90Bas        Tracey,



   /0000      e)        Bridger CARDENAS



         mcg/Act        

 

 Fluticasone    Active  Suspension  50mcg/Act        Unknown



 Propionate                

 

 Atorvastatin    Active  Tablets  20mg        StefekPa



 Calcium  /0000              ul M.DLottie

 

 Famotidine    Active  Tablets  20mg        Unknown



   /0000              

 

                 

 

 Ofloxacin    Hx  Solution  0.3%  1unit  4 drops to    Saeid BOWLES



           s  affected    Strominge



   -          ear daily    BUBBA donaldson



   10/24              



   /2013              

 

 Ciprodex    Hx  Suspension  0.3-0.1%  2unit  4 drops    Saeid BOWLES



           s  infected    Strominge



   -          ear bid    BUBBA donaldson



   10/24          apply    



   /2013          rebate    



             rxbin:    



             [id number]    



             rxpcn:    



             loyalty    



             rxgrp:    



             [id number]    



             issuer:    



             ([id number])    



             id:    



             [id number]    

 

 Ciprodex    Hx  Suspension  0.3-0.1%  2unit  4 drops  385.32  Saeid NLottie



           s  infected    Strominge



   -          ear bid    BUBBA donaldson



             rebate    



             rxbin:    



             [id number]    



             rxpcn:    



             loyalty    



             rxgrp:    



             [id number]    



             issuer:    



             ([id number])    



             id:    



             [id number]    

 

 Levofloxacin    Hx  Tablets  500mg  10tab  1 po qd            s      E. Cryer,



   -              MD



                 

 

 Oxycodone/Acetami    Hx  Tablets  5-325mg  30tab  1-2 tabs    Wilber



         s  by mouth    E. Cryer,



   -          every 4-6    MD



             hours as    



   /2013          needed for    



             pain    

 

 Ciprodex  10/16  Hx  Suspension  0.3-0.1%  2unit  4 drops  381.3          s  infected    E. Cryer,



   -          ear bid x    MD



             14 days    



   /          apply    



             rebate    



             rxbin:    



             [id number]    



             rxpcn:    



             loyalty    



             rxgrp:    



             [id number]    



             issuer:    



             [id number])    



             id:    



             [id number]    

 

 Bacitracin    Hx      1Tube  apply to  472.0  Saeid BOWLES



 Oint          rim of    Turner



   -          nose both    BUBBA donaldson



             sides in    



             the am and    



             the pm.    

 

 Flovent HFA    Hx  Aerosol  110mcg/Ac  1unit  2 puff bid  493.01  Saeid BOWLES



         t  s      Turner donaldson M.D.



                 

 

 Omnaris    Hx  Suspension  50mcg/Act  90day  2 sprays    Saeid BOWLES



           s  each    Turner



   -          nostril    BUBBA donaldson



   05/01          daily    



   /2012              

 

 Percocet    Hx  Tablets  5-325mg  30tab  1-2 tabs            s  by mouth    E. Cryer, -          every 4-6    MD



             hours as    



   /2013          needed for    



             pain    

 

 Ciprofloxacin HCL    Hx  Tablets  500mg  14tab  1 tablet            s  twice a    E. Cryer, -          day for 7    MD



   05/24          days    



   /2011              

 

 Aciphex    Hx  Tablets DR  20mg  30tab  1 po Daily    Saeid WILBUR



           s      Turner donaldson M.D.



                 

 

 Mucinex DM  10/22  Hx  Tablets ER    5Days  1 op qd  478.1-[facility]  [facility]  /2009    12HR          E. Cryer, - MD



                 

 

 Fexofenadine HCL    Hx  Tabs  180mg  30tab  1qd - Take            s  One Tablet    E. Cryer, -          By Mouth    MD



             Every Day    



   /2011              

 

 Medrol Dosepak    Hx  Tablets  4mg  1Pack  Take as  350.2            Directed    E. Cryer, - MD



                 

 

 Prednisone    Hx  Tablets  20mg  5Days  3 PO qd    Saeid BOWLES



             With Food    Turner donaldson M.D.



                 

 

 Augmentin    Hx  Tablets  875mg  14Day  1 po bid  473.0  Saeid BOWLES



           s  with food    Turner donaldson M.D.



                 

 

 Aciphex    Hx  Tablets DR  20mg  60tab  1 po bid  530.81          s      E. Cryer,



   -              MD



                 

 

 Fexofenadine HCL    Hx  Tablets  180mg  30tab  1 po qd  477.8  Saeid N.



           maciel donaldson M.D.



                 

 

 Zyrtec    Hx  Tablets  10mg  30tab  1 PO qd    Saeid N.



           maciel donaldson M.D.



                 

 

 Fluticasone    Hx  Suspension  50mcg/Act  1mont  2 sprays  471.8  Wilber



 Propionate  /2007        h  each    E. Cryer,



   -          nostril    MD



             daily    



   /              

 

 Prednisone    Hx  Tablets  10mg  40tab  4 Tabs PO  471.8          s  Every Day    E. Cryer,



   -          X4 D, Then    MD



   10/15          3 Tabs PO    



   /          Every Day    



             X 4D, Then    



             2 Tab PO    



             Daily X4D,    



             Then 1 Tab    



             PO Daily    



             X4D    

 

 Nasacort Aq    Hx  Suspension  55mcg/Act  1unit  2 Sprays  471.8  Wilber



 Intranasal Spray  /      uation  s  Each    E. Cryer,



   -          Nostril qd    MD



                 

 

 Levaquin    Hx  Tablets  500mg  10tab  1 Tablet  471.8  Wilber



           s  By Mouth    E. Cryer,



   -          Daily    MD



   10/15          Until    



             Finished    

 

 Nexium    Hx            Unknown



   /              



   -              



                 

 

 Singulair    Hx  Tablets  10mg  30tab  1 PO qd  471.8  Wilber



           s      E. Cryer, - MD



                 

 

 Nasacort Aq    Hx  Suspension  55McG/Act  1unit  2 sprays  473.2  Wilber



 Intranasal [place]  /      uation  s  each    E. Cryer,



   -          nostril qd    MD



                 

 

 Lisinopril  00  Hx            Unknown



   /0000              



   -              



                 

 

 Pepcid ac Ez    Hx            Unknown



 Chews Maximum  /0000              



 Strength  -              



                 







Immunizations







 CPT Code  Status  Date  Vaccine  Lot #

 

 [id number]  Ordered  10/01/2015  Influenza Virus Vaccine, 3 Years Of Age And Above,
  



       Intramuscular  

 

 26180  Given  Unknown  Influenza Virus Vaccine, 3 Years Of Age And Above,  



       Intramuscular  







Vital Signs







 Date  Vital  Result  Comment

 

 2018  Weight  335.00 lb  









 Weight in kg's  151.956  

 

 Height  68.50 inches  5'8.50"

 

 Height in cm's  174.0 cm  

 

 BMI (Body Mass Index)  50.2 kg/m2  









 2018  BP Systolic  159 mmHg  









 BP Diastolic  94 mmHg  

 

 Heart Rate  74 /min  

 

 Respiratory Rate  17 /min  

 

 Weight  335.00 lb  

 

 Weight in kg's  151.956  

 

 Height  68.50 inches  5'8.50"

 

 Height in cm's  174.0 cm  

 

 BMI (Body Mass Index)  50.2 kg/m2  









 2017  BP Systolic  155 mmHg  









 BP Diastolic  88 mmHg  

 

 Heart Rate  77 /min  

 

 Weight  335.00 lb  

 

 Weight in kg's  151.956  

 

 Height  68.50 inches  5'8.50"

 

 Height in cm's  174.0 cm  

 

 BMI (Body Mass Index)  50.2 kg/m2  









 2016  BP Systolic  165 mmHg  









 BP Diastolic  99 mmHg  

 

 Heart Rate  78 /min  

 

 Respiratory Rate  16 /min  

 

 Weight  335.00 lb  

 

 Weight in kg's  151.956  

 

 Height  68.50 inches  5'8.50"

 

 Height in cm's  174.0 cm  

 

 BMI (Body Mass Index)  50.2 kg/m2  









 2016  BP Systolic  114 mmHg  









 BP Diastolic  89 mmHg  

 

 Heart Rate  64 /min  

 

 Respiratory Rate  17 /min  

 

 Weight  335.00 lb  

 

 Weight in kg's  151.956  

 

 Height  68.50 inches  5'8.50"

 

 Height in cm's  174.0 cm  

 

 BMI (Body Mass Index)  50.2 kg/m2  









 2016  BP Systolic  107 mmHg  









 BP Diastolic  82 mmHg  

 

 Heart Rate  78 /min  

 

 Respiratory Rate  17 /min  

 

 Weight  335.00 lb  

 

 Weight in kg's  151.956  

 

 Height  68.50 inches  5'8.50"

 

 Height in cm's  174.0 cm  

 

 BMI (Body Mass Index)  50.2 kg/m2  









 2015  BP Systolic  142 mmHg  









 BP Diastolic  105 mmHg  

 

 Heart Rate  76 /min  

 

 Respiratory Rate  17 /min  

 

 Weight  335.00 lb  

 

 Weight in kg's  151.956  

 

 Height  68.50 inches  5'8.50"

 

 Height in cm's  174.0 cm  

 

 BMI (Body Mass Index)  50.2 kg/m2  









 2015  BP Systolic  134 mmHg  









 BP Diastolic  86 mmHg  

 

 Heart Rate  72 /min  

 

 Respiratory Rate  17 /min  

 

 Weight  335.00 lb  

 

 Weight in kg's  151.956  

 

 Height  68.50 inches  5'8.50"

 

 Height in cm's  174.0 cm  

 

 BMI (Body Mass Index)  50.2 kg/m2  









 10/29/2015  BP Systolic  132 mmHg  









 BP Diastolic  92 mmHg  

 

 Heart Rate  91 /min  

 

 Respiratory Rate  18 /min  

 

 Weight  335.00 lb  

 

 Weight in kg's  151.956  

 

 Height  68.50 inches  5'8.50"

 

 Height in cm's  174.0 cm  

 

 BMI (Body Mass Index)  50.2 kg/m2  









 2015  Respiratory Rate  17 /min  









 Weight  335.00 lb  

 

 Weight in kg's  151.956  

 

 Height  68.50 inches  5'8.50"

 

 Height in cm's  174.0 cm  

 

 BMI (Body Mass Index)  50.2 kg/m2  









 2015  BP Systolic  97 mmHg  









 BP Diastolic  61 mmHg  

 

 Heart Rate  78 /min  

 

 Respiratory Rate  16 /min  

 

 Weight  335.00 lb  

 

 Weight in kg's  151.956  

 

 Height  68.50 inches  5'8.50"

 

 Height in cm's  174.0 cm  

 

 BMI (Body Mass Index)  50.2 kg/m2  









 2014  BP Systolic  131 mmHg  









 BP Diastolic  84 mmHg  

 

 Heart Rate  67 /min  

 

 Respiratory Rate  18 /min  

 

 Weight  335.00 lb  

 

 Weight in kg's  151.956  

 

 Height  68.50 inches  5'8.50"

 

 Height in cm's  174.0 cm  

 

 BMI (Body Mass Index)  50.2 kg/m2  









 2014  BP Systolic  109 mmHg  









 BP Diastolic  68 mmHg  

 

 Heart Rate  68 /min  

 

 Respiratory Rate  17 /min  

 

 Weight  328.00 lb  

 

 Weight in kg's  148.781  

 

 Height  68.50 inches  5'8.50"

 

 Height in cm's  174.0 cm  

 

 BMI (Body Mass Index)  49.1 kg/m2  









 2014  BP Systolic  121 mmHg  









 BP Diastolic  79 mmHg  

 

 Heart Rate  77 /min  

 

 Respiratory Rate  16 /min  

 

 Weight  325.00 lb  

 

 Weight in kg's  147.420  

 

 Height  68.50 inches  5'8.50"

 

 Height in cm's  174.0 cm  

 

 BMI (Body Mass Index)  48.7 kg/m2  









 2014  BP Systolic  138 mmHg  









 BP Diastolic  76 mmHg  

 

 Heart Rate  73 /min  

 

 Respiratory Rate  16 /min  

 

 Weight  325.00 lb  

 

 Weight in kg's  147.420  

 

 Height  68.50 inches  5'8.50"

 

 Height in cm's  174.0 cm  

 

 BMI (Body Mass Index)  48.7 kg/m2  









 2014  BP Systolic  128 mmHg  









 BP Diastolic  80 mmHg  

 

 Heart Rate  65 /min  

 

 Respiratory Rate  16 /min  

 

 Weight  325.00 lb  

 

 Weight in kg's  147.420  

 

 Height  68.50 inches  5'8.50"

 

 Height in cm's  174.0 cm  

 

 BMI (Body Mass Index)  48.7 kg/m2  









 2014  Respiratory Rate  17 /min  









 Weight  325.00 lb  

 

 Weight in kg's  147.420  

 

 Height  68.50 inches  5'8.50"

 

 Height in cm's  174.0 cm  

 

 BMI (Body Mass Index)  48.7 kg/m2  









 2014  BP Systolic  129 mmHg  









 BP Diastolic  82 mmHg  

 

 Heart Rate  87 /min  

 

 Respiratory Rate  17 /min  

 

 Weight  325.00 lb  

 

 Weight in kg's  147.420  

 

 Height  68.50 inches  5'8.50"

 

 Height in cm's  174.0 cm  

 

 BMI (Body Mass Index)  48.7 kg/m2  









 2013  BP Systolic  131 mmHg  









 BP Diastolic  83 mmHg  

 

 Heart Rate  75 /min  

 

 Respiratory Rate  16 /min  

 

 Weight  325.00 lb  

 

 Weight in kg's  147.420  

 

 Height  68.50 inches  5'8.50"

 

 Height in cm's  174.0 cm  

 

 BMI (Body Mass Index)  48.7 kg/m2  









 10/24/2013  BP Systolic  132 mmHg  









 BP Diastolic  84 mmHg  

 

 Heart Rate  61 /min  

 

 Respiratory Rate  17 /min  

 

 Weight  325.00 lb  

 

 Weight in kg's  147.420  

 

 Height  68.50 inches  5'8.50"

 

 Height in cm's  174.0 cm  

 

 BMI (Body Mass Index)  48.7 kg/m2  









 2013  BP Systolic  126 mmHg  









 BP Diastolic  81 mmHg  

 

 Heart Rate  73 /min  

 

 Respiratory Rate  17 /min  

 

 Weight  325.00 lb  

 

 Weight in kg's  147.420  

 

 Height  68.50 inches  5'8.50"

 

 Height in cm's  174.0 cm  

 

 BMI (Body Mass Index)  48.7 kg/m2  









 2013  Weight  325.00 lb  









 Weight in kg's  147.420  

 

 Height  68.50 inches  5'8.50"

 

 Height in cm's  174.0 cm  

 

 BMI (Body Mass Index)  48.7 kg/m2  









 2013  Weight  325.00 lb  









 Weight in kg's  147.420  

 

 Height  68.50 inches  5'8.50"

 

 Height in cm's  174.0 cm  

 

 BMI (Body Mass Index)  48.7 kg/m2  









 2013  BP Systolic  108 mmHg  









 BP Diastolic  73 mmHg  

 

 Heart Rate  77 /min  

 

 Respiratory Rate  16 /min  

 

 Weight  325.00 lb  

 

 Weight in kg's  147.420  

 

 Height  68.50 inches  5'8.50"

 

 Height in cm's  174.0 cm  

 

 BMI (Body Mass Index)  48.7 kg/m2  









 2013  BP Systolic  99 mmHg  









 BP Diastolic  72 mmHg  

 

 Heart Rate  62 /min  

 

 Respiratory Rate  16 /min  

 

 Weight  325.00 lb  

 

 Weight in kg's  147.420  

 

 Height  68.50 inches  5'8.50"

 

 Height in cm's  174.0 cm  

 

 BMI (Body Mass Index)  48.7 kg/m2  









 2013  BP Systolic  123 mmHg  









 BP Diastolic  88 mmHg  

 

 Heart Rate  66 /min  

 

 Respiratory Rate  16 /min  

 

 Weight  325.94 lb  

 

 Weight in kg's  147.84  

 

 Height  68.50 inches  5'8.50"

 

 Height in cm's  174.0 cm  

 

 BMI (Body Mass Index)  48.8 kg/m2  









 2013  BP Systolic  122 mmHg  









 BP Diastolic  84 mmHg  

 

 Heart Rate  64 /min  

 

 Respiratory Rate  16 /min  

 

 Weight  305.00 lb  

 

 Weight in kg's  138.348  

 

 Height  69.02 inches  5'9.02"

 

 Height in cm's  175.3 cm  

 

 BMI (Body Mass Index)  45.0 kg/m2  









 10/25/2012  Weight  288.00 lb  









 Weight in kg's  130.637  

 

 Height  69.02 inches  5'9"

 

 Height in cm's  175.3 cm  

 

 BMI (Body Mass Index)  42.5 kg/m2  









 10/16/2012  BP Systolic  150 mmHg  









 BP Diastolic  90 mmHg  

 

 Heart Rate  66 /min  

 

 Respiratory Rate  20 /min  

 

 Weight  208.00 lb  

 

 Weight in kg's  94.349  

 

 Height  69 inches  5'9"

 

 Height in cm's  175.3 cm  

 

 BMI (Body Mass Index)  30.7 kg/m2  









 10/06/2011  BP Systolic  152 mmHg  









 BP Diastolic  100 mmHg  

 

 Heart Rate  62 /min  

 

 Respiratory Rate  16 /min  

 

 Weight  280.00 lb  

 

 Weight in kg's  127.008  

 

 Height  69 inches  5'9"

 

 Height in cm's  175.3 cm  

 

 BMI (Body Mass Index)  41.3 kg/m2  









 2011  BP Systolic  143 mmHg  









 BP Diastolic  95 mmHg  

 

 Heart Rate  75 /min  

 

 Respiratory Rate  17 /min  









 2009  Heart Rate  64 /min  









 Respiratory Rate  16 /min  

 

 Weight  321.00 lb  

 

 Weight in kg's  145.606  









 2008  BP Systolic  170 mmHg  Taken with LG BP cuff









 BP Diastolic  106 mmHg  Taken with LG BP cuff

 

 Heart Rate  79 /min  

 

 Respiratory Rate  18 /min  









 2008  BP Systolic  159 mmHg  









 BP Diastolic  114 mmHg  

 

 Heart Rate  79 /min  

 

 Respiratory Rate  16 /min  









 10/23/2008  BP Systolic  157 mmHg  









 BP Diastolic  107 mmHg  

 

 Heart Rate  77 /min  

 

 Respiratory Rate  16 /min  









 2008  BP Systolic  133 mmHg  









 BP Diastolic  100 mmHg  

 

 Heart Rate  75 /min  

 

 Respiratory Rate  16 /min  









 2008  BP Systolic  160 mmHg  









 BP Diastolic  109 mmHg  

 

 Heart Rate  74 /min  

 

 Respiratory Rate  16 /min  









 2008  BP Systolic  137 mmHg  









 BP Diastolic  85 mmHg  

 

 Heart Rate  75 /min  

 

 Respiratory Rate  16 /min  









 2008  BP Systolic  139 mmHg  









 BP Diastolic  88 mmHg  

 

 Heart Rate  85 /min  

 

 Respiratory Rate  12 /min  









 2008  BP Systolic  138 mmHg  









 BP Diastolic  87 mmHg  

 

 Heart Rate  82 /min  

 

 Respiratory Rate  16 /min  









 2008  BP Systolic  148 mmHg  









 BP Diastolic  86 mmHg  









 2008  BP Systolic  148 mmHg  









 BP Diastolic  92 mmHg  

 

 Heart Rate  80 /min  

 

 Respiratory Rate  14 /min  









 2007  BP Systolic  119 mmHg  









 BP Diastolic  76 mmHg  

 

 Heart Rate  75 /min  

 

 Respiratory Rate  16 /min  









 2006  BP Systolic  176 mmHg  









 BP Diastolic  73 mmHg  

 

 Heart Rate  80 /min  

 

 Respiratory Rate  16 /min  









 2005  BP Systolic  146 mmHg  









 BP Diastolic  82 mmHg  

 

 Heart Rate  80 /min  

 

 Respiratory Rate  16 /min  







Results







 Test  Date  Test  Result  H/L  Range  Note

 

 CBC No Diff  2015  White Blood Count  5.2 10^3/uL    3.5-10.8  1









 Red Blood Count  5.43 10^6/uL  High  4.0-5.4  1

 

 Hemoglobin  15.1 g/dL    14.0-18.0  1

 

 Hematocrit  47 %    42-52  1

 

 Mean Corpuscular Volume  87 fL    80-94  1

 

 Mean Corpuscular Hemoglobin  28 pg    27-31  1

 

 Mean Corpuscular HGB Conc  32 g/dL    31-36  1

 

 Red Cell Distribution Width  15 %    10.5-15  1

 

 Platelet Count  232 10^3/uL    150-450  1

 

 Mean Platelet Volume  8 um3    7.4-10.4  1









 Inr/Protime  2015  Inr  1.05    0.89-1.11  1

 

 Laboratory test finding  2015  Partial Thrombo  45.0 seconds  High  26.0-
36.3  1, 2



     Time PTT        

 

 Basic Metabolic Panel  2015  Sodium  141 mmol/L    133-145  1









 Potassium  3.8 mmol/L    3.5-5.0  1

 

 Chloride  106 mmol/L    101-111  1

 

 Co2 Carbon Dioxide  28 mmol/L    22-32  1

 

 Anion Gap  7 mmol/L    2-11  1

 

 Glucose  91 mg/dL      1

 

 Blood Urea Nitrogen  14 mg/dL    6-24  1

 

 Creatinine  1.15 mg/dL    0.67-1.17  1

 

 BUN/Creatinine Ratio  12.2    8-20  1

 

 Calcium  9.9 mg/dL    8.6-10.3  1

 

 Egfr Non-  67.0    &gt;60  1

 

 Egfr   86.2    &gt;60  1, 3









 Laboratory test finding  2013  Inr  0.96    0.87-0.97  4









 Activated Partial Thrombo Time  44.1 seconds  High  22.18-37.18  5









 Basic Metabolic Panel  2013  Sodium  140 mmol/L    133-145  









 Potassium  4.3 mmol/L    3.5-5.0  

 

 Chloride  104 mmol/L    101-111  

 

 Co2 Carbon Dioxide  30.0 mmol/L    22-32  

 

 Anion Gap  6.0 mmol/L    2-11  

 

 Glucose  111 mg/dL  High    

 

 Blood Urea Nitrogen  14 mg/dL    6-24  

 

 Creatinine  1.30 mg/dL    0.50-1.40  

 

 BUN/Creatinine Ratio  10.8    8-20  

 

 Calcium  9.7 mg/dL    8.1-9.9  

 

 Egfr Non-  58.7    &gt;60  

 

 Egfr   75.5    &gt;60  6









 CBC Auto Diff  2013  White Blood Count  4.0 10^3/uL  Low  4.8-10.8  









 Red Blood Count  4.93 10^6/uL    4.0-5.4  

 

 Hemoglobin  14.3 g/dL    14.0-18.0  

 

 Hematocrit  43 %    42-52  

 

 Mean Corpuscular Volume  87 fL    80-94  

 

 Mean Corpuscular Hemoglobin  29 pg    27-31  

 

 Mean Corpuscular HGB Conc  33 g/dL    31-36  

 

 Red Cell Distribution Width  14 %    10.5-15  

 

 Platelet Count  184 10^3/uL    150-450  

 

 Mean Platelet Volume  8 um3    7.4-10.4  

 

 Abs Neutrophils  2.2 10^3/uL    1.5-7.7  

 

 Abs Lymphocytes  1.3 10^3/uL    1.0-4.8  

 

 Abs Monocytes  0.4 10^3/uL    0-0.8  

 

 Abs Eosinophils  0.1 10^3/uL    0-0.6  

 

 Abs Basophils  0 10^3/uL    0-0.2  

 

 Abs Nucleated RBC  0.01 10^3/uL      

 

 Granulocyte %  54.0 %    38-83  

 

 Lymphocyte %  32.6 %    25-47  

 

 Monocyte %  9.7 %  High  1-9  

 

 Eosinophil %  3.1 %    0-6  

 

 Basophil %  0.6 %    0-2  

 

 Nucleated Red Blood Cells %  0.2      









 Basic Metabolic Panel  2013  Sodium  135 mmol/L    133-145  









 Potassium  3.6 mmol/L    3.5-5.0  

 

 Chloride  102 mmol/L    101-111  

 

 Co2 Carbon Dioxide  30.0 mmol/L    22-32  

 

 Anion Gap  3.0 mmol/L    2-11  

 

 Glucose  80 mg/dL      

 

 Blood Urea Nitrogen  11 mg/dL    6-24  

 

 Creatinine  1.10 mg/dL    0.50-1.40  

 

 BUN/Creatinine Ratio  10.0    8-20  

 

 Calcium  9.0 mg/dL    8.1-9.9  

 

 Egfr Non-  71.4    &gt;60  

 

 Egfr   91.9    &gt;60  7









 Laboratory test finding  12/10/2008  Troponin-I (TnI)  0.04 NG/ML    0-0.06  8

 

 Stat CMP  12/10/2008  Sodium  136 mmol/L    135-145  









 Potassium  3.9 mmol/L    3.5-5.0  

 

 Chloride  104 mmol/L    101-111  

 

 Co2 (Carbon Dioxide)  25.0 mmol/L    22-32  

 

 Anion Gap  7.0 mmol/L    2-11  9

 

 Glucose  98 mg/dL      10

 

 BUN  15 mg/dL    6-24  

 

 Creatinine  1.20 mg/dL    0.50-1.40  

 

 One Over Creatinine  0.80      

 

 BUN/Creatinine Ratio  12.5    8-20  

 

 Calcium  9.3 mg/dL    8.1-9.9  11

 

 Total Protein  6.9 GM/DL    6.2-8.1  

 

 Albumin  3.9 GM/DL    3.6-5.4  

 

 Globulin  3.0 GM/DL    2-4  

 

 Albumin/Globulin Ratio  1.3    1-3  

 

 Bilirubin Total  0.5 mg/dL    0.4-1.5  

 

 Alkaline Phosphatase  112 U/L      

 

 Alt (SGPT)  53 U/L    17-63  

 

 Ast (Sgot)  31 U/L    12-42  









 CBC With Electronic Diff Stat  12/10/2008  White Blood Count  6.7 CUMM    4.8-
10.8  









 Red Cell Count  5.28 CUMM    4.6-6.2  

 

 Hemoglobin  15.2 g/dL    14.0-18.0  

 

 Hematocrit  44 %    42-52  

 

 Mean Corpuscular Volume  83 um3    80-94  

 

 Mean Corpuscular Hemoglob  29 pg    27-31  

 

 Mean Corpuscular HGB Cone  35 g/dL    32-36  

 

 Redcell Distribution WDTH  14 %    10.5-15  

 

 Platelet Count  254 CUMM    150-450  

 

 Mean Platelet Volume  7.6 um3    7.4-10.4  

 

 Gran %  60.3 %    38-83  

 

 Lymph %  28.8 %    25-47  

 

 Mononuclear %  7.4 %    1-9  

 

 Eosinophil %  3.0 %    0-6  

 

 Basophil %  0.5 %    0-2  

 

 Abs Lymphs  1.9    1.0-4.8  

 

 Abs Mononuclear  0.5    0-0.8  

 

 Absolute Neutrophil Count  4.0    1.5-7.7  

 

 Abs Eosinophils  0.2    0-0.6  

 

 Abs Basophils  0    0-0.2  









 Laboratory test finding  2008  Culture   Sensitivity  FEW COLONIES OF   
   12



       &lt;SEE NOTE&gt;      









 1  SDS 

 

 2  SDS 

 

 3  *******Because ethnic data is not always readily available,



   this report includes an eGFR for both -Americans and



   non- Americans.****



   The National Kidney Disease Education Program (NKDEP) does



   not endorse the use of the MDRD equation for patients that



   are not between the ages of 18 and 70, are pregnant, have



   extremes of body size, muscle mass, or nutritional status,



   or are non- or non-.



   According to the National Kidney Foundation, irrespective of



   diagnosis, the stage of the disease is based on the level of



   kidney function:



   Stage Description                      GFR(mL/min/1.73 m(2))



   1     Kidney damage with normal or decreased GFR       90



   2     Kidney damage with mild decrease in GFR          60-89



   3     Moderate decrease in GFR                         30-59



   4     Severe decrease in GFR                           15-29



   5     Kidney failure                       &lt;15 (or dialysis)

 

 4  SDS 13

 

 5  SDS 13

 

 6  *******Because ethnic data is not always readily available,



   this report includes an eGFR for both -Americans and



   non- Americans.****



   The National Kidney Disease Education Program (NKDEP) does



   not endorse the use of the MDRD equation for patients that



   are not between the ages of 18 and 70, are pregnant, have



   extremes of body size, muscle mass, or nutritional status,



   or are non- or non-.



   According to the National Kidney Foundation, irrespective of



   diagnosis, the stage of the disease is based on the level of



   kidney function:



   Stage Description                      GFR(mL/min/1.73 m(2))



   1     Kidney damage with normal or decreased GFR       90



   2     Kidney damage with mild decrease in GFR          60-89



   3     Moderate decrease in GFR                         30-59



   4     Severe decrease in GFR                           15-29



   5     Kidney failure                       &lt;15 (or dialysis)

 

 7  *******Because ethnic data is not always readily available,



   this report includes an eGFR for both -Americans and



   non- Americans.****



   The National Kidney Disease Education Program (NKDEP) does



   not endorse the use of the MDRD equation for patients that



   are not between the ages of 18 and 70, are pregnant, have



   extremes of body size, muscle mass, or nutritional status,



   or are non- or non-.



   According to the National Kidney Foundation, irrespective of



   diagnosis, the stage of the disease is based on the level of



   kidney function:



   Stage Description                      GFR(mL/min/1.73 m(2))



   1     Kidney damage with normal or decreased GFR       90



   2     Kidney damage with mild decrease in GFR          60-89



   3     Moderate decrease in GFR                         30-59



   4     Severe decrease in GFR                           15-29



   5     Kidney failure                       &lt;15 (or dialysis)

 

 8  New Reference Range and Interpretation effective 10/4/02



   



   TnI (ng/ml)             INTERPRETATION



   



   &lt;0.06 ng/ml             NOT SUPPORTIVE OF DIAGNOSIS OF MI



   



   0.06 - 0.50 ng/ml       INDETERMINATE: SUGGEST SERIAL



   STUDIES IF CLINICALLY INDICATED.



   



   &gt; 0.5 ng/ml             CONSISTENT WITH DIAGNOSIS OF MI



   .

 

 9  Anion gap measurement may be of limited value in the



   presence of any alkalosis, especially in a combined acid



   base disorder.



   .

 

 10  ** Note change in reference range as of 08.  The



   change was based on recommendations from the American



   Diabetes Association.

 

 11  Please note change in reference range effective 08



   



   



   .

 

 12  FEW COLONIES OF NORMAL RESPIRATORY EDMUNDO







Procedures







 Date  CPT Code  Description  Status

 

 2018  61736  Nasal Endoscopy, Diagnostic  Completed

 

 2016  64561  Nasal Endoscopy, Diagnostic  Completed

 

 2015  94410  Revision Mastoidectomy Resulting In Modified Radical  
Completed



     Mastoidctomy  

 

 2014  03712  Fiberoptic Laryngoscopy  Completed

 

 2014  06165  Tympanometry  Completed

 

 2014  92039  Comprehensive Audiogram  Completed

 

 10/24/2013  51762  Tympanometry  Completed

 

 10/24/2013  80548  Comprehensive Audiogram  Completed

 

 2013  81720  Binocular Microscopy  Completed

 

 2013  88894  Tympanomastoidectomy W/Ossicular Chain  Completed

 

 2013  92054  Medical Records  Completed

 

 2013  45552  Tympanometry  Completed

 

 2013  35237  Comprehensive Audiogram  Completed

 

 2013  96268  Tympanometry  Completed

 

 2013  96221  Comprehensive Audiogram  Completed

 

 10/16/2012  62413  Binocular Microscopy  Completed

 

 2012  66928  Nasal Endoscopy, Diagnostic  Completed

 

 2012  93551  Tympanometry  Completed

 

 2012  49782  Comprehensive Audiogram  Completed

 

 2012  89117  Tympanometry  Completed

 

 2012  93110  Binocular Microscopy  Completed

 

 2011  14544  Fiberoptic Laryngoscopy  Completed

 

 2011  52639  Tympanometry  Completed

 

 2011  37147  Tympanometry  Completed

 

 2011  24072  Comprehensive Audiogram  Completed

 

 2010  40907  Tympanometry  Completed

 

 2009  31165  Tympanometry  Completed

 

 2009  88609  Binocular Microscopy  Completed

 

 10/22/2009  75873  Tympanometry  Completed

 

 10/22/2009  18936  Comprehensive Audiogram  Completed

 

 2008  87205  Nasal Endoscopy, Diagnostic  Completed

 

 2008  43524  Fiberoptic Laryngoscopy  Completed

 

 2008  44653  No Show Fee  Completed

 

 2008  01583  Demonstration/Eval. Of Patient Utilization Of  Completed



     Spacer/Nebulizer  

 

 2008  17228  Respiratory Flow Volume Loop  Completed

 

 2008  32388  Bronchospasm Evaluation  Completed

 

 2008  78976  Prick Test  Completed

 

 2008  47625  Prick Test  Completed

 

 2008  94360  Nasal Endoscopy, Diagnostic  Completed

 

 2007  83058  Nasopharyngoscopy  Completed

 

 2007  70795  Tympanometry  Completed

 

 2007  68195  Tympanometry  Completed

 

 2007  03110  Comprehensive Audiogram  Completed

 

 2007  47906  Comprehensive Audiogram  Completed

 

 2007  59800  Nasal Endoscopy, Diagnostic  Completed

 

 2007  69346  Tympanostomy W/Tube Local Or Topical Anes.  Completed

 

 2007  07936  Tympanometry  Completed

 

 2007  70136  Tympanometry  Completed

 

 2007  04532  Comprehensive Audiogram  Completed

 

 2007  85953  Comprehensive Audiogram  Completed

 

 2006  96358  Nasal Endoscopy, Diagnostic  Completed

 

 2005  90193  Nasal Endoscopy, Diagnostic  Completed

 

 2005  59110  Nasal Endoscopy W/ Debridement  Completed

 

 2005  49625  Nasal Endoscopy W/ Debridement  Completed

 

 2004  62602  Nasal Endoscopy W/Maxllary Antrostomy W/Excision Of  
Completed



     Poylp  

 

 2004  42499  Nasal Endoscopy With Ethmoidectomy, Partial  Completed

 

 12/10/2004  58143  Nasal Endoscopy, Diagnostic  Completed

 

 2004  43716  Nasal Endoscopy, Diagnostic  Completed

 

 10/13/2004  17573  Nasal Endoscopy, Diagnostic  Completed

 

 2004  99545  No Show Fee  Completed







Encounters







 Type  Date  Location  Provider  CPT E/M  Dx

 

 Office Visit  2018 10:00a  [facility],After 08  Jonathan E. Cryer,  
21505  H71.12



       MD    

 

 Office Visit  2018 11:15a  [facility],After 08  Jonathan E. Cryer,  
70934  H71.12



       MD    

 

 Office Visit  2017 11:00a  [facility],After 08  Jonathan E. Cryer,  
85861  H71.12



       MD    









 H72.2x1









 Office Visit  2017 10:30a  [facility],After 08  Jonathan E. Cryer, MD  
57060  H71.12

 

 Office Visit  2016  2:45p  [facility],After 08  Jonathan E. Cryer, MD  
64201  J31.0









 H71.12









 Office Visit  2016 11:00a  [facility],After 08  Jonathan E. Cryer, MD  
08634  H71.12









 H60.11









 Office Visit  2016 11:00a  [facility],After 08  Jonathan E. Cryer, MD  
75299  H71.12

 

 Office Visit  2016  3:45p  [facility],After 08  Jonathan E. Cryer, MD  
04560  L23.3

 

 Office Visit  2015 11:30a  [facility],After 08  Jonathan E. Cryer, MD  
18102  H71.12

 

 Office Visit  10/29/2015 10:30a  [facility],After 08  Jonathan E. Cryer, MD  
41897  H71.12

 

 Office Visit  10/15/2015 11:00a  [facility],After 08  Jonathan E. Cryer, MD  
66983  H65.32









 H71.12









 Office Visit  2015 10:15a  [facility],After 08  Jonathan E. Cryer, MD  
93310  381.29









 385.32









 Office Visit  2015  3:45p  [facility],After 08  Jonathan E. Cryer, MD  
54890  381.29









 385.32









 Office Visit  2014  9:45a  [facility],After 08  Jonathan E. Cryer, MD  
15288  381.29

 

 Office Visit  2014 10:45a  [facility],After 08  Jonathan E. Cryer, MD  
16454  381.29









 787.20









 Office Visit  2014 10:15a  [facility],After 08  Jonathan E. Cryer, MD  
76160  786.2









 787.20

 

 493.00









 Office Visit  2014 10:00a  [facility],After 08  Jonathan E. Cryer, MD  
52895  389.03









 389.10

 

 462-2









 Office Visit  2014 10:30a  [facility],After 08  Jonathan E. Cryer, MD  
43681  380.10









 389.03









 Office Visit  2014 10:30a  [facility],After 08  Jonathan E. Cryer, MD  
16000  380.10









 381.29









 Office Visit  2014  9:45a  [facility],After 08  Jonathan E. Cryer, MD  
66215  385.32

 

 Office Visit  2013 11:00a  [facility],After 08  Jonathan E. Cryer, MD  
40388  472.0









 385.32









 Office Visit  10/24/2013 10:30a  [facility],After 08  Jonathan E. Cryer, MD  
31852  385.32









 389.03

 

 389.10

 

 477.8









 Office Visit  2013  4:00p  [facility],After 08  Yanelis Wooten NP  
75138  385.32









 381.29









 Office Visit  2013  1:45p  [facility],After 08  Jonathan E. Cryer, MD  
24690  385.32









 381.29

 

 389.03









 Office Visit  2013  2:15p  [facility],After 08  Yanelis Wooten NP  
41166  385.32









 381.29









 Office Visit  2013  1:30p  [facility],After 08  Jonathan E. Cryer, MD  
65613  385.32









 381.29

 

 389.03

 

 389.10









 Office Visit  2013 11:30a  [facility],After 08  Jonathan E. Cryer, MD  
23545  385.32

 

 Office Visit  2012 11:15a  [facility],After 08  Jonathan E. Cryer, MD  
44635  381.29

 

 Office Visit  10/25/2012  3:15p  [facility],After 08  Jonathan E. Cryer, MD  
82674  381.29

 

 Office Visit  10/16/2012  9:30a  [facility],After 08  Yanelis Wooten NP  
84737  381.3

 

 Office Visit  2012  2:45p  [facility],After 08  Yanelis Wooten NP  
40763  350.2









 472.0









 Office Visit  2012  3:30p  [facility],After 08  Yanelis Wooten NP  
04744  477.8









 493.01









 Office Visit  2012 11:15a  [facility],After 08  Jonathan E. Cryer, MD  
41910  389.03









 389.10

 

 384.82









 Office Visit  2012  4:00p  [facility],After 08  Yanelis Wooten NP  
46139  384.82

 

 Office Visit  10/06/2011  1:45p  [facility],After 08  Jonathan E. Cryer, MD  
51093  462









 384.20

 

 381.81

 

 389.03

 

 389.10









 Office Visit  2011  9:30a  [facility],After 08  Yanelis Wooten NP  
71456  478.19









 530.81

 

 493.00

 

 530.11









 Office Visit  2011  2:00p  [facility],After 08  Yanelis Wooten NP  
36985  384.20









 477.8

 

 493.00









 Office Visit  2011  1:30p  [facility],After 08  Yanelis Wooten NP  
00577  384.20









 381.81









 Office Visit  2011  2:00p  [facility],After 08  Jonathan E. Cryer, MD  
76053  389.03









 389.10

 

 381.81

 

 384.20









 Office Visit  2010 11:30a  [facility],After 08  Yanelis Wooten NP  
05909  384.20









 381.81

 

 350.2









 Office Visit  2010  1:30p  [facility],After 08  Jonathan E. Cryer, MD  
60686  477.8









 384.20

 

 381.81









 Office Visit  2010 10:00a  [facility],After 08  Jonathan E. Cryer, MD  
66647  477.8









 493.00

 

 384.20

 

 389.03









 Office Visit  2009  3:45p  [facility],After 08  Jonathan E. Cryer, MD  
42538  384.20









 388.71

 

 381.81









 Office Visit  2009  2:30p  [facility],After 08  Jonathan E. Cryer, MD  
00932  384.20









 388.71









 Office Visit  10/22/2009  1:45p  [facility],After 08  Jonathan E. Cryer, MD  
82068  388.31









 478.1-4









 Office Visit  2009  4:00p  [facility],After 08  Jonathan E. Cryer, MD  
70217  478.19









 478.1-4

 

 477.8

 

 493.00









 Office Visit  2009  3:15p  [facility],After 08  Jonathan E. Cryer, MD  
26298  350.2









 473.0

 

 473.2

 

 471.8









 Office Visit  2008  2:45p  [facility],After 08  Saeid Ann,  
53795  493.01



       M.D.    









 530.81

 

 780.57

 

 278.01

 

 429.3









 Office Visit  12/10/2008  1:48p  [facility],After 08  Saeid Ann,  
56514  493.00



       M.D.    

 

 Office Visit  2008  9:30a  [facility],After 08  Yanelis Wooten NP  
98845  473.0









 530.81









 Office Visit  10/23/2008  4:00p  [facility],After 08  Jonathan E. Cryer, MD  
25646  780.57









 381.81

 

 389.03









 Office Visit  2008 10:15a  [facility],After 08  Jonathan E. Cryer, MD  
58562  780.57









 530.11









 Office Visit  2008  3:30p  [facility],After 08  Yanelis Wooten NP  
11211  530.81









 477.8

 

 493.90









 Office Visit  2008 10:15a  [facility],After 08  Yanelis Wooten NP  
75641  478.19









 478.1-4

 

 477.8









 Office Visit  2008  3:00p  [facility],After 08  Yanelis Wooten NP  
63313  478.1-4









 381.3

 

 493.00

 

 477.0

 

 477.8









 Office Visit  2008 10:00a  [facility],After 08  Yanelis oWoten NP  
53510  477.8









 477.0









 Office Visit  2008  3:30p  [facility],After 08  Jonathan E. Cryer, MD  
33205  477.8









 478.19

 

 478.1-4









 Office Visit  2007  9:00a  [facility],After 08  Jonathan E. Cryer[place]  
94920  477.8

 

 Office Visit  2007  2:15p  [facility],After 08  Jonathan E. Cryer, MD  
52483  477.8









 471.8

 

 389.2

 

 381.81

 

 474.12









 Office Visit  10/15/2007  2:45p  [facility],After 08  Jonathan E. Cryer, MD  
30997  473.0









 474.12

 

 381.81









 Office Visit  2007  3:15p  [facility],After 08  Jonathan E. Cryer, MD  
83912  381.81









 389.2

 

 471.8

 

 474.12









 Office Visit  2007  2:30p  [facility],After 08  Jonathan E. Cryer, MD  
90515  381.81









 389.2









 Office Visit  2006  9:45a  [facility],After 08  Jonathan E. Cryer, MD  
01557  471.8









 473.0

 

 461.0









 Office Visit  2006  1:30p  [facility],After 08  Jonathan E. Cryer, MD  
63187  471.8









 780.57

 

 493.0









 Office Visit  2005  1:30p  [facility],After 08  Jonathan E. Cryer, MD  
21443  473.2









 471.8

 

 389.03









 Office Visit  2004  9:30a  [facility],After 08  Saeid Ann  
67333  473.2



       M.D.    









 471.8









 Office Visit  2004  9:45a  [facility],After 08  Julio Desai,  
21621  780.57



       M.D.    

 

 Office Visit  2004 11:15a  [facility],After 08  Julio Desai,  
95622  478.1



       M.D.    







Plan of Care

Future Appointment(s):2018 10:30 am - Jonathan E. Cryer, MD at [facility],
After  - Jonathan E. 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 
left ear

## 2018-06-03 NOTE — ED
Griffin NATHAN Natalie, scribed for Miguel Myles MD on 06/03/18 at 1354 .





Dizziness





- HPI Summary


HPI Summary: 


The patient is a 54 y/o M presenting to the ED c/o dizziness, lightheadedness, 

and blurred vision starting this morning and worsening until arrival. He was 

not having these symptoms yesterday, and he woke up this morning and felt 

alright, but his symptoms started as he was getting ready for [employer]. While at 

[employer], his symptoms worsened. He denies nausea and vomiting. He also states 

that he felt like he hasn't eaten anything, but he ate breakfast and doesn't 

feel hungry. This is a similar episode to one he had in January 2018, where he 

was admitted to [facility] [place], and it was found that he had a seizure. During this time, 

his glucose levels were high, but he has not been diagnosed with diabetes. The 

patient did not check his blood sugar PTA. He additionally c/o edema in his 

bilateral lower extremities that has caused tenderness, which started when he 

"started having leg problems."





- History Of Current Complaint


Chief Complaint: EDGeneral


Stated Complaint: DIZZINESS


Time Seen by Provider: 06/03/18 12:49


Hx Obtained From: Patient


Onset/Duration: Still Present, Suddenly


Timing: Hours


Severity Initially: Mild


Severity Currently: Moderate


Character: Lightheaded, Dizzy


Aggravating Factor(s): Nothing


Alleviating Factor(s): Nothing


Associated Signs And Symptoms: Positive: Visual Changes - blurred vision.  

Negative: Nausea, Vomiting





- Allergies/Home Medications


Allergies/Adverse Reactions: 


 Allergies











Allergy/AdvReac Type Severity Reaction Status Date / Time


 


amoxicillin [From Augmentin] Allergy  Swelling Verified 06/03/18 12:03


 


clavulanic acid Allergy  Swelling Verified 06/03/18 12:03





[From Augmentin]     


 


Penicillins Allergy  Anaphylatic Verified 06/03/18 12:03





   Shock  


 


VINEGAR Allergy Intermediate Hives Uncoded 06/03/18 12:03


 


MUSHROOMS Allergy Unknown Rash Uncoded 06/03/18 12:03











Home Medications: 


 Home Medications





Famotidine TAB* [Pepcid 20 MG TAB*] 20 mg PO BID 06/03/18 [History Confirmed 06/ 03/18]











PMH/Surg Hx/FS Hx/Imm Hx


Endocrine/Hematology History: Reports: Hx Anticoagulant Therapy - plavix


   Denies: Hx Diabetes, Hx Sickle Cell Disease, Hx Thyroid Disease


Cardiovascular History: Reports: Hx Angina, Hx Cardiac Arrest, Hx Coronary 

Artery Disease, Hx Hypercholesterolemia, Hx Hypertension, Hx Myocardial 

Infarction, Other Cardiovascular Problems/Disorders - HEART DISEASE, CARDIAC 

EVENT MONITOR IMPLANTED


   Denies: Hx Congestive Heart Failure, Hx Pacemaker/ICD, Hx Valvular Heart 

Disease


Respiratory History: Reports: Hx Asthma, Hx Sleep Apnea


   Denies: Hx Chronic Obstructive Pulmonary Disease (COPD)


GI History: Reports: Hx Gastroesophageal Reflux Disease


   Denies: Hx Ulcer


 History: Reports: Hx Renal Disease - cysts, Other  Problems/Disorders - 

bilat cyst on kidneys


Musculoskeletal History: Reports: Hx Arthritis, Hx Back Problems, Other 

Musculoskeletal History - VIPUL


   Denies: Hx Scoliosis


Sensory History: Reports: Hx Cataracts - right eye, Hx Contacts or Glasses


Opthamlomology History: Reports: Hx Cataracts - right eye, Hx Contacts or 

Glasses


Neurological History: Reports: Hx Seizures, Hx Transient Ischemic Attacks (TIA)


   Denies: Hx Dementia, Hx Headaches, Other Neuro Impairments/Disorders


Psychiatric History: Reports: Hx Depression


   Denies: Hx Panic Disorder





- Cancer History


Hx Chemotherapy: No





- Surgical History


Surgery Procedure, Year, and Place: DEC 2004 sinus [facility] ;.  2008 left knee 

arthroscopy [facility] ;.  2013 left tympanoplasty WITH STAPES IMPLANT ([facility] 

MALLEOUS HEAD SERIAL # [id number] - PER [facility] INFORMATION SENT TO MRI ON 6/ 13/2016; IMPLANT IS STAINLESS STEEL STATES SOME DEGREE OF MAGNETISM TESTED MRI 

SAFE AT 1.5T ONLY - DR VALDEZ APPROVED THIS INFORMATION FOR 1.5T ONLY).  [facility] ;.  

2015 LEFT REVISION TYMPANOPLASTY/ MASTOIDECTOMY [facility] [place] ;.  8/ 2012 HEART CATH - NO 

STENTS ;.  2011 HEART CATH WITH cardiac stents x 4 (PROMUS DRUG-ELUTING STENT 

OKAY IN NORMAL MODE ONLY,  GAUSS/CM @ 1.5T) [facility] [place] ;.  REVEAL LINQ EVENT 

MONITOR PLACED- 7/28/16- ED CAME OVER BMEYE/ [facility] MACHINE & DID A DOWNLOAD SO 

THAT NOTHING WOULD BE ERASED


Hx Anesthesia Reactions: Yes - SEIZURE LIKE ACTIVITY; REINTUBATION AFTER LEFT 

EAR 2013





- Immunization History


Date of Tetanus Vaccine: UTD


Date of Influenza Vaccine: 10/17


Infectious Disease History: No


Infectious Disease History: 


   Denies: Hx Clostridium Difficile, Hx Hepatitis, Hx Human Immunodeficiency 

Virus (HIV), Hx of Known/Suspected MRSA, Hx Shingles, Hx Tuberculosis, Hx Known/

Suspected VRE, Hx Known/Suspected VRSA, History Other Infectious Disease, 

Traveled Outside the [place] in Last 30 Days





- Family History


Known Family History: Positive: Cardiac Disease, Hypertension, Diabetes





- Social History


Alcohol Use: Occasionally


Hx Substance Use: No


Substance Use Type: Reports: None


Hx Tobacco Use: Yes


Smoking Status (MU): Former Smoker


Type: Cigarettes


Length of Time of Smoking/Using Tobacco: 10-12 YRS


Have You Smoked in the Last Year: No





Review of Systems


Positive: Other - dizzy, lightheaded


Positive: Blurred Vision


All Other Systems Reviewed And Are Negative: Yes





Physical Exam





- Summary


Physical Exam Summary: 


Appearance: The patient is morbidly obses in no acute distress and in no acute 

pain.


 


Skin: The skin is warm and dry and skin color reflects adequate perfusion.


 


HEENT: The head is normocephalic and atraumatic. The pupils are equal and 

reactive. The conjunctivae are clear and without drainage. No nystagmus. Nares 

are patent and without drainage. Mouth reveals moist mucous membranes and the 

throat is without erythema and exudate. The external ears are intact. The ear 

canals are patent and without drainage. The tympanic membranes are intact.


 


Neck: The neck is supple with full range of motion and non-tender. There are no 

carotid bruits. There is no neck vein distension.


 


Respiratory: Chest is non-tender. Lungs are clear to auscultation and breath 

sounds are symmetrical and equal.


 


Cardiovascular: Heart is regular rate and rhythm. There is no murmur or rub 

auscultated. There is very slight pitting edema and pulses are symmetrical and 

equal.


 


Abdomen: The abdomen is soft and non-tender. There are normal bowel sounds 

heard in all four quadrants and there is no organomegaly palpated.


 


Musculoskeletal: There is no back tenderness noted. Extremities are non-tender 

with full range of motion. There is good capillary refill. There is very slight 

pitting edema and mild calf tenderness elicited.


 


Neurological: Patient is alert and oriented to person, place and time. The 

patient has symmetrical motor strength in all four extremities. Cranial nerves 

are grossly intact. Deep tendon reflexes are symmetrical and equal in all four 

extremities.


 


Psychiatric: The patient has an appropriate affect and does not exhibit any 

anxiety or depression.


Triage Information Reviewed: Yes


Vital Signs On Initial Exam: 


 Initial Vitals











Temp Pulse Resp BP Pulse Ox


 


 97.4 F   62   22   146/86   97 


 


 06/03/18 11:59  06/03/18 11:59  06/03/18 11:59  06/03/18 11:59  06/03/18 11:59











Vital Signs Reviewed: Yes





Diagnostics





- Vital Signs


 Vital Signs











  Temp Pulse Resp BP Pulse Ox


 


 06/03/18 12:35   64  15  148/92  98


 


 06/03/18 11:59  97.4 F  62  22  146/86  97














- Laboratory


Lab Results: 


 Lab Results











  06/03/18 06/03/18 06/03/18 Range/Units





  14:08 14:08 14:08 


 


WBC  4.4    (3.5-10.8)  10^3/ul


 


RBC  4.95    (4.0-5.4)  10^6/ul


 


Hgb  14.2    (14.0-18.0)  g/dl


 


Hct  42    (42-52)  %


 


MCV  85    (80-94)  fL


 


MCH  29    (27-31)  pg


 


MCHC  34    (31-36)  g/dl


 


RDW  14    (10.5-15)  %


 


Plt Count  195    (150-450)  10^3/ul


 


MPV  7.5    (7.4-10.4)  um3


 


Neut % (Auto)  56.5    (38-83)  %


 


Lymph % (Auto)  28.7    (25-47)  %


 


Mono % (Auto)  11.1 H    (0-7)  %


 


Eos % (Auto)  2.4    (0-6)  %


 


Baso % (Auto)  1.3    (0-2)  %


 


Absolute Neuts (auto)  2.5    (1.5-7.7)  10^3/ul


 


Absolute Lymphs (auto)  1.3    (1.0-4.8)  10^3/ul


 


Absolute Monos (auto)  0.5    (0-0.8)  10^3/ul


 


Absolute Eos (auto)  0.1    (0-0.6)  10^3/ul


 


Absolute Basos (auto)  0.1    (0-0.2)  10^3/ul


 


Absolute Nucleated RBC  0    10^3/ul


 


Nucleated RBC %  0.1    


 


INR (Anticoag Therapy)   0.99   (0.77-1.02)  


 


Sodium    137 L  (139-145)  mmol/L


 


Potassium    3.9  (3.5-5.0)  mmol/L


 


Chloride    103  (101-111)  mmol/L


 


Carbon Dioxide    28  (22-32)  mmol/L


 


Anion Gap    6  (2-11)  mmol/L


 


BUN    13  (6-24)  mg/dL


 


Creatinine    1.25 H  (0.67-1.17)  mg/dL


 


Est GFR ( Amer)    77.4  (>60)  


 


Est GFR (Non-Af Amer)    60.2  (>60)  


 


BUN/Creatinine Ratio    10.4  (8-20)  


 


Glucose    89  ()  mg/dL


 


Lactic Acid     (0.5-2.0)  mmol/L


 


Calcium    9.3  (8.6-10.3)  mg/dL


 


Magnesium    1.9  (1.9-2.7)  mg/dL


 


Total Bilirubin    0.70  (0.2-1.0)  mg/dL


 


AST    16  (13-39)  U/L


 


ALT    23  (7-52)  U/L


 


Alkaline Phosphatase    86  ()  U/L


 


Troponin I    0.01  (<0.04)  ng/mL


 


C-Reactive Protein    8.84 H  (< 5.00)  mg/L


 


B-Natriuretic Peptide    ( - 100) pg/mL


 


Total Protein    6.6  (6.4-8.9)  g/dL


 


Albumin    3.9  (3.2-5.2)  g/dL


 


Globulin    2.7  (2-4)  g/dL


 


Albumin/Globulin Ratio    1.4  (1-3)  


 


TSH    1.78  (0.34-5.60)  mcIU/mL


 


Urine Color     


 


Urine Appearance     


 


Urine pH     (5-9)  


 


Ur Specific Gravity     (1.010-1.030)  


 


Urine Protein     (Negative)  


 


Urine Ketones     (Negative)  


 


Urine Blood     (Negative)  


 


Urine Nitrate     (Negative)  


 


Urine Bilirubin     (Negative)  


 


Urine Urobilinogen     (Negative)  


 


Ur Leukocyte Esterase     (Negative)  


 


Urine WBC (Auto)     (Absent)  


 


Urine RBC (Auto)     (Absent)  


 


Urine Bacteria     (Absent)  


 


Urine Glucose     (Negative)  














  06/03/18 06/03/18 06/03/18 Range/Units





  14:08 14:08 15:02 


 


WBC     (3.5-10.8)  10^3/ul


 


RBC     (4.0-5.4)  10^6/ul


 


Hgb     (14.0-18.0)  g/dl


 


Hct     (42-52)  %


 


MCV     (80-94)  fL


 


MCH     (27-31)  pg


 


MCHC     (31-36)  g/dl


 


RDW     (10.5-15)  %


 


Plt Count     (150-450)  10^3/ul


 


MPV     (7.4-10.4)  um3


 


Neut % (Auto)     (38-83)  %


 


Lymph % (Auto)     (25-47)  %


 


Mono % (Auto)     (0-7)  %


 


Eos % (Auto)     (0-6)  %


 


Baso % (Auto)     (0-2)  %


 


Absolute Neuts (auto)     (1.5-7.7)  10^3/ul


 


Absolute Lymphs (auto)     (1.0-4.8)  10^3/ul


 


Absolute Monos (auto)     (0-0.8)  10^3/ul


 


Absolute Eos (auto)     (0-0.6)  10^3/ul


 


Absolute Basos (auto)     (0-0.2)  10^3/ul


 


Absolute Nucleated RBC     10^3/ul


 


Nucleated RBC %     


 


INR (Anticoag Therapy)     (0.77-1.02)  


 


Sodium     (139-145)  mmol/L


 


Potassium     (3.5-5.0)  mmol/L


 


Chloride     (101-111)  mmol/L


 


Carbon Dioxide     (22-32)  mmol/L


 


Anion Gap     (2-11)  mmol/L


 


BUN     (6-24)  mg/dL


 


Creatinine     (0.67-1.17)  mg/dL


 


Est GFR ( Amer)     (>60)  


 


Est GFR (Non-Af Amer)     (>60)  


 


BUN/Creatinine Ratio     (8-20)  


 


Glucose     ()  mg/dL


 


Lactic Acid  1.0    (0.5-2.0)  mmol/L


 


Calcium     (8.6-10.3)  mg/dL


 


Magnesium     (1.9-2.7)  mg/dL


 


Total Bilirubin     (0.2-1.0)  mg/dL


 


AST     (13-39)  U/L


 


ALT     (7-52)  U/L


 


Alkaline Phosphatase     ()  U/L


 


Troponin I     (<0.04)  ng/mL


 


C-Reactive Protein     (< 5.00)  mg/L


 


B-Natriuretic Peptide   34  ( - 100) pg/mL


 


Total Protein     (6.4-8.9)  g/dL


 


Albumin     (3.2-5.2)  g/dL


 


Globulin     (2-4)  g/dL


 


Albumin/Globulin Ratio     (1-3)  


 


TSH     (0.34-5.60)  mcIU/mL


 


Urine Color    Yellow  


 


Urine Appearance    Clear  


 


Urine pH    5.0  (5-9)  


 


Ur Specific Gravity    1.018  (1.010-1.030)  


 


Urine Protein    Negative  (Negative)  


 


Urine Ketones    Negative  (Negative)  


 


Urine Blood    2+ A  (Negative)  


 


Urine Nitrate    Negative  (Negative)  


 


Urine Bilirubin    Negative  (Negative)  


 


Urine Urobilinogen    Negative  (Negative)  


 


Ur Leukocyte Esterase    Negative  (Negative)  


 


Urine WBC (Auto)    Absent  (Absent)  


 


Urine RBC (Auto)    2+(6-10/hpf) A  (Absent)  


 


Urine Bacteria    Absent  (Absent)  


 


Urine Glucose    Negative  (Negative)  











Result Diagrams: 


 06/03/18 14:08





 06/03/18 14:08


Lab Statement: Any lab studies that have been ordered have been reviewed, and 

results considered in the medical decision making process.





- CT


  ** Brain CT


CT Interpretation: Positive (See Comments) - 1. No acute intracranial 

abnormality. Negative unenhanced CT of the brain. 2. Paranasal sinus disease. 

Fluid level at the partially visualized RIGHT maxillary sinus which may reflect 

acute sinusitis. Interval resolution of previous fluid level at the LEFT 

maxillary sinus. ED physician has reviewed this report.


CT Interpretation Completed By: Radiologist





- EKG


  ** 14:27


Cardiac Rate: Bradycardia


EKG Rhythm: Sinus Bradycardia - 58 BPM


EKG Interpretation: Prolonged IL interval.





Re-Evaluation





- Re-Evaluation


  ** First Eval


Re-Evaluation Time: 17:00


Change: Unchanged


Comment: I spoke with the patient about his imaging results. The patient will 

walk around to see if he's feeling better.





  ** Second Eval


Re-Evaluation Time: 17:30


Change: Improved


Comment: The patient is able to walk around. He will be discharged home.





Dizzy Course/Dx





- Course


Course Of Treatment: Mr. Rodriguez presented with multiple vague complaints 

including blurred vision, not feeling right and dizziness.  His workup was 

unremarkable and he was able to ambulate around the department without any 

difficulty.  I recommended close follow-up with his PMD.





- Diagnoses


Provider Diagnoses: 


 Weakness








Discharge





- Sign-Out/Discharge


Documenting (check all that apply): Discharge/Admit/Transfer





- Discharge Plan


Condition: Stable


Disposition: HOME


Patient Education Materials:  Weakness (ED)


Referrals: 


Bridger Webb MD [Primary Care Provider] - 3 Days


Additional Instructions: 


Follow up with your primary care provider in 2-3 days.





Return to the emergency department for any new or worsening symptoms.





- Billing Disposition and Condition


Condition: STABLE


Disposition: HOME





The documentation as recorded by the Griffin song Natalie accurately reflects 

the service I personally performed and the decisions made by me, Miguel Myles MD.

## 2018-07-09 ENCOUNTER — HOSPITAL ENCOUNTER (EMERGENCY)
Dept: HOSPITAL 25 - ED | Age: 53
Discharge: HOME | End: 2018-07-09
Payer: MEDICARE

## 2018-07-09 VITALS — DIASTOLIC BLOOD PRESSURE: 89 MMHG | SYSTOLIC BLOOD PRESSURE: 128 MMHG

## 2018-07-09 DIAGNOSIS — Z87.891: ICD-10-CM

## 2018-07-09 DIAGNOSIS — Z88.0: ICD-10-CM

## 2018-07-09 DIAGNOSIS — R51: ICD-10-CM

## 2018-07-09 DIAGNOSIS — R07.9: Primary | ICD-10-CM

## 2018-07-09 LAB
BASOPHILS # BLD AUTO: 0 10^3/UL (ref 0–0.2)
EOSINOPHIL # BLD AUTO: 0.1 10^3/UL (ref 0–0.6)
HCT VFR BLD AUTO: 43 % (ref 42–52)
HGB BLD-MCNC: 14.1 G/DL (ref 14–18)
INR PPP/BLD: 0.93 (ref 0.77–1.02)
LYMPHOCYTES # BLD AUTO: 1.4 10^3/UL (ref 1–4.8)
MCH RBC QN AUTO: 28 PG (ref 27–31)
MCHC RBC AUTO-ENTMCNC: 33 G/DL (ref 31–36)
MCV RBC AUTO: 85 FL (ref 80–94)
MONOCYTES # BLD AUTO: 0.6 10^3/UL (ref 0–0.8)
NEUTROPHILS # BLD AUTO: 3.8 10^3/UL (ref 1.5–7.7)
NRBC # BLD AUTO: 0 10^3/UL
NRBC BLD QL AUTO: 0.2
PLATELET # BLD AUTO: 213 10^3/UL (ref 150–450)
RBC # BLD AUTO: 5.03 10^6/UL (ref 4–5.4)
WBC # BLD AUTO: 5.9 10^3/UL (ref 3.5–10.8)

## 2018-07-09 PROCEDURE — 71045 X-RAY EXAM CHEST 1 VIEW: CPT

## 2018-07-09 PROCEDURE — 93005 ELECTROCARDIOGRAM TRACING: CPT

## 2018-07-09 PROCEDURE — 83605 ASSAY OF LACTIC ACID: CPT

## 2018-07-09 PROCEDURE — 84484 ASSAY OF TROPONIN QUANT: CPT

## 2018-07-09 PROCEDURE — 85025 COMPLETE CBC W/AUTO DIFF WBC: CPT

## 2018-07-09 PROCEDURE — 80053 COMPREHEN METABOLIC PANEL: CPT

## 2018-07-09 PROCEDURE — 85610 PROTHROMBIN TIME: CPT

## 2018-07-09 PROCEDURE — 83880 ASSAY OF NATRIURETIC PEPTIDE: CPT

## 2018-07-09 PROCEDURE — 36415 COLL VENOUS BLD VENIPUNCTURE: CPT

## 2018-07-09 PROCEDURE — 99283 EMERGENCY DEPT VISIT LOW MDM: CPT

## 2018-07-09 NOTE — RAD
HISTORY: chest pain



COMPARISONS: February 14, 2018



VIEWS: 1: frontal portable view of the chest at 1:55 PM



FINDINGS:

LINES AND TUBES: None.

CARDIOMEDIASTINAL SILHOUETTE: The cardiomediastinal silhouette is normal for portable

technique.

PLEURA: The costophrenic angles are sharp. No pleural abnormalities are noted.

LUNG PARENCHYMA: The lungs are clear.

ABDOMEN: The upper abdomen is clear. There is no subphrenic gas.

BONES AND SOFT TISSUES: No bone or soft tissue abnormalities are noted.



IMPRESSION: NO ACTIVE CARDIOPULMONARY DISEASE.

## 2018-07-09 NOTE — ED
Korey NATHAN Angela, scribed for Miguel Myles MD on 07/09/18 at 1308 .





HPI Chest Pain





- HPI Summary


HPI Summary: 





This pt is a 52 y/o male presenting to [facility] c/o chset pain since this morning. 

Pt reports he woke up this morning with chest pain. He describes chest pain as 

"someone is standing on it." His pain is unchanged with movement or position 

change. Chest pain is aggravated with ambulation. Additionally notes arm and 

leg pain, headache. Headache is described as throbbing on the left side of his 

head. Denies neck pain. 


Pt is unsure if his symptoms are secondary to the heat. He states he just 

bought an AC a couple of days ago for his apartment. 





- History of Current Complaint


Chief Complaint: EDChestPainROMI


Time Seen by Provider: 07/09/18 12:46


Hx Obtained From: Patient


Onset/Duration: Started Hours Ago, Still Present


Timing: Lasting Hours


Current Severity: Severe


Pain Intensity: 10


Pain Scale Used: 0-10 Numeric


Chest Pain Location: Diffuse


Chest Pain Radiates: No


Character: Heaviness


Aggravating Factor(s): Other: - ambulation


Alleviating Factor(s): Nothing


Associated Signs and Symptoms: Positive: Chest Pain, Headaches - left side, 

Other: - arm and leg pain.  Negative: Fever





- Additional Pertinent History


Primary Care Physician: ANA





- Allergy/Home Medications


Allergies/Adverse Reactions: 


 Allergies











Allergy/AdvReac Type Severity Reaction Status Date / Time


 


amoxicillin [From Augmentin] Allergy  Swelling Verified 07/09/18 12:52


 


clavulanic acid Allergy  Swelling Verified 07/09/18 12:52





[From Augmentin]     


 


Penicillins Allergy  Anaphylatic Verified 07/09/18 12:52





   Shock  


 


VINEGAR Allergy Intermediate Hives Uncoded 07/09/18 12:52


 


MUSHROOMS Allergy Unknown Rash Uncoded 07/09/18 12:52














PMH/Surg Hx/FS Hx/Imm Hx


Endocrine/Hematology History: Reports: Hx Anticoagulant Therapy - plavix


   Denies: Hx Diabetes, Hx Sickle Cell Disease, Hx Thyroid Disease


Cardiovascular History: Reports: Hx Angina, Hx Cardiac Arrest, Hx Coronary 

Artery Disease, Hx Hypercholesterolemia, Hx Hypertension, Hx Myocardial 

Infarction, Other Cardiovascular Problems/Disorders - HEART DISEASE, CARDIAC 

EVENT MONITOR IMPLANTED


   Denies: Hx Congestive Heart Failure, Hx Pacemaker/ICD, Hx Valvular Heart 

Disease


Respiratory History: Reports: Hx Asthma, Hx Sleep Apnea


   Denies: Hx Chronic Obstructive Pulmonary Disease (COPD)


GI History: Reports: Hx Gastroesophageal Reflux Disease


   Denies: Hx Ulcer


 History: Reports: Hx Renal Disease - cysts, Other  Problems/Disorders - 

bilat cyst on kidneys


Musculoskeletal History: Reports: Hx Arthritis, Hx Back Problems, Other 

Musculoskeletal History - VIPUL


   Denies: Hx Scoliosis


Sensory History: Reports: Hx Cataracts - right eye, Hx Contacts or Glasses


Opthamlomology History: Reports: Hx Cataracts - right eye, Hx Contacts or 

Glasses


Neurological History: Reports: Hx Seizures, Hx Transient Ischemic Attacks (TIA)


   Denies: Hx Dementia, Hx Headaches, Other Neuro Impairments/Disorders


Psychiatric History: Reports: Hx Depression


   Denies: Hx Panic Disorder





- Cancer History


Hx Chemotherapy: No





- Surgical History


Sur[place] Procedure, Year, and Place[place] [facility] [place]  [place] 

[place] [facility] [place].  2013 left tympanoplasty WITH STAPES IMPLANT ([employer] 

MALLEOUS HEAD SERIAL # [id number] - PER [employer] INFORMATION SENT TO MRI ON 6/ 13/2016; IMPLANT IS STAINLESS STEEL STATES SOME DEGREE OF MAGNETISM TESTED MRI 

SAFE AT 1.5T ONLY - DR VALDEZ APPROVED THIS INFORMATION FOR 1.5T ONLY).  [facility] ;.  

2015 LEFT REVISION TYMPANOPLASTY/ MASTOIDECTOMY [facility] [place] ;.  8/ 2012 HEART CATH - NO 

STENTS ;.  2011 HEART CATH WITH cardiac stents x 4 (PROMUS DRUG-ELUTING STENT 

OKAY IN NORMAL MODE ONLY,  GAUSS/CM @ 1.5T) [facility] [place].  REVEAL LINQ EVENT 

MONITOR PLACED- 7/28/16- ED CAME OVER W/ [employer] MACHINE & DID A DOWNLOAD SO 

THAT NOTHING WOULD BE ERASED


Hx Anesthesia Reactions: Yes - SEIZURE LIKE ACTIVITY; REINTUBATION AFTER LEFT 

EAR 2013





- Immunization History


Date of Tetanus Vaccine: UTD


Date of Influenza Vaccine: 10/17


Infectious Disease History: No


Infectious Disease History: 


   Denies: Hx Clostridium Difficile, Hx Hepatitis, Hx Human Immunodeficiency 

Virus (HIV), Hx of Known/Suspected MRSA, Hx Shingles, Hx Tuberculosis, Hx Known/

Suspected VRE, Hx Known/Suspected VRSA, History Other Infectious Disease, 

Traveled Outside the [place] in Last 30 Days





- Family History


Known Family History: Positive: Cardiac Disease, Hypertension, Diabetes





- Social History


Alcohol Use: Occasionally


Hx Substance Use: No


Substance Use Type: Reports: None


Hx Tobacco Use: Yes


Smoking Status (MU): Former Smoker


Type: Cigarettes


Length of Time of Smoking/Using Tobacco: 10-12 YRS


Have You Smoked in the Last Year: No





Review of Systems


Negative: Fever, Chills


Positive: Chest Pain


Musculoskeletal: Other - arm and leg pain


Negative: Other - neck pain


Positive: Headache


All Other Systems Reviewed And Are Negative: Yes





Physical Exam





- Summary


Physical Exam Summary: 





Appearance: The patient is obese. 


Skin: The skin is warm and dry and skin color reflects adequate perfusion.


HEENT: The head is normocephalic and atraumatic. The pupils are equal and 

reactive. The conjunctivae are clear and without drainage. Nares are patent and 

without drainage. Mouth reveals moist mucous membranes and the throat is 

without erythema and exudate. The external ears are intact. The ear canals are 

patent and without drainage. The tympanic membranes are intact.


Neck: the neck is supple with full range of motion and non-tender. There are no 

carotid bruits. There is no neck vein distension.


Respiratory: Chest wall tenderness. Lungs are clear to auscultation and breath 

sounds are symmetrical and equal.


Cardiovascular: Heart is regular rate and rhythm. There is no murmur or rub 

auscultated. There is a little bit of pitting edema and pulses are symmetrical 

and equal.


Abdomen: The abdomen is soft and non-tender. There are normal bowel sounds 

heard in all four quadrants and there is no organomegaly palpated.


Musculoskeletal: There is no back tenderness noted. Extremities are non-tender 

with full range of motion. There is good capillary refill. There is a little 

bit of pitting edema. No calf tenderness elicited.


Neurological: Patient is alert and oriented to person, place and time. 


Psychiatric: The patient has an appropriate affect and does not exhibit any 

anxiety or depression.


Triage Information Reviewed: Yes


Vital Signs On Initial Exam: 


 Initial Vitals











Temp Pulse Resp BP Pulse Ox


 


 96.4 F   63   18   165/144   98 


 


 07/09/18 12:35  07/09/18 12:35  07/09/18 12:35  07/09/18 12:35  07/09/18 12:35











Vital Signs Reviewed: Yes





Diagnostics





- Vital Signs


 Vital Signs











  Temp Pulse Resp BP Pulse Ox


 


 07/09/18 12:35  96.4 F  63  18  165/144  98














- Laboratory


Lab Results: 


 Lab Results











  07/09/18 07/09/18 07/09/18 Range/Units





  12:57 12:57 12:57 


 


WBC  5.9    (3.5-10.8)  10^3/ul


 


RBC  5.03    (4.00-5.40)  10^6/ul


 


Hgb  14.1    (14.0-18.0)  g/dl


 


Hct  43    (42-52)  %


 


MCV  85    (80-94)  fL


 


MCH  28    (27-31)  pg


 


MCHC  33    (31-36)  g/dl


 


RDW  15    (10.5-15)  %


 


Plt Count  213    (150-450)  10^3/ul


 


MPV  7.7    (7.4-10.4)  um3


 


Neut % (Auto)  63.7    (38-83)  %


 


Lymph % (Auto)  23.5 L    (25-47)  %


 


Mono % (Auto)  9.6 H    (0-7)  %


 


Eos % (Auto)  2.5    (0-6)  %


 


Baso % (Auto)  0.7    (0-2)  %


 


Absolute Neuts (auto)  3.8    (1.5-7.7)  10^3/ul


 


Absolute Lymphs (auto)  1.4    (1.0-4.8)  10^3/ul


 


Absolute Monos (auto)  0.6    (0-0.8)  10^3/ul


 


Absolute Eos (auto)  0.1    (0-0.6)  10^3/ul


 


Absolute Basos (auto)  0    (0-0.2)  10^3/ul


 


Absolute Nucleated RBC  0    10^3/ul


 


Nucleated RBC %  0.2    


 


INR (Anticoag Therapy)     (0.77-1.02)  


 


Sodium   140   (135-145)  mmol/L


 


Potassium   3.7   (3.5-5.0)  mmol/L


 


Chloride   106   (101-111)  mmol/L


 


Carbon Dioxide   28   (22-32)  mmol/L


 


Anion Gap   6   (2-11)  mmol/L


 


BUN   12   (6-24)  mg/dL


 


Creatinine   1.08   (0.67-1.17)  mg/dL


 


Est GFR ( Amer)   86.2   (>60)  


 


Est GFR (Non-Af Amer)   71.3   (>60)  


 


BUN/Creatinine Ratio   11.1   (8-20)  


 


Glucose   124 H   ()  mg/dL


 


Lactic Acid    1.3  (0.5-2.0)  mmol/L


 


Calcium   9.3   (8.6-10.3)  mg/dL


 


Total Bilirubin   0.40   (0.2-1.0)  mg/dL


 


AST   18   (13-39)  U/L


 


ALT   24   (7-52)  U/L


 


Alkaline Phosphatase   111 H   ()  U/L


 


Troponin I   0.00   (<0.04)  ng/mL


 


B-Natriuretic Peptide    ( - 100) pg/mL


 


Total Protein   7.1   (6.4-8.9)  g/dL


 


Albumin   4.0   (3.2-5.2)  g/dL


 


Globulin   3.1   (2-4)  g/dL


 


Albumin/Globulin Ratio   1.3   (1-3)  














  07/09/18 07/09/18 07/09/18 Range/Units





  12:57 12:57 15:22 


 


WBC     (3.5-10.8)  10^3/ul


 


RBC     (4.00-5.40)  10^6/ul


 


Hgb     (14.0-18.0)  g/dl


 


Hct     (42-52)  %


 


MCV     (80-94)  fL


 


MCH     (27-31)  pg


 


MCHC     (31-36)  g/dl


 


RDW     (10.5-15)  %


 


Plt Count     (150-450)  10^3/ul


 


MPV     (7.4-10.4)  um3


 


Neut % (Auto)     (38-83)  %


 


Lymph % (Auto)     (25-47)  %


 


Mono % (Auto)     (0-7)  %


 


Eos % (Auto)     (0-6)  %


 


Baso % (Auto)     (0-2)  %


 


Absolute Neuts (auto)     (1.5-7.7)  10^3/ul


 


Absolute Lymphs (auto)     (1.0-4.8)  10^3/ul


 


Absolute Monos (auto)     (0-0.8)  10^3/ul


 


Absolute Eos (auto)     (0-0.6)  10^3/ul


 


Absolute Basos (auto)     (0-0.2)  10^3/ul


 


Absolute Nucleated RBC     10^3/ul


 


Nucleated RBC %     


 


INR (Anticoag Therapy)  0.93    (0.77-1.02)  


 


Sodium     (135-145)  mmol/L


 


Potassium     (3.5-5.0)  mmol/L


 


Chloride     (101-111)  mmol/L


 


Carbon Dioxide     (22-32)  mmol/L


 


Anion Gap     (2-11)  mmol/L


 


BUN     (6-24)  mg/dL


 


Creatinine     (0.67-1.17)  mg/dL


 


Est GFR ( Amer)     (>60)  


 


Est GFR (Non-Af Amer)     (>60)  


 


BUN/Creatinine Ratio     (8-20)  


 


Glucose     ()  mg/dL


 


Lactic Acid     (0.5-2.0)  mmol/L


 


Calcium     (8.6-10.3)  mg/dL


 


Total Bilirubin     (0.2-1.0)  mg/dL


 


AST     (13-39)  U/L


 


ALT     (7-52)  U/L


 


Alkaline Phosphatase     ()  U/L


 


Troponin I    0.01  (<0.04)  ng/mL


 


B-Natriuretic Peptide   82  ( - 100) pg/mL


 


Total Protein     (6.4-8.9)  g/dL


 


Albumin     (3.2-5.2)  g/dL


 


Globulin     (2-4)  g/dL


 


Albumin/Globulin Ratio     (1-3)  











Result Diagrams: 


 07/09/18 12:57





 07/09/18 12:57


Lab Statement: Any lab studies that have been ordered have been reviewed, and 

results considered in the medical decision making process.





- Radiology


  ** Chest XR


Xray Interpretation: No Acute Changes - IMPRESSION: No active cardiopulmonary 

disease. Dr. Myles has reviewed this report.


Radiology Interpretation Completed By: Radiologist





- EKG


  ** 12:43


Cardiac Rate: NL - at 63 bpm


EKG Rhythm: Sinus Rhythm


EKG Interpretation: Lateral T wave inversions consistent with 6/3/18.





Re-Evaluation





- Re-Evaluation


  ** First Eval


Re-Evaluation Time: 16:21


Comment: I reviewed lab and XR results with the pt. He will be discharged home.





Chest Pain Course/Dx





- Course


Course Of Treatment: Mr. Rodriguez presented with a myriad of complaints most 

salient of which was chest pain.  He pointed to his left upper chest where he 

was tender to palpation.  He was fairly vague about accompanying symptoms or 

exacerbating/relieving factors.  His workup was negative including a delayed 

troponin and he was discharged for symptomatic treatment.





- Diagnoses


Provider Diagnoses: 


 Chest pain








Discharge





- Sign-Out/Discharge


Documenting (check all that apply): Discharge/Admit/Transfer - Discharge





- Discharge Plan


Condition: Stable


Disposition: HOME


Prescriptions: 


traMADol TAB* [Ultram*] 50 mg PO Q6HR PRN #20 tab MDD 4


 PRN Reason: Pain


Patient Education Materials:  Chest Pain (ED)


Referrals: 


Bridger Webb MD [Primary Care Provider] - 


Additional Instructions: 


Please follow up with your primary care provider in 2-3 days.





RETURN TO THE ED FOR ANY WORSENING SYMPTOMS.





- Billing Disposition and Condition


Condition: STABLE


Disposition: Home





The documentation as recorded by the Korey song Angela accurately reflects 

the service I personally performed and the decisions made by me, Miguel Myles MD.

## 2018-09-16 ENCOUNTER — HOSPITAL ENCOUNTER (OUTPATIENT)
Dept: HOSPITAL 25 - ED | Age: 53
Setting detail: OBSERVATION
LOS: 2 days | Discharge: HOME | End: 2018-09-18
Attending: HOSPITALIST | Admitting: HOSPITALIST
Payer: MEDICARE

## 2018-09-16 DIAGNOSIS — I73.9: ICD-10-CM

## 2018-09-16 DIAGNOSIS — E66.01: ICD-10-CM

## 2018-09-16 DIAGNOSIS — I26.99: Primary | ICD-10-CM

## 2018-09-16 DIAGNOSIS — Z79.82: ICD-10-CM

## 2018-09-16 DIAGNOSIS — Z86.79: ICD-10-CM

## 2018-09-16 DIAGNOSIS — J45.909: ICD-10-CM

## 2018-09-16 DIAGNOSIS — R07.9: ICD-10-CM

## 2018-09-16 DIAGNOSIS — E78.5: ICD-10-CM

## 2018-09-16 DIAGNOSIS — I10: ICD-10-CM

## 2018-09-16 DIAGNOSIS — I25.10: ICD-10-CM

## 2018-09-16 DIAGNOSIS — Z95.5: ICD-10-CM

## 2018-09-16 DIAGNOSIS — I25.2: ICD-10-CM

## 2018-09-16 DIAGNOSIS — G47.33: ICD-10-CM

## 2018-09-16 DIAGNOSIS — R60.0: ICD-10-CM

## 2018-09-16 LAB
BASOPHILS # BLD AUTO: 0.1 10^3/UL (ref 0–0.2)
EOSINOPHIL # BLD AUTO: 0.2 10^3/UL (ref 0–0.6)
HCT VFR BLD AUTO: 41 % (ref 42–52)
HGB BLD-MCNC: 13.4 G/DL (ref 14–18)
INR PPP/BLD: 1.01 (ref 0.77–1.02)
LYMPHOCYTES # BLD AUTO: 1.6 10^3/UL (ref 1–4.8)
MCH RBC QN AUTO: 28 PG (ref 27–31)
MCHC RBC AUTO-ENTMCNC: 33 G/DL (ref 31–36)
MCV RBC AUTO: 84 FL (ref 80–94)
MONOCYTES # BLD AUTO: 0.5 10^3/UL (ref 0–0.8)
NEUTROPHILS # BLD AUTO: 2.4 10^3/UL (ref 1.5–7.7)
NRBC # BLD AUTO: 0 10^3/UL
NRBC BLD QL AUTO: 0
PLATELET # BLD AUTO: 185 10^3/UL (ref 150–450)
RBC # BLD AUTO: 4.82 10^6/UL (ref 4–5.4)
WBC # BLD AUTO: 4.7 10^3/UL (ref 3.5–10.8)

## 2018-09-16 PROCEDURE — 71045 X-RAY EXAM CHEST 1 VIEW: CPT

## 2018-09-16 PROCEDURE — 96375 TX/PRO/DX INJ NEW DRUG ADDON: CPT

## 2018-09-16 PROCEDURE — 80053 COMPREHEN METABOLIC PANEL: CPT

## 2018-09-16 PROCEDURE — 99284 EMERGENCY DEPT VISIT MOD MDM: CPT

## 2018-09-16 PROCEDURE — 83735 ASSAY OF MAGNESIUM: CPT

## 2018-09-16 PROCEDURE — 36415 COLL VENOUS BLD VENIPUNCTURE: CPT

## 2018-09-16 PROCEDURE — 96376 TX/PRO/DX INJ SAME DRUG ADON: CPT

## 2018-09-16 PROCEDURE — 93306 TTE W/DOPPLER COMPLETE: CPT

## 2018-09-16 PROCEDURE — 96374 THER/PROPH/DIAG INJ IV PUSH: CPT

## 2018-09-16 PROCEDURE — 85730 THROMBOPLASTIN TIME PARTIAL: CPT

## 2018-09-16 PROCEDURE — 94640 AIRWAY INHALATION TREATMENT: CPT

## 2018-09-16 PROCEDURE — 84484 ASSAY OF TROPONIN QUANT: CPT

## 2018-09-16 PROCEDURE — G0378 HOSPITAL OBSERVATION PER HR: HCPCS

## 2018-09-16 PROCEDURE — 71275 CT ANGIOGRAPHY CHEST: CPT

## 2018-09-16 PROCEDURE — 83880 ASSAY OF NATRIURETIC PEPTIDE: CPT

## 2018-09-16 PROCEDURE — 85610 PROTHROMBIN TIME: CPT

## 2018-09-16 PROCEDURE — 85025 COMPLETE CBC W/AUTO DIFF WBC: CPT

## 2018-09-16 PROCEDURE — 93970 EXTREMITY STUDY: CPT

## 2018-09-16 PROCEDURE — C8929 TTE W OR WO FOL WCON,DOPPLER: HCPCS

## 2018-09-16 PROCEDURE — 84443 ASSAY THYROID STIM HORMONE: CPT

## 2018-09-16 RX ADMIN — MORPHINE SULFATE PRN MG: 4 INJECTION INTRAVENOUS at 14:55

## 2018-09-16 RX ADMIN — RIVAROXABAN SCH MG: 15 TABLET, FILM COATED ORAL at 23:32

## 2018-09-16 RX ADMIN — FAMOTIDINE SCH MG: 20 TABLET, FILM COATED ORAL at 21:14

## 2018-09-16 RX ADMIN — MOMETASONE FUROATE AND FORMOTEROL FUMARATE DIHYDRATE SCH PUFF: 200; 5 AEROSOL RESPIRATORY (INHALATION) at 19:29

## 2018-09-16 NOTE — HP
CC:  Dr. Webb *

 

[facility] MEDICINE HISTORY AND PHYSICAL:

 

DATE OF ADMISSION:  18

 

PRIMARY CARE PROVIDER:  Dr. Webb.

 

ATTENDING PHYSICIAN:  Dr. Kaylen Beyer * (dictation provided by Katelynn Parmar, MARI
).

 

CHIEF COMPLAINT:  Chest pain.

 

HISTORY OF PRESENT ILLNESS:  Mr. Rodriguez is a 53-year-old male with frequent 
admissions and visits to emergency room for chest pain, who has a history of 
coronary artery disease with posterior wall MI and stent placement in  as 
well as obstructive sleep apnea, on CPAP, morbid obesity, hypertension, 
hyperlipidemia, and asthma.  He presents today to the hospital with concern for 
10/10 chest pain. Mr. Rodriguez states he was feeling in his normal state of health 
yesterday, he had no complaints.  Mr. Rodriguez states he was in his normal state 
of health yesterday, he went out to his [employer] where he had a fish rapp dinner.  
He was feeling well and he went to bed last night.  He was awoken in the early 
hours this morning with 10/10 chest pain.  He states that this pain is similar 
to his history of chest pain except this pain now is associated with radiation 
into the back.  He states the chest feels a little bit tight, but he is not 
short of breath.  He denies diaphoresis or nausea.  He denies any recent 
history of pain in his calf.  He has chronic bilateral lower extremity edema.  
He denied any fever, any cough, any nausea, vomiting, diarrhea, abdominal pain.

 

In the emergency room, Mr. Rodriguez had labs, which showed a normal troponin, rest 
of his labs were unremarkable.  His BNP was 31.  He went on for chest x-ray 
that showed no acute process, but then a chest thorax CTA was a poor quality 
study that showed suggestion of small filling defects in the segmental branches 
of the left lower lobe consistent with a small pulmonary emboli.  His vital 
signs are stable. He is not hypoxic.  He is not tachycardiac.

 

PAST MEDICAL HISTORY:

1.  Coronary artery disease with posterior wall MI in  with stent placement.

2.  Question of seizure disorder earlier this year, but now off medication, 
managed by Dr. Arteaga.

3.  Obstructive sleep apnea, with CPAP.

4.  Morbid obesity.

5.  Hyperlipidemia.

6.  Hypertension.

7.  Peripheral vascular disease.

8.  Asthma.

9.  History of total knee arthroplasty.

10.  History of Mobitz type 1 second-degree AV block.

11.  Chronic chest pain.

12.  History of CVA "cryptogenic."

13.  History of inner ear surgery.

14.  History of sinus surgery.

 

MEDICATIONS:  Outpatient are confirmed with the patient to be:

1.  Advair 250/50, 1 dose inhaled b.i.d.

2.  Atorvastatin 20 mg p.o. daily.

3.  Aspirin 325 mg p.o. daily.

4.  Triamterene/hydrochlorothiazide 37.5/25 one cap p.o. daily.

5.  Potassium chloride 20 mEq 1 tab p.o. daily.

6.  Famotidine 20 mg p.o. b.i.d.

7.  Tramadol 50 mg p.o. q.6 hours p.r.n.

8.  Nitroglycerin tab 0.4 mg sublingually q.5 minutes p.r.n.

9.  Nebivolol 10 mg p.o. daily.

 

ALLERGIES:  AMOXICILLIN, CLAVULANIC ACID, MUSHROOMS, and PENICILLIN.

 

FAMILY HISTORY:  Both his mother and father  related to heart failure.

 

SOCIAL HISTORY:  The patient states he smoked briefly a very long time ago.  No 
report of alcohol or drug use.  He lives alone.  He is on disability, is not 
currently working.  He states that his sister, Susanne, will be his healthcare 
proxy.

 

REVIEW OF SYSTEMS:  A 14-point review of systems was completed with Mr. Rodriguez 
and all those not mentioned above were negative.

 

                               PHYSICAL EXAMINATION

 

GENERAL:  Mr. Rodriguez is lying in the bed.  He is in no acute distress.  He 
states is in 9/10 chest pain.

 

VITAL SIGNS:  Blood pressure 138/75, heart rate 58, temperature 97.7, 
respiratory rate 12, O2 saturation 97% on room air.

 

LUNGS:  Clear to auscultation bilaterally today.  There is no wheezing, or 
rhonchi appreciated.  There is good aeration.  No accessory muscle use.

 

HEART:  S1, S2.  No murmur, rub, or gallop and regular.

 

ABDOMEN:  Soft, nontender, with bowel sounds positive x4.

 

EXTREMITIES:  No cyanosis.  Positive for trace to 1+ edema.

 

NEUROLOGIC:  He is alert.  He is oriented x3.  He moves all extremities 
equally. There is no facial asymmetry or focal weakness.  Extraocular movements 
are intact.

 

SKIN:  Intact.

 

 LABORATORY DATA:  Sodium 140, potassium 3.6, chloride 107, serum bicarbonate 28
, BUN 15, creatinine 1.18, glucose 108.  Magnesium 2.0.  Troponin 0.01.  BNP is 
31. TSH 3.53.  WBC 4.7, hemoglobin 13.4, hematocrit 41, platelet count 185.  
INR 1.01.

 

IMAGING:  Chest x-ray is as follows:  "No radiographic evidence for acute 
cardiopulmonary abnormality on this portable chest x-ray."

 

The chest thorax CTA is as follows:  "Image quality is limited by suboptimal 
bolus timing, but there appeared to be nonobstructing filling defects in the 
segmental branches of the left lower lobe, pulmonary artery is consistent with 
small pulmonary emboli.  Additional chronic and degenerative changes described 
in the body of the report."

 

ASSESSMENT:  Mr. Rodriguez is a 53-year-old male with a past medical history of 
coronary artery disease with stent placement in , as well as obstructive 
sleep apnea, on CPAP, morbid obesity, and asthma, who presents today to the 
hospital with concern for chest pain, found to have small pulmonary emboli on 
the left lower lobe.  Our plans are for observation in the hospital for the 
followin.  Chest pain:  The patient has had a chronic history of intermittent severe 
chest pain.  He has had multiple levels of workup including a repeat cardiac 
catheterization since , stent placement that failed to show any significant 
coronary artery disease, though certainly he is at high risk.  The fact that he 
has had negative workups repeatedly makes me question whether or not his 
symptoms are actually related to the pulmonary emboli discovered on the CTA of 
the chest today. Regardless, these pulmonary emboli are present and require 
treatment.  The patient has been started on Lovenox in the emergency room.  I 
reviewed the case with Dr. Beyer and with the patient, and given him the 
opportunity to decide between using injections of Lovenox with warfarin and 
frequent monitoring versus using Xarelto, and at this time our plan is to use 
Xarelto.  The patient will be on 15 mg p.o. b.i.d. and then transition over to 
20 mg after the first 21 days.  Our recommendation would be that Dr. Webb 
could consider monitoring the factor Xa level given that the patient is 
morbidly obese.  I plan to check a transthoracic echocardiogram to evaluate for 
cardiac changes that would suggest that there is pressure overload, although at 
this time I think there is a low likelihood of this. I also plan to Doppler the 
bilateral lower extremities given that they are both edematous.  I think if 
there was significant clot burden found in the lower extremities, then we will 
ask to switch from Xarelto to Lovenox with warfarin, but if those are negative, 
I think Xarelto would continue to be a good choice.

2.  Asthma.  No evidence of acute exacerbation.  Continue his Advair.

3.  Hyperlipidemia.  Continue atorvastatin.

4.  Coronary artery disease.  Plan to just use the aspirin 81 mg instead of 325 
mg while the patient is on anticoagulation.  He will continue on his home 
Nebivolol.

5.  Hypertension.  Continue Nebivolol and Dyazide.

6.  Code status is full code.

7.  Gastroesophageal reflux disease.  Continue Pepcid.

8.  Disposition:  To the medical floor or telemetry floor.

 

TIME SPENT:  Approximately 60 minutes was spent on the admission of this patient
, more than half of the time was spent with the patient at the bedside 
reviewing the events leading up to this hospitalization, performing a physical 
examination, and reviewing my plan of care.

 

 ____________________________________ 

KATELYNN PARMAR NP

 

[id number]/CPS #: [id number]

STEVE

## 2018-09-16 NOTE — ED
HPI Chest Pain





- HPI Summary


HPI Summary: 


This patient is a 53 year old M BIBA to [facility] with a chief complaint of R-sided 

CP radiating to mid-back that woke him up, beginning at approximately 0700 

today. The patient rates the pain 10/10 in severity. Symptoms aggravated by 

nothing. Symptoms alleviated by nothing. Patient reports edema (chronic), SOB, 

and headache. Patient denies nausea, vomiting, diarrhea, skin diaphoresis, 

double vision, blurred vision, ear ache, sore throat, abd pain, dysuria, and 

bruising. Pt received nitro and ASA PTA. Pt reports a history of cardiac stents 

in 2011. 








- History of Current Complaint


Chief Complaint: EDChestPainROMI


Hx Obtained From: Patient


Onset/Duration: Started Hours Ago, Atraumatic, Still Present


Timing: Constant, Lasting Hours


Initial Severity: Severe


Current Severity: Severe


Pain Intensity: 10


Pain Scale Used: 0-10 Numeric


Chest Pain Location: Right Lateral


Chest Pain Radiates: Yes


Chest Pain Radiates To:: Back


Aggravating Factor(s): Nothing


Alleviating Factor(s): Nothing


Associated Signs and Symptoms: Positive: Other: - Positive edema (chronic), SOB

, and headache. Negative nausea, vomiting, diarrhea, skin diaphoresis, double 

vision, blurred vision, ear ache, sore throat, abd pain, dysuria, and bruising





- Additional Pertinent History


Primary Care Physician: ANA





- Allergy/Home Medications


Allergies/Adverse Reactions: 


 Allergies











Allergy/AdvReac Type Severity Reaction Status Date / Time


 


amoxicillin [From Augmentin] Allergy  Swelling Verified 09/16/18 08:40


 


clavulanic acid Allergy  Swelling Verified 09/16/18 08:40





[From Augmentin]     


 


mushroom Allergy  Rash Verified 09/16/18 08:41


 


Penicillins Allergy  Anaphylatic Verified 09/16/18 08:40





   Shock  


 


VINEGAR Allergy Intermediate Hives Uncoded 09/16/18 08:41











Home Medications: 


 Home Medications





Atorvastatin* [Lipitor 20 MG*] 20 mg PO DAILY 09/16/18 [History Confirmed 09/16/ 18]


Fluticasone-Salmeterol 250-50* [Advair Diskus 250-50*] 1 dose INH BID 09/16/18 [

History Confirmed 09/16/18]











PMH/Surg Hx/FS Hx/Imm Hx


Previously Healthy: No


Endocrine/Hematology History: Reports: Hx Anticoagulant Therapy - plavix


   Denies: Hx Diabetes, Hx Sickle Cell Disease, Hx Thyroid Disease


Cardiovascular History: Reports: Hx Angina, Hx Cardiac Arrest, Hx Coronary 

Artery Disease, Hx Hypercholesterolemia, Hx Hypertension, Hx Myocardial 

Infarction, Other Cardiovascular Problems/Disorders - HEART DISEASE, CARDIAC 

EVENT MONITOR IMPLANTED


   Denies: Hx Congestive Heart Failure, Hx Pacemaker/ICD, Hx Valvular Heart 

Disease


Respiratory History: Reports: Hx Asthma, Hx Sleep Apnea


   Denies: Hx Chronic Obstructive Pulmonary Disease (COPD)


GI History: Reports: Hx Gastroesophageal Reflux Disease


   Denies: Hx Ulcer


 History: Reports: Hx Renal Disease - cysts, Other  Problems/Disorders - 

bilat cyst on kidneys


Musculoskeletal History: Reports: Hx Arthritis, Hx Back Problems, Other 

Musculoskeletal History - VIPUL


   Denies: Hx Scoliosis


Sensory History: Reports: Hx Cataracts - right eye, Hx Contacts or Glasses


Opthamlomology History: Reports: Hx Cataracts - right eye, Hx Contacts or 

Glasses


Neurological History: Reports: Hx Seizures, Hx Transient Ischemic Attacks (TIA)


   Denies: Hx Dementia, Hx Headaches, Other Neuro Impairments/Disorders


Psychiatric History: Reports: Hx Depression


   Denies: Hx Panic Disorder





- Cancer History


Hx Chemotherapy: No





- Surgical History


Surgery Procedure, Year, and Place: DEC 2004 sinus [facility] [place] ;.  2008 left knee 

arthroscopy [facility] ;.  2013 left tympanoplasty WITH STAPES IMPLANT ([email or web address] 

MALLEOUS HEAD SERIAL # [id number] - PER [email or web address] INFORMATION SENT TO MRI ON 6/ 13/2016; IMPLANT IS STAINLESS STEEL STATES SOME DEGREE OF MAGNETISM TESTED MRI 

SAFE AT 1.5T ONLY - DR VALDEZ APPROVED THIS INFORMATION FOR 1.5T ONLY).  [facility] ;.  

2015 LEFT REVISION TYMPANOPLASTY/ MASTOIDECTOMY [facility] [place] ;.  8/ 2012 HEART CATH - NO 

STENTS ;.  2011 HEART CATH WITH cardiac stents x 4 (PROMUS DRUG-ELUTING STENT 

OKAY IN NORMAL MODE ONLY,  GAUSS/CM @ 1.5T) [facility] [place] ;.  REVEAL LINQ EVENT 

MONITOR PLACED- 7/28/16- ED CAME OVER [email or web address] MACHINE & DID A DOWNLOAD SO 

THAT NOTHING WOULD BE ERASED


Hx Anesthesia Reactions: Yes - SEIZURE LIKE ACTIVITY; REINTUBATION AFTER LEFT 

EAR 2013





- Immunization History


Date of Tetanus Vaccine: UTD


Date of Influenza Vaccine: 10/17


Infectious Disease History: No


Infectious Disease History: 


   Denies: Hx Clostridium Difficile, Hx Hepatitis, Hx Human Immunodeficiency 

Virus (HIV), Hx of Known/Suspected MRSA, Hx Shingles, Hx Tuberculosis, Hx Known/

Suspected VRE, Hx Known/Suspected VRSA, History Other Infectious Disease, 

Traveled Outside the [place] in Last 30 Days





- Family History


Known Family History: Positive: Cardiac Disease, Hypertension, Diabetes





- Social History


Occupation: Disabled


Lives: Alone


Alcohol Use: Occasionally


Hx Substance Use: No


Substance Use Type: Reports: None


Hx Tobacco Use: Yes


Smoking Status (MU): Former Smoker


Type: Cigarettes


Length of Time of Smoking/Using Tobacco: 10-12 YRS


Have You Smoked in the Last Year: No





Review of Systems


Negative: Skin Diaphoresis


Negative: Blurred Vision, Diplopia


Negative: Sore Throat, Ear Ache


Positive: Chest Pain


Positive: Shortness Of Breath


Negative: Abdominal Pain, Vomiting, Diarrhea, Nausea


Negative: dysuria


Positive: Edema


Negative: Bruising


Positive: Headache


All Other Systems Reviewed And Are Negative: No





Physical Exam





- Summary


Physical Exam Summary: 


Appearance: Alert, conversive, nontoxic appearing


Skin: Warm, no mottling, no rashes, no contusions. Dry, scaly skin


HEENT: EOMI, PERRL, moist mucous membranes. Partially edentulous.


Neck: No masses on the neck, supple


Respiratory: Clear to auscultation, no rales, no rhonchi. Diminished breath 

sounds. Mild wheezing throughout all lung fields. 


Cardiovascular: RRR, pulses are symmetrical in both lower and upper extremities


Abdomen: Soft, non-tender


Bowel Sounds: Present


Musculoskeletal: No CVA tenderness, no obvious deformity, moving all 

extremities in a grossly normal manner. Mild pitting edema to bilateral lower 

extremities


Neurological: A&Ox3, CN II-XII Intact, moving all extremities symmetrically


Psychiatric: Normal affect and mood





Triage Information Reviewed: Yes


Vital Signs On Initial Exam: 


 Initial Vitals











Temp Pulse Resp BP Pulse Ox


 


 97.7 F   57   15   131/85   96 


 


 09/16/18 08:02  09/16/18 08:02  09/16/18 08:02  09/16/18 08:02  09/16/18 08:02











Vital Signs Reviewed: Yes





Diagnostics





- Vital Signs


 Vital Signs











  Temp Pulse Resp BP Pulse Ox


 


 09/16/18 08:02  97.7 F  57  15  131/85  96














- Laboratory


Result Diagrams: 


 09/16/18 08:17





 09/16/18 08:17


Lab Statement: Any lab studies that have been ordered have been reviewed, and 

results considered in the medical decision making process.





- Radiology


  ** CXR


Radiology Interpretation Completed By: Radiologist - CXR reveals, per 

radiologist, no radiographic evidence for acute cardiopulmonary abnormality on 

this portable chest x-ray. ED physician has reviewed this radiology report.





- CT


  ** CTA Chest and Thorax


CT Interpretation Completed By: Radiologist - CTA chest and thorax reveals, per 

radiologist, 1. Image quality is limited by suboptimal bolus timing, but there 

appear to be nonobstructing filling defects in segment branches of the left 

lower lobe pulmonary arteries consistent with a small pulmonary emboli. 2. 

Additional chronic and degenerative changes described in the body of the 

report. ED physician has reviewed this radiology report.





- EKG


  ** 0758


Cardiac Rate: Bradycardia


EKG Rhythm: Sinus Rhythm - 57 BPM


EKG Interpretation: Slightly prolonged QRS. T wave inversion in V4, V5, V6, I, 

and AVL.


EKG Comparison: Other - Findings are new since EKG taken on 07/06/2018





Chest Pain Course/Dx





- Course


Course Of Treatment: This patient is a 53 year old M BIBA to [facility] with a chief 

complaint of R-sided CP radiating to mid-back that woke him up, beginning at 

approximately 0700 today. Pt reports a history of cardiac stents in 2011. 

Physical Exam Findings: Mild pitting edema to bilateral lower extremities. 

Morbidly obese. Diminished breath sounds. Mild wheezing throughout all lung 

fields. Dry, scaly skin. Partially edentulous. An EKG taken at 0758 reveals 

sinus bradycardia at 57 BPM, slightly prolonged QRS, and T wave inversion in V4

, V5, V6, I, and AVL. CXR reveals, per radiologist, no radiographic evidence 

for acute cardiopulmonary abnormality on this portable chest x-ray. CTA chest 

and thorax reveals, per radiologist, 1. Image quality is limited by suboptimal 

bolus timing, but there appear to be nonobstructing filling defects in segment 

branches of the left lower lobe pulmonary arteries consistent with a small 

pulmonary emboli. 2. Additional chronic and degenerative changes described in 

the body of the report. Blood work UA obtained. In the ED course the patient 

was given morphine, contrast, and Duoneb. Consult with Dr. Montalvo (hospitalist) 

at 1118. She agrees to admit pt for further evaluation. The patient is 

agreeable with this plan.





- Diagnoses


Provider Diagnoses: 


 Chest pain, Pulmonary embolism








- Provider Notifications


Discussed Care Of Patient With: Kaylen Montalvo


Time Discussed With Above Provider: 11:18


Instructed by Provider To: Other - Consult with Dr. Montalvo (hospitalist) at 

1118. She agrees to admit pt for further evaluation.





Discharge





- Sign-Out/Discharge


Documenting (check all that apply): Patient Departure - Admit to [facility] [place]





- Discharge Plan


Condition: Stable


Disposition: ADMITTED TO [facility]





- Attestation Statements


Document Initiated by Scribe: Yes


Documenting Scribe: Chelsie Craig


Provider For Whom Scribe is Documenting (Include Credential): Mary Beth Calvillo MD


Scribe Attestation: 


IChelsie, scribed for Mary Beth Calvillo MD on 09/16/18 at 1115.

## 2018-09-16 NOTE — RAD
INDICATION: Chest pain radiating to the back



COMPARISON: Similar CTA dated November 17, 2017

 

TECHNIQUE: Axial source images were acquired following the administration of 80 mL

Omnipaque 350 intravenously and utilizing CT angiographic technique. Coronal and sagittal

reconstructed images were constructed and reviewed.



FINDINGS:



Image quality is limited by suboptimal bolus timing. The Hounsfield units overlying the

right left mainstem pulmonary arteries measure 230 Hounsfield units. Only the central,

lobar and proximal segmental branches are reliably evaluated.



At the left lower lobe there are nonobstructing filling defects in the segmental branches

(image 134 and 140 for example).



There are no focal infiltrates or effusions. There are no pulmonary parenchymal masses.



The heart is normal in size. There is no evidence of pericardial effusion. There is no

evidence of aortic aneurysm or dissection. 



There is no mediastinal, hilar, or axillary lymphadenopathy.



Degenerative changes of the thoracic spine includes loss of intervertebral disc height and

anterior marginal osteophyte formation.



Fluid density renal cysts are noted in the bilateral kidneys.



IMPRESSION:  



1. Image quality is limited by suboptimal bolus timing, but there appear to be

nonobstructing filling defects in segmental branches of the left lower lobe pulmonary

arteries consistent with small pulmonary emboli.

2. Additional chronic and degenerative changes described in the body the report.

## 2018-09-16 NOTE — RAD
HISTORY: Bilateral lower extremity swelling



TECHNIQUE: Multiple transverse and longitudinal ultrasound images were obtained of the

veins of the right lower extremity using grayscale, color Doppler, and spectral Doppler

imaging with and without compression and with augmentation. 



FINDINGS:



VEINS: The common femoral vein, deep femoral vein, femoral vein and popliteal vein are

compressible throughout their course, with normal flow on color Doppler imaging and normal

response to augmentation on spectral Doppler imaging.



SOFT TISSUES: Grossly normal. No large popliteal fossa cyst was identified.



IMPRESSION: 

No sonographic evidence of deep vein thrombosis.

## 2018-09-16 NOTE — XMS REPORT
Delta Rodriguez

 Created on:2018



Patient:Delta Rodriguez

Sex:Male

:1965

External Reference #:[id number]





Demographics







 Address  [address] [facility] [address] [place] [address]

 

 Home Phone  [phone]

 

 Mobile Phone  [phone]

 

 Preferred Language  English

 

 Marital Status  Declined to Specify/Unknown

 

 Buddhism Affiliation  Unknown

 

 Race  Unknown

 

 Ethnic Group  Declined to Specify/Unknown









Author







 Organization  [facility]

 

 Address  [address]



   [place] [address]

 

 Phone  [phone]









Support







 Name  Relationship  Address  Phone

 

 Unavailable  Sibling  Unavailable  [phone]









Care Team Providers







 Name  Role  Phone

 

 Bridger Webb MD  Care Team Information   Unavailable

 

 Bridger Webb MD  Primary Care Physician  Unavailable









Payers







 Type  Date  Identification Numbers  Payment Provider  Subscriber

 

 Medicare Primary  Effective:  Policy Number:  [place]  Delta Rodriguez



   1999  [id number]  [employer]  









 PayID: [id number]  [address]









 [place] [address]









 Medigap Part B    Policy Number: [id number]  Medicaid/C  Delta Rodriguez









 Group Number: [id number]  Medicare Primary

 

 Group Name: 21  [address]

 

 PayID: [id number]  [place] [address]







Problems







 Date  Description  Provider  Status

 

 Onset: 10/07/2011  Acute pharyngitis  Jonathan E. Cryer, MD  Active

 

 Onset: 10/07/2011  Perforation of tympanic membrane  Jonathan E. Cryer, MD  
Active

 

 Onset: 10/07/2011  Dysfunction of eustachian tube  Jonathan E. Cryer, MD  
Active

 

 Onset: 10/07/2011  Middle ear conductive hearing loss  Jonathan E. Cryer, MD  
Active

 

 Onset: 10/07/2011  Sensorineural hearing loss  Jonathan E. Cryer, MD  Active

 

 Onset: 10/07/2011  Disorder of nasal cavity  Yanelis Wooten NP  Active

 

 Onset: 10/07/2011  Gastroesophageal reflux disease  Yanelis Wooten NP  Active

 

 Onset: 10/07/2011  Extrinsic asthma without status  Yanelis Wooten NP  Active



   asthmaticus    

 

 Onset: 10/07/2011  Peptic reflux disease  Yanelis Wooten NP  Active

 

 Onset: 10/07/2011  Allergic rhinitis  Yanelis Wooten NP  Active







Family History







 Date  Family Member(s)  Problem(s)  Comments

 

   Mother  Non Insulin Dependent Diabetes  







Social History







 Type  Date  Description  Comments

 

 Occupation    tops  

 

 Cigarette Use    Former Cigarette Smoker Packs Daily 1  for 25 years

 

 Cigars    has never smoked cigars  

 

 Pipe    has never smoked a pipe  

 

 Smokeless Tobacco    has never used smokeless tobacco  

 

 ETOH Use    Current Alcohol Use Occasionally  

 

 Recreational Drug Use    denies drug use  

 

 Smoking    Patient is a former smoker  







Allergies, Adverse Reactions, Alerts







 Date  Description  Reaction  Status  Severity  Comments

 

 2008  Augmentin XR    active    

 

 2013  Penicillins  neck, tongue swells  active    

 

 2005  NKDA    inactive    







Medications







 Medication  Date  Status  Form  Strength  Qnty  SIG  Indications  Ordering



                 Provider

 

 Percocet    Active  Tablets  5-325mg  30tab  1-2 tabs    Wilber



           s  by mouth    E. Cryer,



             every 4-6    MD



             hours as    



             needed for    



             pain    

 

 Oxycodone/Acetami    Active  Tablets  5-325mg  30tab  1-2 tabs    Saeid randhawa          s  by mouth    Strominge



             every 4-6    r M.DLottie



             hours as    



             needed for    



             pain    

 

 Ipratropium    Active  Solution  0.06%  6unit  2 to 4  J31.0  Saeid BOWLES



 [place]          s  sprays in    [facility]



             each    LUCIANO donaldsonDLottie



             nostril 4    



             times a    



             day as    



             needed for    



             rhinitis    

 

 Omeprazole    Active  Capsules DR  40mg  60cap  40mg po  530.81  Saeid BOWLES



   /        s  [facility]    [facility]



             [facility] for    r M.DLottie



             esophagiti    



             s    

 

 Fluticasone Nasal    Active    50mcg  1unit  2 sprays    Wilber



 Spray  /        s  each side    E. Cryer,



             bid    MD

 

 Astepro Nasal    Active    0.15% 205  1unit  2 sprays    Saeid BOWLES



 Spray        mcg  s  once a day    Turner donaldson M.D.

 

 Singulair    Active  Tabs  10mg  30tab  1qd - Take  477.0  Wilber



           s  One Tablet    E. Cryer,



             By Mouth    MD



             Every Day    

 

 Advair Hfa    Active  Aerosol  115/21  1unit  2 Puffs  478.19  Saeid BOWLES



   /        s  [facility]    [facility]



             Spacer    r, M.D.



                 



                 



                 



                 



                 



             Please    



             Provide    



             Spacer    

 

 Albuterol    Active  Aerosol  90mcg/Dos  2unit  2 Puffs    Wilber



 Inhalation        e  s  Q4H prn    E. Cryer, MD

 

 Clopidrogel    Active  Tablets  75mg        Unknown



                 

 

 Isosorbide    Active    60mg    1 po qd    Unknown



 Dinitrate SA                

 

 Bystolic    Active    5mg    qd    Unknown



                 

 

 Simvastatin    Active    ?mg    oen po qd    Tracey,



                 Bridger CARDENAS

 

 Sertraline HCL    Active    ?mg    one po qd    Tracey,



                 Bridger CARDENAS

 

 Aspirin    Active  Tablets DR  325mg        Unknown



                 

 

 Triamterene/Hydro    Active            Unknown



 chlorothiazide                

 

 Nitroglycerin    Active    ?mg    prn Only    Tracey,



                 Bridger CARDENAS

 

 Ranexa    Active  Tablets ER  500mg    1 po bid    Unknown



       12HR          

 

 Atorvastatin    Active  Tablets  40mg    1 po qd    Unknown



 Calcium                

 

 Klor-Con M20    Active  Tablets ER  20Meq        Stefek,Pa



   /              ul M.D.

 

 Metoprolol    Active  Tablets ER  50mg        Unknown



 Succinate ER      24HR          

 

 Clopidogrel    Active  Tablets  75mg        Stefek,Pa



 Bisulfate                ul M.D.

 

 Triamterene/Hydro    Active  Capsules  37.5-25mg        Tracey,



 chlorothiazide                Bridger CARDENAS

 

 Advair Diskus    Active  Aerosol  250-50mcg        Tracey,



         /Dose        Bridger CARDENAS

 

 Proair HFA    Active  Aerosol  108(90Bas        Tracey,



         e)        Bridger CARDENAS



         mcg/Act        

 

 Fluticasone    Active  Suspension  50mcg/Act        Unknown



 Propionate                

 

 Atorvastatin    Active  Tablets  20mg        Stefek,Pa



 Calcium  /0000              ul M.D.

 

 Famotidine    Active  Tablets  20mg        Unknown



                 

 

                 

 

 Ciprodex    Hx  Suspension  0.3-0.1%  7.500  4 drops            ml  left ear    E. Cryer, -          twice a    MD



             day for 10    



   /2018          days    

 

 Methylprednisolon    Hx  TBPK  4mg  1pack  take as    Wilber



           directed    E. Cryer, - MD



                 

 

 Mupirocin    Hx  Ointment  2%  22gm  apply to              both    E. Cryer,



   -          nostril    MD



             twice a    



             day    

 

 Triamcinolone    Hx  Cream  0.1%  15gm  Apply to    Wilber



 Acetonide  /2016          left ear    E. Cryer,



   -          twice    MD



             daily for    



             1 week    

 

 Levofloxacin    Hx  Tablets  500mg  10tab  1 by mouth            s  every day    E. Cryer,



   -              MD



                 

 

 Ciprodex    Hx  Suspension  0.3-0.1%  1Bott  4 drops to            le  left ears    E. Cryer,



   -          twice a    MD



             day apply    



             rebate rx    



             bin:    



             [id number]    



             rxpcn:    



             loyalty    



             rxgrp:[id number]    



             [id number]    



             issuer:    



             [id number])    



             id#[id number]    



             25    

 

 Ofloxacin    Hx  Solution  0.3%  1unit  4 drops to    Saeid N.



           s  affected    Strominge



   -          ear daily    r, M.D.



   10/24              



   /2013              

 

 Ciprodex    Hx  Suspension  0.3-0.1%  2unit  4 drops    Saeid N.



           s  infected    Strominge



   -          ear bid    r, M.D.



   10/24          apply    



   /2013          rebate    



             rxbin:    



             [id number]    



             rxpcn:    



             loyalty    



             rxgrp:    



             [id number]    



             issuer:    



             ([id number])    



             id:    



             [id number]    

 

 Ciprodex    Hx  Suspension  0.3-0.1%  2unit  4 drops  385.32  Saeid N.



           s  infected    Strominge



   -          ear bid    BUBBA donaldson



             rebate    



             rxbin:    



             [id number]    



             rxpcn:    



             loyalty    



             rxgrp:    



             [id number]    



             issuer:    



             ([id number])    



             id:    



             [id number]    

 

 Levofloxacin    Hx  Tablets  500mg  10tab  1 po qd            s      E. Cryer,



   -              MD



                 

 

 Oxycodone/Acetami    Hx  Tablets  5-325mg  30tab  1-2 tabs    Wilber



         s  by mouth    E. Cryer,



   -          every 4-6    MD



             hours as    



   /2013          needed for    



             pain    

 

 Ciprodex  10/16  Hx  Suspension  0.3-0.1%  2unit  4 drops  381.3          s  infected    E. Cryer, -          ear bid x    MD



             14 days    



   /2013          apply    



             reuben    



             rxbin:    



             [id number]    



             rxpcn:    



             mamadou    



             rxgrp:    



             [id number]    



             issuer:    



             ([id number])    



             id:    



             [id number]    

 

 Bacitracin    Hx      1Tube  apply to  472.0  Saeid BOWLES



 Oint          rim of    Turner



   -          nose both    BUBBA donaldson



             sides in    



             the am and    



             the pm.    

 

 Flovent HFA    Hx  Aerosol  110mcg/Ac  1unit  2 puff bid  493.01  Saeid BOWLES



         t  s      Turner donaldson M.D.



                 

 

 Omnaris    Hx  Suspension  50mcg/Act  90day  2 sprays    Saeid BOWLES



           s  each    Tunrer



   -          nostril    BUBBA donaldson



   05/01          daily    



   /2012              

 

 Percocet    Hx  Tablets  5-325mg  30tab  1-2 tabs            s  by mouth    E. Cryer, -          every 4-6    MD



             hours as    



   /2013          needed for    



             pain    

 

 Ciprofloxacin HCL    Hx  Tablets  500mg  14tab  1 tablet            s  twice a    E. Cryer, -          day for 7    MD



   05/24          days    



   /2011              

 

 Aciphex    Hx  Tablets DR  20mg  30tab  1 po Daily    Saeid NLottie



           s      Turner donaldson M.D.



                 

 

 Mucinex DM  10/22  Hx  Tablets ER    5Days  1 op qd  478.1-4  Wilber



 Maximum Strength  /2009    12HR          E. Cryer, - MD



                 

 

 Fexofenadine HCL    Hx  Tabs  180mg  30tab  1qd - Take            s  One Tablet    E. Cryer, -          By Mouth    MD



             Every Day    



                 

 

 Medrol Dosepak    Hx  Tablets  4mg  1Pack  Take as  350.2            Directed    E. Cryer, - MD



                 

 

 Prednisone    Hx  Tablets  20mg  5Days  3 PO qd    Saeid BOWLES



             With Food    Turner donaldson M.D.



                 

 

 Augmentin    Hx  Tablets  875mg  14Day  1 po bid  473.0  Saeid VELARDE.



           s  with food    Turner donaldson M.D.



                 

 

 Aciphex    Hx  Tablets DR  20mg  60tab  1 po bid  530.81          s      E. Cryer,



   -              MD



                 

 

 Fexofenadine HCL    Hx  Tablets  180mg  30tab  1 po qd  477.8  .



           maciel donaldson M.D.



                 

 

 Zyrtec    Hx  Tablets  10mg  30tab  1 PO qd    .



           maciel donaldson M.D.



                 

 

 Fluticasone    Hx  Suspension  50mcg/Act  1mont  2 sprays  471.8  Wilber



 Propionate  /2007        h  each    E. Cryer,



   -          nostril    MD



   12/16          daily    



   /2008              

 

 Prednisone    Hx  Tablets  10mg  40tab  4 Tabs PO  471.8          s  Every Day    E. Cryer,



   -          X4 D, Then    MD



   10/15          3 Tabs PO    



   /          Every Day    



             X 4D, Then    



             2 Tab PO    



             Daily X4D,    



             Then 1 Tab    



             PO Daily    



             X4D    

 

 Nasacort Aq    Hx  Suspension  55mcg/Act  1unit  2 Sprays  471.8  Wilber



 Intranasal Spray  /      uation  s  Each    E. Cryer,



   -          Nostril qd    MD



                 

 

 Levaquin    Hx  Tablets  500mg  10tab  1 Tablet  471.8  Wilber



           s  By Mouth    E. Cryer,



   -          Daily    MD



   10/15          Until    



             Finished    

 

 Nexium    Hx            Unknown



   /              



   -              



                 

 

 Singulair    Hx  Tablets  10mg  30tab  1 PO qd  471.8  Wilber



           s      E. Cryer, - MD



                 

 

 Nasacort Aq    Hx  Suspension  55McG/Act  1unit  2 sprays  473.2  Wilber



 Intranasal S[place]en  /      uation  s  each    E. Cryer,



   -          nostril qd    MD



                 

 

 Lisinopril  00  Hx            Unknown



   /0000              



   -              



                 

 

 Pepcid ac Ez    Hx            Unknown



 Chews Maximum  /0000              



 Strength  -              



                 







Immunizations







 CPT Code  Status  Date  Vaccine  Lot #

 

 [id number]  Ordered  10/01/2015  Influenza Virus Vaccine, 3 Years Of Age And Above,
  



       Intramuscular  

 

 84574  Given  Unknown  Influenza Virus Vaccine, 3 Years Of Age And Above,  



       Intramuscular  







Vital Signs







 Date  Vital  Result  Comment

 

 2018  Weight  338.00 lb  









 Weight in kg's  153.317  

 

 Height  68.50 inches  5'8.50"

 

 Height in cm's  174.0 cm  

 

 BMI (Body Mass Index)  50.6 kg/m2  









 2018  Weight  335.00 lb  









 Weight in kg's  151.956  

 

 Height  68.50 inches  5'8.50"

 

 Height in cm's  174.0 cm  

 

 BMI (Body Mass Index)  50.2 kg/m2  









 2018  BP Systolic  159 mmHg  









 BP Diastolic  94 mmHg  

 

 Heart Rate  74 /min  

 

 Respiratory Rate  17 /min  

 

 Weight  335.00 lb  

 

 Weight in kg's  151.956  

 

 Height  68.50 inches  5'8.50"

 

 Height in cm's  174.0 cm  

 

 BMI (Body Mass Index)  50.2 kg/m2  









 2017  BP Systolic  155 mmHg  









 BP Diastolic  88 mmHg  

 

 Heart Rate  77 /min  

 

 Weight  335.00 lb  

 

 Weight in kg's  151.956  

 

 Height  68.50 inches  5'8.50"

 

 Height in cm's  174.0 cm  

 

 BMI (Body Mass Index)  50.2 kg/m2  









 2016  BP Systolic  165 mmHg  









 BP Diastolic  99 mmHg  

 

 Heart Rate  78 /min  

 

 Respiratory Rate  16 /min  

 

 Weight  335.00 lb  

 

 Weight in kg's  151.956  

 

 Height  68.50 inches  5'8.50"

 

 Height in cm's  174.0 cm  

 

 BMI (Body Mass Index)  50.2 kg/m2  









 2016  BP Systolic  114 mmHg  









 BP Diastolic  89 mmHg  

 

 Heart Rate  64 /min  

 

 Respiratory Rate  17 /min  

 

 Weight  335.00 lb  

 

 Weight in kg's  151.956  

 

 Height  68.50 inches  5'8.50"

 

 Height in cm's  174.0 cm  

 

 BMI (Body Mass Index)  50.2 kg/m2  









 2016  BP Systolic  107 mmHg  









 BP Diastolic  82 mmHg  

 

 Heart Rate  78 /min  

 

 Respiratory Rate  17 /min  

 

 Weight  335.00 lb  

 

 Weight in kg's  151.956  

 

 Height  68.50 inches  5'8.50"

 

 Height in cm's  174.0 cm  

 

 BMI (Body Mass Index)  50.2 kg/m2  









 2015  BP Systolic  142 mmHg  









 BP Diastolic  105 mmHg  

 

 Heart Rate  76 /min  

 

 Respiratory Rate  17 /min  

 

 Weight  335.00 lb  

 

 Weight in kg's  151.956  

 

 Height  68.50 inches  5'8.50"

 

 Height in cm's  174.0 cm  

 

 BMI (Body Mass Index)  50.2 kg/m2  









 2015  BP Systolic  134 mmHg  









 BP Diastolic  86 mmHg  

 

 Heart Rate  72 /min  

 

 Respiratory Rate  17 /min  

 

 Weight  335.00 lb  

 

 Weight in kg's  151.956  

 

 Height  68.50 inches  5'8.50"

 

 Height in cm's  174.0 cm  

 

 BMI (Body Mass Index)  50.2 kg/m2  









 10/29/2015  BP Systolic  132 mmHg  









 BP Diastolic  92 mmHg  

 

 Heart Rate  91 /min  

 

 Respiratory Rate  18 /min  

 

 Weight  335.00 lb  

 

 Weight in kg's  151.956  

 

 Height  68.50 inches  5'8.50"

 

 Height in cm's  174.0 cm  

 

 BMI (Body Mass Index)  50.2 kg/m2  









 2015  Respiratory Rate  17 /min  









 Weight  335.00 lb  

 

 Weight in kg's  151.956  

 

 Height  68.50 inches  5'8.50"

 

 Height in cm's  174.0 cm  

 

 BMI (Body Mass Index)  50.2 kg/m2  









 2015  BP Systolic  97 mmHg  









 BP Diastolic  61 mmHg  

 

 Heart Rate  78 /min  

 

 Respiratory Rate  16 /min  

 

 Weight  335.00 lb  

 

 Weight in kg's  151.956  

 

 Height  68.50 inches  5'8.50"

 

 Height in cm's  174.0 cm  

 

 BMI (Body Mass Index)  50.2 kg/m2  









 2014  BP Systolic  131 mmHg  









 BP Diastolic  84 mmHg  

 

 Heart Rate  67 /min  

 

 Respiratory Rate  18 /min  

 

 Weight  335.00 lb  

 

 Weight in kg's  151.956  

 

 Height  68.50 inches  5'8.50"

 

 Height in cm's  174.0 cm  

 

 BMI (Body Mass Index)  50.2 kg/m2  









 2014  BP Systolic  109 mmHg  









 BP Diastolic  68 mmHg  

 

 Heart Rate  68 /min  

 

 Respiratory Rate  17 /min  

 

 Weight  328.00 lb  

 

 Weight in kg's  148.781  

 

 Height  68.50 inches  5'8.50"

 

 Height in cm's  174.0 cm  

 

 BMI (Body Mass Index)  49.1 kg/m2  









 2014  BP Systolic  121 mmHg  









 BP Diastolic  79 mmHg  

 

 Heart Rate  77 /min  

 

 Respiratory Rate  16 /min  

 

 Weight  325.00 lb  

 

 Weight in kg's  147.420  

 

 Height  68.50 inches  5'8.50"

 

 Height in cm's  174.0 cm  

 

 BMI (Body Mass Index)  48.7 kg/m2  









 2014  BP Systolic  138 mmHg  









 BP Diastolic  76 mmHg  

 

 Heart Rate  73 /min  

 

 Respiratory Rate  16 /min  

 

 Weight  325.00 lb  

 

 Weight in kg's  147.420  

 

 Height  68.50 inches  5'8.50"

 

 Height in cm's  174.0 cm  

 

 BMI (Body Mass Index)  48.7 kg/m2  









 2014  BP Systolic  128 mmHg  









 BP Diastolic  80 mmHg  

 

 Heart Rate  65 /min  

 

 Respiratory Rate  16 /min  

 

 Weight  325.00 lb  

 

 Weight in kg's  147.420  

 

 Height  68.50 inches  5'8.50"

 

 Height in cm's  174.0 cm  

 

 BMI (Body Mass Index)  48.7 kg/m2  









 2014  Respiratory Rate  17 /min  









 Weight  325.00 lb  

 

 Weight in kg's  147.420  

 

 Height  68.50 inches  5'8.50"

 

 Height in cm's  174.0 cm  

 

 BMI (Body Mass Index)  48.7 kg/m2  









 2014  BP Systolic  129 mmHg  









 BP Diastolic  82 mmHg  

 

 Heart Rate  87 /min  

 

 Respiratory Rate  17 /min  

 

 Weight  325.00 lb  

 

 Weight in kg's  147.420  

 

 Height  68.50 inches  5'8.50"

 

 Height in cm's  174.0 cm  

 

 BMI (Body Mass Index)  48.7 kg/m2  









 2013  BP Systolic  131 mmHg  









 BP Diastolic  83 mmHg  

 

 Heart Rate  75 /min  

 

 Respiratory Rate  16 /min  

 

 Weight  325.00 lb  

 

 Weight in kg's  147.420  

 

 Height  68.50 inches  5'8.50"

 

 Height in cm's  174.0 cm  

 

 BMI (Body Mass Index)  48.7 kg/m2  









 10/24/2013  BP Systolic  132 mmHg  









 BP Diastolic  84 mmHg  

 

 Heart Rate  61 /min  

 

 Respiratory Rate  17 /min  

 

 Weight  325.00 lb  

 

 Weight in kg's  147.420  

 

 Height  68.50 inches  5'8.50"

 

 Height in cm's  174.0 cm  

 

 BMI (Body Mass Index)  48.7 kg/m2  









 2013  BP Systolic  126 mmHg  









 BP Diastolic  81 mmHg  

 

 Heart Rate  73 /min  

 

 Respiratory Rate  17 /min  

 

 Weight  325.00 lb  

 

 Weight in kg's  147.420  

 

 Height  68.50 inches  5'8.50"

 

 Height in cm's  174.0 cm  

 

 BMI (Body Mass Index)  48.7 kg/m2  









 2013  Weight  325.00 lb  









 Weight in kg's  147.420  

 

 Height  68.50 inches  5'8.50"

 

 Height in cm's  174.0 cm  

 

 BMI (Body Mass Index)  48.7 kg/m2  









 2013  Weight  325.00 lb  









 Weight in kg's  147.420  

 

 Height  68.50 inches  5'8.50"

 

 Height in cm's  174.0 cm  

 

 BMI (Body Mass Index)  48.7 kg/m2  









 2013  BP Systolic  108 mmHg  









 BP Diastolic  73 mmHg  

 

 Heart Rate  77 /min  

 

 Respiratory Rate  16 /min  

 

 Weight  325.00 lb  

 

 Weight in kg's  147.420  

 

 Height  68.50 inches  5'8.50"

 

 Height in cm's  174.0 cm  

 

 BMI (Body Mass Index)  48.7 kg/m2  









 2013  BP Systolic  99 mmHg  









 BP Diastolic  72 mmHg  

 

 Heart Rate  62 /min  

 

 Respiratory Rate  16 /min  

 

 Weight  325.00 lb  

 

 Weight in kg's  147.420  

 

 Height  68.50 inches  5'8.50"

 

 Height in cm's  174.0 cm  

 

 BMI (Body Mass Index)  48.7 kg/m2  









 2013  BP Systolic  123 mmHg  









 BP Diastolic  88 mmHg  

 

 Heart Rate  66 /min  

 

 Respiratory Rate  16 /min  

 

 Weight  325.94 lb  

 

 Weight in kg's  147.84  

 

 Height  68.50 inches  5'8.50"

 

 Height in cm's  174.0 cm  

 

 BMI (Body Mass Index)  48.8 kg/m2  









 2013  BP Systolic  122 mmHg  









 BP Diastolic  84 mmHg  

 

 Heart Rate  64 /min  

 

 Respiratory Rate  16 /min  

 

 Weight  305.00 lb  

 

 Weight in kg's  138.348  

 

 Height  69.02 inches  5'9.02"

 

 Height in cm's  175.3 cm  

 

 BMI (Body Mass Index)  45.0 kg/m2  









 10/25/2012  Weight  288.00 lb  









 Weight in kg's  130.637  

 

 Height  69.02 inches  5'9"

 

 Height in cm's  175.3 cm  

 

 BMI (Body Mass Index)  42.5 kg/m2  









 10/16/2012  BP Systolic  150 mmHg  









 BP Diastolic  90 mmHg  

 

 Heart Rate  66 /min  

 

 Respiratory Rate  20 /min  

 

 Weight  208.00 lb  

 

 Weight in kg's  94.349  

 

 Height  69 inches  5'9"

 

 Height in cm's  175.3 cm  

 

 BMI (Body Mass Index)  30.7 kg/m2  









 10/06/2011  BP Systolic  152 mmHg  









 BP Diastolic  100 mmHg  

 

 Heart Rate  62 /min  

 

 Respiratory Rate  16 /min  

 

 Weight  280.00 lb  

 

 Weight in kg's  127.008  

 

 Height  69 inches  5'9"

 

 Height in cm's  175.3 cm  

 

 BMI (Body Mass Index)  41.3 kg/m2  









 2011  BP Systolic  143 mmHg  









 BP Diastolic  95 mmHg  

 

 Heart Rate  75 /min  

 

 Respiratory Rate  17 /min  









 2009  Heart Rate  64 /min  









 Respiratory Rate  16 /min  

 

 Weight  321.00 lb  

 

 Weight in kg's  145.606  









 2008  BP Systolic  170 mmHg  Taken with LG BP cuff









 BP Diastolic  106 mmHg  Taken with LG BP cuff

 

 Heart Rate  79 /min  

 

 Respiratory Rate  18 /min  









 2008  BP Systolic  159 mmHg  









 BP Diastolic  114 mmHg  

 

 Heart Rate  79 /min  

 

 Respiratory Rate  16 /min  









 10/23/2008  BP Systolic  157 mmHg  









 BP Diastolic  107 mmHg  

 

 Heart Rate  77 /min  

 

 Respiratory Rate  16 /min  









 2008  BP Systolic  133 mmHg  









 BP Diastolic  100 mmHg  

 

 Heart Rate  75 /min  

 

 Respiratory Rate  16 /min  









 2008  BP Systolic  160 mmHg  









 BP Diastolic  109 mmHg  

 

 Heart Rate  74 /min  

 

 Respiratory Rate  16 /min  









 2008  BP Systolic  137 mmHg  









 BP Diastolic  85 mmHg  

 

 Heart Rate  75 /min  

 

 Respiratory Rate  16 /min  









 2008  BP Systolic  139 mmHg  









 BP Diastolic  88 mmHg  

 

 Heart Rate  85 /min  

 

 Respiratory Rate  12 /min  









 2008  BP Systolic  138 mmHg  









 BP Diastolic  87 mmHg  

 

 Heart Rate  82 /min  

 

 Respiratory Rate  16 /min  









 2008  BP Systolic  148 mmHg  









 BP Diastolic  86 mmHg  









 2008  BP Systolic  148 mmHg  









 BP Diastolic  92 mmHg  

 

 Heart Rate  80 /min  

 

 Respiratory Rate  14 /min  









 2007  BP Systolic  119 mmHg  









 BP Diastolic  76 mmHg  

 

 Heart Rate  75 /min  

 

 Respiratory Rate  16 /min  









 2006  BP Systolic  176 mmHg  









 BP Diastolic  73 mmHg  

 

 Heart Rate  80 /min  

 

 Respiratory Rate  16 /min  









 2005  BP Systolic  146 mmHg  









 BP Diastolic  82 mmHg  

 

 Heart Rate  80 /min  

 

 Respiratory Rate  16 /min  







Results







 Test  Date  Test  Result  H/L  Range  Note

 

 CBC No Diff  2015  White Blood Count  5.2 10^3/uL    3.5-10.8  1









 Red Blood Count  5.43 10^6/uL  High  4.0-5.4  1

 

 Hemoglobin  15.1 g/dL    14.0-18.0  1

 

 Hematocrit  47 %    42-52  1

 

 Mean Corpuscular Volume  87 fL    80-94  1

 

 Mean Corpuscular Hemoglobin  28 pg    27-31  1

 

 Mean Corpuscular HGB Conc  32 g/dL    31-36  1

 

 Red Cell Distribution Width  15 %    10.5-15  1

 

 Platelet Count  232 10^3/uL    150-450  1

 

 Mean Platelet Volume  8 um3    7.4-10.4  1









 Inr/Protime  2015  Inr  1.05    0.89-1.11  1

 

 Laboratory test finding  2015  Partial Thrombo  45.0 seconds  High  26.0-
36.3  1, 2



     Time PTT        

 

 Basic Metabolic Panel  2015  Sodium  141 mmol/L    133-145  1









 Potassium  3.8 mmol/L    3.5-5.0  1

 

 Chloride  106 mmol/L    101-111  1

 

 Co2 Carbon Dioxide  28 mmol/L    22-32  1

 

 Anion Gap  7 mmol/L    2-11  1

 

 Glucose  91 mg/dL      1

 

 Blood Urea Nitrogen  14 mg/dL    6-24  1

 

 Creatinine  1.15 mg/dL    0.67-1.17  1

 

 BUN/Creatinine Ratio  12.2    8-20  1

 

 Calcium  9.9 mg/dL    8.6-10.3  1

 

 Egfr Non-  67.0    >60  1

 

 Egfr   86.2    >60  1, 3









 Laboratory test finding  2013  Inr  0.96    0.87-0.97  4









 Activated Partial Thrombo Time  44.1 seconds  High  22.18-37.18  5









 Basic Metabolic Panel  2013  Sodium  140 mmol/L    133-145  









 Potassium  4.3 mmol/L    3.5-5.0  

 

 Chloride  104 mmol/L    101-111  

 

 Co2 Carbon Dioxide  30.0 mmol/L    22-32  

 

 Anion Gap  6.0 mmol/L    2-11  

 

 Glucose  111 mg/dL  High    

 

 Blood Urea Nitrogen  14 mg/dL    6-24  

 

 Creatinine  1.30 mg/dL    0.50-1.40  

 

 BUN/Creatinine Ratio  10.8    8-20  

 

 Calcium  9.7 mg/dL    8.1-9.9  

 

 Egfr Non-  58.7    >60  

 

 Egfr   75.5    >60  6









 CBC Auto Diff  2013  White Blood Count  4.0 10^3/uL  Low  4.8-10.8  









 Red Blood Count  4.93 10^6/uL    4.0-5.4  

 

 Hemoglobin  14.3 g/dL    14.0-18.0  

 

 Hematocrit  43 %    42-52  

 

 Mean Corpuscular Volume  87 fL    80-94  

 

 Mean Corpuscular Hemoglobin  29 pg    27-31  

 

 Mean Corpuscular HGB Conc  33 g/dL    31-36  

 

 Red Cell Distribution Width  14 %    10.5-15  

 

 Platelet Count  184 10^3/uL    150-450  

 

 Mean Platelet Volume  8 um3    7.4-10.4  

 

 Abs Neutrophils  2.2 10^3/uL    1.5-7.7  

 

 Abs Lymphocytes  1.3 10^3/uL    1.0-4.8  

 

 Abs Monocytes  0.4 10^3/uL    0-0.8  

 

 Abs Eosinophils  0.1 10^3/uL    0-0.6  

 

 Abs Basophils  0 10^3/uL    0-0.2  

 

 Abs Nucleated RBC  0.01 10^3/uL      

 

 Granulocyte %  54.0 %    38-83  

 

 Lymphocyte %  32.6 %    25-47  

 

 Monocyte %  9.7 %  High  1-9  

 

 Eosinophil %  3.1 %    0-6  

 

 Basophil %  0.6 %    0-2  

 

 Nucleated Red Blood Cells %  0.2      









 Basic Metabolic Panel  2013  Sodium  135 mmol/L    133-145  









 Potassium  3.6 mmol/L    3.5-5.0  

 

 Chloride  102 mmol/L    101-111  

 

 Co2 Carbon Dioxide  30.0 mmol/L    22-32  

 

 Anion Gap  3.0 mmol/L    2-11  

 

 Glucose  80 mg/dL      

 

 Blood Urea Nitrogen  11 mg/dL    6-24  

 

 Creatinine  1.10 mg/dL    0.50-1.40  

 

 BUN/Creatinine Ratio  10.0    8-20  

 

 Calcium  9.0 mg/dL    8.1-9.9  

 

 Egfr Non-  71.4    >60  

 

 Egfr   91.9    >60  7









 Laboratory test finding  12/10/2008  Troponin-I (TnI)  0.04 NG/ML    0-0.06  8

 

 Stat CMP  12/10/2008  Sodium  136 mmol/L    135-145  









 Potassium  3.9 mmol/L    3.5-5.0  

 

 Chloride  104 mmol/L    101-111  

 

 Co2 (Carbon Dioxide)  25.0 mmol/L    22-32  

 

 Anion Gap  7.0 mmol/L    2-11  9

 

 Glucose  98 mg/dL      10

 

 BUN  15 mg/dL    6-24  

 

 Creatinine  1.20 mg/dL    0.50-1.40  

 

 One Over Creatinine  0.80      

 

 BUN/Creatinine Ratio  12.5    8-20  

 

 Calcium  9.3 mg/dL    8.1-9.9  11

 

 Total Protein  6.9 GM/DL    6.2-8.1  

 

 Albumin  3.9 GM/DL    3.6-5.4  

 

 Globulin  3.0 GM/DL    2-4  

 

 Albumin/Globulin Ratio  1.3    1-3  

 

 Bilirubin Total  0.5 mg/dL    0.4-1.5  

 

 Alkaline Phosphatase  112 U/L      

 

 Alt (SGPT)  53 U/L    17-63  

 

 Ast (Sgot)  31 U/L    12-42  









 CBC With Electronic Diff Stat  12/10/2008  White Blood Count  6.7 CUMM    4.8-
10.8  









 Red Cell Count  5.28 CUMM    4.6-6.2  

 

 Hemoglobin  15.2 g/dL    14.0-18.0  

 

 Hematocrit  44 %    42-52  

 

 Mean Corpuscular Volume  83 um3    80-94  

 

 Mean Corpuscular Hemoglob  29 pg    27-31  

 

 Mean Corpuscular HGB Cone  35 g/dL    32-36  

 

 Redcell Distribution WDTH  14 %    10.5-15  

 

 Platelet Count  254 CUMM    150-450  

 

 Mean Platelet Volume  7.6 um3    7.4-10.4  

 

 Gran %  60.3 %    38-83  

 

 Lymph %  28.8 %    25-47  

 

 Mononuclear %  7.4 %    1-9  

 

 Eosinophil %  3.0 %    0-6  

 

 Basophil %  0.5 %    0-2  

 

 Abs Lymphs  1.9    1.0-4.8  

 

 Abs Mononuclear  0.5    0-0.8  

 

 Absolute Neutrophil Count  4.0    1.5-7.7  

 

 Abs Eosinophils  0.2    0-0.6  

 

 Abs Basophils  0    0-0.2  









 Laboratory test finding  2008  Culture   Sensitivity  FEW COLONIES OF  <
SEE      12



       NOTE>      









 1  SDS 

 

 2  SDS 

 

 3  *******Because ethnic data is not always readily available,



   this report includes an eGFR for both -Americans and



   non- Americans.****



   The National Kidney Disease Education Program (NKDEP) does



   not endorse the use of the MDRD equation for patients that



   are not between the ages of 18 and 70, are pregnant, have



   extremes of body size, muscle mass, or nutritional status,



   or are non- or non-.



   According to the National Kidney Foundation, irrespective of



   diagnosis, the stage of the disease is based on the level of



   kidney function:



   Stage Description                      GFR(mL/min/1.73 m(2))



   1     Kidney damage with normal or decreased GFR       90



   2     Kidney damage with mild decrease in GFR          60-89



   3     Moderate decrease in GFR                         30-59



   4     Severe decrease in GFR                           15-29



   5     Kidney failure                       <15 (or dialysis)

 

 4  SDS 13

 

 5  SDS 13

 

 6  *******Because ethnic data is not always readily available,



   this report includes an eGFR for both -Americans and



   non- Americans.****



   The National Kidney Disease Education Program (NKDEP) does



   not endorse the use of the MDRD equation for patients that



   are not between the ages of 18 and 70, are pregnant, have



   extremes of body size, muscle mass, or nutritional status,



   or are non- or non-.



   According to the National Kidney Foundation, irrespective of



   diagnosis, the stage of the disease is based on the level of



   kidney function:



   Stage Description                      GFR(mL/min/1.73 m(2))



   1     Kidney damage with normal or decreased GFR       90



   2     Kidney damage with mild decrease in GFR          60-89



   3     Moderate decrease in GFR                         30-59



   4     Severe decrease in GFR                           15-29



   5     Kidney failure                       <15 (or dialysis)

 

 7  *******Because ethnic data is not always readily available,



   this report includes an eGFR for both -Americans and



   non- Americans.****



   The National Kidney Disease Education Program (NKDEP) does



   not endorse the use of the MDRD equation for patients that



   are not between the ages of 18 and 70, are pregnant, have



   extremes of body size, muscle mass, or nutritional status,



   or are non- or non-.



   According to the National Kidney Foundation, irrespective of



   diagnosis, the stage of the disease is based on the level of



   kidney function:



   Stage Description                      GFR(mL/min/1.73 m(2))



   1     Kidney damage with normal or decreased GFR       90



   2     Kidney damage with mild decrease in GFR          60-89



   3     Moderate decrease in GFR                         30-59



   4     Severe decrease in GFR                           15-29



   5     Kidney failure                       <15 (or dialysis)

 

 8  New Reference Range and Interpretation effective 10/4/02



   



   TnI (ng/ml)             INTERPRETATION



   



   <0.06 ng/ml             NOT SUPPORTIVE OF DIAGNOSIS OF MI



   



   0.06 - 0.50 ng/ml       INDETERMINATE: SUGGEST SERIAL



   STUDIES IF CLINICALLY INDICATED.



   



   > 0.5 ng/ml             CONSISTENT WITH DIAGNOSIS OF MI



   .

 

 9  Anion gap measurement may be of limited value in the



   presence of any alkalosis, especially in a combined acid



   base disorder.



   .

 

 10  ** Note change in reference range as of 08.  The



   change was based on recommendations from the American



   Diabetes Association.

 

 11  Please note change in reference range effective 08



   



   



   .

 

 12  FEW COLONIES OF NORMAL RESPIRATORY EDMUNDO







Procedures







 Date  CPT Code  Description  Status

 

 2018  92354  Nasal Endoscopy, Diagnostic  Completed

 

 2016  27569  Nasal Endoscopy, Diagnostic  Completed

 

 2015  86041  Revision Mastoidectomy Resulting In Modified Radical  
Completed



     Mastoidctomy  

 

 2014  07933  Fiberoptic Laryngoscopy  Completed

 

 2014  00723  Tympanometry  Completed

 

 2014  06551  Comprehensive Audiogram  Completed

 

 10/24/2013  88887  Tympanometry  Completed

 

 10/24/2013  00353  Comprehensive Audiogram  Completed

 

 2013  72842  Binocular Microscopy  Completed

 

 2013  84192  Tympanomastoidectomy W/Ossicular Chain  Completed

 

 2013  99363  Medical Records  Completed

 

 2013  62615  Tympanometry  Completed

 

 2013  17389  Comprehensive Audiogram  Completed

 

 2013  53309  Tympanometry  Completed

 

 2013  75210  Comprehensive Audiogram  Completed

 

 10/16/2012  43315  Binocular Microscopy  Completed

 

 2012  74352  Nasal Endoscopy, Diagnostic  Completed

 

 2012  35353  Tympanometry  Completed

 

 2012  57242  Comprehensive Audiogram  Completed

 

 2012  05938  Tympanometry  Completed

 

 2012  64422  Binocular Microscopy  Completed

 

 2011  45068  Fiberoptic Laryngoscopy  Completed

 

 2011  31355  Tympanometry  Completed

 

 2011  59434  Tympanometry  Completed

 

 2011  62586  Comprehensive Audiogram  Completed

 

 2010  11904  Tympanometry  Completed

 

 2009  95319  Tympanometry  Completed

 

 2009  41657  Binocular Microscopy  Completed

 

 10/22/2009  08784  Tympanometry  Completed

 

 10/22/2009  39762  Comprehensive Audiogram  Completed

 

 2008  18983  Nasal Endoscopy, Diagnostic  Completed

 

 2008  53668  Fiberoptic Laryngoscopy  Completed

 

 2008  38440  No Show Fee  Completed

 

 2008  71484  Demonstration/Eval. Of Patient Utilization Of  Completed



     Spacer/Nebulizer  

 

 2008  37203  Respiratory Flow Volume Loop  Completed

 

 2008  95521  Bronchospasm Evaluation  Completed

 

 2008  28395  Prick Test  Completed

 

 2008  85239  Prick Test  Completed

 

 2008  51084  Nasal Endoscopy, Diagnostic  Completed

 

 2007  36177  Nasopharyngoscopy  Completed

 

 2007  77573  Tympanometry  Completed

 

 2007  32250  Tympanometry  Completed

 

 2007  87444  Comprehensive Audiogram  Completed

 

 2007  17774  Comprehensive Audiogram  Completed

 

 2007  48310  Nasal Endoscopy, Diagnostic  Completed

 

 2007  83276  Tympanostomy W/Tube Local Or Topical Anes.  Completed

 

 2007  22098  Tympanometry  Completed

 

 2007  47254  Tympanometry  Completed

 

 2007  12800  Comprehensive Audiogram  Completed

 

 2007  91334  Comprehensive Audiogram  Completed

 

 2006  25800  Nasal Endoscopy, Diagnostic  Completed

 

 2005  97173  Nasal Endoscopy, Diagnostic  Completed

 

 2005  95954  Nasal Endoscopy W/ Debridement  Completed

 

 2005  19556  Nasal Endoscopy W/ Debridement  Completed

 

 2004  17194  Nasal Endoscopy W/Maxllary Antrostomy W/Excision Of  
Completed



     Poylp  

 

 2004  02949  Nasal Endoscopy With Ethmoidectomy, Partial  Completed

 

 12/10/2004  59963  Nasal Endoscopy, Diagnostic  Completed

 

 2004  88957  Nasal Endoscopy, Diagnostic  Completed

 

 10/13/2004  00858  Nasal Endoscopy, Diagnostic  Completed

 

 2004  49924  No Show Fee  Completed







Encounters







 Type  Date  Location  Provider  CPT E/M  Dx

 

 Office Visit  2018 10:30a  [facility],After 08  Jonathan E. Cryer,  
56175  H71.12



       MD    









 J31.0

 

 G50.1









 Office Visit  2018 10:00a  [facility],After 08  Jonathan E. Cryer, MD  
89648  H71.12

 

 Office Visit  2018 11:15a  [facility],After 08  Jonathan E. Cryer, MD  
23623  H71.12

 

 Office Visit  2017 11:00a  [facility],After 08  Jonathan E. Cryer, MD  
14187  H71.12









 H72.2x1









 Office Visit  2017 10:30a  [facility]After 08  Jonathan E. Cryer, MD  
63161  H71.12

 

 Office Visit  2016  2:45p  [facility]After 08  Jonathan E. Cryer, MD  
53359  J31.0









 H71.12









 Office Visit  2016 11:00a  [facility]After 08  Jonathan E. Cryer, MD  
03207  H71.12









 H60.11









 Office Visit  2016 11:00a  [facility]After 08  Jonathan E. Cryer, MD  
88494  H71.12

 

 Office Visit  2016  3:45p  [facility],After 08  Jonathan E. Cryer, MD  
24969  L23.3

 

 Office Visit  2015 11:30a  [facility],After 08  Jonathan E. Cryer, MD  
83047  H71.12

 

 Office Visit  10/29/2015 10:30a  [facility],After 08  Jonathan E. Cryer, MD  
84003  H71.12

 

 Office Visit  10/15/2015 11:00a  [facility],After 08  Jonathan E. Cryer, MD  
57414  H65.32









 H71.12









 Office Visit  2015 10:15a  [facility],After 08  Jonathan E. Cryer, MD  
93702  381.29









 385.32









 Office Visit  2015  3:45p  [facility],After 08  Jonathan E. Cryer, MD  
11752  381.29









 385.32









 Office Visit  2014  9:45a  [facility],After 08  Jonathan E. Cryer, MD  
52339  381.29

 

 Office Visit  2014 10:45a  [facility],After 08  Jonathan E. Cryer, MD  
39730  381.29









 787.20









 Office Visit  2014 10:15a  [facility],After 08  Jonathan E. Cryer, MD  
71553  786.2









 787.20

 

 493.00









 Office Visit  2014 10:00a  [facility],After 08  Jonathan E. Cryer, MD  
00051  389.03









 389.10

 

 462-2









 Office Visit  2014 10:30a  [facility],After 08  Jonathan E. Cryer, MD  
92662  380.10









 389.03









 Office Visit  2014 10:30a  [facility],After 08  Jonathan E. Cryer, MD  
31980  380.10









 381.29









 Office Visit  2014  9:45a  [facility],After 08  Jonathan E. Cryer, MD  
37781  385.32

 

 Office Visit  2013 11:00a  [facility],After 08  Jonathan E. Cryer, MD  
36143  472.0









 385.32









 Office Visit  10/24/2013 10:30a  [facility],After 08  Jonathan E. Cryer, MD  
27611  385.32









 389.03

 

 389.10

 

 477.8









 Office Visit  2013  4:00p  [facility],After 08  Yanelis Wooten NP  
90544  385.32









 381.29









 Office Visit  2013  1:45p  [facility],After 08  Jonathan E. Cryer, MD  
35222  385.32









 381.29

 

 389.03









 Office Visit  2013  2:15p  [facility],After 08  Yanelis Wooten NP  
09096  385.32









 381.29









 Office Visit  2013  1:30p  [facility],After 08  Jonathan E. Cryer, MD  
45245  385.32









 381.29

 

 389.03

 

 389.10









 Office Visit  2013 11:30a  [facility],After 08  Jonathan E. Cryer, MD  
67515  385.32

 

 Office Visit  2012 11:15a  [facility],After 08  Jonathan E. Cryer, MD  
13118  381.29

 

 Office Visit  10/25/2012  3:15p  [facility],After 08  Jonathan E. Cryer, MD  
46119  381.29

 

 Office Visit  10/16/2012  9:30a  [facility],After 08  Ziggy Wootenirdre NP  
46194  381.3

 

 Office Visit  2012  2:45p  [facility],After 08  Ziggy Wootenirdre NP  
32999  350.2









 472.0









 Office Visit  2012  3:30p  [facility],After 08  Ziggy Wootenirdre NP  
83885  477.8









 493.01









 Office Visit  2012 11:15a  [facility],After 08  Jonathan E. Cryer, MD  
77174  389.03









 389.10

 

 384.82









 Office Visit  2012  4:00p  [facility],After 08  Ziggy Wootenirdre NP  
12356  384.82

 

 Office Visit  10/06/2011  1:45p  [facility],After 08  Jonathan E. Cryer, MD  
93984  462









 384.20

 

 381.81

 

 389.03

 

 389.10









 Office Visit  2011  9:30a  [facility],After 08  Ziggy Wootenirdre NP  
34965  478.19









 530.81

 

 493.00

 

 530.11









 Office Visit  2011  2:00p  [facility],After 08  Ziggy Wootenirdre NP  
00933  384.20









 477.8

 

 493.00









 Office Visit  2011  1:30p  [facility],After 08  Ziggy Wootenirdre NP  
97214  384.20









 381.81









 Office Visit  2011  2:00p  [facility],After 08  Jonathan E. Cryer, MD  
28202  389.03









 389.10

 

 381.81

 

 384.20









 Office Visit  2010 11:30a  [facility],After 08  Ziggy Wootenirdre NP  
27253  384.20









 381.81

 

 350.2









 Office Visit  2010  1:30p  [facility],After 08  Jonathan E. Cryer, MD  
15666  477.8









 384.20

 

 381.81









 Office Visit  2010 10:00a  [facility]After 08  Jonathan E. Cryer, MD  
63738  477.8









 493.00

 

 384.20

 

 389.03









 Office Visit  2009  3:45p  [facility],After 08  Jonathan E. Cryer, MD  
17030  384.20









 388.71

 

 381.81









 Office Visit  2009  2:30p  [facility],After 08  Jonathan E. Cryer, MD  
20415  384.20









 388.71









 Office Visit  10/22/2009  1:45p  [facility],After 08  Jonathan E. Cryer, MD  
51637  388.31









 478.1-4









 Office Visit  2009  4:00p  [facility],After 08  Jonathan E. Cryer, MD  
53449  478.19









 478.1-4

 

 477.8

 

 493.00









 Office Visit  2009  3:15p  [facility],After 08  Jonathan E. Cryer, MD  
67910  350.2









 473.0

 

 473.2

 

 471.8









 Office Visit  2008  2:45p  [facility],After 08  Saeid Ann,  
22553  493.01



       M.D.    









 530.81

 

 780.57

 

 278.01

 

 429.3









 Office Visit  12/10/2008  1:48p  [facility],After 08  Saeid Ann,  
19677  493.00



       M.D.    

 

 Office Visit  2008  9:30a  [facility],After 08  Yanelis Wooten NP  
23068  473.0









 530.81









 Office Visit  10/23/2008  4:00p  [facility],After 08  Jonathan E. Cryer, MD  
44075  780.57









 381.81

 

 389.03









 Office Visit  2008 10:15a  [facility],After 08  Jonathan E. Cryer, MD  
21557  780.57









 530.11









 Office Visit  2008  3:30p  [facility],After 08  Yanelis Wooten NP  
75097  530.81









 477.8

 

 493.90









 Office Visit  2008 10:15a  [facility],After 08  Yanelis Wooten NP  
97564  478.19









 478.1-4

 

 477.8









 Office Visit  2008  3:00p  [facility],After 08  Yanelis Wooten NP  
87408  478.1-4









 381.3

 

 493.00

 

 477.0

 

 477.8









 Office Visit  2008 10:00a  [facility]After 08  Yanelis Wooten NP  
88615  477.8









 477.0









 Office Visit  2008  3:30p  [facility],After 08  Jonathan E. Cryer, MD  
84174  477.8









 478.19

 

 478.1-4









 Office Visit  2007  9:00a  [facility],After 08  Jonathan E. Cryer, MD  
83620  477.8

 

 Office Visit  2007  2:15p  [facility],After 08  Jonathan E. Cryer, MD  
47187  477.8









 471.8

 

 389.2

 

 381.81

 

 474.12









 Office Visit  10/15/2007  2:45p  [facility]After 08  Jonathan E. Cryer, MD  
66624  473.0









 474.12

 

 381.81









 Office Visit  2007  3:15p  [facility],After 08  Jonathan E. Cryer, MD  
16633  381.81









 389.2

 

 471.8

 

 474.12









 Office Visit  2007  2:30p  [facility],After 08  Jonathan E. Cryer, MD  
31576  381.81









 389.2









 Office Visit  2006  9:45a  [facility],After 08  Jonathan E. Cryer, MD  
53529  471.8









 473.0

 

 461.0









 Office Visit  2006  1:30p  [facility],After 08  Jonathan E. Cryer, MD  
67756  471.8









 780.57

 

 493.0









 Office Visit  2005  1:30p  [facility],After 08  Jonathan E. Cryer, MD  
76242  473.2









 471.8

 

 389.03









 Office Visit  2004  9:30a  [facility],After 08  Saeid Ann,  
73117  473.2



       M.DLottie    









 471.8









 Office Visit  2004  9:45a  [facility],After 08  Julio Desai,  
97988  780.57



       MLottieD.    

 

 Office Visit  2004 11:15a  [facility],After 08  Juilo Desai,  
44918  478.1



       MLottieD.    







Plan of Care

Future Appointment(s):2018 10:30 am - Jonathan E. Cryer, MD at [facility],
After  - Jonathan E. 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

## 2018-09-16 NOTE — RAD
INDICATION: Chest pain



COMPARISON: Most recent comparison chest x-rays dated July 09, 2018

 

TECHNIQUE: Single AP portable view of the chest was obtained.



FINDINGS: 



Image quality is compromised due to the relative inferiority of a portable chest x-ray. 



Again noted over the left chest is an implantable cardiac monitoring device.



The heart and mediastinum exhibit normal size and contour.



The lungs are grossly clear. There is no evidence of a large pleural effusion.



Visualized bones are normal for the patient's age.



IMPRESSION:  No radiographic evidence for acute cardiopulmonary abnormality on this

portable chest x-ray.

## 2018-09-17 RX ADMIN — MOMETASONE FUROATE AND FORMOTEROL FUMARATE DIHYDRATE SCH PUFF: 200; 5 AEROSOL RESPIRATORY (INHALATION) at 21:39

## 2018-09-17 RX ADMIN — MOMETASONE FUROATE AND FORMOTEROL FUMARATE DIHYDRATE SCH PUFF: 200; 5 AEROSOL RESPIRATORY (INHALATION) at 08:01

## 2018-09-17 RX ADMIN — FAMOTIDINE SCH MG: 20 TABLET, FILM COATED ORAL at 09:55

## 2018-09-17 RX ADMIN — FAMOTIDINE SCH MG: 20 TABLET, FILM COATED ORAL at 20:59

## 2018-09-17 RX ADMIN — ATORVASTATIN CALCIUM SCH MG: 20 TABLET, FILM COATED ORAL at 09:55

## 2018-09-17 RX ADMIN — RIVAROXABAN SCH MG: 15 TABLET, FILM COATED ORAL at 12:21

## 2018-09-17 RX ADMIN — POTASSIUM CHLORIDE SCH MEQ: 1500 TABLET, EXTENDED RELEASE ORAL at 09:55

## 2018-09-17 RX ADMIN — NEBIVOLOL HYDROCHLORIDE SCH MG: 2.5 TABLET ORAL at 09:55

## 2018-09-17 RX ADMIN — MORPHINE SULFATE PRN MG: 4 INJECTION INTRAVENOUS at 14:39

## 2018-09-17 RX ADMIN — RIVAROXABAN SCH MG: 15 TABLET, FILM COATED ORAL at 23:56

## 2018-09-17 RX ADMIN — TRIAMTERENE AND HYDROCHLOROTHIAZIDE SCH CAP: 25; 37.5 CAPSULE ORAL at 09:55

## 2018-09-17 RX ADMIN — MORPHINE SULFATE PRN MG: 4 INJECTION INTRAVENOUS at 09:55

## 2018-09-17 RX ADMIN — ASPIRIN SCH MG: 81 TABLET, CHEWABLE ORAL at 09:55

## 2018-09-17 NOTE — ECHO
Patient:      NAKUL SANDOVAL  

Med Rec#:     [id number]            :          1965          

Date:         2018            Age:          53y                 

Account#:     [id number]          Height:       175 cm / 68.9 in

Accession#:   [id number]           Weight:       157.27 kg / 346.6 lbs

Sex:          M                     BSA:          2.61

Room#:        432                   

Admit Date#:  2018          

Type:         Inpatient

 

Referring:    Katelynn Parmar NP

Reading:      Denys Tanner MD

Sonographer:  Fauzia Reynolds RDCS

CC:           Bridger Webb MD

______________________________________________________________________

 

Transthoracic Echocardiogram

 

Indication:

Pulmonary embolism

BP:           124/69

HR:           56

Rhythm:       Bradycardia

 

Findings     

History:

CAD, MI, s/p PCI, VIPUL w/ CPAP, VT, HTN, HLD, PVD, mobitz type 1 second

degree heart block. 

 

Technical Comments:

The study is technically limited due to poor apical windows.  Completed

at 0900. 

 

Left Ventricle:

The left ventricular chamber size is moderately dilated. Mild concentric

left ventricular hypertrophy is observed. There is a focal wall motion

abnormality present. There is mildly decreased left ventricular systolic

function. The estimated ejection fraction is 45-50%.  There is no

consistent Doppler evidence of clinically significant     diastolic

dysfunction. The  mid inferolateral, mid inferior, and  apical inferior

wall segments are hypokinetic (score 2). Overall wallmotion score index

is  2.00 

 

Left Atrium:

The left atrium is moderate to severely dilated.  

 

Right Ventricle:

The right ventricle is mildly dilated.  The right ventricular global

systolic function is mildly reduced. 

 

Right Atrium:

The right atrium is moderate to severely dilated.  

 

Aortic Valve:

The aortic valve is trileaflet. There is mild thickening of the right

coronary cusp. There is no evidence of aortic regurgitation. There is no

evidence of aortic stenosis. 

 

Mitral Valve:

The mitral valve leaflets are mildly thickened. There is a trace of

mitral regurgitation. There is no evidence of mitral stenosis. 

 

Tricuspid Valve:

The tricuspid valve leaflets are normal.  There is trace tricuspid

regurgitation.  Unable to estimate the right ventricular systolic

pressure.   There is no tricuspid stenosis. 

 

Pulmonic Valve:

The pulmonic valve appears normal. There is a trace pulmonic

regurgitation.  There is no pulmonic stenosis.  

 

Pericardium:

There is no significant pericardial effusion. A pericardial fat pad is

visualized. 

 

Aorta:

There is borderline dilatation of the ascending aorta. There is no

dilatation of the aortic arch. There is mild dilatation of the aortic

root. 

 

Pulmonary Artery:

The main pulmonary artery is not well visualized. 

 

Venous:

The inferior vena cava appears normal in size. There is a greater than

50% respiratory change in the inferior vena cava dimension. 

 

Contrast:

Definity was used to optimize study.  5 mL of dilute Definity were

utilized.  Intravenous contrast was used to enhance endocardial border

definition. 

 

Conclusions

Mild concentric left ventricular hypertrophy is observed.

There is mildly decreased left ventricular systolic function.

The estimated ejection fraction is 45-50%. 

The  mid inferolateral, mid inferior, and  apical inferior wall segments

are hypokinetic (score 2).

The left atrium is moderate to severely dilated. 

There is no evidence of aortic stenosis.

There is a trace of mitral regurgitation.

There is trace tricuspid regurgitation. 

Unable to estimate the right ventricular systolic pressure.  

There is no significant pericardial effusion.

Compared to study of 16, the LV function is slightly lower.  Valve

function is the same.  No dilation of the RV

 

Measurements     

Name                    Value         Normal Range            

RVIDd (AP) 2D           3.3 cm        (0.9 - 2.6)             

RVDdMajor (2D)          4.9 cm        (2.2 - 4.4)             

RAd ISD 4CH             6.7 cm        (3.4 - 4.9)             

RA (A4C)W               5.2 cm        (2.9 - 4.6)             

IVSd (2D)               1.1 cm        (0.6 - 1)               

LVPWd (2D)              1.1 cm        (0.6 - 1)               

LVIDd (2D)              6.2 cm        (3.6 - 5.4)             

LVIDs (2D)              5.1 cm        -                        

LV FS (2D)              18 %          (25 - 45)               

Aortic Annulus          2.3 cm        (1.4 - 2.6)             

Ao root diameter (2D)   3.6 cm        (2.1 - 3.5)             

Ascending Ao            3.4 cm        (2.1 - 3.4)             

Aortic arch             2.8 cm        (1.8 - 3.4)             

LA dimension (AP) 2D    5.6 cm        (2.3 - 3.8)             

LAd ISD 4CH             7.1 cm        (2.9 - 5.3)             

LA ISD 4CH W            5.7 cm        (2.5 - 4.5)             

 

Name                    Value         Normal Range            

LA ESV BP (A/L) index   43 ml/m2      -                        

 

Name                    Value         Normal Range            

MV E-wave Vmax          0.7 m/sec     -                        

MV deceleration time    219 msec      -                        

MV A-wave Vmax          0.58 m/sec    -                        

MV E:A ratio            1.3 ratio     -                        

LV septal e' Vmax       0.1 m/sec     -                        

LV lateral e' Vmax      0.1 m/sec     -                        

LV E:e' septal ratio    7 ratio       -                        

LV E:e' lateral ratio   7 ratio       -                        

 

Name                    Value         Normal Range            

AV Vmax                 1.4 m/sec     -                        

AV VTI                  29.7 cm       -                        

AV peak gradient        8 mmHg        -                        

AV mean gradient        5 mmHg        -                        

LVOT Vmax               1.1 m/sec     -                        

LVOT VTI                23.6 cm       -                        

LVOT peak gradient      4 mmHg        -                        

LVOT mean gradient      2 mmHg        -                        

SHAMEKA Vmax                1 m/sec       -                        

 

Name                    Value         Normal Range            

IVC diameter            1.3 cm        -                        

 

Wallmotion

 

BAS          Not Seen

BA           Not Seen

BAL          Not Seen

TIERRA          Not Seen

BI           Not Seen

BIS          Not Seen

MAS          Not Seen

MA           Not Seen

MAL          Not Seen

MIL          Hypokinetic

MI           Hypokinetic

MIS          Not Seen

AS           Not Seen

AA           Not Seen

AL           Not Seen

AI           Hypokinetic

APEX         Not Seen

 

Electronically signed by: Denys Tanner MD on 2018 10:38:01

## 2018-09-17 NOTE — PN
Subjective


Date of Service: 09/17/18


Interval History: 





c/o pain to right side of his chest consistent radiating to his back below his 

shoulder blades,  c/o lower back pain as well,  Denies shortness of breath.  

Denies abd pain n/v/d.  Denies fever or chills.  walking o2 saturation 100 on 

RA.   


Family History: Unchanged from Admission


Social History: Unchanged from Admission


Past Medical History: Unchanged from Admission





Objective


Active Medications: 








Aspirin (Aspirin 81 Mg Chew Tab*)  81 mg PO DAILY [facility]


   Last Admin: 09/17/18 09:55 Dose:  81 mg


Atorvastatin Calcium (Lipitor*)  20 mg PO DAILY [facility]


   Last Admin: 09/17/18 09:55 Dose:  20 mg


Famotidine (Pepcid Tab*)  20 mg PO BID [facility]


   Last Admin: 09/17/18 09:55 Dose:  20 mg


Mometasone Furoate/Formoterol Fumar (Dulera 200/5 Mdi*)  2 puff INH BID [facility]


   Last Admin: 09/17/18 08:01 Dose:  2 puff


Morphine Sulfate (Morphine Vial*)  4 mg IV Q4H PRN


   PRN Reason: PAIN


   Last Admin: 09/17/18 14:39 Dose:  4 mg


Nebivolol (Bystolic Tab (Nf))  10 mg PO DAILY [facility]


   Last Admin: 09/17/18 09:55 Dose:  10 mg


Nitroglycerin (Nitroglycerin Tab 0.4 Mg*)  0.4 mg SL Q5M PRN


   PRN Reason: PAIN - CHEST


Potassium Chloride (Klor Con Er Tab*)  20 meq PO DAILY [facility]


   Last Admin: 09/17/18 09:55 Dose:  20 meq


Rivaroxaban (Xarelto(*))  15 mg PO 0000,1200 [facility]


   Last Admin: 09/17/18 12:21 Dose:  15 mg


Tramadol HCl (Ultram*)  50 mg PO Q6HR PRN


   PRN Reason: PAIN


Triamterene/HCTZ (Dyazide Cap*)  1 cap PO DAILY [facility]


   Last Admin: 09/17/18 09:55 Dose:  1 cap








 Vital Signs - 8 hr











  09/17/18 09/17/18 09/17/18





  08:05 09:48 09:50


 


Temperature  97.7 F 


 


Pulse Rate 57 67 


 


Respiratory 20 20 





Rate   


 


Blood Pressure  126/102 141/80





(mmHg)   


 


O2 Sat by Pulse 95 100 





Oximetry   














  09/17/18 09/17/18 09/17/18





  09:55 10:47 11:10


 


Temperature   97.6 F


 


Pulse Rate   57


 


Respiratory 18 18 18





Rate   


 


Blood Pressure   149/91





(mmHg)   


 


O2 Sat by Pulse   100





Oximetry   














  09/17/18 09/17/18





  14:29 14:39


 


Temperature 97.9 F 


 


Pulse Rate 59 


 


Respiratory 18 18





Rate  


 


Blood Pressure 120/75 





(mmHg)  


 


O2 Sat by Pulse 97 





Oximetry  











Oxygen Devices in Use Now: None


Appearance: appears comfortable at rest, mild discomfort with movement.  no 

acute distress


Eyes: No Scleral Icterus


Ears/Nose/Mouth/Throat: Clear Oropharnyx, Mucous Membranes Moist


Neck: NL Appearance and Movements; NL JVP, Trachea Midline


Respiratory: Symmetrical Chest Expansion and Respiratory Effort, Clear to 

Auscultation


Cardiovascular: NL Sounds; No Murmurs; No JVD


Abdominal: NL Sounds; No Tenderness; No Distention


Extremities: No Clubbing, Cyanosis, - - lower ext with edema + 1 


Skin: No Rash or Ulcers


Neurological: Alert and Oriented x 3


Nutrition: Taking PO's


Result Diagrams: 


 09/16/18 08:17





 09/16/18 08:17





Assess/Plan/Problems-Billing


Assessment: 





Mr. Rodriguez is a 53 y.o male with a PMhx significant for CAD with stenting, 

obesity, htn ,hld, and asthma that presented to the emergency room with chest 

pain that woke him from a sleep.  Was found to have PE . 





- Patient Problems


(1) Pulmonary embolism


Current Visit: Yes   Status: Acute   Code(s): I26.99 - OTHER PULMONARY EMBOLISM 

WITHOUT ACUTE COR PULMONALE   SNOMED Code(s): 94193394


   Comment: 


- Started on Xarelto 15mg BID


- Echo - shows mild decrease in LV function when compared to 6/13/16; EF today 

45-50%


- chest pain that radiated to back below shoulder blades, - suspect this is 

related to his PE 


- will given tramadol as needed for pain 


   





(2) CAD (coronary artery disease)


Current Visit: No   Status: Acute   Code(s): I25.10 - ATHSCL HEART DISEASE OF 

NATIVE CORONARY ARTERY W/O ANG PCTRS   SNOMED Code(s): 30207293


   Comment: MI, 3 stents 2011.  





- continue home medications- bystolic 


-Echo today showed slight decreased LV function when compared to 6/13/16 echo.


   





(3) Chest pain


Current Visit: No   Status: Acute   Priority: Medium   Onset Date: 09/14/14   

Code(s): R07.9 - CHEST PAIN, UNSPECIFIED   SNOMED Code(s): 20272191


   Comment: Atypical chest pain, states that itis pressure that radiatd to back

, constant pressure,  improves with tramadol.  


Troponin negative Doubt cardiac etiology.


Ekg without changes 


Spoke to Dr. Tanner in regards to Chest pain and needing further work up- not 

recommending further work up at this time.  suspect pain is likely related to 

his PE.    





(4) HTN (hypertension)


Current Visit: No   Status: Acute   Priority: Medium   Code(s): I10 - ESSENTIAL 

(PRIMARY) HYPERTENSION   SNOMED Code(s): 79940811


   Comment: Continue nebivolol.   





(5) Morbid obesity


Current Visit: No   Status: Acute   Code(s): E66.01 - MORBID (SEVERE) OBESITY 

DUE TO EXCESS CALORIES   SNOMED Code(s): 635833165


   Comment: BMI 49.5.   





(6) Sleep apnea


Current Visit: No   Status: Acute   Code(s): G47.30 - SLEEP APNEA, UNSPECIFIED 

  SNOMED Code(s): 75402204


   Comment:  


will need CPAP at night    





(7) Dyslipidemia


Current Visit: No   Status: Chronic   Priority: Medium   Code(s): E78.5 - 

HYPERLIPIDEMIA, UNSPECIFIED   SNOMED Code(s): 346178471


   Comment: 





continue statin - lipitor    





(8) DVT prophylaxis


Current Visit: No   Status: Acute   Priority: Medium   Code(s): YYC0395 -    

SNOMED Code(s): 325751849


   Comment: 


xarelto    


Status and Disposition: 





obv - poss discharge tomorrow

## 2018-09-18 VITALS — DIASTOLIC BLOOD PRESSURE: 79 MMHG | SYSTOLIC BLOOD PRESSURE: 144 MMHG

## 2018-09-18 RX ADMIN — RIVAROXABAN SCH MG: 15 TABLET, FILM COATED ORAL at 13:11

## 2018-09-18 RX ADMIN — ATORVASTATIN CALCIUM SCH MG: 20 TABLET, FILM COATED ORAL at 09:46

## 2018-09-18 RX ADMIN — ASPIRIN SCH MG: 81 TABLET, CHEWABLE ORAL at 09:47

## 2018-09-18 RX ADMIN — POTASSIUM CHLORIDE SCH MEQ: 1500 TABLET, EXTENDED RELEASE ORAL at 09:46

## 2018-09-18 RX ADMIN — FAMOTIDINE SCH MG: 20 TABLET, FILM COATED ORAL at 09:46

## 2018-09-18 RX ADMIN — NEBIVOLOL HYDROCHLORIDE SCH MG: 2.5 TABLET ORAL at 09:52

## 2018-09-18 RX ADMIN — TRIAMTERENE AND HYDROCHLOROTHIAZIDE SCH CAP: 25; 37.5 CAPSULE ORAL at 09:46

## 2018-09-18 RX ADMIN — MOMETASONE FUROATE AND FORMOTEROL FUMARATE DIHYDRATE SCH PUFF: 200; 5 AEROSOL RESPIRATORY (INHALATION) at 07:23

## 2018-09-18 NOTE — PN
Subjective


Date of Service: 09/18/18


Interval History: 





Reports that pain is improved.  States that he slept well.  States that pain is 

controlled with tramadol.  Denies abd pain ,n/v/d.  Denies shortness of breath.

   


Family History: Unchanged from Admission


Social History: Unchanged from Admission


Past Medical History: Unchanged from Admission





Objective


 Vital Signs - 8 hr











  09/18/18 09/18/18 09/18/18





  10:06 11:41 13:12


 


Temperature  98.0 F 


 


Pulse Rate  56 


 


Respiratory 20 18 16





Rate   


 


Blood Pressure  144/79 





(mmHg)   


 


O2 Sat by Pulse  98 





Oximetry   











Oxygen Devices in Use Now: None


Appearance: appears comfortable sitting in on the edge of the bed.


Eyes: No Scleral Icterus


Ears/Nose/Mouth/Throat: Clear Oropharnyx, Mucous Membranes Moist


Neck: NL Appearance and Movements; NL JVP, Trachea Midline


Respiratory: Symmetrical Chest Expansion and Respiratory Effort, Clear to 

Auscultation, - - diminshed at the bases bialt 


Cardiovascular: NL Sounds; No Murmurs; No JVD, No Edema


Abdominal: NL Sounds; No Tenderness; No Distention


Extremities: No Edema, No Clubbing, Cyanosis


Skin: No Rash or Ulcers


Neurological: Alert and Oriented x 3


Nutrition: Taking PO's


Result Diagrams: 


 09/16/18 08:17





 09/16/18 08:17





Assess/Plan/Problems-Billing


Assessment: 





Mr. Rodriguez is a 53 y.o male with a PMhx significant for CAD with stenting, 

obesity, htn ,hld, and asthma that presented to the emergency room with chest 

pain that woke him from a sleep.  Was found to have PE . 





- Patient Problems


(1) Pulmonary embolism


Status: Acute   Code(s): I26.99 - OTHER PULMONARY EMBOLISM WITHOUT ACUTE COR 

PULMONALE   SNOMED Code(s): 52245881


   Comment: 


- Started on Xarelto 15mg BID day 2 of 20


- Echo - shows mild decrease in LV function when compared to 6/13/16; EF today 

45-50%


- chest pain that radiated to back below shoulder blades, - suspect this is 

related to his PE 


- will given tramadol as needed for pain 


   





(2) CAD (coronary artery disease)


Status: Acute   Code(s): I25.10 - ATHSCL HEART DISEASE OF NATIVE CORONARY 

ARTERY W/O ANG PCTRS   SNOMED Code(s): 96826538


   Comment: MI, 3 stents 2011.  





- continue home medications- bystolic 


-Echo today showed slight decreased LV function when compared to 6/13/16 echo.


   





(3) Chest pain


Status: Acute   Priority: Medium   Onset Date: 09/14/14   Code(s): R07.9 - 

CHEST PAIN, UNSPECIFIED   SNOMED Code(s): 25324591


   Comment: Atypical chest pain, states that itis pressure that radiatd to back

, constant pressure,  improves with tramadol.  


Troponin negative Doubt cardiac etiology.


Ekg without changes 


Spoke to Dr. Tanner in regards to Chest pain and needing further work up- not 

recommending further work up at this time.  suspect pain is likely related to 

his PE. 





Patient is stents in 2011 on plavix and ASA at home - will decrease ASA to 81 

mg and d/c plavix as per cardiology recommendations    





(4) HTN (hypertension)


Status: Acute   Priority: Medium   Code(s): I10 - ESSENTIAL (PRIMARY) 

HYPERTENSION   SNOMED Code(s): 25446419


   Comment: Continue nebivolol.   





(5) Morbid obesity


Status: Acute   Code(s): E66.01 - MORBID (SEVERE) OBESITY DUE TO EXCESS 

CALORIES   SNOMED Code(s): 450947896


   Comment: BMI 49.5.   





(6) Sleep apnea


Status: Acute   Code(s): G47.30 - SLEEP APNEA, UNSPECIFIED   SNOMED Code(s): 

89188753


   Comment:  


will need CPAP at night    





(7) Dyslipidemia


Status: Chronic   Priority: Medium   Code(s): E78.5 - HYPERLIPIDEMIA, 

UNSPECIFIED   SNOMED Code(s): 411985428


   Comment: 





continue statin - lipitor    





(8) DVT prophylaxis


Status: Acute   Priority: Medium   Code(s): SXA0802 -    SNOMED Code(s): 

506064848


   Comment: 


xarelto    


Status and Disposition: 





discharge

## 2018-09-19 NOTE — DS
CC:  Dr. Lantigua; Dr. Webb *

 

DISCHARGE SUMMARY:

 

DATE OF ADMISSION:  09/16/18

 

DATE OF DISCHARGE:  09/18/18

 

PROVIDER:  Manisha Keith NP.

 

ATTENDING PHYSICIAN:  Dr. Kaylen Beyer * (dictated by Manisha Keith NP).

 

PRIMARY CARE PROVIDER:  Dr. Bridger Webb.

 

PRIMARY DIAGNOSES:

1.  Pulmonary embolism.

2.  Chest pain.

 

SECONDARY DIAGNOSES:

1.  Coronary artery disease with stenting in 2011.

2.  Questionable seizure disorder.

3.  Obstructive sleep apnea.

4.  Morbid obesity.

5.  Hyperlipidemia.

6.  Hypertension.

7.  Peripheral vascular disease.

8.  Asthma.

9.  History of Mobitz type 1 second-degree heart block.

 

STUDIES COMPLETED WHILE IN THE HOSPITAL:  The patient had a chest x-ray on 09/16
/18. Radiologist Impression:  No radiographic evidence of cardiopulmonary 
abnormality on a portable chest x-ray.

 

He had a CTA of the chest and thorax.  Radiologist Impression:

1.  Imaging was limited by suboptimal bolusing time, but there appears to be 
nonobstructing filling defects in the submental branches of the left lower lobe 
pulmonary arteries consistent with small pulmonary emboli.

2.  Chronic and degenerative changes in the thoracic spine with loss of disk 
height.

 

DISCHARGE MEDICATIONS:  Discontinued medications:

1.  Plavix 75 mg p.o. daily.

2.  Aspirin 325 mg daily.

 

New home medications:

1.  Xarelto 15 mg p.o. b.i.d. day 2 of 20 to be completed on 10/07/18 and then 
will need to start Xarelto 20 mg p.o. daily.

2.  Tramadol 50 mg p.o. q.8 hours as needed for pain.

 

Continued home medications:

1.  Advair Diskus 250/50 one inhaled b.i.d.

2.  Atorvastatin 20 mg p.o. daily.

3.  Dyazide 1 cap p.o. daily.

4.  Potassium chloride 20 mEq 1 tab p.o. daily.

5.  Pepcid 20 mg p.o. b.i.d.

6.  Nitroglycerin 0.4 mg sublingual q.5 minutes as needed for pain.

7.  Bystolic 10 mg p.o. daily.

 

HISTORY OF PRESENT ILLNESS AND HOSPITAL COURSE:  Mr. Rodriguez is a 53-year-old 
gentleman with a past medical history significant for coronary artery disease 
with stenting in 2011; obstructive sleep apnea, on CPAP; morbid obesity; 
hypertension; hyperlipidemia; and asthma who presented to the emergency room 
with concerns of chest pain 10/10.  Mr. Rodriguez states that he was feeling in his 
normal state of health yesterday and had no complaints.  Mr. Rodriguez said he had 
fish fried dinner, he was feeling well, and went to bed last night.  He awoke 
in the morning with 10/10 chest pain.  He states the pain is similar to his 
history of chest pain except for now the pain is associated with radiation to 
the back.  The patient feels a little bit tight, but not shortness of breath.  
He denies any diaphoresis or nausea.  Denies any recent history of pain in his 
calf.  He has chronic bilateral lower extremity edema.  Denies any fever or any 
cough, any nausea or vomiting.  Denies any diarrhea or abdominal pain.

 

While in the emergency room, Mr. Rodriguez had lab work, which showed normal 
troponins. The rest of his labs were unremarkable.  His BNP was 31.  On chest x-
ray, there was no acute process.  He did have a CTA of the chest, which was of 
poor quality, but did show some filling defects in the segmental branches of 
the left lower lobe consistent with pulmonary emboli.  He was not hypoxic and 
he was not tachycardic. Given the new findings of pulmonary embolism, we were 
asked to see and evaluate him for admission.

 

The patient was monitored on telemetry throughout his hospitalization.  He was 
started on Xarelto 15 mg b.i.d.  This should be continued for 20 days.  He is 
on day 2.  This should be completed on 10/07/18.  The patient had no further 
complaints.  He had venous Dopplers of his lower extremities, which were 
negative. I did discuss with Cardiology during this admission about the patient'
s chest pain and pulmonary embolism.  At this time, I did not feel any further 
cardiac workup was needed and that he should follow up for an outpatient 
cardiac workup with his primary cardiologist.

 

Mr. Rodriguez is stable for discharge home.  Vital Signs are as follows:  Blood 
pressure was 144/79, temperature 98.0, heart rate was 56, respirations 18, and 
O2 saturation 98% on room air.

 

DISCHARGE PLAN:  Mr. Rodriguez will be discharged back home.  Activity as 
tolerated. He should continue on a heart-healthy low-sodium diet.

 

1.  Pulmonary embolism.  I suspect that his chest pain is related to the 
pulmonary embolism that he has.  He should continue on Xarelto 15 mg p.o. 
b.i.d. for 20 days. This needs to be completed on 10/07/18.  This medication 
was discussed fully with the patient including the risks and benefits of the 
medication.  The patient was advised of the risk of bleeding associated with 
the medication and the risk of not taking the medication including more 
pulmonary embolism, the possibility of stroke. The patient at this time wishes 
to proceed with continuing on Xarelto.  He understands the risk of bleeding and 
he was instructed not to take any ibuprofen with this medication.  He 
verbalized understanding.

2.  Chest pain.  I suspect that his chest pain is related to his pulmonary 
embolism.  He did have negative troponins throughout his hospitalization.  I 
recommend that he follow up with his cardiologist for possible need for 
outpatient stress test, but given the event he has an acute pulmonary embolism, 
it was deemed the patient should not proceed with a stress test at this time 
during this hospitalization.

3.  Coronary artery disease.  The patient should continue on his home 
medications. His Plavix was discontinued during this hospitalization as he has 
been started on Xarelto.  He should continue on an aspirin at 81 mg.  Continue 
his Bystolic as previously prescribed.

4.  Hypertension.  He should continue on his home medications as previously 
prescribed.

5.  Sleep apnea.  He should continue wearing his CPAP at night.

6.  The patient should follow up with his primary care provider in 4 to 7 days.
  He should follow up with Dr. Lantigua from Cardiology in 4 to 7 days for 
further management of his chest pain.

 

The patient was instructed to return to the emergency room for any severe 
shortness of breath, coughing, or severe hemoptysis or severe chest pain or any 
other concerning symptoms.  The patient verbalized understanding.  The patient 
was also instructed to return to the emergency room for any vomiting of blood 
or rectal bleeding or severe abdominal pain.  The patient again verbalized 
understanding.  He was also instructed to stop his Plavix 75 mg and decrease 
his aspirin to 81 mg p.o. daily.

 

This is a summarization of his hospitalization.  If you need complete details, 
please obtain the entire medical record.

 

TIME SPENT:  Time spent on this discharge was approximately 60 minutes, greater 
than half that time was spent with the patient discussing discharge plans and 
instructions.

 

CONDITION ON DISCHARGE:  Stable.

 

____________________________________ MANISHA KEITH, NP

 

[id number]/[facility] #: [id number]

[facility]

## 2018-11-02 ENCOUNTER — HOSPITAL ENCOUNTER (EMERGENCY)
Dept: HOSPITAL 25 - ED | Age: 53
Discharge: HOME | End: 2018-11-02
Payer: MEDICARE

## 2018-11-02 VITALS — SYSTOLIC BLOOD PRESSURE: 144 MMHG | DIASTOLIC BLOOD PRESSURE: 87 MMHG

## 2018-11-02 DIAGNOSIS — Z88.0: ICD-10-CM

## 2018-11-02 DIAGNOSIS — M54.9: Primary | ICD-10-CM

## 2018-11-02 DIAGNOSIS — M54.5: ICD-10-CM

## 2018-11-02 DIAGNOSIS — Z87.891: ICD-10-CM

## 2018-11-02 DIAGNOSIS — Z79.01: ICD-10-CM

## 2018-11-02 LAB
BASOPHILS # BLD AUTO: 0 10^3/UL (ref 0–0.2)
EOSINOPHIL # BLD AUTO: 0.2 10^3/UL (ref 0–0.6)
HCT VFR BLD AUTO: 41 % (ref 42–52)
HGB BLD-MCNC: 13.6 G/DL (ref 14–18)
INR PPP/BLD: 1.44 (ref 0.77–1.02)
LYMPHOCYTES # BLD AUTO: 1.2 10^3/UL (ref 1–4.8)
MCH RBC QN AUTO: 28 PG (ref 27–31)
MCHC RBC AUTO-ENTMCNC: 33 G/DL (ref 31–36)
MCV RBC AUTO: 84 FL (ref 80–94)
MONOCYTES # BLD AUTO: 0.5 10^3/UL (ref 0–0.8)
NEUTROPHILS # BLD AUTO: 2.7 10^3/UL (ref 1.5–7.7)
NRBC # BLD AUTO: 0 10^3/UL
NRBC BLD QL AUTO: 0.1
PLATELET # BLD AUTO: 189 10^3/UL (ref 150–450)
RBC # BLD AUTO: 4.9 10^6/UL (ref 4–5.4)
RBC UR QL AUTO: (no result)
WBC # BLD AUTO: 4.6 10^3/UL (ref 3.5–10.8)
WBC UR QL AUTO: (no result)

## 2018-11-02 PROCEDURE — 84484 ASSAY OF TROPONIN QUANT: CPT

## 2018-11-02 PROCEDURE — 86140 C-REACTIVE PROTEIN: CPT

## 2018-11-02 PROCEDURE — 99283 EMERGENCY DEPT VISIT LOW MDM: CPT

## 2018-11-02 PROCEDURE — 93005 ELECTROCARDIOGRAM TRACING: CPT

## 2018-11-02 PROCEDURE — 80053 COMPREHEN METABOLIC PANEL: CPT

## 2018-11-02 PROCEDURE — 81003 URINALYSIS AUTO W/O SCOPE: CPT

## 2018-11-02 PROCEDURE — 87086 URINE CULTURE/COLONY COUNT: CPT

## 2018-11-02 PROCEDURE — 85610 PROTHROMBIN TIME: CPT

## 2018-11-02 PROCEDURE — 85025 COMPLETE CBC W/AUTO DIFF WBC: CPT

## 2018-11-02 PROCEDURE — 81015 MICROSCOPIC EXAM OF URINE: CPT

## 2018-11-02 PROCEDURE — 96374 THER/PROPH/DIAG INJ IV PUSH: CPT

## 2018-11-02 PROCEDURE — 36415 COLL VENOUS BLD VENIPUNCTURE: CPT

## 2018-11-02 PROCEDURE — 85730 THROMBOPLASTIN TIME PARTIAL: CPT

## 2018-11-02 PROCEDURE — 96375 TX/PRO/DX INJ NEW DRUG ADDON: CPT

## 2018-11-02 PROCEDURE — 83690 ASSAY OF LIPASE: CPT

## 2018-11-02 PROCEDURE — 74177 CT ABD & PELVIS W/CONTRAST: CPT

## 2018-11-02 PROCEDURE — 83605 ASSAY OF LACTIC ACID: CPT

## 2018-11-02 PROCEDURE — 71275 CT ANGIOGRAPHY CHEST: CPT

## 2018-11-02 NOTE — RAD
INDICATION: Thoracic and lumbar pain. Sensation of taste of blood. Anticoagulated.



COMPARISON: No relevant prior exams available on the [facility] [place] for comparison.



TECHNIQUE: Multidetector CT images were obtained from the lung apices to the ischial

tuberosities with 150 mL Omnipaque 350 IV contrast and oral. Pulmonary angiogram protocol

the chest. Multiplanar reformation including maximum intensity projection images of the

thorax.



CHEST REPORT: Obese body habitus and motion artifact degrades image quality. The lungs and

pleural spaces are grossly clear. Negative for pneumothorax. 



Negative for thoracic lymphadenopathy. Unchanged cardiomegaly. Negative for pericardial

effusion. Normal diameter thoracic aorta. Negative for dissection of the aorta.



Assessment of the pulmonary arteries is limited due to motion artifact. No compelling

filling defects are identified within the main to segmental and subsegmental pulmonary

arteries. Significant enlargement of the main and LEFT main pulmonary arteries as on the

prior exam and peripheral attenuation of the pulmonary arteries strongly favoring

pulmonary arterial hypertension.



Negative for suspicious thoracic osseous lesions.



CHEST IMPRESSION: 

#. No definitive pulmonary arterial filling defects identified to indicate pulmonary

embolism at this time. Motion artifact limits image quality of the pulmonary angiogram. 

#. Stigmata of probable pulmonary arterial hypertension.



ABDOMEN PELVIS REPORT: 



LIVER / GALLBLADDER / PANCREAS / SPLEEN: Unremarkable liver, gallbladder, pancreas,

spleen.



ALIMENTARY TRACT: No CT abnormality of the upper GI, small bowel, appendix visualized

medial to the cecum, or colon. Negative for ascites or free air. Small fat-containing

umbilical hernia without inflammatory change.



MESENTERIC: Unremarkable.



ADRENAL / GENITOURINARY: Normal adrenal glands. Bilateral renal cortical cysts with

dominant 5 cm cyst at the upper pole of the RIGHT kidney. No suspicious focal renal

lesions or hydronephrosis. Symmetric nephrograms and pyelograms. Unremarkable nondilated

ureters and largely decompressed urinary bladder limiting assessment without gross

abnormality. Symmetric seminal vesicles.



RETROPERITONEAL: Negative for retroperitoneal hematoma. Negative for lymphadenopathy by

short axis size criteria.



VASCULAR: Normal diameter abdominal aorta and iliac arteries. Physiologic distention of

the IVC.



BONES: Negative for suspicious osseous lesions.



SOFT TISSUE: Unremarkable.



ABDOMEN PELVIS IMPRESSION: No abdominal pelvic pathologic process evident.

## 2018-11-02 NOTE — ED
Progress





- Progress Note


Progress Note: 





Patient signed out from Dr. Romero pending CTA chest/abdomen/pelvis. The 

results are as follows:





Chest:


#. No definitive pulmonary arterial filling defects identified to indicate 

pulmonary


embolism at this time. Motion artifact limits image quality of the pulmonary 

angiogram. 


#. Stigmata of probable pulmonary arterial hypertension.





Abdomen/pelvis:No abdominal Pelvic pathologic process evident





This report has been reviewed by the ED physician.





The pt will be discharged with a diagnosis of back pain. 





Course/Dx





- Course


Course Of Treatment: Patient signed out from Dr. Romero pending CTA chest/

abdomen/pelvis. The results are as follows:  Chest:  #. No definitive pulmonary 

arterial filling defects identified to indicate pulmonary.  embolism at this 

time. Motion artifact limits image quality of the pulmonary angiogram.  #. 

Stigmata of probable pulmonary arterial hypertension.  Abdomen/pelvis:No 

abdominal Pelvic pathologic process evident.  The pt will be discharged with a 

diagnosis of back pain and is agreeable to this plan.





- Diagnoses


Provider Diagnoses: 


 Back pain








Discharge





- Sign-Out/Discharge


Documenting (check all that apply): Patient Departure - DC





- Discharge Plan


Condition: Stable


Disposition: HOME


Patient Education Materials:  Back Pain (ED)


Referrals: 


Bridger Webb MD [Primary Care Provider] - 


Additional Instructions: 


Please follow up with your primary care physician.  return if worse or any new 

symptoms.  Take all medications as previously instructed. 





- Billing Disposition and Condition


Condition: STABLE


Disposition: Home





- Attestation Statements


Document Initiated by Sarahibe: Yes


Documenting Scribe: Lorenzo Bartlett


Provider For Whom Sissy is Documenting (Include Credential): Mary Beth Calvillo MD


Scribe Attestation: 


Lorenzo NATHAN, scribed for Mary Beth Calvillo MD on 11/02/18 at 2033. 


Scribe Documentation Reviewed: Yes


Provider Attestation: 


The documentation as recorded by the Lorenzo song accurately 

reflects the service I personally performed and the decisions made by me, Mary Beth Calvillo MD

## 2018-11-02 NOTE — ED
Complex/Multi-Sys Presentation





- HPI Summary


HPI Summary: 


Patient is a 54 y/o M w/ c/o lower back pain, bilateral flank pain, and taste 

of blood in mouth since last night. He states Sx onset while he was sitting in 

front on computer. Patient denies spitting/coughing up blood. Patient was seen 

this past September and diagnosed with left lower lobe pulmonary embolism, no 

DVT. He does note that he has been experiencing episodes of pain since his PE. 

PMHx of HTN, no diabetes. Patient is on Xarelto, first 21 days 15 mg twice a day

, afterwards 20 mg once a day until PCP takes him off of this medication. He 

denies other Hx of blood clots, recent trauma/fall. On triage, pain is rated 10/

10, nothing is reported to aggravate/alleviate Sx. Home medications and 

allergies are reviewed. 





- History Of Current Complaint


Chief Complaint: EDBackInjuryPain


Time Seen by Provider: 11/02/18 11:00


Hx Obtained From: Patient


Onset/Duration: Lasting Days - onset last night, Still Present


Timing: Constant, Days - onset last night, does note previous episodes


Severity Currently: Severe - 10/10


Aggravating Factor(s): nothing


Alleviating Factor(s): nothing


Associated Signs And Symptoms: Positive: Back Pain - lower, Other - bilateral 

flank pain, taste of blood in mouth





- Allergies/Home Medications


Allergies/Adverse Reactions: 


 Allergies











Allergy/AdvReac Type Severity Reaction Status Date / Time


 


amoxicillin [From Augmentin] Allergy  Swelling Verified 11/02/18 10:50


 


clavulanic acid Allergy  Swelling Verified 11/02/18 10:50





[From Augmentin]     


 


mushroom Allergy  Rash Verified 11/02/18 10:50


 


Penicillins Allergy  Anaphylatic Verified 11/02/18 10:50





   Shock  


 


VINEGAR Allergy Intermediate Hives Uncoded 11/02/18 10:50











Home Medications: 


 Home Medications





Potassium Chlor TAB* [Klor Con ER TAB*] 20 meq PO DAILY 11/02/18 [History 

Confirmed 11/02/18]


Rivaroxaban TAB(*) [Xarelto 20 mg] 20 mg PO DAILY 11/02/18 [History Confirmed 11 /02/18]


Spironolactone TAB* [Aldactone TAB*] 25 mg PO DAILY 11/02/18 [History Confirmed 

11/02/18]











PMH/Surg Hx/FS Hx/Imm Hx


Endocrine/Hematology History: Reports: Hx Anticoagulant Therapy - plavix


   Denies: Hx Diabetes, Hx Sickle Cell Disease, Hx Thyroid Disease


Cardiovascular History: Reports: Hx Angina, Hx Cardiac Arrest, Hx Coronary 

Artery Disease, Hx Hypercholesterolemia, Hx Hypertension, Hx Myocardial 

Infarction, Other Cardiovascular Problems/Disorders - HEART DISEASE, CARDIAC 

EVENT MONITOR IMPLANTED


   Denies: Hx Congestive Heart Failure, Hx Pacemaker/ICD, Hx Valvular Heart 

Disease


Respiratory History: Reports: Hx Asthma, Hx Sleep Apnea


   Denies: Hx Chronic Obstructive Pulmonary Disease (COPD)


GI History: Reports: Hx Gastroesophageal Reflux Disease


   Denies: Hx Ulcer


 History: Reports: Hx Renal Disease - cysts, Other  Problems/Disorders - 

bilat cyst on kidneys


Musculoskeletal History: Reports: Hx Arthritis, Hx Back Problems, Other 

Musculoskeletal History - VIPUL


   Denies: Hx Scoliosis


Sensory History: Reports: Hx Cataracts - right eye, Hx Contacts or Glasses, Hx 

Hearing Aid, Hx Hearing Problem


Opthamlomology History: Reports: Hx Cataracts - right eye, Hx Contacts or 

Glasses


Neurological History: Reports: Hx Seizures, Hx Transient Ischemic Attacks (TIA)


   Denies: Hx Dementia, Hx Headaches, Other Neuro Impairments/Disorders


Psychiatric History: Reports: Hx Depression


   Denies: Hx Panic Disorder





- Cancer History


Hx Chemotherapy: No





- Surgical History


Surgery Procedure, Year, and Place: DEC 2004 sinus [facility] [place] ;.  2008 left knee 

arthroscopy [facility] [place] ;.  2013 left tympanoplasty WITH STAPES IMPLANT ([employer] 

MALLEOUS HEAD SERIAL # [id number] - PER [employer] INFORMATION SENT TO MRI ON 6/ 13/2016; IMPLANT IS STAINLESS STEEL STATES SOME DEGREE OF MAGNETISM TESTED MRI 

SAFE AT 1.5T ONLY - DR VALDEZ APPROVED THIS INFORMATION FOR 1.5T ONLY).  [facility] ;.  

2015 LEFT REVISION TYMPANOPLASTY/ MASTOIDECTOMY [facility] [place] ;.  8/ 2012 HEART CATH - NO 

STENTS ;.  2011 HEART CATH WITH cardiac stents x 4 (PROMUS DRUG-ELUTING STENT 

OKAY IN NORMAL MODE ONLY,  GAUSS/CM @ 1.5T) [facility] ;.  REVEAL LINQ EVENT 

MONITOR PLACED- 7/28/16- ED CAME OVER [employer]/ [employer] MACHINE & DID A DOWNLOAD SO 

THAT NOTHING WOULD BE ERASED


Hx Anesthesia Reactions: Yes - SEIZURE LIKE ACTIVITY; REINTUBATION AFTER LEFT 

EAR 2013





- Immunization History


Date of Tetanus Vaccine: UTD


Date of Influenza Vaccine: 10/17


Infectious Disease History: No


Infectious Disease History: 


   Denies: Hx Clostridium Difficile, Hx Hepatitis, Hx Human Immunodeficiency 

Virus (HIV), Hx of Known/Suspected MRSA, Hx Shingles, Hx Tuberculosis, Hx Known/

Suspected VRE, Hx Known/Suspected VRSA, History Other Infectious Disease, 

Traveled Outside the [place] in Last 30 Days





- Family History


Known Family History: Positive: Cardiac Disease, Hypertension, Diabetes





- Social History


Alcohol Use: None


Hx Substance Use: No


Substance Use Type: Reports: None


Hx Tobacco Use: Yes


Smoking Status (MU): Former Smoker


Type: Cigarettes


Length of Time of Smoking/Using Tobacco: 10-12 YRS


Have You Smoked in the Last Year: No





Review of Systems


Positive: Other - taste of blood in mouth, no coughing/spitting up blood 


Positive: flank pain - bilateral 


Positive: Other - lower back pain 


All Other Systems Reviewed And Are Negative: Yes





Physical Exam





- Summary


Physical Exam Summary: 


Appearance: Well appearing, no pain distress


Skin: warm, dry, reflects adequate perfusion


Head/face: normal


Eyes: EOMI, LISA


ENT: mucous membranes moist


Neck: supple, non-tender


Respiratory: CTA, breath sounds present


Cardiovascular: RRR, pulses symmetrical 


Abdomen: non-tender, soft


Bowel Sounds: present


Musculoskeletal: normal, strength/ROM intact, no tenderness/edema at LE 

bilaterally


Neuro: normal, sensory motor intact, A&Ox3








Triage Information Reviewed: Yes


Vital Signs On Initial Exam: 


 Initial Vitals











Temp Pulse Resp BP Pulse Ox


 


 96.5 F   58   20   146/83   97 


 


 11/02/18 10:45  11/02/18 10:45  11/02/18 10:45  11/02/18 10:45  11/02/18 10:45











Vital Signs Reviewed: Yes





Diagnostics





- Vital Signs


 Vital Signs











  Temp Pulse Resp BP Pulse Ox


 


 11/02/18 12:00    13  


 


 11/02/18 11:43    17  174/86 


 


 11/02/18 11:13   62  11  155/84  97


 


 11/02/18 11:12    9  


 


 11/02/18 10:45  96.5 F  58  20  146/83  97














- Laboratory


Lab Results: 


 Lab Results











  11/02/18 11/02/18 Range/Units





  12:06 12:06 


 


WBC  4.6   (3.5-10.8)  10^3/ul


 


RBC  4.90   (4.00-5.40)  10^6/ul


 


Hgb  13.6 L   (14.0-18.0)  g/dl


 


Hct  41 L   (42-52)  %


 


MCV  84   (80-94)  fL


 


MCH  28   (27-31)  pg


 


MCHC  33   (31-36)  g/dl


 


RDW  15   (10.5-15)  %


 


Plt Count  189   (150-450)  10^3/ul


 


MPV  7.9   (7.4-10.4)  um3


 


Neut % (Auto)  59.8   (38-83)  %


 


Lymph % (Auto)  26.0   (25-47)  %


 


Mono % (Auto)  10.0 H   (0-7)  %


 


Eos % (Auto)  3.4   (0-6)  %


 


Baso % (Auto)  0.8   (0-2)  %


 


Absolute Neuts (auto)  2.7   (1.5-7.7)  10^3/ul


 


Absolute Lymphs (auto)  1.2   (1.0-4.8)  10^3/ul


 


Absolute Monos (auto)  0.5   (0-0.8)  10^3/ul


 


Absolute Eos (auto)  0.2   (0-0.6)  10^3/ul


 


Absolute Basos (auto)  0   (0-0.2)  10^3/ul


 


Absolute Nucleated RBC  0   10^3/ul


 


Nucleated RBC %  0.1   


 


INR (Anticoag Therapy)   1.44 H  (0.77-1.02)  


 


APTT   49.3 H  (26.0-36.3)  seconds











Result Diagrams: 


 11/02/18 12:06





 11/02/18 12:06


Lab Statement: Any lab studies that have been ordered have been reviewed, and 

results considered in the medical decision making process.





- EKG


  ** 1137


Cardiac Rate: Bradycardia - rate of 59 BPM


EKG Rhythm: Sinus Bradycardia


Summary of EKG Findings: 1st degree AV block, lateral flipped T-waves, normal 

axis





Complex Multi-Symp Course/Dx


Course Of Treatment: Patient stable with back pain and feeling of blood in the 

mouth.  No blood was evident.  Pending CT given that he is on anticoagulant to 

evaluate for possible retroperitoneal or abdominal bleeding.





- Diagnoses


Provider Diagnoses: 


 Back pain








Discharge





- Sign-Out/Discharge


Documenting (check all that apply): Sign-Out Patient


Signing out patient TO: Mary Beth Calvillo


Receiving patient FROM: Iftikhar Romero





- Discharge Plan


Condition: Stable


Disposition: HOME


Patient Education Materials:  Back Pain (ED)


Referrals: 


Bridger Webb MD [Primary Care Provider] - 


Additional Instructions: 


Please follow up with your primary care physician.  return if worse or any new 

symptoms.  Take all medications as previously instructed. 





- Billing Disposition and Condition


Condition: STABLE


Disposition: Home





- Attestation Statements


Document Initiated by Sissy: Yes


Documenting Scribe: Valerio Pickering 


Provider For Whom Sissy is Documenting (Include Credential): Iftikhar Romero MD


Scribe Attestation: 


I, Valerio Pickering , scribed for Iftikhar Romero MD on 11/02/18 at 1859. 


Scribe Documentation Reviewed: Yes


Provider Attestation: 


The documentation as recorded by the Valerio song  accurately reflects 

the service I personally performed and the decisions made by me, Iftikhar Romero MD

## 2018-11-30 ENCOUNTER — HOSPITAL ENCOUNTER (EMERGENCY)
Dept: HOSPITAL 25 - ED | Age: 53
LOS: 1 days | Discharge: HOME | End: 2018-12-01
Payer: MEDICARE

## 2018-11-30 DIAGNOSIS — Z79.01: ICD-10-CM

## 2018-11-30 DIAGNOSIS — M79.661: ICD-10-CM

## 2018-11-30 DIAGNOSIS — R07.9: Primary | ICD-10-CM

## 2018-11-30 LAB
BASOPHILS # BLD AUTO: 0.1 10^3/UL (ref 0–0.2)
EOSINOPHIL # BLD AUTO: 0.2 10^3/UL (ref 0–0.6)
HCT VFR BLD AUTO: 45 % (ref 42–52)
HGB BLD-MCNC: 14.6 G/DL (ref 14–18)
INR PPP/BLD: 0.98 (ref 0.77–1.02)
LYMPHOCYTES # BLD AUTO: 1.6 10^3/UL (ref 1–4.8)
MCH RBC QN AUTO: 28 PG (ref 27–31)
MCHC RBC AUTO-ENTMCNC: 33 G/DL (ref 31–36)
MCV RBC AUTO: 85 FL (ref 80–94)
MONOCYTES # BLD AUTO: 0.7 10^3/UL (ref 0–0.8)
NEUTROPHILS # BLD AUTO: 3.5 10^3/UL (ref 1.5–7.7)
NRBC # BLD AUTO: 0 10^3/UL
NRBC BLD QL AUTO: 0.1
PLATELET # BLD AUTO: 206 10^3/UL (ref 150–450)
RBC # BLD AUTO: 5.28 10^6/UL (ref 4–5.4)
WBC # BLD AUTO: 6 10^3/UL (ref 3.5–10.8)

## 2018-11-30 PROCEDURE — 85730 THROMBOPLASTIN TIME PARTIAL: CPT

## 2018-11-30 PROCEDURE — 83605 ASSAY OF LACTIC ACID: CPT

## 2018-11-30 PROCEDURE — 80053 COMPREHEN METABOLIC PANEL: CPT

## 2018-11-30 PROCEDURE — 96375 TX/PRO/DX INJ NEW DRUG ADDON: CPT

## 2018-11-30 PROCEDURE — 84484 ASSAY OF TROPONIN QUANT: CPT

## 2018-11-30 PROCEDURE — 99282 EMERGENCY DEPT VISIT SF MDM: CPT

## 2018-11-30 PROCEDURE — 85610 PROTHROMBIN TIME: CPT

## 2018-11-30 PROCEDURE — 36415 COLL VENOUS BLD VENIPUNCTURE: CPT

## 2018-11-30 PROCEDURE — 71045 X-RAY EXAM CHEST 1 VIEW: CPT

## 2018-11-30 PROCEDURE — 85025 COMPLETE CBC W/AUTO DIFF WBC: CPT

## 2018-11-30 PROCEDURE — 85379 FIBRIN DEGRADATION QUANT: CPT

## 2018-11-30 PROCEDURE — 71275 CT ANGIOGRAPHY CHEST: CPT

## 2018-11-30 PROCEDURE — 82550 ASSAY OF CK (CPK): CPT

## 2018-11-30 PROCEDURE — 96374 THER/PROPH/DIAG INJ IV PUSH: CPT

## 2018-11-30 PROCEDURE — 82553 CREATINE MB FRACTION: CPT

## 2018-11-30 PROCEDURE — 93005 ELECTROCARDIOGRAM TRACING: CPT

## 2018-11-30 PROCEDURE — 83880 ASSAY OF NATRIURETIC PEPTIDE: CPT

## 2018-11-30 PROCEDURE — 83735 ASSAY OF MAGNESIUM: CPT

## 2018-11-30 NOTE — ED
Lower Extremity





- HPI Summary


HPI Summary: 


Patient is a 52 y/o M w/ c/o chest pain, back pain, and right leg pain. PMHx of 

PE is reported. Patient was seen at [facility] in september for PE. He was seen at 11 /2 for similar Sx, CTA chest/pelvis/abdomen was done and determined to be 

negative. However, when asked about this visit, he denies any memory of this 

visit. Patient states that he did not have chest pain yesterday, reports that 

it onset this morning. He states that the combination of back pain, chest pain, 

and right leg pain prompted today's ED visit. N/V denied, SOB is endorsed but 

he states he is not more SOB at present compared to yesterday. He notes bump on 

right leg, patient believes that he has not been bitten by an insect recently. 

An open area is noted at this leg as well, patient reports that he bumped his 

leg against a coffee table some time ago.  Patient is on Xarelto, patient 

states that he has been taking his medications daily. Patient has cotton in 

left ear, reports he had surgery at this ear recently, notes he cannot ear from 

this ear without hearing aid. Patient lives alone, took bus here. On triage, 

pain is rated 10/10, nothing is noted to alleviate Sx, palpation is reported to 

aggravate Sx. Home medications and allergies are reviewed. 








- History of Current Complaint


Chief Complaint: EDExtremityLower


Stated Complaint: CHEST/BACK/RT LEG PAIN


Time Seen by Provider: 11/30/18 21:01


Pain Intensity: 10





- Allergies/Home Medications


Allergies/Adverse Reactions: 


 Allergies











Allergy/AdvReac Type Severity Reaction Status Date / Time


 


amoxicillin [From Augmentin] Allergy  Swelling Verified 11/02/18 10:50


 


clavulanic acid Allergy  Swelling Verified 11/02/18 10:50





[From Augmentin]     


 


mushroom Allergy  Rash Verified 11/02/18 10:50


 


Penicillins Allergy  Anaphylatic Verified 11/02/18 10:50





   Shock  


 


VINEGAR Allergy Intermediate Hives Uncoded 11/02/18 10:50














PMH/Surg Hx/FS Hx/Imm Hx


Endocrine/Hematology History: Reports: Hx Anticoagulant Therapy - plavix


   Denies: Hx Diabetes, Hx Sickle Cell Disease, Hx Thyroid Disease


Cardiovascular History: Reports: Hx Angina, Hx Cardiac Arrest, Hx Coronary 

Artery Disease, Hx Hypercholesterolemia, Hx Hypertension, Hx Myocardial 

Infarction, Other Cardiovascular Problems/Disorders - HEART DISEASE, CARDIAC 

EVENT MONITOR IMPLANTED


   Denies: Hx Congestive Heart Failure, Hx Pacemaker/ICD, Hx Valvular Heart 

Disease


Respiratory History: Reports: Hx Asthma, Hx Sleep Apnea


   Denies: Hx Chronic Obstructive Pulmonary Disease (COPD)


GI History: Reports: Hx Gastroesophageal Reflux Disease


   Denies: Hx Ulcer


 History: Reports: Hx Renal Disease - cysts, Other  Problems/Disorders - 

bilat cyst on kidneys


Musculoskeletal History: Reports: Hx Arthritis, Hx Back Problems, Other 

Musculoskeletal History - VIPUL


   Denies: Hx Scoliosis


Sensory History: Reports: Hx Cataracts - right eye, Hx Contacts or Glasses, Hx 

Hearing Aid, Hx Hearing Problem


Opthamlomology History: Reports: Hx Cataracts - right eye, Hx Contacts or 

Glasses


Neurological History: Reports: Hx Seizures, Hx Transient Ischemic Attacks (TIA)


   Denies: Hx Dementia, Hx Headaches, Other Neuro Impairments/Disorders


Psychiatric History: Reports: Hx Depression


   Denies: Hx Panic Disorder





- Cancer History


Hx Chemotherapy: No





- Surgical History


Surgery Procedure, Year, and Place: DEC 2004 sinus [facility] [place] ;.  2008 left knee 

arthroscopy [facility] [place] ;.  2013 left tympanoplasty WITH STAPES IMPLANT ([employer] 

MALLEOUS HEAD SERIAL # [id number] - PER [employer] INFORMATION SENT TO MRI ON 6/ 13/2016; IMPLANT IS STAINLESS STEEL STATES SOME DEGREE OF MAGNETISM TESTED MRI 

SAFE AT 1.5T ONLY - DR VALDEZ APPROVED THIS INFORMATION FOR 1.5T ONLY).  [facility] ;.  

2015 LEFT REVISION TYMPANOPLASTY/ MASTOIDECTOMY [facility] [place] ;.  8/ 2012 HEART CATH - NO 

STENTS ;.  2011 HEART CATH WITH cardiac stents x 4 (PROMUS DRUG-ELUTING STENT 

OKAY IN NORMAL MODE ONLY,  GAUSS/CM @ 1.5T) [facility] [place] ;.  REVEAL LINQ EVENT 

MONITOR PLACED- 7/28/16- ED CAME OVER [employer]/ [employer] MACHINE & DID A DOWNLOAD SO 

THAT NOTHING WOULD BE ERASED


Hx Anesthesia Reactions: Yes - SEIZURE LIKE ACTIVITY; REINTUBATION AFTER LEFT 

EAR 2013





- Immunization History


Date of Tetanus Vaccine: UTD


Date of Influenza Vaccine: 10/17


Infectious Disease History: No


Infectious Disease History: 


   Denies: Hx Clostridium Difficile, Hx Hepatitis, Hx Human Immunodeficiency 

Virus (HIV), Hx of Known/Suspected MRSA, Hx Shingles, Hx Tuberculosis, Hx Known/

Suspected VRE, Hx Known/Suspected VRSA, History Other Infectious Disease, 

Traveled Outside the [place] in Last 30 Days





- Family History


Known Family History: Positive: Cardiac Disease, Hypertension, Diabetes





- Social History


Alcohol Use: None


Hx Substance Use: No


Substance Use Type: Reports: None


Hx Tobacco Use: Yes


Smoking Status (MU): Former Smoker


Type: Cigarettes


Length of Time of Smoking/Using Tobacco: 10-12 YRS


Have You Smoked in the Last Year: No





Physical Exam





- Summary


Physical Exam Summary: 


Appearance: Well-appearing, moderate pain distress, obese


Skin: Warm, color reflects adequate perfusion, dry; discretely tender 1 cm 

raised lesion on the right medial upper calf, 1 cm open abrasion that is dry 

and at the right mid medial calf; darkened skin bilaterally from ankles to 

upper calves  


Head: Normal Head/Face inspection, atraumatic


Eyes: Conjunctiva clear


ENT: Normal inspection


Neck: Supple, no nodes, no JVD


Respiratory: decreased breath sounds throughout, no respiratory distress


Cardio: RRR, No murmur, pulses normal, brisk capillary refill


Abdomen: Soft, nontender


Bowel sounds: Present 


Musculoskeletal: Strength Intact/ROM intact, no deep calf tenderness, no edema. 

Tender lesion as above. 


Psychological: Normal


Neuro: Alert, muscle tone normal, no focal deficit








Triage Information Reviewed: Yes


Vital Signs On Initial Exam: 


 Initial Vitals











Temp Pulse Resp BP Pulse Ox


 


 96.6 F   74   18   168/102   97 


 


 11/30/18 15:49  11/30/18 15:49  11/30/18 15:49  11/30/18 15:49  11/30/18 15:49











Vital Signs Reviewed: Yes





Diagnostics





- Vital Signs


 Vital Signs











  Temp Pulse Resp BP Pulse Ox


 


 11/30/18 20:12  97.5 F  68  20  182/100  99


 


 11/30/18 18:34  99.2 F  62  18  187/117  100


 


 11/30/18 15:49  96.6 F  74  18  168/102  97














- Laboratory


Lab Statement: Any lab studies that have been ordered have been reviewed, and 

results considered in the medical decision making process.





Lower Extremity Course/Dx





- Course


Course Of Treatment: ASA not given due to patient being on Xeralto.





Discharge





- Discharge Plan


Referrals: 


Bridger Webb MD [Primary Care Provider] - 





- Attestation Statements


Document Initiated by Scribe: Yes

## 2018-11-30 NOTE — ED
Complex/Multi-Sys Presentation





- HPI Summary


HPI Summary: 


Patient is a 52 y/o M w/ c/o chest pain, back pain, and right leg pain. PMHx of 

PE is reported and pt is on xarelto. Patient was seen at [facility] in September 2018 for PE. He was seen on 11/2 for similar Sx, CTA chest/pelvis/abdomen was 

done and determined to be negative. However, when asked about this visit, he 

denies any memory of this visit. Patient states that he did not have chest pain 

yesterday, reports that it onset this morning. Patient rates chest pain and 

right leg pain 10/10 in the room. Patient reports chest pain is located at mid 

right-side, radiates to back. Back pain is reported to be located at mid upper 

region. Patient reports that he does not take pain medication daily, notes he 

cannot take ibuprofen due to Xarelto, patient states that he has been taking 

his medications daily. He states that the combination of back pain, chest pain, 

and right leg pain prompted today's ED visit. N/V denied, SOB is endorsed but 

he states he is not more SOB at present compared to yesterday. He notes bump on 

right leg, patient believes that he has not been bitten by an insect recently. 

An open area is noted at this leg as well, patient reports that he bumped his 

leg against a coffee table some time ago.  Patient has cotton in left ear, 

reports he had surgery at this ear recently, notes he cannot hear from this ear 

without hearing aid. Patient lives alone, took bus here. On triage, pain is 

rated 10/10, nothing is noted to alleviate Sx, palpation is reported to 

aggravate Sx. Home medications and allergies are reviewed. In room, pulse 60, 

/97, o2 98. 





 Allergies











Allergy/AdvReac Type Severity Reaction Status Date / Time


 


amoxicillin [From Augmentin] Allergy  Swelling Verified 11/02/18 10:50


 


clavulanic acid Allergy  Swelling Verified 11/02/18 10:50





[From Augmentin]     


 


mushroom Allergy  Rash Verified 11/02/18 10:50


 


Penicillins Allergy  Anaphylatic Verified 11/02/18 10:50





   Shock  


 


VINEGAR Allergy Intermediate Hives Uncoded 11/02/18 10:50














- History Of Current Complaint


Chief Complaint: EDExtremityLower


Time Seen by Provider: 11/30/18 21:01


Hx Obtained From: Patient, Medical Records


Onset/Duration: Lasting Hours - chest pain reported to onset this morning, 

Still Present


Timing: Constant


Severity Currently: Severe - 10/10


Severity Initially: Moderate


Location: Pain At: - right mid chest, mid upper back, right leg


Character: Dull


Aggravating Factor(s): palpation


Alleviating Factor(s): nothing


Associated Signs And Symptoms: Positive: SOB, Chest Pain, Back Pain, 

Anticoagulation Therapy - xarelto, Other - POSITIVE - RIGHT LEG PAIN, BUMP AT 

RIGHT LEG, OPEN ABRASION AT RIGHT LEG.  Negative: Nausea, Vomiting





- Allergies/Home Medications


Allergies/Adverse Reactions: 


 Allergies











Allergy/AdvReac Type Severity Reaction Status Date / Time


 


amoxicillin [From Augmentin] Allergy  Swelling Verified 11/02/18 10:50


 


clavulanic acid Allergy  Swelling Verified 11/02/18 10:50





[From Augmentin]     


 


mushroom Allergy  Rash Verified 11/02/18 10:50


 


Penicillins Allergy  Anaphylatic Verified 11/02/18 10:50





   Shock  


 


VINEGAR Allergy Intermediate Hives Uncoded 11/02/18 10:50














PMH/Surg Hx/FS Hx/Imm Hx


Previously Healthy: No


Endocrine/Hematology History: Reports: Hx Anticoagulant Therapy - xarelto 


   Denies: Hx Diabetes, Hx Sickle Cell Disease, Hx Thyroid Disease


Cardiovascular History: Reports: Hx Angina, Hx Cardiac Arrest, Hx Coronary 

Artery Disease, Hx Hypercholesterolemia, Hx Hypertension, Hx Myocardial 

Infarction


   Denies: Hx Congestive Heart Failure, Hx Pacemaker/ICD, Hx Valvular Heart 

Disease


Respiratory History: Reports: Hx Asthma, Hx Sleep Apnea


   Denies: Hx Chronic Obstructive Pulmonary Disease (COPD)


GI History: Reports: Hx Gastroesophageal Reflux Disease


   Denies: Hx Ulcer


 History: Reports: Hx Renal Disease - cysts bilateral kidneys 


Musculoskeletal History: Reports: Hx Arthritis, Hx Back Problems


   Denies: Hx Scoliosis


Sensory History: Reports: Hx Cataracts - right eye, Hx Contacts or Glasses, Hx 

Hearing Aid, Hx Hearing Problem


Opthamlomology History: Reports: Hx Cataracts - right eye, Hx Contacts or 

Glasses


Neurological History: Reports: Hx Seizures, Hx Transient Ischemic Attacks (TIA)


   Denies: Hx Dementia, Hx Headaches, Other Neuro Impairments/Disorders


Psychiatric History: Reports: Hx Depression


   Denies: Hx Panic Disorder





- Cancer History


Hx Chemotherapy: No





- Surgical History


Surgery Procedure, Year, and Place: DEC 2004 sinus [facility] ;.  2008 left knee 

arthroscopy [facility] ;.  2013 left tympanoplasty WITH STAPES IMPLANT ([employer] 

MALLEOUS HEAD SERIAL # [id number] - PER [employer] INFORMATION SENT TO MRI ON 6/ 13/2016; IMPLANT IS STAINLESS STEEL STATES SOME DEGREE OF MAGNETISM TESTED MRI 

SAFE AT 1.5T ONLY - DR VALDEZ APPROVED THIS INFORMATION FOR 1.5T ONLY).  [facility] ;.  

2015 LEFT REVISION TYMPANOPLASTY/ MASTOIDECTOMY [facility] ;.  8/ 2012 HEART CATH - NO 

STENTS ;.  2011 HEART CATH WITH cardiac stents x 4 (PROMUS DRUG-ELUTING STENT 

OKAY IN NORMAL MODE ONLY,  GAUSS/CM @ 1.5T) [facility] ;.  REVEAL LINQ EVENT 

MONITOR PLACED- 7/28/16- ED CAME OVER W/ [employer] MACHINE & DID A DOWNLOAD SO 

THAT NOTHING WOULD BE ERASED


Hx Anesthesia Reactions: Yes - SEIZURE LIKE ACTIVITY; REINTUBATION AFTER LEFT 

EAR 2013





- Immunization History


Date of Tetanus Vaccine: UTD


Date of Influenza Vaccine: 10/17


Infectious Disease History: No


Infectious Disease History: 


   Denies: Hx Clostridium Difficile, Hx Hepatitis, Hx Human Immunodeficiency 

Virus (HIV), Hx of Known/Suspected MRSA, Hx Shingles, Hx Tuberculosis, Hx Known/

Suspected VRE, Hx Known/Suspected VRSA, History Other Infectious Disease, 

Traveled Outside the [place] in Last 30 Days





- Family History


Known Family History: Positive: Cardiac Disease, Hypertension, Diabetes





- Social History


Lives: Alone


Alcohol Use: None


Hx Substance Use: No


Substance Use Type: Reports: None


Hx Tobacco Use: Yes


Smoking Status (MU): Former Smoker


Type: Cigarettes


Length of Time of Smoking/Using Tobacco: 10-12 YRS


Have You Smoked in the Last Year: No





Review of Systems


Constitutional: Negative


Positive: Chest Pain


Positive: Shortness Of Breath


Negative: Vomiting, Nausea


Positive: no symptoms reported


Positive: Other - POSITIVE - BACK PAIN, RIGHT LEG PAIN 


Positive: Other - POSITIVE - BUMP AT RIGHT LEG, OPEN ABRASION AT RIGHT LEG 


Neurological: Negative


Psychological: Normal


All Other Systems Reviewed And Are Negative: Yes





Physical Exam





- Summary


Physical Exam Summary: 


Appearance: Well-appearing, moderate pain distress, obese


Skin: Warm, color reflects adequate perfusion, dry; there is a 1 cm raised 

discretely tender lesion at right upper medial calf; there is a dry superficial 

abrasion of 1 cm at right mid medial calf. Darkened skin bilaterally from 

ankles to calves.


Head: Normal Head/Face inspection, atraumatic


Eyes: Conjunctiva clear


ENT: Normal inspection


Neck: Supple, no nodes, no JVD


Respiratory: decreased breath sounds throughout, no respiratory distress


Cardio: RRR, No murmur, pulses normal, brisk capillary refill


Abdomen: Soft, nontender


Bowel sounds: Present 


Musculoskeletal: Strength Intact/ROM intact, no deep calf tenderness, no edema. 

Lesion noted as above. 


Psychological: Normal


Neuro: Alert, muscle tone normal, no focal deficit








Triage Information Reviewed: Yes


Vital Signs On Initial Exam: 


 Initial Vitals











Temp Pulse Resp BP Pulse Ox


 


 96.6 F   74   18   168/102   97 


 


 11/30/18 15:49  11/30/18 15:49  11/30/18 15:49  11/30/18 15:49  11/30/18 15:49











Vital Signs Reviewed: Yes





Diagnostics





- Vital Signs


 Vital Signs











  Temp Pulse Resp BP Pulse Ox


 


 11/30/18 20:12  97.5 F  68  20  182/100  99


 


 11/30/18 18:34  99.2 F  62  18  187/117  100


 


 11/30/18 15:49  96.6 F  74  18  168/102  97














- Laboratory


Lab Statement: Any lab studies that have been ordered have been reviewed, and 

results considered in the medical decision making process.





- Radiology


  ** CXR


Radiology Interpretation Completed By: ED Physician


Summary of Radiographic Findings: NAD





- EKG


  ** 2139


Cardiac Rate: Bradycardia - rate of 57 bpm


EKG Rhythm: Sinus Bradycardia


ST Segment: Non-Specific


Ectopy: None


EKG Comparison: No Significant Change - Compared to 11/2/18, no significant 

changes.


Summary of EKG Findings: EKG showed sinus bradycardia with rate of 57 BPM, 

first degree AV block (232), normal IVCT, normal AVCT, normal axis, nonspecific 

changes especially in v4-v6. Compared to 11/2/18, no significant changes.





Re-Evaluation





- Re-Evaluation


  ** First Eval


Re-Evaluation Time: 21:24


Change: Unchanged


Comment: Discussed patient's case with Ran from [facility], recommends soft tissue US 

for leg.





Complex Multi-Symp Course/Dx


Assessment/Plan: Patient is a 52 y/o M w/ c/o chest pain, back pain, and right 

leg pain. PMHx of PE is reported. Patient was seen at [facility] in September for 

PE. He was seen on 11/2 for similar Sx, CTA chest/pelvis/abdomen was done and 

determined to be negative. However, when asked about this visit, he denies any 

memory of this visit. Patient states that he did not have chest pain yesterday, 

reports that it onset this morning. Patient rates chest pain and right leg pain 

10/10 in the room. Patient reports chest pain is located at mid right-side, 

radiates to back. Back pain is reported to be located at mid upper region. 

Patient reports that he does not take pain medication daily, notes he cannot 

take ibuprofen due to Xarelto, patient states that he has been taking his 

medications daily. He states that the combination of back pain, chest pain, and 

right leg pain prompted today's ED visit. N/V denied, SOB is endorsed but he 

states he is not more SOB at present compared to yesterday. He notes bump on 

right leg, patient believes that he has not been bitten by an insect recently. 

An open area is noted at this leg as well, patient reports that he bumped his 

leg against a coffee table some time ago. On physical exam, discretely tender 1 

cm raised lesion on the right medial upper calf, 1 cm open abrasion that is dry 

and at the right mid medial calf; darkened skin bilaterally from ankles to 

upper calves. Patient is obese. No deep calf tenderness noted. Decreased breath 

sounds throughout.  EKG showed sinus bradycardia with rate of 57 BPM, first 

degree AV block (232), normal IVCT, normal AVCT, normal axis, nonspecific 

changes especially in v4-v6. Compared to 11/2/18, no significant changes.  CXR 

showed NAD.  Discussed patient's case with Ran from [facility], recommends soft tissue 

US for leg.  During ED course, patient was given dilaudid 1 mg IV SLOW PUSH ED 

ONCE ONE.  Patient is signed out to Dr. Herbert pending CTA, US and labs.





- Diagnoses


Provider Diagnoses: 


 Right calf pain, Chest pain








Discharge





- Sign-Out/Discharge


Documenting (check all that apply): Sign-Out Patient


Signing out patient TO: Tl Herbert - 11/30/18, 2230


Receiving patient FROM: Quiana Sales





- Discharge Plan


Referrals: 


Bridger Webb MD [Primary Care Provider] - 





- Attestation Statements


Document Initiated by Scribe: Yes


Documenting Scribe: ROME SAUNDERS 


Provider For Whom Scribe is Documenting (Include Credential): QUIANA SALES MD


Scribe Attestation: 


ROME NATHAN , scribed for QUIANA SALES MD on 11/30/18 at 2223. 


Scribe Documentation Reviewed: Yes


Provider Attestation: 


The documentation as recorded by the scribeROME  accurately reflects 

the service I personally performed and the decisions made by me, QUIANA SALES MD


Status of Scribe Document: Viewed

## 2018-12-01 VITALS — SYSTOLIC BLOOD PRESSURE: 133 MMHG | DIASTOLIC BLOOD PRESSURE: 74 MMHG

## 2018-12-01 NOTE — ED
Progress





- Progress Note


Progress Note: 


The patient is a sign-out from Dr. Quiana Apple MD, at change of shift at [22

:00] to Dr. Tl Herbert MD, pending US, CTA, and disposition. Soft Tissue 

US reveals small nonspecific subcutaneous mass or collection possibly a 

granuloma. Chest/Thorax CTA reveals no acute pathology, although a Bosniak type 

I renal cyst was found. Patient is diagnosed with right leg pain and chest 

pain. He will be discharged home with instructions for dx and follow up with 

PCP. Patient agrees with this plan and understands the need for return to the 

ED if symptoms worsen.





- Results/Orders


Results/Orders: 


Soft Tissue US:


FINDINGS: Soft tissues: Small hypoechoic nonvascular collections subcutaneous 

fat measures 0.7 x 0.6 x 0.2 cm. 





IMPRESSION: 


Small nonspecific subcutaneous mass or collection possibly a granuloma.





ED physician has reviewed this report.


 





Chest/Thorax CTA:


FINDINGS: 


Pulmonary arteries:  Pulmonary arteries are wellopacified to the subsegmental 

branches. Dilated main pulmonary artery measuring 3.5 cm. No filling defects 

throughout the pulmonary artery tree. 


Aorta:  Normal caliber aorta with no evidence of dissection or rupture. 


Thyroid: No thyroid nodules. 


Lungs:  No pulmonary nodules, masses, or consolidations. No bronchiectasis, 

peribronchial thickening, or luminal defects. 


Pleural space: Normal. No pneumothorax. No pleural effusion. 


Heart: Normal. No cardiomegaly. No pericardial effusion. 


Kidneys and ureters:  Partially visualized left renal cyst measuring 5.4 cm (

series 2, image 121). 


Lymph nodes: Normal. No enlarged lymph nodes. 


Bones/joints:  The thoracic spine demonstrates mild degenerative changes at 

multiple levels. No fractures. No suspicious bone lesions. 


Soft tissues: Normal.  





IMPRESSION: 


1. No pulmonary emboli. No additional findings to correlate with patient's 


symptomatology. Findings which in the proper clinical setting can be seen in 


pulmonary hypertension. 


2. Bosniak type I renal cyst. No followup indicated. 





ED physician has reviewed this report.








Re-Evaluation





- Re-Evaluation


  ** First Eval


Re-Evaluation Time: 21:24


Change: Unchanged


Comment: Discussed patient's case with Ran from [facility], recommends soft tissue US 

for leg.





  ** Second Eval


Re-Evaluation Time: 00:25


Change: Unchanged


Comment: I spoke with patient about imaging results and disposition home.





Course/Dx





- Diagnoses


Provider Diagnoses: 


 Right calf pain, Chest pain








Discharge





- Sign-Out/Discharge


Documenting (check all that apply): Patient Departure - Patient will be 

discharged home., Receiving Sign-Out


Receiving patient FROM: Quiana Apple - Patient is a sign-out from Dr. Quiana Apple MD, at change of shift at [22:00] pending US, CTA, and 

disposition.





- Discharge Plan


Condition: Stable


Disposition: HOME


Patient Education Materials:  Chest Pain (ED), Leg Pain (ED)


Referrals: 


Bridger Webb MD [Primary Care Provider] - 3 Days


Additional Instructions: 


Follow up with your primary care provider in 2-3 days. 


Return to the emergency department for any new or worsening symptoms.





- Billing Disposition and Condition


Condition: STABLE


Disposition: Home





- Attestation Statements


Document Initiated by Sissy: Yes


Documenting Scribe: Miya Moya


Provider For Whom Sissy is Documenting (Include Credential): Dr. Tl Herbert MD


Scribe Attestation: 


Miya NATHAN scribed for Dr. Tl Herbert MD on 12/01/18 at 0033. 


Scribe Documentation Reviewed: Yes


Provider Attestation: 


The documentation as recorded by the Miya song accurately reflects 

the service I personally performed and the decisions made by me, Dr. Tl Herbert MD


Status of Scribe Document: Ready

## 2018-12-05 ENCOUNTER — HOSPITAL ENCOUNTER (EMERGENCY)
Dept: HOSPITAL 25 - ED | Age: 53
Discharge: HOME | End: 2018-12-05
Payer: MEDICARE

## 2018-12-05 VITALS — DIASTOLIC BLOOD PRESSURE: 94 MMHG | SYSTOLIC BLOOD PRESSURE: 177 MMHG

## 2018-12-05 DIAGNOSIS — L02.415: Primary | ICD-10-CM

## 2018-12-05 DIAGNOSIS — R21: ICD-10-CM

## 2018-12-05 DIAGNOSIS — Z87.891: ICD-10-CM

## 2018-12-05 PROCEDURE — 99282 EMERGENCY DEPT VISIT SF MDM: CPT

## 2018-12-05 NOTE — ED
Skin Complaint





- HPI Summary


HPI Summary: 


53 year old male presents with lesion on right leg.  He states that he was seen 

here this week and had a negative ultrasound.  He states he developed a cyst on 

the area the next couple days.  He popped it with a pin.  He states some 

whitest discharge came out.  He denies any fevers.  Denies any spreading 

redness.  he admits to  some pain on the area.  He denies any further drainage 

from the area.  He states he is concerned it is infected.  





- History of Current Complaint


Chief Complaint: EDRashSkinAbscess


Time Seen by Provider: 12/05/18 17:07


Stated Complaint: POSS INFECTION ON RT LEG


Pain Intensity: 10





- Additional Pertinent History


Primary Care Physician: ANA





- Allergy/Home Medications


Allergies/Adverse Reactions: 


 Allergies











Allergy/AdvReac Type Severity Reaction Status Date / Time


 


amoxicillin [From Augmentin] Allergy  Swelling Verified 12/05/18 17:15


 


clavulanic acid Allergy  Swelling Verified 12/05/18 17:15





[From Augmentin]     


 


mushroom Allergy  Rash Verified 12/05/18 17:15


 


Penicillins Allergy  Anaphylatic Verified 12/05/18 17:15





   Shock  


 


VINEGAR Allergy Intermediate Hives Uncoded 12/05/18 17:15














PMH/Surg Hx/FS Hx/Imm Hx


Endocrine/Hematology History: Reports: Hx Anticoagulant Therapy - xarelto 


   Denies: Hx Diabetes, Hx Sickle Cell Disease, Hx Thyroid Disease


Cardiovascular History: Reports: Hx Angina, Hx Cardiac Arrest, Hx Coronary 

Artery Disease, Hx Hypercholesterolemia, Hx Hypertension, Hx Myocardial 

Infarction, Other Cardiovascular Problems/Disorders - HEART DISEASE, CARDIAC 

EVENT MONITOR IMPLANTED


   Denies: Hx Congestive Heart Failure, Hx Pacemaker/ICD, Hx Valvular Heart 

Disease


Respiratory History: Reports: Hx Asthma, Hx Sleep Apnea


   Denies: Hx Chronic Obstructive Pulmonary Disease (COPD)


GI History: Reports: Hx Gastroesophageal Reflux Disease


   Denies: Hx Ulcer


 History: Reports: Hx Renal Disease - cysts bilateral kidneys , Other  

Problems/Disorders - bilat cyst on kidneys


Musculoskeletal History: Reports: Hx Arthritis, Hx Back Problems, Other 

Musculoskeletal History - VIPUL


   Denies: Hx Scoliosis


Sensory History: Reports: Hx Cataracts - right eye, Hx Contacts or Glasses, Hx 

Hearing Aid, Hx Hearing Problem


Opthamlomology History: Reports: Hx Cataracts - right eye, Hx Contacts or 

Glasses


Neurological History: Reports: Hx Seizures, Hx Transient Ischemic Attacks (TIA)


   Denies: Hx Dementia, Hx Headaches, Other Neuro Impairments/Disorders


Psychiatric History: Reports: Hx Depression


   Denies: Hx Panic Disorder





- Cancer History


Hx Chemotherapy: No





- Surgical History


Surgery Procedure, Year, and Place: DEC 2004 sinus [facility] [place] ;.  2008 left knee 

arthroscopy [facility] [place] ;.  2013 left tympanoplasty WITH STAPES IMPLANT ([employer] 

MALLEOUS HEAD SERIAL # [id number] - PER [employer] INFORMATION SENT TO MRI ON 6/ 13/2016; IMPLANT IS STAINLESS STEEL STATES SOME DEGREE OF MAGNETISM TESTED MRI 

SAFE AT 1.5T ONLY - DR VALDEZ APPROVED THIS INFORMATION FOR 1.5T ONLY).  [facility] ;.  

2015 LEFT REVISION TYMPANOPLASTY/ MASTOIDECTOMY [facility] [place] ;.  8/ 2012 HEART CATH - NO 

STENTS ;.  2011 HEART CATH WITH cardiac stents x 4 ([employer]US DRUG-ELUTING STENT 

OKAY IN NORMAL MODE ONLY,  GAUSS/CM @ 1.5T) [facility] [place] ;.  REVEAL LINQ EVENT 

MONITOR PLACED- 7/28/16- ED CAME OVER W/ [employer] MACHINE & DID A DOWNLOAD SO 

THAT NOTHING WOULD BE ERASED


Hx Anesthesia Reactions: Yes - SEIZURE LIKE ACTIVITY; REINTUBATION AFTER LEFT 

EAR 2013





- Immunization History


Date of Tetanus Vaccine: UTD


Date of Influenza Vaccine: 10/17


Infectious Disease History: No


Infectious Disease History: 


   Denies: Hx Clostridium Difficile, Hx Hepatitis, Hx Human Immunodeficiency 

Virus (HIV), Hx of Known/Suspected MRSA, Hx Shingles, Hx Tuberculosis, Hx Known/

Suspected VRE, Hx Known/Suspected VRSA, History Other Infectious Disease, 

Traveled Outside the [place] in Last 30 Days





- Family History


Known Family History: Positive: Cardiac Disease, Hypertension, Diabetes





- Social History


Alcohol Use: None


Hx Substance Use: No


Substance Use Type: Reports: None


Hx Tobacco Use: Yes


Smoking Status (MU): Former Smoker


Type: Cigarettes


Length of Time of Smoking/Using Tobacco: 10-12 YRS


Have You Smoked in the Last Year: No





Review of Systems


Negative: Fever


Negative: Chest Pain


Negative: Shortness Of Breath


Positive: Rash


All Other Systems Reviewed And Are Negative: Yes





Physical Exam


Triage Information Reviewed: Yes


Vital Signs On Initial Exam: 


 Initial Vitals











Temp Pulse Resp BP Pulse Ox


 


 97 F   64   20   177/94   100 


 


 12/05/18 16:59  12/05/18 16:59  12/05/18 16:59  12/05/18 16:59  12/05/18 16:59











Vital Signs Reviewed: Yes


Appearance: Positive: Well-Appearing


Skin: Positive: Warm, Dry, Other - 2cm scabbed over lesion on right leg some 

induration felt underneath, no erythema or flutance present, tenderness over 

area


Head/Face: Positive: Normal Head/Face Inspection


Eyes: Positive: Normal, Conjunctiva Clear


ENT: Positive: Pharynx normal


Respiratory/Lung Sounds: Positive: Clear to Auscultation, Breath Sounds Present


Cardiovascular: Positive: Normal, RRR


Musculoskeletal: Positive: Normal


Neurological: Positive: Normal


Psychiatric: Positive: Normal





Diagnostics





- Vital Signs


 Vital Signs











  Temp Pulse Resp BP Pulse Ox


 


 12/05/18 16:59  97 F  64  20  177/94  100














- Laboratory


Lab Statement: Any lab studies that have been ordered have been reviewed, and 

results considered in the medical decision making process.





Course/Dx





- Course


Course Of Treatment: 53 year old male presents with lesion on right leg.  He 

states that he was seen here this week and had a negative ultrasound.  He 

states he developed a cyst on the area the next couple days.  He popped it with 

a pin.  He states some whitest discharge came out.  He denies any fevers.  

Denies any spreading redness.  he admits to  some pain on the area.  He denies 

any further drainage from the area.  He states he is concerned it is infected.  

On exam has 2 cm x 1 tenderness Area on right shin.  Some induration present 

but no erythema or fluctuation.  Does not appear like anything Can be drained.  

We'll treat as a potential healing abscess with doxycycline.  Told to follow 

with primary to ensure that is resolving.  Patient understands agrees the plan.





- Differential Diagnoses - Skin Complaint


Differential Diagnoses: Abscess, Cellulitis, Contact Dermatitis





- Diagnoses


Provider Diagnoses: 


 Abscess of right leg








Discharge





- Sign-Out/Discharge


Documenting (check all that apply): Patient Departure





- Discharge Plan


Condition: Good


Disposition: HOME


Prescriptions: 


DOXYcycline CAP(*) [DOXYcycline 100MG CAP(*)] 100 mg PO BID #19 cap


Patient Education Materials:  Abscess (ED)


Referrals: 


Bridger Webb MD [Primary Care Provider] - 


Additional Instructions: 


Take antibiotic twice a day for 10 days, first dose given in ED


Apply warm compresses to area   


Take Tylenol for pain every 6 hours


Follow up with primary within 3 days


Return to ED if develop fever, area of redness spreads, or any new or worsening 

symptoms





- Billing Disposition and Condition


Condition: GOOD


Disposition: Home

## 2018-12-28 ENCOUNTER — HOSPITAL ENCOUNTER (EMERGENCY)
Dept: HOSPITAL 25 - ED | Age: 53
LOS: 1 days | Discharge: HOME | End: 2018-12-29
Payer: MEDICARE

## 2018-12-28 DIAGNOSIS — R07.9: Primary | ICD-10-CM

## 2018-12-28 DIAGNOSIS — Z88.0: ICD-10-CM

## 2018-12-28 DIAGNOSIS — H95.199: ICD-10-CM

## 2018-12-28 DIAGNOSIS — H92.02: ICD-10-CM

## 2018-12-28 DIAGNOSIS — R06.02: ICD-10-CM

## 2018-12-28 DIAGNOSIS — Z79.01: ICD-10-CM

## 2018-12-28 DIAGNOSIS — E66.01: ICD-10-CM

## 2018-12-28 DIAGNOSIS — I10: ICD-10-CM

## 2018-12-28 DIAGNOSIS — Z87.891: ICD-10-CM

## 2018-12-28 LAB
ALBUMIN SERPL BCG-MCNC: 3.9 G/DL (ref 3.2–5.2)
ALBUMIN/GLOB SERPL: 1.4 {RATIO} (ref 1–3)
ALP SERPL-CCNC: 111 U/L (ref 34–104)
ALT SERPL W P-5'-P-CCNC: 23 U/L (ref 7–52)
ANION GAP SERPL CALC-SCNC: 5 MMOL/L (ref 2–11)
AST SERPL-CCNC: 17 U/L (ref 13–39)
BASOPHILS # BLD AUTO: 0 10^3/UL (ref 0–0.2)
BUN SERPL-MCNC: 13 MG/DL (ref 6–24)
BUN/CREAT SERPL: 10.3 (ref 8–20)
CALCIUM SERPL-MCNC: 8.9 MG/DL (ref 8.6–10.3)
CHLORIDE SERPL-SCNC: 106 MMOL/L (ref 101–111)
EOSINOPHIL # BLD AUTO: 0.2 10^3/UL (ref 0–0.6)
GLOBULIN SER CALC-MCNC: 2.7 G/DL (ref 2–4)
GLUCOSE SERPL-MCNC: 137 MG/DL (ref 70–100)
HCO3 SERPL-SCNC: 29 MMOL/L (ref 22–32)
HCT VFR BLD AUTO: 41 % (ref 42–52)
HGB BLD-MCNC: 13.8 G/DL (ref 14–18)
LYMPHOCYTES # BLD AUTO: 1.6 10^3/UL (ref 1–4.8)
MCH RBC QN AUTO: 28 PG (ref 27–31)
MCHC RBC AUTO-ENTMCNC: 33 G/DL (ref 31–36)
MCV RBC AUTO: 85 FL (ref 80–94)
MONOCYTES # BLD AUTO: 0.6 10^3/UL (ref 0–0.8)
NEUTROPHILS # BLD AUTO: 3.5 10^3/UL (ref 1.5–7.7)
NRBC # BLD AUTO: 0 10^3/UL
NRBC BLD QL AUTO: 0.1
PLATELET # BLD AUTO: 205 10^3/UL (ref 150–450)
POTASSIUM SERPL-SCNC: 3.8 MMOL/L (ref 3.5–5)
PROT SERPL-MCNC: 6.6 G/DL (ref 6.4–8.9)
RBC # BLD AUTO: 4.89 10^6/UL (ref 4–5.4)
SODIUM SERPL-SCNC: 140 MMOL/L (ref 135–145)
WBC # BLD AUTO: 6 10^3/UL (ref 3.5–10.8)

## 2018-12-28 PROCEDURE — 85025 COMPLETE CBC W/AUTO DIFF WBC: CPT

## 2018-12-28 PROCEDURE — 71045 X-RAY EXAM CHEST 1 VIEW: CPT

## 2018-12-28 PROCEDURE — 36415 COLL VENOUS BLD VENIPUNCTURE: CPT

## 2018-12-28 PROCEDURE — 85379 FIBRIN DEGRADATION QUANT: CPT

## 2018-12-28 PROCEDURE — 99284 EMERGENCY DEPT VISIT MOD MDM: CPT

## 2018-12-28 PROCEDURE — 83605 ASSAY OF LACTIC ACID: CPT

## 2018-12-28 PROCEDURE — 84484 ASSAY OF TROPONIN QUANT: CPT

## 2018-12-28 PROCEDURE — 80053 COMPREHEN METABOLIC PANEL: CPT

## 2018-12-28 PROCEDURE — 93005 ELECTROCARDIOGRAM TRACING: CPT

## 2018-12-28 NOTE — ED
HPI Chest Pain





- HPI Summary


HPI Summary: 





A 54 y/o male presents to [facility] with a chief complaint of chest pain since 19:

00 12/28/18. The patient claims that he was at [employer] when he felt his pain. He 

denies N/V but admits to diaphoresis. He reports that his CP was worse in 

Synagogue than it is in the ED. He describes the pain as sharp. He rates his pain 

as 10/10. He also c/o left arm pain, SOB and left ear pain.  Deep breaths 

aggravate his pain. He reports his left ear has been bothering him for a long 

time, stating that ear drops have not alleviated his pain. He saw an ENT MD and 

claims that his ear was fine.  The patient is obese. He reports that he does 

not have a job and most of the time is laying or sitting down. The patient is 

on disability. He admits a Hx of cardiac disease, HTN and asthma. He denies a 

Hx of DM. He had three stents in 2011. He states that a month after his stents 

he felt his heart fluttering. He denies smoking or drug use although he is a 

former smoker.





- History of Current Complaint


Chief Complaint: EDChestPainROMI


Time Seen by Provider: 12/28/18 21:23


Hx Obtained From: Patient


Onset/Duration: Started Hours Ago, Still Present


Timing: Constant


Initial Severity: Severe


Current Severity: Severe


Pain Intensity: 10


Pain Scale Used: 0-10 Numeric


Chest Pain Location: Diffuse


Chest Pain Radiates: Yes


Chest Pain Radiates To:: Arm


Character: Sharp/Stabbing


Aggravating Factor(s): Deep Breaths


Alleviating Factor(s): Nothing


Associated Signs and Symptoms: Positive: Shortness of Breath, Diaphoresis, Other

: - positive: left arm pain, left ear pain.  Negative: Nausea, Vomiting





- Additional Pertinent History


Primary Care Physician: [id number]





- Allergy/Home Medications


Allergies/Adverse Reactions: 


 Allergies











Allergy/AdvReac Type Severity Reaction Status Date / Time


 


amoxicillin [From Augmentin] Allergy  Swelling Verified 12/05/18 17:15


 


clavulanic acid Allergy  Swelling Verified 12/05/18 17:15





[From Augmentin]     


 


mushroom Allergy  Rash Verified 12/05/18 17:15


 


Penicillins Allergy  Anaphylatic Verified 12/05/18 17:15





   Shock  


 


VINEGAR Allergy Intermediate Hives Uncoded 12/05/18 17:15














PMH/Surg Hx/FS Hx/Imm Hx


Endocrine/Hematology History: Reports: Hx Anticoagulant Therapy - xarelto 


   Denies: Hx Diabetes, Hx Sickle Cell Disease, Hx Thyroid Disease


Cardiovascular History: Reports: Hx Angina, Hx Cardiac Arrest, Hx Coronary 

Artery Disease, Hx Hypercholesterolemia, Hx Hypertension, Hx Myocardial 

Infarction, Other Cardiovascular Problems/Disorders - HEART DISEASE, CARDIAC 

EVENT MONITOR IMPLANTED


   Denies: Hx Congestive Heart Failure, Hx Pacemaker/ICD, Hx Valvular Heart 

Disease


Respiratory History: Reports: Hx Asthma, Hx Sleep Apnea


   Denies: Hx Chronic Obstructive Pulmonary Disease (COPD)


GI History: Reports: Hx Gastroesophageal Reflux Disease


   Denies: Hx Ulcer


 History: Reports: Hx Renal Disease - cysts bilateral kidneys , Other  

Problems/Disorders - bilat cyst on kidneys


Musculoskeletal History: Reports: Hx Arthritis, Hx Back Problems, Other 

Musculoskeletal History - VIPUL


   Denies: Hx Scoliosis


Sensory History: Reports: Hx Cataracts - right eye, Hx Contacts or Glasses, Hx 

Hearing Aid, Hx Hearing Problem


Opthamlomology History: Reports: Hx Cataracts - right eye, Hx Contacts or 

Glasses


Neurological History: Reports: Hx Seizures, Hx Transient Ischemic Attacks (TIA)


   Denies: Hx Dementia, Hx Headaches, Other Neuro Impairments/Disorders


Psychiatric History: Reports: Hx Depression


   Denies: Hx Panic Disorder





- Cancer History


Hx Chemotherapy: No





- Surgical History


Surgery Procedure, Year, and Place: DEC 2004 [place] [facility] [place].  2008 left knee 

arthrosc[place] [facility] [place].  2013 left tympanoplasty WITH STAPES IMPLANT ([employer] 

MALLEOUS HEAD SERIAL # [id number] - PER [facility] INFORMATION SENT TO MRI ON 6/ 13/2016; IMPLANT IS STAINLESS STEEL STATES SOME DEGREE OF MAGNETISM TESTED MRI 

SAFE AT 1.5T ONLY - DR VALDEZ APPROVED THIS INFORMATION FOR 1.5T ONLY).  [facility] ;.  

2015 LEFT REVISION TYMPANOPLASTY/ MASTOIDECTOMY [facility] [place] ;.  8/ 2012 HEART CATH - NO 

STENTS ;.  2011 HEART CATH WITH cardiac stents x 4 (PROMUS DRUG-ELUTING STENT 

OKAY IN NORMAL MODE ONLY,  GAUSS/CM @ 1.5T) [facility] [place] ;.  REVEAL LINQ EVENT 

MONITOR PLACED- 7/28/16- ED CAME OVER W/ [facility] MACHINE & DID A DOWNLOAD SO 

THAT NOTHING WOULD BE ERASED


Hx Anesthesia Reactions: Yes - SEIZURE LIKE ACTIVITY; REINTUBATION AFTER LEFT 

EAR 2013





- Immunization History


Date of Tetanus Vaccine: UTD


Date of Influenza Vaccine: 10/17


Infectious Disease History: No


Infectious Disease History: 


   Denies: Hx Clostridium Difficile, Hx Hepatitis, Hx Human Immunodeficiency 

Virus (HIV), Hx of Known/Suspected MRSA, Hx Shingles, Hx Tuberculosis, Hx Known/

Suspected VRE, Hx Known/Suspected VRSA, History Other Infectious Disease, 

Traveled Outside the [place] in Last 30 Days





- Family History


Known Family History: Positive: Cardiac Disease, Hypertension, Diabetes





- Social History


Alcohol Use: None


Hx Substance Use: No


Substance Use Type: Reports: None


Hx Tobacco Use: Yes


Smoking Status (MU): Former Smoker


Type: Cigarettes


Length of Time of Smoking/Using Tobacco: 10-12 YRS


Have You Smoked in the Last Year: No





Review of Systems


Positive: Skin Diaphoresis.  Negative: Fever


Positive: Ear Ache - left ear pain


Positive: Chest Pain


Positive: Shortness Of Breath


Negative: Vomiting, Nausea


Positive: Myalgia - left arm pain


All Other Systems Reviewed And Are Negative: Yes





Physical Exam





- Summary


Physical Exam Summary: 





Appearance: Morbidly obese, appears uncomfortable.


Skin: Warm, dry, no obvious rash


Eyes: sclera anicteric, no conjunctival pallor


ENT: mucous membranes moist, pharynx appears normal


Neck: Supple, nontender


Respiratory: Clear to auscultation, no signs of respiratory distress


Cardiovascular: Normal S1, S2. No murmurs. Normal distal pulses in tibial and 

radial bilaterally.


Abdomen: Soft, nontender, normal active bowel sounds present


Musculoskeletal: Normal, Strength/ROM Intact


Neurological: A&Ox3, awake and alert, mentation is normal, speech is fluent and 

appropriate


Psychiatric: affect is normal, does not appear anxious or depressed


Triage Information Reviewed: Yes


Vital Signs On Initial Exam: 


 Initial Vitals











Temp Pulse Resp BP Pulse Ox


 


 97.7 F   66   22   162/90   99 


 


 12/28/18 21:12  12/28/18 21:12  12/28/18 21:12  12/28/18 21:12  12/28/18 21:12











Vital Signs Reviewed: Yes





Diagnostics





- Vital Signs


 Vital Signs











  Temp Pulse Resp BP Pulse Ox


 


 12/28/18 21:12  97.7 F  66  22  162/90  99














- Laboratory


Result Diagrams: 


 12/28/18 21:56





 12/28/18 21:56


Lab Statement: Any lab studies that have been ordered have been reviewed, and 

results considered in the medical decision making process.





- Radiology


  ** CXR


Radiology Interpretation Completed By: ED Physician


Summary of Radiographic Findings: No acute disease. Pending official radiology 

report.





- EKG


  ** 21:18


Cardiac Rate: NL - 69 bpm


EKG Rhythm: Sinus Rhythm


Summary of EKG Findings: NSR at 69 BPM, P waves, QRS complex, and T waves are 

within normal limits, T waves and intervals are normal, no ischemic changes. 

This is a normal EKG





Re-Evaluation





- Re-Evaluation


  ** First Eval


Re-Evaluation Time: 23:23


Comment: Lab work so far is coming back negative.  Troponin is flat and d-dimer 

is normal.





Chest Pain Course/Dx





- Course


Course Of Treatment: A 54 y/o male presents to [facility] with a chief complaint of 

chest pain since 19:00 12/28/18. The patient claims that he was at [employer] when 

he felt his pain. He denies N/V but admits to diaphoresis. He reports that his 

CP was worse in Synagogue than it is in the ED. He describes the pain as sharp. He 

rates his pain as 10/10. He also c/o left arm pain, SOB and left ear pain.  

Deep breaths aggravate his pain. He reports his left ear has been bothering him 

for a long time, stating that ear drops have not alleviated his pain. He saw an 

ENT MD and claims that his ear was fine.  The patient is obese. He reports that 

he does not have a job and most of the time is laying or sitting down. The 

patient is on disability. He admits a Hx of cardiac disease, HTN and asthma. He 

denies a Hx of DM. He had three stents in 2011. He states that a month after 

his stents he felt his heart fluttering. He denies smoking or drug use although 

he is a former smoker. His EKG showed NSR at 69 BPM, P waves, QRS complex, and 

T waves are within normal limits, T waves and intervals are normal, no ischemic 

changes. This is a normal EKG. His CXR showed no acute disease.  Review of his 

chart shows that he has had many prior ED visits for similar symptoms.  On one 

occasion he was diagnosed with a pulmonary embolism and admitted, although it 

appears that was a fairly small embolus, nonobstructive and subsegmental.  His 

last cardiac catheterization showed coronary artery disease but no lesions 

amenable to revascularization he was treated medically.  Subsequent stress 

tests appear unremarkable.  He does not appear to have suffered any neck or 

infarction, with a number of negative troponins in the record.  We will rule 

him out for MI with serial troponins, check a d-dimer, and I will order some 

medication for his pain. Lab work obtained and WNL. Troponin 0.01. Repeat 

troponin 0.01. In the ED course the patient was given Maalox Plus PO and 

Lidocaine PO. The patient will be discharged and is agreeable with this plan.





- Chest Pain


Differential Diagnosis/HQI/PQRI: Acute MI, ACS, Angina, GI Disease, Pulmonary 

Embolism





- Diagnoses


Provider Diagnoses: 


 Chest pain, History of tympanomastoidectomy, HTN (hypertension), Morbid obesity





Provider Diagnoses: 


 (Ruled Out): Pulmonary embolism





Discharge





- Sign-Out/Discharge


Documenting (check all that apply): Patient Departure - DC





- Discharge Plan


Condition: Good


Disposition: HOME


Patient Education Materials:  Chest Pain (ED)


Referrals: 


Bridger Webb MD [Primary Care Provider] - 


Additional Instructions: 


Call your cardiologist tomorrow to let them know you were here. Your blood 

tests and EKG did not show any sign of trouble with your heart. We also did a 

blood test to rule out a recurrent embolus in your lung, which was also normal.





- Billing Disposition and Condition


Condition: GOOD


Disposition: Home





- Attestation Statements


Document Initiated by Sissy: Yes


Documenting Sarahibe: Lorenzo Bartlett


Provider For Whom Sissy is Documenting (Include Credential): Miguel Paulson MD


Scribe Attestation: 


Lorenzo NATHNA scribed for Miguel Paulson MD on 12/29/18 at 0537. 


Scribe Documentation Reviewed: Yes


Provider Attestation: 


The documentation as recorded by the Lorenzo song accurately 

reflects the service I personally performed and the decisions made by me, 

Miguel Paulson MD


Status of Scranna Document: Viewed

## 2018-12-28 NOTE — XMS REPORT
Continuity of Care Document (CCD)

 Created on:2018



Patient:Delta Rodriguez

Sex:Male

:1965

External Reference #:[id number]





Demographics







 Address  [address]



   [place] [address]

 

 Home Phone  [phone]

 

 Mobile Phone  [phone]

 

 Preferred Language  en

 

 Marital Status  Declined to Specify/Unknown

 

 Jehovah's witness Affiliation  Unknown

 

 Race  Unknown

 

 Ethnic Group  Declined to Specify/Unknown









Author







 Name  Jonathan E. Cryer, MD

 

 Address  [address]



   Unavailable



   [place] [address]









Support







 Name  Relationship  Address  Phone

 

 Unavailable  Sibling  Unavailable  [phone]









Care Team Providers







 Name  Role  Phone

 

 Bridger Webb MD  Care Team Information   Unavailable

 

 Bridger Webb MD  Primary Care Physician  Unavailable









Payers







 Type  Date  Identification Numbers  Payment Provider  Subscriber

 

   Effective:  Policy Number: [id number]  [facility] [facility]  Delta Rodriguez



   1999    [employer]  









 PayID: [id number]  [address]









 [place] [address]









     Policy Number: [id number]  Medicaid/C  Delta Rodriguez









 Group Number: [id number]  Medicare Primary

 

 Group Name: [id number]  [address]

 

 PayID: [id number]  [place] [address]







Advance Directives







 Description

 

 No Information Available







Problems







 Date  Description  Provider  Status

 

 Onset: 10/07/2011  Acute pharyngitis  Jonathan E. Cryer, MD  Active

 

 Onset: 10/07/2011  Perforation of tympanic membrane  Jonathan E. Cryer, MD  
Active

 

 Onset: 10/07/2011  Dysfunction of eustachian tube  Jonathan E. Cryer, MD  
Active

 

 Onset: 10/07/2011  Middle ear conductive hearing loss  Jonathan E. Cryer, MD  
Active

 

 Onset: 10/07/2011  Sensorineural hearing loss  Jonathan E. Cryer, MD  Active

 

 Onset: 10/07/2011  Disorder of nasal cavity  Yanelis Wooten NP  Active

 

 Onset: 10/07/2011  Gastroesophageal reflux disease  Yanelis Wooten NP  Active

 

 Onset: 10/07/2011  Extrinsic asthma without status  Yanelis Wooten NP  Active



   asthmaticus    

 

 Onset: 10/07/2011  Peptic reflux disease  Yanelis Wooten NP  Active

 

 Onset: 10/07/2011  Allergic rhinitis  Yanelis Wooten NP  Active

 

 Onset: 2018  Chronic tympanitis  Katelynn Trevino PA-C  Active

 

 Onset: 2018  Impacted cerumen  Katelynn Trevino PA-C  Active







Family History







 Date  Family Member(s)  Problem(s)  Comments

 

   Mother  Non Insulin Dependent Diabetes  







Social History







 Type  Date  Description  Comments

 

 Birth Sex    Unknown  

 

 Occupation    tops  

 

 Tobacco Use  Start: Unknown End:  Former Cigarette Smoker  for 25 years



   Unknown  Packs Daily 1  

 

 Tobacco Use  Start: Unknown  has never smoked cigars  

 

 Tobacco Use  Start: Unknown  has never smoked a pipe  

 

 Smokeless Tobacco    has never used smokeless  



     tobacco  

 

 ETOH Use    Current Alcohol Use  



     Occasionally  

 

 Recreational Drug Use    denies drug use  

 

 Tobacco Use  Start: Unknown End:  Patient is a former smoker  



   Unknown    

 

 Smoking Status  Reviewed: 18  Patient is a former smoker  







Allergies, Adverse Reactions, Alerts







 Date  Description  Reaction  Status  Severity  Comments

 

 2008  Augmentin XR    Active    

 

 2013  Penicillins  neck, tongue swells  Active    

 

 2005  NKDA    Inactive    







Medications







 Medication  Date  Status  Form  Strength  Qnty  SIG  Indications  Ordering



                 Provider

 

 Omeprazole    Active  Capsules DR  40mg  60cap  40mg po  530.81  Saeid NLottie



           s  bid for 1    Strominge



             month for    r, M.D.



             esophagiti    



             s    

 

 Advair Hfa    Active  Aerosol  115/21  1unit  2 Puffs  478.19  Saeid BOWLES



   /        s  bid With    Strominge



             Spacer    BUBBA donaldson



                 



                 



                 



                 



                 



             Please    



             Provide    



             Spacer    

 

 Albuterol    Active  Aerosol  90mcg/Dos  2unit  2 Puffs    Wilber



 Inhalation  /      e  s  Q4H prn    E. Cryer, MD

 

 Bystolic    Active    5mg    qd    Unknown



                 

 

 Aspirin    Active  Tablets DR  81mg        Unknown



                 

 

 Nitroglycerin    Active    ?mg    prn Only    Tracey,



   /              Bridger CARDENAS

 

 Ranexa    Active  Tablets ER  500mg    1 po bid    Unknown



       12HR          

 

 Atorvastatin    Active  Tablets  40mg    1 po qd    Unknown



 Calcium                

 

 Klor-Con M20    Active  Tablets ER  20Meq        Stefek,Pa



                 rosario WOLFDLottie

 

 Metoprolol    Active  Tablets ER  50mg        Unknown



 Succinate ER      24HR          

 

 Triamterene/Hydro    Active  Capsules  37.5-25mg        Tracey



 chlorothiazide  /              Bridger Sykes Diskus    Active  Aerosol  250-50mcg        Tracey,



   /0000      /Dose        Bridger CARDENAS

 

 Proair HFA    Active  Aerosol  108(90Bas        Tracey,



   /      e)        Bridger CARDENAS



         mcg/Act        

 

 Atorvastatin    Active  Tablets  20mg        Stefek,Pa



 Calcium  /              ul M.D.

 

 Clopidogrel    Active  Tablets  75mg        Tracey,



 Bisulfate                Bridger CARDENAS

 

 Xarelto    Active  Tablets  20mg        Tracey,



                 Bridger CARDENAS

 

 Famotidine    Active  Tablets  20mg        Tracey,



                 Bridger CARDENAS

 

                 

 

 Cipro HC    Hx  Suspension  0.2-1%  10ml  Instill  H73.12  Saeid N.



             5-8 drops    Strominge



   -          into the    r, M.D.



             left ear           times a    



             day for 10    



             days    

 

 Tobradex    Hx  Suspension  0.3-0.1%  10ml  Instill    Saeid N.



             5-8 drops    Strominge



   -          in the    r, M.D.



             left ear           times a    



             day for 10    



             days.    

 

 Ciprodex    Hx  Suspension  0.3-0.1%  7.500  4 drops            ml  left ear    E. Cryer,



   -          twice a    MD



             day for 10    



   /2018          days    

 

 Methylprednisolon    Hx  TBPK  4mg  1pack  take as              directed    E. Cryer, - MD



                 

 

 Mupirocin    Hx  Ointment  2%  22gm  apply to              both    E. Cryer, -          nostril    MD



             twice a    



             day    

 

 Triamcinolone    Hx  Cream  0.1%  15gm  Apply to    Wilber



 Acetonide            left ear    E. Cryer,



   -          twice    MD



             daily for    



             1 week    

 

 Percocet    Hx  Tablets  5-325mg  30tab  1-2 tabs    Wilber



           s  by mouth    E. Cryer,



   -          every 4-6    MD



             hours as    



   /2018          needed for    



             pain    

 

 Levofloxacin    Hx  Tablets  500mg  10tab  1 by mouth    Wilber        s  every day    E. Cryer, - MD



                 

 

 Ciprodex    Hx  Suspension  0.3-0.1%  1Bott  4 drops to            le  left ears    E. Cryer,



   -          twice a    MD



             day apply    



             rebate rx    



             bin:    



             [id number]    



             rxpcn:    



             loyalty    



             rxgrp:[id number]    



             [id number]    



             issuer:    



             ([id number])    



             id#[id number]    



             25    

 

 Ofloxacin    Hx  Solution  0.3%  1unit  4 drops to    Saeid N.



           s  affected    Strominge



   -          ear daily    r, M.D.



   10/24              



   /2013              

 

 Ciprodex    Hx  Suspension  0.3-0.1%  2unit  4 drops    Saeid N.



           s  infected    Strominge



   -          ear bid    r, M.DLottie



   10/24          apply    



   /2013          rebate    



             rxbin:    



             [id number]    



             rxpcn:    



             loyalty    



             rxgrp:    



             [id number]    



             issuer:    



             ([id number])    



             id:    



             [id number]    

 

 Oxycodone/Acetami    Hx  Tablets  5-325mg  30tab  1-2 tabs    Saeid randhawa          s  by mouth    Strominge



   -          every 4-6    r, M.D.



             hours as    



   /2018          needed for    



             pain    

 

 Ciprodex    Hx  Suspension  0.3-0.1%  2unit  4 drops  385.32  Saeid BOWLES



           s  infected    Strominge



   -          ear bid    r, M.D.



             rebate    



             rxbin:    



             [id number]    



             rxpcn:    



             loyalty    



             rxgrp:    



             [id number]    



             issuer:    



             ([id number])    



             id:    



             [id number]    

 

 Levofloxacin    Hx  Tablets  500mg  10tab  1 po qd            s      ELottie Cryer,



   -              MD



                 

 

 Oxycodone/Acetami    Hx  Tablets  5-325mg  30tab  1-2 tabs    Wilber isabelhen  /2013        s  by mouth    E. Cryer,



   -          every 4-6    MD



             hours as    



   /2013          needed for    



             pain    

 

 Ciprodex  10/16  Hx  Suspension  0.3-0.1%  2unit  4 drops  381.3          s  infected    E. Cryer,



   -          ear bid x    MD



             14 days    



   /          apply    



             rebate    



             rxbin:    



             [id number]    



             rxpcn:    



             loyalty    



             rxgrp:    



             [id number]    



             issuer:    



             ([id number])    



             id:    



             [id number]    

 

 Ipratropium    Hx  Solution  0.06%  6unit  2 to 4  J31.0  Saeid N.



 [place]          s  sprays in    [facility]



   -          each    BUBBA donaldson



             nostril           times a    



             day as    



             needed for    



             rhinitis    

 

 Bacitracin    Hx      1Tube  apply to  472.0  Saeid N.



 Ointment            rim of    [facility]



   -          nose both    r, MTANVIR



             sides in    



             the am and    



             the pm.    

 

 Flovent HFA    Hx  Aerosol  110mcg/Ac  1unit  2 puff bid  493.01  Saeid 
NLottie



   /      t  s      Turner donaldson M.D.



                 

 

 Fluticasone Nasal    Hx    50mcg  1unit  2 sprays    Wilber



 Spray  /2011        s  each side    E. Cryer,



   -          bid    MD



                 

 

 Omnaris    Hx  Suspension  50mcg/Act    2 sprays    Saeid BOWLES



           s  each    Strominge



   -          nostril    BUBBA donaldson



   05/01          daily    



   /2012              

 

 Percocet    Hx  Tablets  5-325mg  30tab  1-2 tabs            s  by mouth    E. Cryer, -          every 4-6    MD



             hours as    



   /2013          needed for    



             pain    

 

 Ciprofloxacin HCL    Hx  Tablets  500mg  14tab  1 tablet            s  twice a    E. Cryer, -          day for 7    MD



   05/24          days    



   /2011              

 

 Astepro Nasal    Hx    0.15% 205  1unit  2 sprays    Saeid BOWLES



 Spray        mcg  s  once a day    Turner donaldson M.D.



                 

 

 Aciphex    Hx  Tablets DR  20mg  30tab  1 po Daily    Saeid BOWLES



           s      Turner donaldson M.D.



                 

 

 Mucinex DM  10/22  Hx  Tablets ER    5Days  1 op qd  478.1-4  Wilber



 Maximum Strength      12HR          E. Cryer, - MD



                 

 

 Fexofenadine HCL    Hx  Tabs  180mg  30tab  1qd - Take    Wilber



           s  One Tablet    E. Cryer,



   -          By Mouth    MD



             Every Day    



   /2011              

 

 Singulair    Hx  Tabs  10mg  30tab  1qd - Take  477.0          s  One Tablet    E. Cryer, -          By Mouth    MD



             Every Day    



                 

 

 Medrol Dosepak    Hx  Tablets  4mg  1Pack  Take as  350.2            Directed    E. Cryer, - MD



                 

 

 Prednisone    Hx  Tablets  20mg  5Days  3 PO qd    Saeid N.



             With Food    Turner donaldson M.D.



                 

 

 Augmentin    Hx  Tablets  875mg  14Day  1 po bid  473.0   N.



           s  with food    Turner donaldson M.D.



                 

 

 Aciphex    Hx  Tablets DR  20mg  60tab  1 po bid  530.81          s      E. Cryer, - MD



                 

 

 Fexofenadine HCL    Hx  Tablets  180mg  30tab  1 po qd  477.8   N.



           maciel donaldson M.D.



                 

 

 Zyrtec    Hx  Tablets  10mg  30tab  1 PO qd     N.



           maciel donaldson M.D.



                 

 

 Fluticasone    Hx  Suspension  50mcg/Act  1mont  2 sprays  471.8  Wilber



 Propionate  /        h  each    E. Cryer, -          nostril    MD



             daily    



   /              

 

 Prednisone    Hx  Tablets  10mg  40tab  4 Tabs PO  471.8  Wilber



           s  Every Day    E. Cryer,



   -          X4 D, Then    MD



   10/15          3 Tabs PO    



   /          Every Day    



             X 4D, Then    



             2 Tab PO    



             Daily X4D,    



             Then 1 Tab    



             PO Daily    



             X4D    

 

 Nasacort Aq    Hx  Suspension  55mcg/Act  1unit  2 Sprays  471.8  Wilber



 Intranasal Spray  /      uation  s  Each    E. Cryer,



   -          Nostril qd    MD



                 

 

 Levaquin    Hx  Tablets  500mg  10tab  1 Tablet  471.8          s  By Mouth    E. Cryer,



   -          Daily    MD



   10/15          Until    



             Finished    

 

 Nexium    Hx            Unknown



   /              



   -              



                 

 

 Singulair    Hx  Tablets  10mg  30tab  1 PO qd  471.8  Wilber



   /        s E. Cryer, - MD



                 

 

 Nasacort Aq    Hx  Suspension  55McG/Act  1unit  2 sprays  473.2  Wilber



 Intranasal [place]  /      uation  s  each E. Cryer, -          nostril qd    MD



                 

 

 Clopidrogel    Hx  Tablets  75mg        Unknown



   /              



   -              



                 

 

 Isosorbide    Hx    60mg    1 po qd    Unknown



 Dinitrate SA  /              



   -              



                 

 

 Simvastatin    Hx    ?mg    oen po qd    Tracey,



   /              Bridger CARDENAS



   -              



                 

 

 Lisinopril  /  Hx            Unknown



   /0000              



   -              



                 

 

 Pepcid ac Ez    Hx            Unknown



 Chews Maximum  /              



 Strength  -              



                 

 

 Sertraline HCL    Hx    ?mg    one po qd    Tracey,



   /              Bridger CARDENAS



   -              



                 

 

 Triamterene/Hydro    Hx            Unknown



 chlorothiazide  /              



   -              



                 

 

 Clopidogrel    Hx  Tablets  75mg        StefekPa



 Bisulfate  /              ul M.D.



   -              



                 

 

 Fluticasone    Hx  Suspension  50mcg/Act        Unknown



 Propionate  /              



   -              



                 

 

 Famotidine    Hx  Tablets  20mg        Unknown



   /              



   -              



                 







Immunizations







 CPT Code  Status  Date  Vaccine  Lot #

 

 [id number]  Ordered  10/01/2015  Influenza Virus Vaccine, 3 Years Of Age And Above,
  



       Intramuscular  

 

 80314  Given  Unknown  Influenza Virus Vaccine, 3 Years Of Age And Above,  



       Intramuscular  







Vital Signs







 Date  Vital  Result  Comment

 

 2018 11:34am  Weight  347.00 lb  









 Weight  157.399 kg  

 

 Height  69 inches  5'9"

 

 Height in cm's  175.3 cm  

 

 BMI (Body Mass Index)  51.2 kg/m2  









 2018 10:50am  Weight  347.00 lb  









 Weight  157.399 kg  

 

 Height  69 inches  5'9"

 

 Height in cm's  175.3 cm  

 

 BMI (Body Mass Index)  51.2 kg/m2  









 2018 10:08am  Weight  338.00 lb  









 Weight  153.317 kg  

 

 Height  68.50 inches  5'8.50"

 

 Height in cm's  174.0 cm  

 

 BMI (Body Mass Index)  50.6 kg/m2  









 2018  9:06am  Weight  335.00 lb  









 Weight  151.956 kg  

 

 Height  68.50 inches  5'8.50"

 

 Height in cm's  174.0 cm  

 

 BMI (Body Mass Index)  50.2 kg/m2  









 2018 10:49am  BP Systolic  159 mmHg  









 BP Diastolic  94 mmHg  

 

 Heart Rate  74 /min  

 

 Respiratory Rate  17 /min  

 

 Weight  335.00 lb  

 

 Weight  151.956 kg  

 

 Height  68.50 inches  5'8.50"

 

 Height in cm's  174.0 cm  

 

 BMI (Body Mass Index)  50.2 kg/m2  









 2017  9:47am  BP Systolic  155 mmHg  









 BP Diastolic  88 mmHg  

 

 Heart Rate  77 /min  

 

 Weight  335.00 lb  

 

 Weight  151.956 kg  

 

 Height  68.50 inches  5'8.50"

 

 Height in cm's  174.0 cm  

 

 BMI (Body Mass Index)  50.2 kg/m2  









 2016  2:46pm  BP Systolic  165 mmHg  









 BP Diastolic  99 mmHg  

 

 Heart Rate  78 /min  

 

 Respiratory Rate  16 /min  

 

 Weight  335.00 lb  

 

 Weight  151.956 kg  

 

 Height  68.50 inches  5'8.50"

 

 Height in cm's  174.0 cm  

 

 BMI (Body Mass Index)  50.2 kg/m2  









 2016 11:17am  BP Systolic  114 mmHg  









 BP Diastolic  89 mmHg  

 

 Heart Rate  64 /min  

 

 Respiratory Rate  17 /min  

 

 Weight  335.00 lb  

 

 Weight  151.956 kg  

 

 Height  68.50 inches  5'8.50"

 

 Height in cm's  174.0 cm  

 

 BMI (Body Mass Index)  50.2 kg/m2  









 2016  9:57am  BP Systolic  107 mmHg  









 BP Diastolic  82 mmHg  

 

 Heart Rate  78 /min  

 

 Respiratory Rate  17 /min  

 

 Weight  335.00 lb  

 

 Weight  151.956 kg  

 

 Height  68.50 inches  5'8.50"

 

 Height in cm's  174.0 cm  

 

 BMI (Body Mass Index)  50.2 kg/m2  









 2015 10:02am  BP Systolic  142 mmHg  









 BP Diastolic  105 mmHg  

 

 Heart Rate  76 /min  

 

 Respiratory Rate  17 /min  

 

 Weight  335.00 lb  

 

 Weight  151.956 kg  

 

 Height  68.50 inches  5'8.50"

 

 Height in cm's  174.0 cm  

 

 BMI (Body Mass Index)  50.2 kg/m2  









 2015 10:35am  BP Systolic  134 mmHg  









 BP Diastolic  86 mmHg  

 

 Heart Rate  72 /min  

 

 Respiratory Rate  17 /min  

 

 Weight  335.00 lb  

 

 Weight  151.956 kg  

 

 Height  68.50 inches  5'8.50"

 

 Height in cm's  174.0 cm  

 

 BMI (Body Mass Index)  50.2 kg/m2  









 10/29/2015  9:50am  BP Systolic  132 mmHg  









 BP Diastolic  92 mmHg  

 

 Heart Rate  91 /min  

 

 Respiratory Rate  18 /min  

 

 Weight  335.00 lb  

 

 Weight  151.956 kg  

 

 Height  68.50 inches  5'8.50"

 

 Height in cm's  174.0 cm  

 

 BMI (Body Mass Index)  50.2 kg/m2  









 2015 10:38am  Respiratory Rate  17 /min  









 Weight  335.00 lb  

 

 Weight  151.956 kg  

 

 Height  68.50 inches  5'8.50"

 

 Height in cm's  174.0 cm  

 

 BMI (Body Mass Index)  50.2 kg/m2  









 2015  3:25pm  BP Systolic  97 mmHg  









 BP Diastolic  61 mmHg  

 

 Heart Rate  78 /min  

 

 Respiratory Rate  16 /min  

 

 Weight  335.00 lb  

 

 Weight  151.956 kg  

 

 Height  68.50 inches  5'8.50"

 

 Height in cm's  174.0 cm  

 

 BMI (Body Mass Index)  50.2 kg/m2  









 2014  9:12am  BP Systolic  131 mmHg  









 BP Diastolic  84 mmHg  

 

 Heart Rate  67 /min  

 

 Respiratory Rate  18 /min  

 

 Weight  335.00 lb  

 

 Weight  151.956 kg  

 

 Height  68.50 inches  5'8.50"

 

 Height in cm's  174.0 cm  

 

 BMI (Body Mass Index)  50.2 kg/m2  









 2014 10:15am  BP Systolic  109 mmHg  









 BP Diastolic  68 mmHg  

 

 Heart Rate  68 /min  

 

 Respiratory Rate  17 /min  

 

 Weight  328.00 lb  

 

 Weight  148.781 kg  

 

 Height  68.50 inches  5'8.50"

 

 Height in cm's  174.0 cm  

 

 BMI (Body Mass Index)  49.1 kg/m2  









 2014  9:02am  BP Systolic  121 mmHg  









 BP Diastolic  79 mmHg  

 

 Heart Rate  77 /min  

 

 Respiratory Rate  16 /min  

 

 Weight  325.00 lb  

 

 Weight  147.420 kg  

 

 Height  68.50 inches  5'8.50"

 

 Height in cm's  174.0 cm  

 

 BMI (Body Mass Index)  48.7 kg/m2  









 2014 10:13am  BP Systolic  138 mmHg  









 BP Diastolic  76 mmHg  

 

 Heart Rate  73 /min  

 

 Respiratory Rate  16 /min  

 

 Weight  325.00 lb  

 

 Weight  147.420 kg  

 

 Height  68.50 inches  5'8.50"

 

 Height in cm's  174.0 cm  

 

 BMI (Body Mass Index)  48.7 kg/m2  









 2014  9:38am  BP Systolic  128 mmHg  









 BP Diastolic  80 mmHg  

 

 Heart Rate  65 /min  

 

 Respiratory Rate  16 /min  

 

 Weight  325.00 lb  

 

 Weight  147.420 kg  

 

 Height  68.50 inches  5'8.50"

 

 Height in cm's  174.0 cm  

 

 BMI (Body Mass Index)  48.7 kg/m2  









 2014  9:28am  Respiratory Rate  17 /min  









 Weight  325.00 lb  

 

 Weight  147.420 kg  

 

 Height  68.50 inches  5'8.50"

 

 Height in cm's  174.0 cm  

 

 BMI (Body Mass Index)  48.7 kg/m2  









 2014 10:01am  BP Systolic  129 mmHg  









 BP Diastolic  82 mmHg  

 

 Heart Rate  87 /min  

 

 Respiratory Rate  17 /min  

 

 Weight  325.00 lb  

 

 Weight  147.420 kg  

 

 Height  68.50 inches  5'8.50"

 

 Height in cm's  174.0 cm  

 

 BMI (Body Mass Index)  48.7 kg/m2  









 2013 10:59am  BP Systolic  131 mmHg  









 BP Diastolic  83 mmHg  

 

 Heart Rate  75 /min  

 

 Respiratory Rate  16 /min  

 

 Weight  325.00 lb  

 

 Weight  147.420 kg  

 

 Height  68.50 inches  5'8.50"

 

 Height in cm's  174.0 cm  

 

 BMI (Body Mass Index)  48.7 kg/m2  









 10/24/2013  9:31am  BP Systolic  132 mmHg  









 BP Diastolic  84 mmHg  

 

 Heart Rate  61 /min  

 

 Respiratory Rate  17 /min  

 

 Weight  325.00 lb  

 

 Weight  147.420 kg  

 

 Height  68.50 inches  5'8.50"

 

 Height in cm's  174.0 cm  

 

 BMI (Body Mass Index)  48.7 kg/m2  









 2013  9:15am  BP Systolic  126 mmHg  









 BP Diastolic  81 mmHg  

 

 Heart Rate  73 /min  

 

 Respiratory Rate  17 /min  

 

 Weight  325.00 lb  

 

 Weight  147.420 kg  

 

 Height  68.50 inches  5'8.50"

 

 Height in cm's  174.0 cm  

 

 BMI (Body Mass Index)  48.7 kg/m2  









 2013  2:36pm  Weight  325.00 lb  









 Weight  147.420 kg  

 

 Height  68.50 inches  5'8.50"

 

 Height in cm's  174.0 cm  

 

 BMI (Body Mass Index)  48.7 kg/m2  









 2013  9:05am  Weight  325.00 lb  









 Weight  147.420 kg  

 

 Height  68.50 inches  5'8.50"

 

 Height in cm's  174.0 cm  

 

 BMI (Body Mass Index)  48.7 kg/m2  









 2013 10:00am  BP Systolic  108 mmHg  









 BP Diastolic  73 mmHg  

 

 Heart Rate  77 /min  

 

 Respiratory Rate  16 /min  

 

 Weight  325.00 lb  

 

 Weight  147.420 kg  

 

 Height  68.50 inches  5'8.50"

 

 Height in cm's  174.0 cm  

 

 BMI (Body Mass Index)  48.7 kg/m2  









 2013  2:49pm  BP Systolic  99 mmHg  









 BP Diastolic  72 mmHg  

 

 Heart Rate  62 /min  

 

 Respiratory Rate  16 /min  

 

 Weight  325.00 lb  

 

 Weight  147.420 kg  

 

 Height  68.50 inches  5'8.50"

 

 Height in cm's  174.0 cm  

 

 BMI (Body Mass Index)  48.7 kg/m2  









 2013  2:04pm  BP Systolic  123 mmHg  









 BP Diastolic  88 mmHg  

 

 Heart Rate  66 /min  

 

 Respiratory Rate  16 /min  

 

 Weight  325.94 lb  

 

 Weight  147.840 kg  

 

 Height  68.50 inches  5'8.50"

 

 Height in cm's  174.0 cm  

 

 BMI (Body Mass Index)  48.8 kg/m2  









 2013 11:43am  BP Systolic  122 mmHg  









 BP Diastolic  84 mmHg  

 

 Heart Rate  64 /min  

 

 Respiratory Rate  16 /min  

 

 Weight  305.00 lb  

 

 Weight  138.348 kg  

 

 Height  69.02 inches  5'9.02"

 

 Height in cm's  175.3 cm  

 

 BMI (Body Mass Index)  45.0 kg/m2  









 10/25/2012  3:08pm  Weight  288.00 lb  









 Weight  130.637 kg  

 

 Height  69.02 inches  5'9"

 

 Height in cm's  175.3 cm  

 

 BMI (Body Mass Index)  42.5 kg/m2  









 10/16/2012  9:18am  BP Systolic  150 mmHg  









 BP Diastolic  90 mmHg  

 

 Heart Rate  66 /min  

 

 Respiratory Rate  20 /min  

 

 Weight  208.00 lb  

 

 Weight  94.349 kg  

 

 Height  69 inches  5'9"

 

 Height in cm's  175.3 cm  

 

 BMI (Body Mass Index)  30.7 kg/m2  









 10/06/2011  2:00pm  BP Systolic  152 mmHg  









 BP Diastolic  100 mmHg  

 

 Heart Rate  62 /min  

 

 Respiratory Rate  16 /min  

 

 Weight  280.00 lb  

 

 Weight  127.008 kg  

 

 Height  69 inches  5'9"

 

 Height in cm's  175.3 cm  

 

 BMI (Body Mass Index)  41.3 kg/m2  









 2011 11:16am  BP Systolic  143 mmHg  









 BP Diastolic  95 mmHg  

 

 Heart Rate  75 /min  

 

 Respiratory Rate  17 /min  









 2009  3:28pm  Heart Rate  64 /min  









 Respiratory Rate  16 /min  

 

 Weight  321.00 lb  

 

 Weight  145.606 kg  









 2008  3:08pm  BP Systolic  170 mmHg  Taken with LG BP cuff









 BP Diastolic  106 mmHg  Taken with LG BP cuff

 

 Heart Rate  79 /min  

 

 Respiratory Rate  18 /min  









 2008  9:33am  BP Systolic  159 mmHg  









 BP Diastolic  114 mmHg  

 

 Heart Rate  79 /min  

 

 Respiratory Rate  16 /min  









 10/23/2008  3:55pm  BP Systolic  157 mmHg  









 BP Diastolic  107 mmHg  

 

 Heart Rate  77 /min  

 

 Respiratory Rate  16 /min  









 2008 10:06am  BP Systolic  133 mmHg  









 BP Diastolic  100 mmHg  

 

 Heart Rate  75 /min  

 

 Respiratory Rate  16 /min  









 2008 11:44am  BP Systolic  160 mmHg  









 BP Diastolic  109 mmHg  

 

 Heart Rate  74 /min  

 

 Respiratory Rate  16 /min  









 2008  3:25pm  BP Systolic  137 mmHg  









 BP Diastolic  85 mmHg  

 

 Heart Rate  75 /min  

 

 Respiratory Rate  16 /min  









 2008 10:15am  BP Systolic  139 mmHg  









 BP Diastolic  88 mmHg  

 

 Heart Rate  85 /min  

 

 Respiratory Rate  12 /min  









 2008  3:17pm  BP Systolic  138 mmHg  









 BP Diastolic  87 mmHg  

 

 Heart Rate  82 /min  

 

 Respiratory Rate  16 /min  









 2008  9:50am  BP Systolic  148 mmHg  









 BP Diastolic  86 mmHg  









 2008  3:43pm  BP Systolic  148 mmHg  









 BP Diastolic  92 mmHg  

 

 Heart Rate  80 /min  

 

 Respiratory Rate  14 /min  









 2007  1:52pm  BP Systolic  119 mmHg  









 BP Diastolic  76 mmHg  

 

 Heart Rate  75 /min  

 

 Respiratory Rate  16 /min  









 2006  9:11am  BP Systolic  176 mmHg  









 BP Diastolic  73 mmHg  

 

 Heart Rate  80 /min  

 

 Respiratory Rate  16 /min  









 2005  1:10pm  BP Systolic  146 mmHg  









 BP Diastolic  82 mmHg  

 

 Heart Rate  80 /min  

 

 Respiratory Rate  16 /min  







Results







 Test  Date  Facility  Test  Result  H/L  Range  Note

 

 CBC No Diff  2015  [facility]  White Blood  5.2 10^3
/uL  N  3.5-10.8  1



     c/o [facility]  Count        



     [place] [address] [phone]          









 Red Blood Count  5.43 10^6/uL  High  4.0-5.4  

 

 Hemoglobin  15.1 g/dL  N  14.0-18.0  

 

 Hematocrit  47 %  N  42-52  

 

 Mean Corpuscular Volume  87 fL  N  80-94  

 

 Mean Corpuscular Hemoglobin  28 pg  N  27-31  

 

 Mean Corpuscular HGB Conc  32 g/dL  N  31-36  

 

 Red Cell Distribution Width  15 %  N  10.5-15  

 

 Platelet Count  232 10^3/uL  N  150-450  

 

 Mean Platelet Volume  8 um3  N  7.4-10.4  









 Inr/Protime  2015  [facility]  Inr  1.05  N  0.89-
1.11  



     c/o [facility] of Laboratories          



     [place] [address]          



     [phone]          

 

 Laboratory test  2015  [facility]  Partial  45.0  
High  26.0-36.3  2



 finding    c/o [facility]  Thrombo  seconds      



     [place] [address]  Time PTT        



     [phone]          

 

 Basic Metabolic  2015  [facility]  Sodium  141 mmol/
L  N  133-145  



 Panel    c/o [facility] of Laboratories          



     [place] [address] [phone]          









 Potassium  3.8 mmol/L  N  3.5-5.0  

 

 Chloride  106 mmol/L  N  101-111  

 

 Co2 Carbon Dioxide  28 mmol/L  N  22-32  

 

 Anion Gap  7 mmol/L  N  2-11  

 

 Glucose  91 mg/dL  N    

 

 Blood Urea Nitrogen  14 mg/dL  N  6-24  

 

 Creatinine  1.15 mg/dL  N  0.67-1.17  

 

 BUN/Creatinine Ratio  12.2  N  8-20  

 

 Calcium  9.9 mg/dL  N  8.6-10.3  

 

 Egfr Non-  67.0  N  >60  

 

 Egfr   86.2  N  >60  3









 Laboratory test finding  2013  [facility]  Inr  
0.96    0.87-0.97  4



     c/o Department of Laboratories          



     [place] [address] [phone]          









 Activated Partial Thrombo Time  44.1 seconds  High  22.18-37.18  5









 Basic Metabolic  2013  [facility]  Sodium  140 mmol/
L    133-145  



 Panel    c/o [facility] of Laboratories          



     [place] [address] [phone]          









 Potassium  4.3 mmol/L    3.5-5.0  

 

 Chloride  104 mmol/L    101-111  

 

 Co2 Carbon Dioxide  30.0 mmol/L    22-32  

 

 Anion Gap  6.0 mmol/L    2-11  

 

 Glucose  111 mg/dL  High    

 

 Blood Urea Nitrogen  14 mg/dL    6-24  

 

 Creatinine  1.30 mg/dL    0.50-1.40  

 

 BUN/Creatinine Ratio  10.8    8-20  

 

 Calcium  9.7 mg/dL    8.1-9.9  

 

 Egfr Non-  58.7    >60  

 

 Egfr   75.5    >60  6









 CBC Auto  2013  [facility]  White Blood  4.0 10^3/
uL  Low  4.8-10.8  



 Diff    c/o [facility] Laboratories  Count        



     [place] [address] [phone]          









 Red Blood Count  4.93 10^6/uL    4.0-5.4  

 

 Hemoglobin  14.3 g/dL    14.0-18.0  

 

 Hematocrit  43 %    42-52  

 

 Mean Corpuscular Volume  87 fL    80-94  

 

 Mean Corpuscular Hemoglobin  29 pg    27-31  

 

 Mean Corpuscular HGB Conc  33 g/dL    31-36  

 

 Red Cell Distribution Width  14 %    10.5-15  

 

 Platelet Count  184 10^3/uL    150-450  

 

 Mean Platelet Volume  8 um3    7.4-10.4  

 

 Abs Neutrophils  2.2 10^3/uL    1.5-7.7  

 

 Abs Lymphocytes  1.3 10^3/uL    1.0-4.8  

 

 Abs Monocytes  0.4 10^3/uL    0-0.8  

 

 Abs Eosinophils  0.1 10^3/uL    0-0.6  

 

 Abs Basophils  0 10^3/uL    0-0.2  

 

 Abs Nucleated RBC  0.01 10^3/uL      

 

 Granulocyte %  54.0 %    38-83  

 

 Lymphocyte %  32.6 %    25-47  

 

 Monocyte %  9.7 %  High  1-9  

 

 Eosinophil %  3.1 %    0-6  

 

 Basophil %  0.6 %    0-2  

 

 Nucleated Red Blood Cells %  0.2      









 Basic Metabolic  2013  [facility]  Sodium  135 mmol/
L    133-145  



 Panel    c/o Department of Laboratories          



     [place] [address] [phone]          









 Potassium  3.6 mmol/L    3.5-5.0  

 

 Chloride  102 mmol/L    101-111  

 

 Co2 Carbon Dioxide  30.0 mmol/L    22-32  

 

 Anion Gap  3.0 mmol/L    2-11  

 

 Glucose  80 mg/dL      

 

 Blood Urea Nitrogen  11 mg/dL    6-24  

 

 Creatinine  1.10 mg/dL    0.50-1.40  

 

 BUN/Creatinine Ratio  10.0    8-20  

 

 Calcium  9.0 mg/dL    8.1-9.9  

 

 Egfr Non-  71.4    >60  

 

 Egfr   91.9    >60  7









 Laboratory test  12/10/2008  [facility]  Troponin-I (TnI)
  0.04 NG/ML    0-0.06  8



 finding    c/o Department of Laboratories          



     [place] [address] [phone]          

 

 Stat CMP  12/10/2008  [facility]  Sodium  136 mmol/L    
135-145  



     c/o Department of Laboratories          



     [place] [address] [phone]          









 Potassium  3.9 mmol/L    3.5-5.0  

 

 Chloride  104 mmol/L    101-111  

 

 Co2 (Carbon Dioxide)  25.0 mmol/L    22-32  

 

 Anion Gap  7.0 mmol/L    2-11  9

 

 Glucose  98 mg/dL      10

 

 BUN  15 mg/dL    6-24  

 

 Creatinine  1.20 mg/dL    0.50-1.40  

 

 One Over Creatinine  0.80      

 

 BUN/Creatinine Ratio  12.5    8-20  

 

 Calcium  9.3 mg/dL    8.1-9.9  11

 

 Total Protein  6.9 GM/DL    6.2-8.1  

 

 Albumin  3.9 GM/DL    3.6-5.4  

 

 Globulin  3.0 GM/DL    2-4  

 

 Albumin/Globulin Ratio  1.3    1-3  

 

 Bilirubin Total  0.5 mg/dL    0.4-1.5  

 

 Alkaline Phosphatase  112 U/L      

 

 Alt (SGPT)  53 U/L    17-63  

 

 Ast (Sgot)  31 U/L    12-42  









 CBC With  12/10/2008  [facility]  White Blood  6.7 CUMM  
  4.8-10.8  



 Electronic Diff    c/o [facility] Laboratories  Count        



 [place] [address] [phone]          









 Red Cell Count  5.28 CUMM    4.6-6.2  

 

 Hemoglobin  15.2 g/dL    14.0-18.0  

 

 Hematocrit  44 %    42-52  

 

 Mean Corpuscular Volume  83 um3    80-94  

 

 Mean Corpuscular Hemoglob  29 pg    27-31  

 

 Mean Corpuscular HGB Cone  35 g/dL    32-36  

 

 Redcell Distribution WDTH  14 %    10.5-15  

 

 Platelet Count  254 CUMM    150-450  

 

 Mean Platelet Volume  7.6 um3    7.4-10.4  

 

 Gran %  60.3 %    38-83  

 

 Lymph %  28.8 %    25-47  

 

 Mononuclear %  7.4 %    1-9  

 

 Eosinophil %  3.0 %    0-6  

 

 Basophil %  0.5 %    0-2  

 

 Abs Lymphs  1.9    1.0-4.8  

 

 Abs Mononuclear  0.5    0-0.8  

 

 Absolute Neutrophil Count  4.0    1.5-7.7  

 

 Abs Eosinophils  0.2    0-0.6  

 

 Abs Basophils  0    0-0.2  









 Laboratory test  2008  [facility]  Culture  FEW 
COLONIES      12



 finding    c/o [facility] of Laboratories  Sensitivity  OF  <SEE NOTE>      



     [place] [address]          



     [phone]          









 1  SDS 

 

 2  SDS 

 

 3  *******Because ethnic data is not always readily available,



   this report includes an eGFR for both -Americans and



   non- Americans.****



   The National Kidney Disease Education Program (NKDEP) does



   not endorse the use of the MDRD equation for patients that



   are not between the ages of 18 and 70, are pregnant, have



   extremes of body size, muscle mass, or nutritional status,



   or are non- or non-.



   According to the National Kidney Foundation, irrespective of



   diagnosis, the stage of the disease is based on the level of



   kidney function:



   Stage Description                      GFR(mL/min/1.73 m(2))



   1     Kidney damage with normal or decreased GFR       90



   2     Kidney damage with mild decrease in GFR          60-89



   3     Moderate decrease in GFR                         30-59



   4     Severe decrease in GFR                           15-29



   5     Kidney failure                       <15 (or dialysis)

 

 4  SDS 13

 

 5  SDS 13

 

 6  *******Because ethnic data is not always readily available,



   this report includes an eGFR for both -Americans and



   non- Americans.****



   The National Kidney Disease Education Program (NKDEP) does



   not endorse the use of the MDRD equation for patients that



   are not between the ages of 18 and 70, are pregnant, have



   extremes of body size, muscle mass, or nutritional status,



   or are non- or non-.



   According to the National Kidney Foundation, irrespective of



   diagnosis, the stage of the disease is based on the level of



   kidney function:



   Stage Description                      GFR(mL/min/1.73 m(2))



   1     Kidney damage with normal or decreased GFR       90



   2     Kidney damage with mild decrease in GFR          60-89



   3     Moderate decrease in GFR                         30-59



   4     Severe decrease in GFR                           15-29



   5     Kidney failure                       <15 (or dialysis)

 

 7  *******Because ethnic data is not always readily available,



   this report includes an eGFR for both -Americans and



   non- Americans.****



   The National Kidney Disease Education Program (NKDEP) does



   not endorse the use of the MDRD equation for patients that



   are not between the ages of 18 and 70, are pregnant, have



   extremes of body size, muscle mass, or nutritional status,



   or are non- or non-.



   According to the National Kidney Foundation, irrespective of



   diagnosis, the stage of the disease is based on the level of



   kidney function:



   Stage Description                      GFR(mL/min/1.73 m(2))



   1     Kidney damage with normal or decreased GFR       90



   2     Kidney damage with mild decrease in GFR          60-89



   3     Moderate decrease in GFR                         30-59



   4     Severe decrease in GFR                           15-29



   5     Kidney failure                       <15 (or dialysis)

 

 8  New Reference Range and Interpretation effective 10/4/02



   



   TnI (ng/ml)             INTERPRETATION



   



   <0.06 ng/ml             NOT SUPPORTIVE OF DIAGNOSIS OF MI



   



   0.06 - 0.50 ng/ml       INDETERMINATE: SUGGEST SERIAL



   STUDIES IF CLINICALLY INDICATED.



   



   > 0.5 ng/ml             CONSISTENT WITH DIAGNOSIS OF MI



   .

 

 9  Anion gap measurement may be of limited value in the



   presence of any alkalosis, especially in a combined acid



   base disorder.



   .

 

 10  ** Note change in reference range as of 08.  The



   change was based on recommendations from the American



   Diabetes Association.

 

 11  Please note change in reference range effective 08



   



   



   .

 

 12  FEW COLONIES OF NORMAL RESPIRATORY EDMUNDO







Procedures







 Date  Code  Description  Status

 

 2018  47826  Debridement Of Mastoid Cavity, Simple  Completed

 

 2018  38655  Nasal Endoscopy, Diagnostic  Completed

 

 2016  05684  Nasal Endoscopy, Diagnostic  Completed

 

 2015  36432  Revision Mastoidectomy Resulting In Modified Radical  
Completed



     Mastoidctomy  

 

 2014  76688  Fiberoptic Laryngoscopy  Completed

 

 2014  31546  Tympanometry  Completed

 

 2014  33542  Comprehensive Audiogram  Completed

 

 10/24/2013  96418  Tympanometry  Completed

 

 10/24/2013  57249  Comprehensive Audiogram  Completed

 

 2013  20816  Binocular Microscopy  Completed

 

 2013  52377  Tympanomastoidectomy W/Ossicular Chain  Completed

 

 2013  61953  Medical Records  Completed

 

 2013  51127  Tympanometry  Completed

 

 2013  21851  Comprehensive Audiogram  Completed

 

 2013  23121  Comprehensive Audiogram  Completed

 

 2013  60428  Tympanometry  Completed

 

 10/16/2012  86005  Binocular Microscopy  Completed

 

 2012  66262  Nasal Endoscopy, Diagnostic  Completed

 

 2012  38400  Tympanometry  Completed

 

 2012  07698  Comprehensive Audiogram  Completed

 

 2012  72978  Tympanometry  Completed

 

 2012  08826  Binocular Microscopy  Completed

 

 2011  73432  Fiberoptic Laryngoscopy  Completed

 

 2011  26187  Tympanometry  Completed

 

 2011  64329  Tympanometry  Completed

 

 2011  04303  Comprehensive Audiogram  Completed

 

 2010  34441  Tympanometry  Completed

 

 2009  80480  Tympanometry  Completed

 

 2009  33575  Binocular Microscopy  Completed

 

 10/22/2009  04194  Comprehensive Audiogram  Completed

 

 10/22/2009  34022  Tympanometry  Completed

 

 2008  98780  Nasal Endoscopy, Diagnostic  Completed

 

 2008  01732  Fiberoptic Laryngoscopy  Completed

 

 2008  15108  No Show Fee  Completed

 

 2008  15163  Demonstration/Eval. Of Patient Utilization Of  Completed



     Spacer/Nebulizer  

 

 2008  35464  Respiratory Flow Volume Loop  Completed

 

 2008  46274  Bronchospasm Evaluation  Completed

 

 2008  06454  Prick Test  Completed

 

 2008  71454  Prick Test  Completed

 

 2008  11798  Nasal Endoscopy, Diagnostic  Completed

 

 2007  95539  Nasopharyngoscopy  Completed

 

 2007  73560  Nasal Endoscopy, Diagnostic  Completed

 

 2007  92459  Comprehensive Audiogram  Completed

 

 2007  46275  Comprehensive Audiogram  Completed

 

 2007  34712  Tympanometry  Completed

 

 2007  05290  Tympanometry  Completed

 

 2007  24871  Tympanostomy W/Tube Local Or Topical Anes.  Completed

 

 2007  73350  Tympanometry  Completed

 

 2007  98847  Tympanometry  Completed

 

 2007  52358  Comprehensive Audiogram  Completed

 

 2007  81610  Comprehensive Audiogram  Completed

 

 2006  36769  Nasal Endoscopy, Diagnostic  Completed

 

 2005  50906  Nasal Endoscopy, Diagnostic  Completed

 

 2005  23676  Nasal Endoscopy W/ Debridement  Completed

 

 2005  90365  Nasal Endoscopy W/ Debridement  Completed

 

 2004  39264  Nasal Endoscopy W/Maxllary Antrostomy W/Excision Of Poylp  
Completed

 

 2004  14347  Nasal Endoscopy With Ethmoidectomy, Partial  Completed

 

 12/10/2004  03073  Nasal Endoscopy, Diagnostic  Completed

 

 2004  58631  Nasal Endoscopy, Diagnostic  Completed

 

 10/13/2004  03482  Nasal Endoscopy, Diagnostic  Completed

 

 2004  49652  No Show Fee  Completed







Encounters







 Type  Date  Location  Provider  Dx  Diagnosis

 

 Office Visit  2018  [place]  Wilber PENA1.12  Cholesteatoma of



   11:30a  08  Cryer, MD    tympanum, left ear

 

 Office Visit  2018  [place]  Wilber PENA1.12  Cholesteatoma of



   10:30a  08  Cryer, MD tympanum, left ear









 J31.0  Chronic rhinitis

 

 G50.1  Atypical facial pain









 Office Visit  2018  [place]  Wilber PENA1.12  Cholesteatoma of



   10:00a  08  Cryer, MD    tympanum, left ear

 

 Office Visit  2018  [place]  Wilber PENA1.12  Cholesteatoma of



   11:15a  08  Cryer, MD    tympanum, left ear

 

 Office Visit  2017  [place]  Wilber PENA1.12  Cholesteatoma of



   11:00a  08  Cryer, MD tympanum, left ear









 H72.2x1  Other marginal perforations of tympanic membrane, right ear









 Office Visit  2017  [place]  Wilber PENA1.12  Cholesteatoma of



   10:30a  08  Cryer, MD    tympanum, left ear

 

 Office Visit  2016  [place]  Wilber ISLAS  J31.0  Chronic rhinitis



   2:45p  08  Cryer, MD    









 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









 Office Visit  2016  [place]  [facility] ISLAS  H71.12  Cholesteatoma of



   11:00a  08  Cryer, MD    tympanum, left ear









 H60.11  Cellulitis of right external ear









 Office Visit  2016  [place]  Wil[facility] ISLAS  H71.12  Cholesteatoma of



   11:00a  08  Cryer, MD    tympanum, left ear

 

 Office Visit  2016  [place]  Wil[facility] ISLAS  L23.3  Allergic contact



   3:45p  08  Cryer, MD    dermatitis due to



           drugs in contact w



           skin

 

 Office Visit  2015  [place]  [facility] ISLAS  H71.12  Cholesteatoma of



   11:30a  08  Cryer, MD    tympanum, left ear

 

 Office Visit  10/29/2015  [place]  Wil[facility] ISLAS  H71.12  Cholesteatoma of



   10:30a  08  Cryer, MD    tympanum, left ear

 

 Office Visit  10/15/2015  [place]  Wil[facility] ISLAS  H65.32  Chronic mucoid



   11:00a  08  Cryer, MD    otitis media, left



           ear









 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









 Office Visit  2015 10:15a  [place] 08  Wilber ISLAS  381.29  
Otitis Media



       Cryer, MD    Chronic Media



           Other









 385.32  Cholesteatoma Of Middle Ear









 Office Visit  2015  3:45p  [place] 08  Wilber ISLAS  381.29  
Otitis Media



       Cryer, MD    Chronic Media



           Other









 385.32  Cholesteatoma Of Middle Ear









 Office Visit  2014  9:45a  [place] 08  Wilber ISLAS  381.29  
Otitis Media



       Cryer, MD    Chronic Media



           Other

 

 Office Visit  2014 10:45a  [place] 08  Wilber ISLAS  381.29  
Otitis Media



       Cryer, MD    Chronic Media



           Other









 787.20  Dysphagia, Unspecified









 Office Visit  2014 10:15a  [place] 08  Jonathan E. Cryer, MD  
786.2  Cough









 787.20  Dysphagia, Unspecified

 

 493.00  Asthma, Extrinsic/Atopic









 Office  2014  [place]  Wilber ISLAS  389.03  Hearing Loss,



 Visit  10:00a  08  Cryer, MD    Conductive/Middle Ear









 389.10  Hearing Loss, Sensorineural/Unspecified

 

 462-2  Sore Throat









 Office Visit  2014 10:30a  [place] 08  Wilber ISLAS  380.10  
Otitis Externa



       Cryer, MD    Acute









 389.03  Hearing Loss, Conductive/Middle Ear









 Office Visit  2014 10:30a  [place] 08  Wilber ISLAS  380.10  
Otitis Externa



       Cryer, MD    Acute









 381.29  Otitis Media Chronic Media Other









 Office Visit  2014  [place]  Wilber ISLAS  385.32  Cholesteatoma Of



   9:45a  08  Cryer, MD    Middle Ear

 

 Office Visit  2013  [place]  Wilber ISLAS  472.0  Rhinitis, Chronic



   11:00a  08  Cryer, MD    









 385.32  Cholesteatoma Of Middle Ear









 Office Visit  10/24/2013  [place]  Wilber ISLAS  385.32  Cholesteatoma Of



   10:30a  08  Cryer, MD    Middle Ear









 389.03  Hearing Loss, Conductive/Middle Ear

 

 389.10  Hearing Loss, Sensorineural/Unspecified

 

 477.8  Rhinitis, Perennial, Allergy









 Office Visit  2013  [place]  Jamie Wooten.32  Cholesteatoma Of



   4:00p  08  Yanelis NP    Middle Ear









 381.29  Otitis Media Chronic Media Other









 Office Visit  2013  [place]  Wilber ISLAS  385.32  Cholesteatoma Of



   1:45p  08  Cryer, MD    Middle Ear









 381.29  Otitis Media Chronic Media Other

 

 389.03  Hearing Loss, Conductive/Middle Ear









 Office Visit  2013  [place]  Jamie Wooten.32  Cholesteatoma Of



   2:15p  08  Yanelis NP    Middle Ear









 381.29  Otitis Media Chronic Media Other









 Office Visit  2013  [place]  Wilber ISLAS  385.32  Cholesteatoma Of



   1:30p  08  Cryer, MD    Middle Ear









 381.29  Otitis Media Chronic Media Other

 

 389.03  Hearing Loss, Conductive/Middle Ear

 

 389.10  Hearing Loss, Sensorineural/Unspecified









 Office Visit  2013  [place]  Wilber ISLAS  385.32  Cholesteatoma Of



   11:30a  08  Cryer, MD    Middle Ear

 

 Office Visit  2012  [place]  Wilber ISLAS  381.29  Otitis Media



   11:15a  08  Cryer, MD    Chronic Media



           Other

 

 Office Visit  10/25/2012  [place]  Wilber ISLAS  381.29  Otitis Media



   3:15p  08  Cryer, MD    Chronic Media



           Other

 

 Office Visit  10/16/2012  [place]  Sugar  381.3  Otitis Media,



   9:30a  08  Yanelis NP    Chronic



           Allergic/Unspecifi



           ed

 

 Office Visit  2012  [place]  [place],  350.2  Facial Pain,



   2:45p  08  Yanelis NP    Atypical









 472.0  Rhinitis, Chronic









 Office Visit  2012  3:30p  [place] 08  Sugar  477.8  Rhinitis
,



       Yanelis NP    Perennial,



           Allergy









 493.01  Asthma Extrinsic W/ Status Asthmaticus









 Office  2012  [place]  Wilber ISLAS  389.03  Hearing Loss,



 Visit  11:15a  08  Cryer, MD    Conductive/Middle Ear









 389.10  Hearing Loss, Sensorineural/Unspecified

 

 384.82  Tympanic Membrane, Atrophic Nonflaccid









 Office Visit  2012  4:00p  [place]  Sugar  384.82  Tympanic



     08  Yanelis NP    Membrane,



           Atrophic



           Nonflaccid

 

 Office Visit  10/06/2011  1:45p  [place]  Wilber ISLAS  462  Pharyngitis,



     08  Cryer, MD    Acute









 384.20  Perforation Of Tympanic Membrane/Unspecified

 

 381.81  Dysfunction Of Eustachian Tube

 

 389.03  Hearing Loss, Conductive/Middle Ear

 

 389.10  Hearing Loss, Sensorineural/Unspecified









 Office Visit  2011  9:30a  [place]  S[place],  478.19  Mucocele Of



     08  Yanelis NP    Sinus/Perf Nasal



           Septum









 530.81  Reflux, Gastroesophageal

 

 493.00  Asthma, Extrinsic/Atopic

 

 530.11  Reflux, Esophagitis









 Office  2011  [place]  [place],  384.20  Perforation Of Tympanic



 Visit  2:00p  08  Yanelis NP    Membrane/Unspecified









 477.8  Rhinitis, Perennial, Allergy

 

 493.00  Asthma, Extrinsic/Atopic









 Office  2011  [place]  [place],  384.20  Perforation Of Tympanic



 Visit  1:30p  08  Yanelis NP    Membrane/Unspecified









 381.81  Dysfunction Of Eustachian Tube









 Office  2011  [place]  Wilber ISLAS  389.03  Hearing Loss,



 Visit  2:00p  08  Cryer, MD    Conductive/Middle Ear









 389.10  Hearing Loss, Sensorineural/Unspecified

 

 381.81  Dysfunction Of Eustachian Tube

 

 384.20  Perforation Of Tympanic Membrane/Unspecified









 Office  2010  [place]  [place],  384.20  Perforation Of Tympanic



 Visit  11:30a  08  Yanelis NP    Membrane/Unspecified









 381.81  Dysfunction Of Eustachian Tube

 

 350.2  Facial Pain, Atypical









 Office Visit  2010  1:30p  [place] 08  Wilber ISLAS  477.8  
Rhinitis, Cryer, MD    Perennial,



           Allergy









 384.20  Perforation Of Tympanic Membrane/Unspecified

 

 381.81  Dysfunction Of Eustachian Tube









 Office Visit  2010 10:00a  [place] 08  Wilber ISLAS  477.8  
Rhinitis, Cryer, MD    Perennial,



           Allergy









 493.00  Asthma, Extrinsic/Atopic

 

 384.20  Perforation Of Tympanic Membrane/Unspecified

 

 389.03  Hearing Loss, Conductive/Middle Ear









 Office  2009  [place]  Wilber ISLAS  384.20  Perforation Of Tympanic



 Visit  3:45p  08  Cryer, MD    Membrane/Unspecified









 388.71  Otalgia Otogenic Pain

 

 381.81  Dysfunction Of Eustachian Tube









 Office  2009  [place]  Wilber ISLAS  384.20  Perforation Of Tympanic



 Visit  2:30p  08  Cryer, MD    Membrane/Unspecified









 388.71  Otalgia Otogenic Pain









 Office Visit  10/22/2009  1:45p  [place]  Wil[facility] ISLAS  388.31  Tinnitus,



     08  Cryer, MD    Subjective









 478.1-4  Obstruction Of Nasal Airway









 Office Visit  2009  4:00p  [place]  Wilber ISLAS  478.19  Mucocele Of



     08  Cryer, MD    Sinus/Perf Nasal



           Septum









 478.1-4  Obstruction Of Nasal Airway

 

 477.8  Rhinitis, Perennial, Allergy

 

 493.00  Asthma, Extrinsic/Atopic









 Office Visit  2009  3:15p  [place],After 08  [facility]  350.2  
Facial Pain,



       Cryer, MD    Atypical









 473.0  Sinusitis, Chronic Maxillary

 

 473.2  Sinusitis, Chronic Ethmoidal

 

 471.8  Polyps, Nasal/Sinus









 Office Visit  2008  [place],[facility]  Saeid BOWLES  493.01  Asthma Extrinsic



   2:45p  08  BUBBA Ann    W/ Status



           Asthmaticus









 530.81  Reflux, Gastroesophageal

 

 780.57  Apnea, Sleep Not Elsewhere Classified /Unspecified

 

 278.01  Obesity Morbid

 

 429.3  Cardiomegaly









 Office  12/10/2008  [place]  Saeid BOWLES  493.00  Asthma,



 Visit  1:48p  08  Gonzalez Ann/Atopic



       M.D.    

 

 Office  2008  [place]  Sugar  473.0  Sinusitis, Chronic



 Visit  9:30a  08  Yanelis NP    Maxillary









 530.81  Reflux, Gastroesophageal









 Office Visit  10/23/2008  [place]  Wilber ISLAS  780.57  Apnea, Sleep Not



   4:00p  08  Cryer, MD    Elsewhere



           Classified



           /Unspecified









 381.81  Dysfunction Of Eustachian Tube

 

 389.03  Hearing Loss, Conductive/Middle Ear









 Office Visit  2008  [place]  Wilber ISLAS  780.57  Apnea, Sleep Not



   10:15a  08  Cryer, MD    Elsewhere



           Classified



           /Unspecified









 530.11  Reflux, Esophagitis









 Office Visit  2008  [place]  Sugar  530.81  Reflux,



   3:30p  08  Yanelis NP    Gastroesophageal









 477.8  Rhinitis, Perennial, Allergy

 

 493.90  Asthma, Allergic/Unspecified









 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



     08  Yanelis NP    Sinus/Perf Nasal



           Septum









 478.1-4  Obstruction Of Nasal Airway

 

 477.8  Rhinitis, Perennial, Allergy









 Office Visit  2008  [place]  Sugar,  478.1-4  Obstruction Of



   3:00p  08  Yanelis NP    Nasal Airway









 381.3  Otitis Media, Chronic Allergic/Unspecified

 

 493.00  Asthma, Extrinsic/Atopic

 

 477.0  Rhinitis, Seasonal -Allergic Due To Pollen

 

 477.8  Rhinitis, Perennial, Allergy









 Office Visit  2008 10:00a  [place]After 08  Sugar  477.8  Rhinitis
,



       Yanelis NP    Perennial,



           Allergy









 477.0  Rhinitis, Seasonal -Allergic Due To Pollen









 Office Visit  2008  3:30p  [place],After 08  Wilber ISLAS  477.8  
Rhinitis, Cryer, MD    Perennial,



           Allergy









 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

 

 478.1-4  Obstruction Of Nasal Airway









 Office Visit  2007  9:00a  [place] 08  Wilber ISLAS  477.8  
Rhinitis, Cryer, MD    Perennial,



           Allergy

 

 Office Visit  2007  2:15p  [place]After 08  Wilber ISLAS  477.8  
Rhinitis, Cryer, MD    Perennial,



           Allergy









 471.8  Polyps, Nasal/Sinus

 

 389.2  Hearing Loss, Mixed/Conductive & Sensorineural

 

 381.81  Dysfunction Of Eustachian Tube

 

 474.12  Hypertrophy, Adenoids









 Office Visit  10/15/2007  2:45p  [place],After 08  Wilber ISLAS  473.0  
Sinusitis, Cryer, MD    Chronic



           Maxillary









 474.12  Hypertrophy, Adenoids

 

 381.81  Dysfunction Of Eustachian Tube









 Office Visit  2007  [place]  Wilber ISLAS  381.81  Dysfunction Of



   3:15p  08  Cryer, MD    Eustachian Tube









 389.2  Hearing Loss, Mixed/Conductive & Sensorineural

 

 471.8  Polyps, Nasal/Sinus

 

 474.12  Hypertrophy, Adenoids









 Office Visit  2007  [place]  Wilber ISLAS  381.81  Dysfunction Of



   2:30p  08  Cryer, MD    Eustachian Tube









 389.2  Hearing Loss, Mixed/Conductive & Sensorineural









 Office Visit  2006  9:45a  [place]  Wilber ISLAS  471.8  Polyps,



     08  Cryer, MD    Nasal/Sinus









 473.0  Sinusitis, Chronic Maxillary

 

 461.0  Sinusitis, Acute Maxillary









 Office Visit  2006  1:30p  [place]  Wilber ISLAS  471.8  Polyps,



     08  Cryer, MD    Nasal/Sinus









 780.57  Apnea, Sleep Not Elsewhere Classified /Unspecified

 

 493.0  Code Needs 5TH Digit









 Office Visit  2005  1:30p  [place],After 08  Wilber ISLAS  473.2  
Sinusitis,



       Cryer, MD    Chronic



           Ethmoidal









 471.8  Polyps, Nasal/Sinus

 

 389.03  Hearing Loss, Conductive/Middle Ear









 Office Visit  2004  [place]  Saeid BOWLES  473.2  Sinusitis,



   9:30a  08  BUBBA Ann    Chronic



           Ethmoidal









 471.8  Polyps, Nasal/Sinus









 Office Visit  2004  [place]  Julio FRANCO  780.57  Apnea, Sleep Not



   9:45a  08  BUBBA Desai    Elsewhere



           Classified



           /Unspecified

 

 Office Visit  2004  [place]  Julio FRANCO  478.1  Ulcer Of Nasal



   11:15a  08  BUBBA Desai    Septum







Plan of Treatment

Future Appointment(s):2019 10:30 am - Jonathan E. Cryer, MD at [place],
After  - Jonathan E. 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 
left ear

## 2018-12-29 VITALS — SYSTOLIC BLOOD PRESSURE: 148 MMHG | DIASTOLIC BLOOD PRESSURE: 93 MMHG

## 2019-02-07 ENCOUNTER — HOSPITAL ENCOUNTER (EMERGENCY)
Dept: HOSPITAL 25 - ED | Age: 54
Discharge: HOME | End: 2019-02-07
Payer: MEDICARE

## 2019-02-07 VITALS — DIASTOLIC BLOOD PRESSURE: 71 MMHG | SYSTOLIC BLOOD PRESSURE: 137 MMHG

## 2019-02-07 DIAGNOSIS — I25.119: ICD-10-CM

## 2019-02-07 DIAGNOSIS — R07.89: Primary | ICD-10-CM

## 2019-02-07 DIAGNOSIS — Z79.01: ICD-10-CM

## 2019-02-07 DIAGNOSIS — I10: ICD-10-CM

## 2019-02-07 DIAGNOSIS — R11.0: ICD-10-CM

## 2019-02-07 DIAGNOSIS — R00.1: ICD-10-CM

## 2019-02-07 DIAGNOSIS — Z88.1: ICD-10-CM

## 2019-02-07 DIAGNOSIS — Z88.0: ICD-10-CM

## 2019-02-07 DIAGNOSIS — Z87.891: ICD-10-CM

## 2019-02-07 DIAGNOSIS — R42: ICD-10-CM

## 2019-02-07 DIAGNOSIS — R61: ICD-10-CM

## 2019-02-07 DIAGNOSIS — Z95.5: ICD-10-CM

## 2019-02-07 LAB
ALBUMIN SERPL BCG-MCNC: 4.4 G/DL (ref 3.2–5.2)
ALBUMIN/GLOB SERPL: 1.5 {RATIO} (ref 1–3)
ALP SERPL-CCNC: 91 U/L (ref 34–104)
ALT SERPL W P-5'-P-CCNC: 26 U/L (ref 7–52)
ANION GAP SERPL CALC-SCNC: 7 MMOL/L (ref 2–11)
AST SERPL-CCNC: 20 U/L (ref 13–39)
BASOPHILS # BLD AUTO: 0 10^3/UL (ref 0–0.2)
BUN SERPL-MCNC: 14 MG/DL (ref 6–24)
BUN/CREAT SERPL: 12.8 (ref 8–20)
CALCIUM SERPL-MCNC: 9.6 MG/DL (ref 8.6–10.3)
CHLORIDE SERPL-SCNC: 106 MMOL/L (ref 101–111)
CK MB SERPL-MCNC: 2.5 NG/ML (ref 0.6–6.3)
CK SERPL-CCNC: 305 U/L (ref 10–223)
EOSINOPHIL # BLD AUTO: 0.2 10^3/UL (ref 0–0.6)
GLOBULIN SER CALC-MCNC: 3 G/DL (ref 2–4)
GLUCOSE SERPL-MCNC: 93 MG/DL (ref 70–100)
HCO3 SERPL-SCNC: 23 MMOL/L (ref 22–32)
HCT VFR BLD AUTO: 44 % (ref 42–52)
HGB BLD-MCNC: 14.5 G/DL (ref 14–18)
LYMPHOCYTES # BLD AUTO: 1.5 10^3/UL (ref 1–4.8)
MAGNESIUM SERPL-MCNC: 2 MG/DL (ref 1.9–2.7)
MCH RBC QN AUTO: 28 PG (ref 27–31)
MCHC RBC AUTO-ENTMCNC: 33 G/DL (ref 31–36)
MCV RBC AUTO: 84 FL (ref 80–94)
MONOCYTES # BLD AUTO: 0.6 10^3/UL (ref 0–0.8)
NEUTROPHILS # BLD AUTO: 3.6 10^3/UL (ref 1.5–7.7)
NRBC # BLD AUTO: 0 10^3/UL
NRBC BLD QL AUTO: 0
PLATELET # BLD AUTO: 197 10^3/UL (ref 150–450)
POTASSIUM SERPL-SCNC: 4 MMOL/L (ref 3.5–5)
PROT SERPL-MCNC: 7.4 G/DL (ref 6.4–8.9)
RBC # BLD AUTO: 5.22 10^6/UL (ref 4–5.4)
SODIUM SERPL-SCNC: 136 MMOL/L (ref 135–145)
TROPONIN I SERPL-MCNC: 0 NG/ML (ref ?–0.04)
TSH SERPL-ACNC: 2.78 MCIU/ML (ref 0.34–5.6)
WBC # BLD AUTO: 6 10^3/UL (ref 3.5–10.8)

## 2019-02-07 PROCEDURE — 84443 ASSAY THYROID STIM HORMONE: CPT

## 2019-02-07 PROCEDURE — 84484 ASSAY OF TROPONIN QUANT: CPT

## 2019-02-07 PROCEDURE — 93005 ELECTROCARDIOGRAM TRACING: CPT

## 2019-02-07 PROCEDURE — 82553 CREATINE MB FRACTION: CPT

## 2019-02-07 PROCEDURE — 83605 ASSAY OF LACTIC ACID: CPT

## 2019-02-07 PROCEDURE — 36415 COLL VENOUS BLD VENIPUNCTURE: CPT

## 2019-02-07 PROCEDURE — 82550 ASSAY OF CK (CPK): CPT

## 2019-02-07 PROCEDURE — 80053 COMPREHEN METABOLIC PANEL: CPT

## 2019-02-07 PROCEDURE — 99284 EMERGENCY DEPT VISIT MOD MDM: CPT

## 2019-02-07 PROCEDURE — 83735 ASSAY OF MAGNESIUM: CPT

## 2019-02-07 PROCEDURE — 71045 X-RAY EXAM CHEST 1 VIEW: CPT

## 2019-02-07 PROCEDURE — 83880 ASSAY OF NATRIURETIC PEPTIDE: CPT

## 2019-02-07 PROCEDURE — 85025 COMPLETE CBC W/AUTO DIFF WBC: CPT

## 2019-02-07 PROCEDURE — 70450 CT HEAD/BRAIN W/O DYE: CPT

## 2019-02-07 NOTE — XMS REPORT
Continuity of Care Document (CCD)

 Created on:January 10, 2019



Patient:Delta Rodriguez

Sex:Male

:1965

External Reference #:[id number]





Demographics







 Address  [address]



   [place] [address]

 

 Home Phone  [phone]

 

 Mobile Phone  [phone]

 

 Preferred Language  en

 

 Marital Status  Declined to Specify/Unknown

 

 Tenriism Affiliation  Unknown

 

 Race  Unknown

 

 Ethnic Group  Declined to Specify/Unknown









Author







 Name  Jonathan E. Cryer, MD

 

 Address  [address]



   Unavailable



   [place] [address]









Support







 Name  Relationship  Address  Phone

 

 Unavailable  Sibling  Unavailable  [phone]









Care Team Providers







 Name  Role  Phone

 

 Bridger Webb MD  Care Team Information   Unavailable

 

 Bridger Webb MD  Primary Care Physician  Unavailable









Payers







 Type  Date  Identification Numbers  Payment Provider  Subscriber

 

   Effective:  Policy Number: [id number]  Medicare[facility]  Delta Rodriguez



   1999    [employer]  









 PayID: [id number]  [address]









 [place] [address]









     Policy Number: [id number]  Medicaid/C  Delta Rodriguez









 Group Number: [id number]  Medicare Primary

 

 Group Name: [id number]  [address]

 

 PayID: [id number]  [place] [address]







Advance Directives







 Description

 

 No Information Available







Problems







 Date  Description  Provider  Status

 

 Onset: 10/07/2011  Acute pharyngitis  Jonathan E. Cryer, MD  Active

 

 Onset: 10/07/2011  Perforation of tympanic membrane  Jonathan E. Cryer, MD  
Active

 

 Onset: 10/07/2011  Dysfunction of eustachian tube  Jonathan E. Cryer, MD  
Active

 

 Onset: 10/07/2011  Middle ear conductive hearing loss  Jonathan E. Cryer, MD  
Active

 

 Onset: 10/07/2011  Sensorineural hearing loss  Jonathan E. Cryer, MD  Active

 

 Onset: 10/07/2011  Disorder of nasal cavity  Yanelis Wooten NP  Active

 

 Onset: 10/07/2011  Gastroesophageal reflux disease  Yanelis Wooten NP  Active

 

 Onset: 10/07/2011  Extrinsic asthma without status  Yanelis Wooten NP  Active



   asthmaticus    

 

 Onset: 10/07/2011  Peptic reflux disease  Yanelis Wooten NP  Active

 

 Onset: 10/07/2011  Allergic rhinitis  Yanelis Wooten NP  Active

 

 Onset: 2018  Impacted cerumen  Katelynn Trevino PA-C  Active

 

 Onset: 2018  Chronic tympanitis  Katelynn Trevino PA-C  Active







Family History







 Date  Family Member(s)  Problem(s)  Comments

 

   Mother  Non Insulin Dependent Diabetes  







Social History







 Type  Date  Description  Comments

 

 Birth Sex    Unknown  

 

 Occupation    tops  

 

 Tobacco Use  Start: Unknown End:  Former Cigarette Smoker  for 25 years



   Unknown  Packs Daily 1  

 

 Tobacco Use  Start: Unknown  has never smoked cigars  

 

 Tobacco Use  Start: Unknown  has never smoked a pipe  

 

 Smokeless Tobacco    has never used smokeless  



     tobacco  

 

 ETOH Use    Current Alcohol Use  



     Occasionally  

 

 Recreational Drug Use    denies drug use  

 

 Tobacco Use  Start: Unknown End:  Patient is a former smoker  



   Unknown    

 

 Smoking Status  Reviewed: 18  Patient is a former smoker  







Allergies, Adverse Reactions, Alerts







 Date  Description  Reaction  Status  Severity  Comments

 

 2008  Augmentin XR    Active    

 

 2013  Penicillins  neck, tongue swells  Active    

 

 2005  NKDA    Inactive    







Medications







 Medication  Date  Status  Form  Strength  Qnty  SIG  Indications  Ordering



                 Provider

 

 Clotrimazole  01/10  Active  Solution  1%  30ml  4 drops to              left ear    E. Cryer,



             twice a    MD



             day for 14    



             days    

 

 Ciprofloxacin HCL  01/10  Active  Tablets  500mg  20tab  1 tablet            s  twice a    E. Cryer,



             day for 10    MD



             days    

 

 Omeprazole    Active  Capsules DR  40mg  60cap  40mg po  530.81  Saeid BOWLES



   /        s  bid for 1    Strominge



             month for    BUBBA donaldson



             esophagiti    



             s    

 

 Advair Hfa    Active  Aerosol  115/21  1unit  2 Puffs  478.19  Saeid BOWLES



   /        s  bid With    Strominge



             Spacer    BUBBA donaldson



                 



                 



                 



                 



                 



             Please    



             Provide    



             Spacer    

 

 Albuterol    Active  Aerosol  90mcg/Dos  2unit  2 Puffs    Wilber



 Inhalation        e  s  Q4H prn    E. Cryer, MD

 

 Bystolic    Active    5mg    qd    Unknown



                 

 

 Aspirin    Active  Tablets DR  81mg        Unknown



                 

 

 Nitroglycerin    Active    ?mg    prn Only    Tracey,



                 Bridger CARDENAS

 

 Ranexa    Active  Tablets ER  500mg    1 po bid    Unknown



       12HR          

 

 Atorvastatin    Active  Tablets  40mg    1 po qd    Unknown



 Calcium                

 

 Klor-Con M20  00/00  Active  Tablets ER  20Meq        Stefek,Pa



   /              ul M.D.

 

 Metoprolol    Active  Tablets ER  50mg        Unknown



 Succinate ER      24HR          

 

 Triamterene/Hydro    Active  Capsules  37.5-25mg        Tracey,



 chlorothiazide                Bridger CARDENAS

 

 Advair Diskus    Active  Aerosol  250-50mcg        Tracey,



   /0000      /Dose        Bridger CARDENAS

 

 Proair HFA    Active  Aerosol  108(90Bas        Tracey,



   /      e)        Bridger CARDENAS



         mcg/Act        

 

 Atorvastatin    Active  Tablets  20mg        Stefek,Pa



 Calcium                ul M.D.

 

 Clopidogrel    Active  Tablets  75mg        Tracey,



 Bisulfate                Bridger CARDENAS

 

 Xarelto    Active  Tablets  20mg        Tracey,



                 Bridger CARDENAS

 

 Famotidine    Active  Tablets  20mg        Tracey,



                 Bridger CARDENAS

 

                 

 

 Cipro HC    Hx  Suspension  0.2-1%  10ml  Instill  H73.12  Saeid N.



             5-8 drops    Strominge



   -          into the    r, M.D.



             left ear           times a    



             day for 10    



             days    

 

 Tobradex    Hx  Suspension  0.3-0.1%  10ml  Instill    Saeid N.



             5-8 drops    Strominge



   -          in the    r, M.D.



             left ear           times a    



             day for 10    



             days.    

 

 Ciprodex    Hx  Suspension  0.3-0.1%  7.500  4 drops            ml  left ear    E. Cryer, -          twice a    MD



             day for 10    



   /2018          days    

 

 Methylprednisolon    Hx  TBPK  4mg  1pack  take as    Wilber



           directed    E. Cryer, - MD



                 

 

 Mupirocin    Hx  Ointment  2%  22gm  apply to              both    E. Cryer, -          nostril    MD



             twice a    



             day    

 

 Triamcinolone    Hx  Cream  0.1%  15gm  Apply to    



 Acet          left ear    E. Cryer, -          twice    MD



             daily for    



             1 week    

 

 Percocet    Hx  Tablets  5-325mg  30tab  1-2 tabs            s  by mouth    E. Cryer,



   -          every 4-6    MD



             hours as    



   /2018          needed for    



             pain    

 

 Levofloxacin    Hx  Tablets  500mg  10tab  1 by mouth            s  every day    E. Cryer,



   -              MD



                 

 

 Ciprodex    Hx  Suspension  0.3-0.1%  1Bott  4 drops to            le  left ears    E. Cryer,



   -          twice a    MD



             day           rebate rx    



             bin:    



             [id number]    



             rxpcn:    



             loyalty    



             rxgrp:[id number]    



             [id number]    



             issuer:    



             ([id number])    



             id#[id number]    



             25    

 

 Ofloxacin    Hx  Solution  0.3%  1unit  4 drops to    Saeid BOWLES



           s  affected    Strominge



   -          ear daily    r, M.D.



   10/24              



   /2013              

 

 Ciprodex    Hx  Suspension  0.3-0.1%  2unit  4 drops    Saeid BOWLES



           s  infected    Strominge



   -          ear bid    BUBBA donaldson



   10/24          apply    



   /2013          rebate    



             rxbin:    



             [id number]    



             rxpcn:    



             loyalty    



             rxgrp:    



             [id number]    



             issuer:    



             ([id number])    



             id:    



             [id number]    

 

 Oxycodone/Acetami    Hx  Tablets  5-325mg  30tab  1-2 tabs    Saeid randhawa          s  by mouth    Strominge



   -          every 4-6    BUBBA donaldson



             hours as    



   /2018          needed for    



             pain    

 

 Ciprodex    Hx  Suspension  0.3-0.1%  2unit  4 drops  385.32  Saeid BOWLES



           s  infected    Strominge



   -          ear bid    BUBBA donaldson



             rebate    



             rxbin:    



             [id number]    



             rxpcn:    



             loyalty    



             rxgrp:    



             [id number]    



             issuer:    



             ([id number])    



             id:    



             [id number]    

 

 Levofloxacin    Hx  Tablets  500mg  10tab  1 po qd            s      E. Cryer,



   -              MD



                 

 

 Oxycodone/Acetami    Hx  Tablets  5-325mg  30tab  1-2 tabs    Wilber randhawa          s  by mouth    E. Cryer,



   -          every 4-6    MD



             hours as    



   /2013          needed for    



             pain    

 

 Ciprodex  10/16  Hx  Suspension  0.3-0.1%  2unit  4 drops  381.3          s  infected    E. Cryer,



   -          ear bid x    MD



             14 days    



   /2013          apply    



             reuben    



             rxbin:    



             [id number]    



             rxpcn:    



             mamadou    



             rxgrp:    



             [id number]    



             issuer:    



             ([id number])    



             id:    



             [id number]    

 

 Ipratropium    Hx  Solution  0.06%  6unit  2 to 4  J31.0  Saeid N.



 Alvin          s  sprays in    Strominge



   -          each    BUBBA donaldson



             nostril           times a    



             day as    



             needed for    



             rhinitis    

 

 Bacitracin    Hx      1Tube  apply to  472.0  Saeid N.



 Ointment            rim of    Turner



   -          nose both    BUBBA donaldson



             sides in    



             the am and    



             the pm.    

 

 Flovent HFA    Hx  Aerosol  110mcg/Ac  1unit  2 puff bid  493.01  Saeid BOWLES



         t  s      Turner donaldson M.D.



                 

 

 Fluticasone Nasal    Hx    50mcg  1unit  2 sprays    Wilber



 Spray  /2011        s  each side    E. Cryer,



   -          bid    MD



                 

 

 Omnaris    Hx  Suspension  50mcg/Act    2 sprays    Saeid BOWLES



           s  each    Strominge



   -          nostril    BUBBA donaldson



   05/01          daily    



   /2012              

 

 Percocet    Hx  Tablets  5-325mg  30tab  1-2 tabs            s  by mouth    E. Cryer,



   -          every 4-6    MD



             hours as    



   /2013          needed for    



             pain    

 

 Ciprofloxacin HCL    Hx  Tablets  500mg  14tab  1 tablet            s  twice a    E. Cryer,



   -          day for 7    MD



   05/24          days    



   /2011              

 

 Astepro Nasal    Hx    0.15% 205  1unit  2 sprays    Saeid Cerrato        mcg  s  once a day    Turner donaldson M.D.



                 

 

 Aciphex    Hx  Tablets DR  20mg  30tab  1 po Daily    Saeid BOWLES



   /        s      Turner donaldson M.D.



                 

 

 Mucinex DM  10/22  Hx  Tablets ER    5Days  1 op qd  478.1-4  Wilber



 Maximum Strength  /    12HR          E. Cryer, - MD



                 

 

 Fexofenadine HCL    Hx  Tabs  180mg  30tab  1qd - Take            s  One Tablet    E. Cryer, -          By Mouth    MD



   01/05          Every Day    



   /2011              

 

 Singulair    Hx  Tabs  10mg  30tab  1qd - Take  477.0          s  One Tablet    E. Cryer, -          By Mouth    MD



   11/27          Every Day    



   /2018              

 

 Medrol Dosepak    Hx  Tablets  4mg  1Pack  Take as  350.2            Directed    E. Cryer, - MD



                 

 

 Prednisone    Hx  Tablets  20mg  5Days  3 PO qd    Saeid BOWLES



             With Food    Turner donaldson M.D.



                 

 

 Augmentin    Hx  Tablets  875mg  14Day  1 po bid  473.0  Saeid BOWLES



           s  with food    Turner donaldson M.D.



                 

 

 Aciphex    Hx  Tablets DR  20mg  60tab  1 po bid  530.81          s      E. Cryer, - MD



                 

 

 Fexofenadine HCL    Hx  Tablets  180mg  30tab  1 po qd  477.8  Saeid N.



           maciel donaldson M.D.



                 

 

 Zyrtec    Hx  Tablets  10mg  30tab  1 PO qd    Saeid BOWLES



           maciel donaldson M.D.



                 

 

 Fluticasone    Hx  Suspension  50mcg/Act  1mont  2 sprays  471.8  Wilber



 Propionate  /        h  each    E. Cryer, -          nostril    MD



             daily    



   /              

 

 Prednisone    Hx  Tablets  10mg  40tab  4 Tabs PO  471.8  Wilber



           s  Every Day    E. Cryer, -          X4 D, Then    MD



   10/15          3 Tabs PO    



   /          Every Day    



             X 4D, Then    



             2 Tab PO    



             Daily X4D,    



             Then 1 Tab    



             PO Daily    



             X4D    

 

 Nasacort Aq    Hx  Suspension  55mcg/Act  1unit  2 Sprays  471.8  Wilber



 Intranasal Spray  /      uation  s  Each    E. Cryer, -          Nostril qd    MD



                 

 

 Levaquin    Hx  Tablets  500mg  10tab  1 Tablet  471.8          s  By Mouth    E. Cryer,



   -          Daily    MD



   10/15          Until    



   /2007          Finished    

 

 Nexium    Hx            Unknown



   /              



   -              



                 

 

 Singulair    Hx  Tablets  10mg  30tab  1 PO qd  471.8  Wilber



           s      E. Cryer,



   -              MD



                 

 

 Nasacort Aq    Hx  Suspension  55McG/Act  1unit  2 sprays  473.2  Wilber



 Intranasal [place]  /      uation  s  each    E. Cryer,



   -          nostril qd    MD



                 

 

 Clopidrogel    Hx  Tablets  75mg        Unknown



   /              



   -              



                 

 

 Isosorbide    Hx    60mg    1 po qd    Unknown



 Dinitrate SA  /              



   -              



                 

 

 Simvastatin    Hx    ?mg    oen po qd    Tracey,



   /              Bridger CARDENAS



   -              



                 

 

 Lisinopril    Hx            Unknown



   /0000              



   -              



                 

 

 Pepcid ac Ez    Hx            Unknown



 Chews Maximum  /              



 Strength  -              



                 

 

 Sertraline HCL    Hx    ?mg    one po qd    Tracey,



   /              Bridger CARDENAS



   -              



                 

 

 Triamterene/Hydro    Hx            Unknown



 chlorothiazide  /              

 

 Clopidogrel    Hx  Tablets  75mg        StefespPa



 Bisulfate  /              ul M.D.



   -              



                 

 

 Fluticasone    Hx  Suspension  50mcg/Act        Unknown



 Propionate  /              



   -              



                 

 

 Famotidine    Hx  Tablets  20mg        Unknown



   /              



   -              



                 







Immunizations







 CPT Code  Status  Date  Vaccine  Lot #

 

 [id number]  Ordered  10/01/2015  Influenza Virus Vaccine, 3 Years Of Age And Above,
  



       Intramuscular  

 

 74728  Given  Unknown  Influenza Virus Vaccine, 3 Years Of Age And Above,  



       Intramuscular  







Vital Signs







 Date  Vital  Result  Comment

 

 01/10/2019  9:23am  Weight  347.00 lb  









 Weight  157.399 kg  

 

 Height  69 inches  5'9"

 

 Height in cm's  175.3 cm  

 

 BMI (Body Mass Index)  51.2 kg/m2  









 2019 11:07am  Weight  347.00 lb  









 Weight  157.399 kg  

 

 Height  69 inches  5'9"

 

 Height in cm's  175.3 cm  

 

 BMI (Body Mass Index)  51.2 kg/m2  









 2018 11:34am  Weight  347.00 lb  









 Weight  157.399 kg  

 

 Height  69 inches  5'9"

 

 Height in cm's  175.3 cm  

 

 BMI (Body Mass Index)  51.2 kg/m2  









 2018 10:50am  Weight  347.00 lb  









 Weight  157.399 kg  

 

 Height  69 inches  5'9"

 

 Height in cm's  175.3 cm  

 

 BMI (Body Mass Index)  51.2 kg/m2  









 2018 10:08am  Weight  338.00 lb  









 Weight  153.317 kg  

 

 Height  68.50 inches  5'8.50"

 

 Height in cm's  174.0 cm  

 

 BMI (Body Mass Index)  50.6 kg/m2  









 2018  9:06am  Weight  335.00 lb  









 Weight  151.956 kg  

 

 Height  68.50 inches  5'8.50"

 

 Height in cm's  174.0 cm  

 

 BMI (Body Mass Index)  50.2 kg/m2  









 2018 10:49am  BP Systolic  159 mmHg  









 BP Diastolic  94 mmHg  

 

 Heart Rate  74 /min  

 

 Respiratory Rate  17 /min  

 

 Weight  335.00 lb  

 

 Weight  151.956 kg  

 

 Height  68.50 inches  5'8.50"

 

 Height in cm's  174.0 cm  

 

 BMI (Body Mass Index)  50.2 kg/m2  









 2017  9:47am  BP Systolic  155 mmHg  









 BP Diastolic  88 mmHg  

 

 Heart Rate  77 /min  

 

 Weight  335.00 lb  

 

 Weight  151.956 kg  

 

 Height  68.50 inches  5'8.50"

 

 Height in cm's  174.0 cm  

 

 BMI (Body Mass Index)  50.2 kg/m2  









 2016  2:46pm  BP Systolic  165 mmHg  









 BP Diastolic  99 mmHg  

 

 Heart Rate  78 /min  

 

 Respiratory Rate  16 /min  

 

 Weight  335.00 lb  

 

 Weight  151.956 kg  

 

 Height  68.50 inches  5'8.50"

 

 Height in cm's  174.0 cm  

 

 BMI (Body Mass Index)  50.2 kg/m2  









 2016 11:17am  BP Systolic  114 mmHg  









 BP Diastolic  89 mmHg  

 

 Heart Rate  64 /min  

 

 Respiratory Rate  17 /min  

 

 Weight  335.00 lb  

 

 Weight  151.956 kg  

 

 Height  68.50 inches  5'8.50"

 

 Height in cm's  174.0 cm  

 

 BMI (Body Mass Index)  50.2 kg/m2  









 2016  9:57am  BP Systolic  107 mmHg  









 BP Diastolic  82 mmHg  

 

 Heart Rate  78 /min  

 

 Respiratory Rate  17 /min  

 

 Weight  335.00 lb  

 

 Weight  151.956 kg  

 

 Height  68.50 inches  5'8.50"

 

 Height in cm's  174.0 cm  

 

 BMI (Body Mass Index)  50.2 kg/m2  









 2015 10:02am  BP Systolic  142 mmHg  









 BP Diastolic  105 mmHg  

 

 Heart Rate  76 /min  

 

 Respiratory Rate  17 /min  

 

 Weight  335.00 lb  

 

 Weight  151.956 kg  

 

 Height  68.50 inches  5'8.50"

 

 Height in cm's  174.0 cm  

 

 BMI (Body Mass Index)  50.2 kg/m2  









 2015 10:35am  BP Systolic  134 mmHg  









 BP Diastolic  86 mmHg  

 

 Heart Rate  72 /min  

 

 Respiratory Rate  17 /min  

 

 Weight  335.00 lb  

 

 Weight  151.956 kg  

 

 Height  68.50 inches  5'8.50"

 

 Height in cm's  174.0 cm  

 

 BMI (Body Mass Index)  50.2 kg/m2  









 10/29/2015  9:50am  BP Systolic  132 mmHg  









 BP Diastolic  92 mmHg  

 

 Heart Rate  91 /min  

 

 Respiratory Rate  18 /min  

 

 Weight  335.00 lb  

 

 Weight  151.956 kg  

 

 Height  68.50 inches  5'8.50"

 

 Height in cm's  174.0 cm  

 

 BMI (Body Mass Index)  50.2 kg/m2  









 2015 10:38am  Respiratory Rate  17 /min  









 Weight  335.00 lb  

 

 Weight  151.956 kg  

 

 Height  68.50 inches  5'8.50"

 

 Height in cm's  174.0 cm  

 

 BMI (Body Mass Index)  50.2 kg/m2  









 2015  3:25pm  BP Systolic  97 mmHg  









 BP Diastolic  61 mmHg  

 

 Heart Rate  78 /min  

 

 Respiratory Rate  16 /min  

 

 Weight  335.00 lb  

 

 Weight  151.956 kg  

 

 Height  68.50 inches  5'8.50"

 

 Height in cm's  174.0 cm  

 

 BMI (Body Mass Index)  50.2 kg/m2  









 2014  9:12am  BP Systolic  131 mmHg  









 BP Diastolic  84 mmHg  

 

 Heart Rate  67 /min  

 

 Respiratory Rate  18 /min  

 

 Weight  335.00 lb  

 

 Weight  151.956 kg  

 

 Height  68.50 inches  5'8.50"

 

 Height in cm's  174.0 cm  

 

 BMI (Body Mass Index)  50.2 kg/m2  









 2014 10:15am  BP Systolic  109 mmHg  









 BP Diastolic  68 mmHg  

 

 Heart Rate  68 /min  

 

 Respiratory Rate  17 /min  

 

 Weight  328.00 lb  

 

 Weight  148.781 kg  

 

 Height  68.50 inches  5'8.50"

 

 Height in cm's  174.0 cm  

 

 BMI (Body Mass Index)  49.1 kg/m2  









 2014  9:02am  BP Systolic  121 mmHg  









 BP Diastolic  79 mmHg  

 

 Heart Rate  77 /min  

 

 Respiratory Rate  16 /min  

 

 Weight  325.00 lb  

 

 Weight  147.420 kg  

 

 Height  68.50 inches  5'8.50"

 

 Height in cm's  174.0 cm  

 

 BMI (Body Mass Index)  48.7 kg/m2  









 2014 10:13am  BP Systolic  138 mmHg  









 BP Diastolic  76 mmHg  

 

 Heart Rate  73 /min  

 

 Respiratory Rate  16 /min  

 

 Weight  325.00 lb  

 

 Weight  147.420 kg  

 

 Height  68.50 inches  5'8.50"

 

 Height in cm's  174.0 cm  

 

 BMI (Body Mass Index)  48.7 kg/m2  









 2014  9:38am  BP Systolic  128 mmHg  









 BP Diastolic  80 mmHg  

 

 Heart Rate  65 /min  

 

 Respiratory Rate  16 /min  

 

 Weight  325.00 lb  

 

 Weight  147.420 kg  

 

 Height  68.50 inches  5'8.50"

 

 Height in cm's  174.0 cm  

 

 BMI (Body Mass Index)  48.7 kg/m2  









 2014  9:28am  Respiratory Rate  17 /min  









 Weight  325.00 lb  

 

 Weight  147.420 kg  

 

 Height  68.50 inches  5'8.50"

 

 Height in cm's  174.0 cm  

 

 BMI (Body Mass Index)  48.7 kg/m2  









 2014 10:01am  BP Systolic  129 mmHg  









 BP Diastolic  82 mmHg  

 

 Heart Rate  87 /min  

 

 Respiratory Rate  17 /min  

 

 Weight  325.00 lb  

 

 Weight  147.420 kg  

 

 Height  68.50 inches  5'8.50"

 

 Height in cm's  174.0 cm  

 

 BMI (Body Mass Index)  48.7 kg/m2  









 2013 10:59am  BP Systolic  131 mmHg  









 BP Diastolic  83 mmHg  

 

 Heart Rate  75 /min  

 

 Respiratory Rate  16 /min  

 

 Weight  325.00 lb  

 

 Weight  147.420 kg  

 

 Height  68.50 inches  5'8.50"

 

 Height in cm's  174.0 cm  

 

 BMI (Body Mass Index)  48.7 kg/m2  









 10/24/2013  9:31am  BP Systolic  132 mmHg  









 BP Diastolic  84 mmHg  

 

 Heart Rate  61 /min  

 

 Respiratory Rate  17 /min  

 

 Weight  325.00 lb  

 

 Weight  147.420 kg  

 

 Height  68.50 inches  5'8.50"

 

 Height in cm's  174.0 cm  

 

 BMI (Body Mass Index)  48.7 kg/m2  









 2013  9:15am  BP Systolic  126 mmHg  









 BP Diastolic  81 mmHg  

 

 Heart Rate  73 /min  

 

 Respiratory Rate  17 /min  

 

 Weight  325.00 lb  

 

 Weight  147.420 kg  

 

 Height  68.50 inches  5'8.50"

 

 Height in cm's  174.0 cm  

 

 BMI (Body Mass Index)  48.7 kg/m2  









 2013  2:36pm  Weight  325.00 lb  









 Weight  147.420 kg  

 

 Height  68.50 inches  5'8.50"

 

 Height in cm's  174.0 cm  

 

 BMI (Body Mass Index)  48.7 kg/m2  









 2013  9:05am  Weight  325.00 lb  









 Weight  147.420 kg  

 

 Height  68.50 inches  5'8.50"

 

 Height in cm's  174.0 cm  

 

 BMI (Body Mass Index)  48.7 kg/m2  









 2013 10:00am  BP Systolic  108 mmHg  









 BP Diastolic  73 mmHg  

 

 Heart Rate  77 /min  

 

 Respiratory Rate  16 /min  

 

 Weight  325.00 lb  

 

 Weight  147.420 kg  

 

 Height  68.50 inches  5'8.50"

 

 Height in cm's  174.0 cm  

 

 BMI (Body Mass Index)  48.7 kg/m2  









 2013  2:49pm  BP Systolic  99 mmHg  









 BP Diastolic  72 mmHg  

 

 Heart Rate  62 /min  

 

 Respiratory Rate  16 /min  

 

 Weight  325.00 lb  

 

 Weight  147.420 kg  

 

 Height  68.50 inches  5'8.50"

 

 Height in cm's  174.0 cm  

 

 BMI (Body Mass Index)  48.7 kg/m2  









 2013  2:04pm  BP Systolic  123 mmHg  









 BP Diastolic  88 mmHg  

 

 Heart Rate  66 /min  

 

 Respiratory Rate  16 /min  

 

 Weight  325.94 lb  

 

 Weight  147.840 kg  

 

 Height  68.50 inches  5'8.50"

 

 Height in cm's  174.0 cm  

 

 BMI (Body Mass Index)  48.8 kg/m2  









 2013 11:43am  BP Systolic  122 mmHg  









 BP Diastolic  84 mmHg  

 

 Heart Rate  64 /min  

 

 Respiratory Rate  16 /min  

 

 Weight  305.00 lb  

 

 Weight  138.348 kg  

 

 Height  69.02 inches  5'9.02"

 

 Height in cm's  175.3 cm  

 

 BMI (Body Mass Index)  45.0 kg/m2  









 10/25/2012  3:08pm  Weight  288.00 lb  









 Weight  130.637 kg  

 

 Height  69.02 inches  5'9"

 

 Height in cm's  175.3 cm  

 

 BMI (Body Mass Index)  42.5 kg/m2  









 10/16/2012  9:18am  BP Systolic  150 mmHg  









 BP Diastolic  90 mmHg  

 

 Heart Rate  66 /min  

 

 Respiratory Rate  20 /min  

 

 Weight  208.00 lb  

 

 Weight  94.349 kg  

 

 Height  69 inches  5'9"

 

 Height in cm's  175.3 cm  

 

 BMI (Body Mass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  10/22/2009  1:45p  [place],After  Wilber ISLAS  388.31  Tinnitus,



     08  Cryer, MD    Subjective









 478.1-4  Obstruction Of Nasal Airway









 Office Visit  2009  4:00p  [place],After  Wilber ISLAS  478.19  Mucocele Of



     08  Cryer, MD    Sinus/Perf Nasal



           Septum









 478.1-4  Obstruction Of Nasal Airway

 

 477.8  Rhinitis, Perennial, Allergy

 

 493.00  Asthma, Extrinsic/Atopic









 Office Visit  2009  3:15p  [place],After 08  Wilber ISLAS  350.2  
Facial Pain,



       Cryer, MD    Atypical









 473.0  Sinusitis, Chronic Maxillary

 

 473.2  Sinusitis, Chronic Ethmoidal

 

 471.8  Polyps, Nasal/Sinus









 Office Visit  2008  [place],After  Saeid BOWLES  493.01  Asthma Extrinsic



   2:45p  08  BUBBA Ann    W/ Status



           Asthmaticus









 530.81  Reflux, Gastroesophageal

 

 780.57  Apnea, Sleep Not Elsewhere Classified /Unspecified

 

 278.01  Obesity Morbid

 

 429.3  Cardiomegaly









 Office  12/10/2008  [place],After  Saeid BOWLES  493.00  Asthma,



 Visit  1:48p  08  Marissa,    Extrinsic/Atopic



       M.D.    

 

 Office  2008  [place],After  Sugar  473.0  Sinusitis, Chronic



 Visit  9:30a  08  Yanelis NP    Maxillary









 530.81  Reflux, Gastroesophageal









 Office Visit  10/23/2008  [place],After  Wilber ISLAS  780.57  Apnea, Sleep Not



   4:00p  08  Cryer, MD    Elsewhere



           Classified



           /Unspecified









 381.81  Dysfunction Of Eustachian Tube

 

 389.03  Hearing Loss, Conductive/Middle Ear









 Office Visit  2008  [place],After  Wilber ISLAS  780.57  Apnea, Sleep Not



   10:15a  08  Cryer, MD    Elsewhere



           Classified



           /Unspecified









 530.11  Reflux, Esophagitis









 Office Visit  2008  [place],After  Sugar  530.81  Reflux,



   3:30p  08  Yanelis NP    Gastroesophageal









 477.8  Rhinitis, Perennial, Allergy

 

 493.90  Asthma, Allergic/Unspecified









 Office Visit  2008 10:15a  [place],After  Sugar  478.19  Mucocele Of



     08  Yanelis NP    Sinus/Perf Nasal



           Septum









 478.1-4  Obstruction Of Nasal Airway

 

 477.8  Rhinitis, Perennial, Allergy









 Office Visit  2008  [place],After  Sugar  478.1-4  Obstruction Of



   3:00p  08  Yanelis NP    Nasal Airway









 381.3  Otitis Media, Chronic Allergic/Unspecified

 

 493.00  Asthma, Extrinsic/Atopic

 

 477.0  Rhinitis, Seasonal -Allergic Due To Pollen

 

 477.8  Rhinitis, Perennial, Allergy









 Office Visit  2008 10:00a  [place],After 08  Sugar  477.8  Rhinitis
,



       Yanelis NP    Perennial,



           Allergy









 477.0  Rhinitis, Seasonal -Allergic Due To Pollen









 Office Visit  2008  3:30p  [place],After 08  Wilber ISLAS  477.8  
Rhinitis,



       Cryer, MD    Perennial,



           Allergy









 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

 

 478.1-4  Obstruction Of Nasal Airway









 Office Visit  2007  9:00a  [place],After 08  Wilber ISLAS  477.8  
Rhinitis, Cryer, MD    Perennial,



           Allergy

 

 Office Visit  2007  2:15p  [place],After 08  Wilber ISLAS  477.8  
Rhinitis, Cryer, MD    Perennial,



           Allergy









 471.8  Polyps, Nasal/Sinus

 

 389.2  Hearing Loss, Mixed/Conductive & Sensorineural

 

 381.81  Dysfunction Of Eustachian Tube

 

 474.12  Hypertrophy, Adenoids









 Office Visit  10/15/2007  2:45p  [place],After 08  Wilber ISLAS  473.0  
Sinusitis, Cryer, MD    Chronic



           Maxillary









 474.12  Hypertrophy, Adenoids

 

 381.81  Dysfunction Of Eustachian Tube









 Office Visit  2007  [place],After  Wilber ISLAS  381.81  Dysfunction Of



   3:15p  08  Cryer, MD    Eustachian Tube









 389.2  Hearing Loss, Mixed/Conductive & Sensorineural

 

 471.8  Polyps, Nasal/Sinus

 

 474.12  Hypertrophy, Adenoids









 Office Visit  2007  [place],After  Wilber ISLAS  381.81  Dysfunction Of



   2:30p  08  Cryer, MD    Eustachian Tube









 389.2  Hearing Loss, Mixed/Conductive & Sensorineural









 Office Visit  2006  9:45a  [place],After  Wilber ISLAS  471.8  Polyps,



     08  Cryer, MD    Nasal/Sinus









 473.0  Sinusitis, Chronic Maxillary

 

 461.0  Sinusitis, Acute Maxillary









 Office Visit  2006  1:30p  [place],After  Wilber ISLAS  471.8  Polyps,



     08  Cryer, MD    Nasal/Sinus









 780.57  Apnea, Sleep Not Elsewhere Classified /Unspecified

 

 493.0  Code Needs 5TH Digit









 Office Visit  2005  1:30p  [place],After 08  Wilber ISLAS  473.2  
Sinusitis, Cryer, MD    Chronic



           Ethmoidal









 471.8  Polyps, Nasal/Sinus

 

 389.03  Hearing Loss, Conductive/Middle Ear









 Office Visit  2004  [place],After  Saeid BOWLES  473.2  Sinusitis,



   9:30a  08  BUBBA Ann    Chronic



           Ethmoidal









 471.8  Polyps, Nasal/Sinus









 Office Visit  2004  [place],After  Julio FRANCO  780.57  Apnea, Sleep Not



   9:45a  08  BUBBA Desai    Elsewhere



           Classified



           /Unspecified

 

 Office Visit  2004  [place],After  Julio FRANCO  478.1  Ulcer Of Nasal



   11:15a  08  BUBBA Desai    Septum







Plan of Treatment

Future Appointment(s):2019  1:30 pm - Jonathan E. Cryer, MD at [place],
After  10:30 am - Jonathan E. Cryer, MD at [place],After 08

## 2019-02-07 NOTE — ED
HPI Chest Pain





- HPI Summary


HPI Summary: 


This patient is a 53 year old male brought in by EMS to [facility] with a CC of CP 

that began today. Along with pain he had diaphoresis, dizziness, and nausea. 

The patient rates the pain 10/10 in severity. He took 324 ASA PTA. Yesterday 

the patient had general malaise. Hx MI and CVA, patient does have three stents. 

He is on xarelto. 





- History of Current Complaint


Chief Complaint: EDChestPainROMI


Time Seen by Provider: 02/07/19 16:45


Hx Obtained From: Patient


Onset/Duration: Started Hours Ago, Still Present


Timing: Constant


Initial Severity: Severe


Current Severity: Severe


Pain Intensity: 10


Pain Scale Used: 0-10 Numeric


Chest Pain Location: Diffuse


Chest Pain Radiates: No


Associated Signs and Symptoms: Positive: Other: - diaphoresis, dizziness, and 

nausea.





- Additional Pertinent History


Primary Care Physician: ANA





- Allergy/Home Medications


Allergies/Adverse Reactions: 


 Allergies











Allergy/AdvReac Type Severity Reaction Status Date / Time


 


amoxicillin [From Augmentin] Allergy  Swelling Verified 12/05/18 17:15


 


clavulanic acid Allergy  Swelling Verified 12/05/18 17:15





[From Augmentin]     


 


mushroom Allergy  Rash Verified 12/05/18 17:15


 


Penicillins Allergy  Anaphylatic Verified 12/05/18 17:15





   Shock  


 


VINEGAR Allergy Intermediate Hives Uncoded 12/05/18 17:15














PMH/Surg Hx/FS Hx/Imm Hx


Endocrine/Hematology History: Reports: Hx Anticoagulant Therapy - xarelto 


   Denies: Hx Diabetes, Hx Sickle Cell Disease, Hx Thyroid Disease


Cardiovascular History: Reports: Hx Angina, Hx Cardiac Arrest, Hx Coronary 

Artery Disease, Hx Hypercholesterolemia, Hx Hypertension, Hx Myocardial 

Infarction, Other Cardiovascular Problems/Disorders - HEART DISEASE, CARDIAC 

EVENT MONITOR IMPLANTED


   Denies: Hx Congestive Heart Failure, Hx Pacemaker/ICD, Hx Valvular Heart 

Disease


Respiratory History: Reports: Hx Asthma, Hx Sleep Apnea


   Denies: Hx Chronic Obstructive Pulmonary Disease (COPD)


GI History: Reports: Hx Gastroesophageal Reflux Disease


   Denies: Hx Ulcer


 History: Reports: Hx Renal Disease - cysts bilateral kidneys , Other  

Problems/Disorders - bilat cyst on kidneys


Musculoskeletal History: Reports: Hx Arthritis, Hx Back Problems, Other 

Musculoskeletal History - VIPUL


   Denies: Hx Scoliosis


Sensory History: Reports: Hx Cataracts - right eye, Hx Contacts or Glasses, Hx 

Hearing Aid, Hx Hearing Problem


Opthamlomology History: Reports: Hx Cataracts - right eye, Hx Contacts or 

Glasses


Neurological History: Reports: Hx Seizures, Hx Transient Ischemic Attacks (TIA)


   Denies: Hx Dementia, Hx Headaches, Other Neuro Impairments/Disorders


Psychiatric History: Reports: Hx Depression


   Denies: Hx Panic Disorder





- Cancer History


Hx Chemotherapy: No





- Surgical History


Surgery Procedure, Year, and Place: DEC 2004 sinus [facility] ;.  2008 left knee 

arthroscopy [facility] ;.  2013 left tympanoplasty WITH STAPES IMPLANT ([employer] 

MALLEOUS HEAD SERIAL # [id number] - PER [employer] INFORMATION SENT TO MRI ON 6/ 13/2016; IMPLANT IS STAINLESS STEEL STATES SOME DEGREE OF MAGNETISM TESTED MRI 

SAFE AT 1.5T ONLY - DR VALDEZ APPROVED THIS INFORMATION FOR 1.5T ONLY).  [facility] ;.  

2015 LEFT REVISION TYMPANOPLASTY/ MASTOIDECTOMY [facility] ;.  8/ 2012 HEART CATH - NO 

STENTS ;.  2011 HEART CATH WITH cardiac stents x 4 ([employer] DRUG-ELUTING STENT 

OKAY IN NORMAL MODE ONLY,  GAUSS/CM @ 1.5T) [facility] ;.  REVEAL LINQ EVENT 

MONITOR PLACED- 7/28/16- ED CAME OVER W/ [employer] MACHINE & DID A DOWNLOAD SO 

THAT NOTHING WOULD BE ERASED


Hx Anesthesia Reactions: Yes - SEIZURE LIKE ACTIVITY; REINTUBATION AFTER LEFT 

EAR 2013





- Immunization History


Date of Tetanus Vaccine: UTD


Date of Influenza Vaccine: 10/17


Infectious Disease History: No


Infectious Disease History: 


   Denies: Hx Clostridium Difficile, Hx Hepatitis, Hx Human Immunodeficiency 

Virus (HIV), Hx of Known/Suspected MRSA, Hx Shingles, Hx Tuberculosis, Hx Known/

Suspected VRE, Hx Known/Suspected VRSA, History Other Infectious Disease, 

Traveled Outside the [place] in Last 30 Days





- Family History


Known Family History: Positive: Cardiac Disease, Hypertension, Diabetes





- Social History


Alcohol Use: None


Hx Substance Use: No


Substance Use Type: Reports: None


Hx Tobacco Use: Yes


Smoking Status (MU): Former Smoker


Type: Cigarettes


Length of Time of Smoking/Using Tobacco: 10-12 YRS


Have You Smoked in the Last Year: No





Review of Systems


Positive: Skin Diaphoresis, Other - general malaise. 


Positive: Nausea


Neurological: Other - dizziness 


All Other Systems Reviewed And Are Negative: Yes





Physical Exam





- Summary


Physical Exam Summary: 








VITAL SIGNS: Reviewed.


GENERAL: Patient is a well-developed and obese male who is lying comfortable in 

the stretcher. Patient is not in any acute respiratory distress.


HEAD AND FACE: No signs of trauma. No ecchymosis, hematomas or skull 

depressions. No sinus tenderness.


EYES: PERRLA, EOMI x 2, No injected conjunctiva, no nystagmus.


EARS: Hearing grossly intact. Ear canals and tympanic membranes are within 

normal limits.


MOUTH: Oropharynx within normal limits.


NECK: Supple, trachea is midline, no adenopathy, no JVD, no carotid bruit, no c-

spine tenderness, neck with full ROM.


CHEST: Symmetric, no tenderness at palpation


LUNGS: Clear to auscultation bilaterally. No wheezing or crackles.


CVS: Regular rate and rhythm, S1 and S2 present, no murmurs or gallops 

appreciated.


ABDOMEN: Soft, non-tender. No signs of distention. No rebound no guarding, and 

no masses palpated. Bowel sounds are normal.


EXTREMITIES: FROM in all major joints. One plus LE edema 


NEURO: Alert and oriented x 3. No acute neurological deficits. Speech is normal 

and follows commands.


SKIN: Dry and warm


 





Triage Information Reviewed: Yes


Vital Signs On Initial Exam: 


 Initial Vitals











Temp Pulse Resp BP Pulse Ox


 


 97.9 F   64   16   140/73   99 


 


 02/07/19 16:39  02/07/19 16:39  02/07/19 16:39  02/07/19 16:39  02/07/19 16:39











Vital Signs Reviewed: Yes





- [place] Coma Scale


Best Eye Response: 4 - Spontaneous


Best Motor Response: 6 - Obeys Commands


Best Verbal Response: 5 - Oriented


Coma Scale Total: 15





Diagnostics





- Vital Signs


 Vital Signs











  Temp Pulse Resp BP Pulse Ox


 


 02/07/19 16:39  97.9 F  64  16  140/73  99














- Laboratory


Result Diagrams: 


 02/07/19 17:01





 02/07/19 17:01


Lab Statement: Any lab studies that have been ordered have been reviewed, and 

results considered in the medical decision making process.





- Radiology


  ** CXR


Radiology Interpretation Completed By: Radiologist


Summary of Radiographic Findings: no active cardiopulmonary disease. ED 

physician has reviewed this report.  





- CT


  ** CT Head


CT Interpretation Completed By: Radiologist


Summary of CT Findings: No acute intracranial abnormality.  Mild ethmoid and 

sphenoid sinus disease.  Dr Chaudhry has reviewed this report





- EKG


  ** 1634


Cardiac Rate: NL


EKG Rhythm: Sinus Rhythm - at 62 bpm


Summary of EKG Findings: no st elevations, multiple PVC s





Chest Pain Course/Dx





- Course


Assessment/Plan: Blood work without any significant abnormality, except for CPK 

of 305.  2 troponins 4 hours apart as 0.00.  Chest x-ray impression: No active 

cuddy pulmonary disease.  Head CT impression: No acute intracranial 

abnormality.  Mild erythema and assessment and sinus disease.  In the ED course 

the patient was given oral hydration.  Patient was given Tylenol for the pain.  

I have low suspicion for an acute coronary syndrome and no suspicion for PE 

since the patient is taking Xarelto.  Heart score is  3 , low suspicion for an 

acute coronary  syndrome.  I discussed all the findings and test results with 

the patient. Patient was instructed to return to the emergency room immediately 

if any of the symptoms return or worsens. Plan of care was discussed with the 

patient and understands and agrees. All questions were answered at patient 

satisfaction.  There were no further complaints or concerns. Lung exam before 

discharge: CTA B/L. Good air exchange. No wheezing or crackles heard. CVS: S1 

and S2 present. No murmurs appreciated. Patient is alert and oriented x 3. 

Patient is hemodynamically stable. Patient will be discharged home with follow 

up PCP in the next 2-3 days





- Chest Pain


Differential Diagnosis/HQI/PQRI: Acute MI, ACS, Angina, Aortic Aneurysm, CHF, 

Chest Wall, GI Disease, Lower Respiratory Infection, Pulmonary Edema





- Diagnoses


Provider Diagnoses: 


 Atypical chest pain








Discharge





- Sign-Out/Discharge


Documenting (check all that apply): Patient Departure


Patient Received Moderate/Deep Sedation with Procedure: No





- Discharge Plan


Condition: Stable


Disposition: HOME


Patient Education Materials:  Chest Pain (ED)


Referrals: 


Bridger Webb MD [Primary Care Provider] - 


Additional Instructions: 


Follow up with your primary care physician in 1-3 days.


RETURN TO THE EMERGENCY DEPARTMENT FOR CHANGING OR WORSENING SYMPTOMS.








- Billing Disposition and Condition


Condition: STABLE


Disposition: Home





- Attestation Statements


Document Initiated by Sissy: Yes


Documenting Scribe: Dayo Rivero 


Provider For Whom Sissy is Documenting (Include Credential): Bridger Chaudhry MD 


Scribe Attestation: 


Dayo NATHAN scribed for Bridger Chaudhry MD  on 02/08/19 at 2056. 


Scribe Documentation Reviewed: Yes


Provider Attestation: 


The documentation as recorded by the Dayo song Rivero  accurately reflects 

the service I personally performed and the decisions made by me, Bridger Chaudhry MD 


Status of Sissy Document: Viewed

## 2019-02-07 NOTE — XMS REPORT
Continuity of Care Document (CCD)

 Created on:2019



Patient:Delta Rodriguez

Sex:Male

:1965

External Reference #:[id number]





Demographics







 Address  [address]



   [place] [address]

 

 Home Phone  [phone]

 

 Mobile Phone  [phone]

 

 Preferred Language  en

 

 Marital Status  Declined to Specify/Unknown

 

 Jew Affiliation  Unknown

 

 Race  Unknown

 

 Ethnic Group  Declined to Specify/Unknown









Author







 Name  Jonathan E. Cryer, MD

 

 Address  [address]



   Unavailable



   [place] [address]









Support







 Name  Relationship  Address  Phone

 

 Unavailable  Sibling  Unavailable  [phone]









Care Team Providers







 Name  Role  Phone

 

 Bridger Webb MD  Care Team Information   Unavailable

 

 Bridger Webb MD  Primary Care Physician  Unavailable









Payers







 Type  Date  Identification Numbers  Payment Provider  Subscriber

 

   Effective:  Policy Number: [id number]  Medicare[facility]1983 Govn  Delta Rodriguez



   1998    [employer]  









 PayID: [id number]  [address]









 [place] [address]









     Policy Number: [id number]  Medicaid/C  Delta Rodriguez









 Group Number: [id number]  Medicare Primary

 

 Group Name: [id number]  [address]

 

 PayID: [id number]  [place] [address]







Advance Directives







 Description

 

 No Information Available







Problems







 Date  Description  Provider  Status

 

 Onset: 10/07/2011  Acute pharyngitis  Jonathan E. Cryer, MD  Active

 

 Onset: 10/07/2011  Perforation of tympanic membrane  Jonathan E. Cryer, MD  
Active

 

 Onset: 10/07/2011  Dysfunction of eustachian tube  Jonathan E. Cryer, MD  
Active

 

 Onset: 10/07/2011  Middle ear conductive hearing loss  Jonathan E. Cryer, MD  
Active

 

 Onset: 10/07/2011  Sensorineural hearing loss  Jonathan E. Cryer, MD  Active

 

 Onset: 10/07/2011  Disorder of nasal cavity  Yanelis Wooten NP  Active

 

 Onset: 10/07/2011  Gastroesophageal reflux disease  Yanelis Wooten NP  Active

 

 Onset: 10/07/2011  Extrinsic asthma without status  Yanelis Wooten NP  Active



   asthmaticus    

 

 Onset: 10/07/2011  Peptic reflux disease  Yanelis Wooten NP  Active

 

 Onset: 10/07/2011  Allergic rhinitis  SherylShira conndre NP  Active

 

 Onset: 2018  Impacted cerumen  Katelynn Trevino PA-C  Active

 

 Onset: 2018  Chronic tympanitis  Katelynn Trevino PA-C  Active







Family History







 Date  Family Member(s)  Problem(s)  Comments

 

   Mother  Non Insulin Dependent Diabetes  







Social History







 Type  Date  Description  Comments

 

 Birth Sex    Unknown  

 

 Occupation    tops  

 

 Tobacco Use  Start: Unknown End:  Former Cigarette Smoker  for 25 years



   Unknown  Packs Daily 1  

 

 Tobacco Use  Start: Unknown  has never smoked cigars  

 

 Tobacco Use  Start: Unknown  has never smoked a pipe  

 

 Smokeless Tobacco    has never used smokeless  



     tobacco  

 

 ETOH Use    Current Alcohol Use  



     Occasionally  

 

 Recreational Drug Use    denies drug use  

 

 Tobacco Use  Start: Unknown End:  Patient is a former smoker  



   Unknown    

 

 Smoking Status  Reviewed: 18  Patient is a former smoker  







Allergies, Adverse Reactions, Alerts







 Date  Description  Reaction  Status  Severity  Comments

 

 2008  Augmentin XR    Active    

 

 2013  Penicillins  neck, tongue swells  Active    

 

 2005  NKDA    Inactive    







Medications







 Medication  Date  Status  Form  Strength  Qnty  SIG  Indications  Ordering



                 Provider

 

 Clotrimazole  01/10  Active  Solution  1%  30ml  4 drops to              left ear    E. Cryer,



             twice a    MD



             day for 14    



             days    

 

 Ciprofloxacin HCL  01/10  Active  Tablets  500mg  20tab  1 tablet            s  twice a    E. Cryer,



             day for 10    MD



             days    

 

 Omeprazole    Active  Capsules DR  40mg  60cap  40mg po  530.81  Saeid BOWLES



   /        s  bid for 1    Strominge



             month for    BUBBA donaldson



             esophagiti    



             s    

 

 Advair Hfa    Active  Aerosol  115/21  1unit  2 Puffs  478.19  Saeid BOWLES



   /        s  bid With    Strominge



             Spacer    BUBBA donaldson



                 



                 



                 



                 



                 



             Please    



             Provide    



             Spacer    

 

 Albuterol    Active  Aerosol  90mcg/Dos  2unit  2 Puffs    Wilber



 Inhalation  /      e  s  Q4H prn    E. Cryer, MD

 

 Bystolic    Active    5mg    qd    Unknown



                 

 

 Aspirin    Active  Tablets DR  81mg        Unknown



                 

 

 Nitroglycerin    Active    ?mg    prn Only    Tracey,



                 Bridger CARDENAS

 

 Ranexa    Active  Tablets ER  500mg    1 po bid    Unknown



       12HR          

 

 Atorvastatin    Active  Tablets  40mg    1 po qd    Unknown



 Calcium                

 

 Klor-Con M20    Active  Tablets ER  20Meq        Stefek,Pa



   /              ul M.D.

 

 Metoprolol    Active  Tablets ER  50mg        Unknown



 Succinate ER      24HR          

 

 Triamterene/Hydro    Active  Capsules  37.5-25mg        Tracey,



 chlorothiazide                Bridger CARDENAS

 

 Advair Diskus    Active  Aerosol  250-50mcg        Tracey,



   /0000      /Dose        Bridgre CARDENAS

 

 Proair HFA    Active  Aerosol  108(90Bas        Tracey,



   /      e)        Bridger CARDENAS



         mcg/Act        

 

 Atorvastatin    Active  Tablets  20mg        Stefek,Pa



 Calcium                ul M.D.

 

 Clopidogrel    Active  Tablets  75mg        Tracey,



 Bisulfate                Bridger CARDENAS

 

 Xarelto    Active  Tablets  20mg        Tracey,



                 Bridger CARDENAS

 

 Famotidine    Active  Tablets  20mg        Tracey,



                 Bridger CARDENAS

 

                 

 

 Cipro HC    Hx  Suspension  0.2-1%  10ml  Instill  H73.12  Saeid N.



             5-8 drops    Strominge



   -          into the    r, M.D.



             left ear           times a    



             day for 10    



             days    

 

 Tobradex    Hx  Suspension  0.3-0.1%  10ml  Instill    Saeid N.



             5-8 drops    Strominge



   -          in the    r, M.D.



             left ear           times a    



             day for 10    



             days.    

 

 Ciprodex    Hx  Suspension  0.3-0.1%  7.500  4 drops            ml  left ear    E. Cryer,



   -          twice a    MD



             day for 10    



   /2018          days    

 

 Methylprednisolon    Hx  TBPK  4mg  1pack  take as    Wilber



           directed    E. Cryer, - MD



                 

 

 Mupirocin    Hx  Ointment  2%  22gm  apply to              both    E. Cryer, -          nostril    MD



             twice a    



             day    

 

 Triamcinolone    Hx  Cream  0.1%  15gm  Apply to    Wilber



 Acet          left ear    E. Cryer, -          twice    MD



             daily for    



             1 week    

 

 Percocet    Hx  Tablets  5-325mg  30tab  1-2 tabs            s  by mouth    E. Cryer,



   -          every 4-6    MD



             hours as    



   /2018          needed for    



             pain    

 

 Levofloxacin    Hx  Tablets  500mg  10tab  1 by mouth    Wilber



           s  every day    E. Cryer,



   -              MD



                 

 

 Ciprodex    Hx  Suspension  0.3-0.1%  1Bott  4 drops to            le  left ears    E. Cryer,



   -          twice a    MD



   08/21          day apply    



   /2018          rebate rx    



             bin:    



             [id number]    



             rxpcn:    



             loyalty    



             rxgrp:[id number]    



             [id number]    



             issuer:    



             ([id number])    



             id#[id number]    



             25    

 

 Ofloxacin    Hx  Solution  0.3%  1unit  4 drops to    Saeid BOWLES



           s  affected    Strominge



   -          ear daily    r, M.D.



   10/24              



   /2013              

 

 Ciprodex    Hx  Suspension  0.3-0.1%  2unit  4 drops    Saeid BOWLES



           s  infected    Strominge



   -          ear bid    r, M.D.



   10/24          apply    



   /2013          rebate    



             rxbin:    



             [id number]    



             rxpcn:    



             loyalty    



             rxgrp:    



             [id number]    



             issuer:    



             ([id number])    



             id:    



             [id number]    

 

 Oxycodone/Acetami    Hx  Tablets  5-325mg  30tab  1-2 tabs    Saeid randhawa          s  by mouth    Strominge



   -          every 4-6    BUBBA donaldson



             hours as    



   /2018          needed for    



             pain    

 

 Ciprodex    Hx  Suspension  0.3-0.1%  2unit  4 drops  385.32  Saeid BOWLES



           s  infected    Strominge



   -          ear bid    r, M.D.



             rebate    



             rxbin:    



             [id number]    



             rxpcn:    



             loyalty    



             rxgrp:    



             [id number]    



             issuer:    



             ([id number])    



             id:    



             [id number]    

 

 Levofloxacin    Hx  Tablets  500mg  10tab  1 po qd            s      E. Cryer,



   -              MD



                 

 

 Oxycodone/Acetami    Hx  Tablets  5-325mg  30tab  1-2 tabs    Wilber randhawa          s  by mouth    E. Cryer,



   -          every 4-6    MD



             hours as    



   /2013          needed for    



             pain    

 

 Ciprodex  10/16  Hx  Suspension  0.3-0.1%  2unit  4 drops  381.3          s  infected    E. Cryer,



   -          ear bid x    MD



             14 days    



   /2013          apply    



             reuben    



             rxbin:    



             [id number]    



             rxpcn:    



             mamadou    



             rxgrp:    



             [id number]    



             issuer:    



             ([id number])    



             id:    



             [id number]    

 

 Ipratropium    Hx  Solution  0.06%  6unit  2 to 4  J31.0  Saeid N.



 [place]          s  sprays in    Strominge



   -          each    BUBBA donaldson



             nostril           times a    



             day as    



             needed for    



             rhinitis    

 

 Bacitracin    Hx      1Tube  apply to  472.0  Saeid N.



 Ointment            rim of    Turner



   -          nose both    BUBBA donaldson



             sides in    



             the am and    



             the pm.    

 

 Flovent HFA    Hx  Aerosol  110mcg/Ac  1unit  2 puff bid  493.01  Saeid BOWLES



         t  s      Turner donaldson M.D.



                 

 

 Fluticasone Nasal    Hx    50mcg  1unit  2 sprays    Wilber



 Spray  /2011        s  each side    E. Cryer,



   -          bid    MD



                 

 

 Omnaris    Hx  Suspension  50mcg/Act    2 sprays    Saeid BOWLES



           s  each    Strominge



   -          nostril    BUBBA donaldson



   05/01          daily    



   /2012              

 

 Percocet    Hx  Tablets  5-325mg  30tab  1-2 tabs            s  by mouth    E. Cryer,



   -          every 4-6    MD



             hours as    



   /2013          needed for    



             pain    

 

 Ciprofloxacin HCL    Hx  Tablets  500mg  14tab  1 tablet            s  twice a    E. Cryer,



   -          day for 7    MD



   05/24          days    



   /2011              

 

 Astepro Nasal    Hx    0.15% 205  1unit  2 sprays    Saeid Cerrato        mcg  s  once a day    Turner donaldson M.D.



                 

 

 Aciphex    Hx  Tablets DR  20mg  30tab  1 po Daily    Saeid BOWLES



   /        s      Turner donaldson M.D.



                 

 

 Mucinex DM  10/22  Hx  Tablets ER    5Days  1 op qd  478.1-4  Wilber



 Maximum Strength  /    12HR          E. Cryer, - MD



                 

 

 Fexofenadine HCL    Hx  Tabs  180mg  30tab  1qd - Take            s  One Tablet    E. Cryer, -          By Mouth    MD



   01/05          Every Day    



   /2011              

 

 Singulair    Hx  Tabs  10mg  30tab  1qd - Take  477.0          s  One Tablet    E. Cryer, -          By Mouth    MD



   11/27          Every Day    



   /2018              

 

 Medrol Dosepak    Hx  Tablets  4mg  1Pack  Take as  350.2            Directed    E. Cryer, - MD



                 

 

 Prednisone    Hx  Tablets  20mg  5Days  3 PO qd    Saeid N.



             With Food    Turner donaldson M.D.



                 

 

 Augmentin    Hx  Tablets  875mg  14Day  1 po bid  473.0  Saeid SYD.



           s  with food    Turner donaldson M.D.



                 

 

 Aciphex    Hx  Tablets DR  20mg  60tab  1 po bid  530.81          s      E. Cryer, - MD



                 

 

 Fexofenadine HCL    Hx  Tablets  180mg  30tab  1 po qd  477.8  Saeid N.



           maciel donaldson M.D.



                 

 

 Zyrtec    Hx  Tablets  10mg  30tab  1 PO qd    Saeid N.



           maciel donaldson M.D.



                 

 

 Fluticasone    Hx  Suspension  50mcg/Act  1mont  2 sprays  471.8  Wilber



 Propionate  /        h  each    E. Cryer, -          nostril    MD



             daily    



   /              

 

 Prednisone    Hx  Tablets  10mg  40tab  4 Tabs PO  471.8  Wilber



           s  Every Day    E. Cryer, -          X4 D, Then    MD



   10/15          3 Tabs PO    



   /          Every Day    



             X 4D, Then    



             2 Tab PO    



             Daily X4D,    



             Then 1 Tab    



             PO Daily    



             X4D    

 

 Nasacort Aq    Hx  Suspension  55mcg/Act  1unit  2 Sprays  471.8  Wilber



 Intranasal Spray  /      uation  s  Each    E. Cryer,



   -          Nostril qd    MD



                 

 

 Levaquin    Hx  Tablets  500mg  10tab  1 Tablet  471.8          s  By Mouth    E. Cryer,



   -          Daily    MD



   10/15          Until    



   /2007          Finished    

 

 Nexium    Hx            Unknown



   /              



   -              



                 

 

 Singulair    Hx  Tablets  10mg  30tab  1 PO qd  471.8  Wilber



           s      E. Cryer,



   -              MD



                 

 

 Nasacort Aq    Hx  Suspension  55McG/Act  1unit  2 sprays  473.2  Wilber



 Intranasal [place]  /      uation  s  each    E. Cryer,



   -          nostril qd    MD



                 

 

 Clopidrogel    Hx  Tablets  75mg        Unknown



   /              



   -              



                 

 

 Isosorbide    Hx    60mg    1 po qd    Unknown



 Dinitrate SA  /              



   -              



                 

 

 Simvastatin    Hx    ?mg    oen po qd    Tracey,



   /              Bridger CARDENAS



   -              



                 

 

 Lisinopril    Hx            Unknown



   /0000              



   -              



                 

 

 Pepcid ac Ez    Hx            Unknown



 Chews Maximum  /0000              



 Strength  -              



                 

 

 Sertraline HCL    Hx    ?mg    one po qd    Tracey,



   /              Bridger CARDENAS



   -              



                 

 

 Triamterene/Hydro    Hx            Unknown



 chlorothiazide  /              

 

 Clopidogrel    Hx  Tablets  75mg        StefespPa



 Bisulfate  /              ul M.D.



   -              



                 

 

 Fluticasone    Hx  Suspension  50mcg/Act        Unknown



 Propionate  /              



   -              



                 

 

 Famotidine    Hx  Tablets  20mg        Unknown



   /              



   -              



                 







Immunizations







 CPT Code  Status  Date  Vaccine  Lot #

 

 [id number]  Ordered  10/01/2015  Influenza Virus Vaccine, 3 Years Of Age And Above,
  



       Intramuscular  

 

 76014  Given  Unknown  Influenza Virus Vaccine, 3 Years Of Age And Above,  



       Intramuscular  







Vital Signs







 Date  Vital  Result  Comment

 

 2019 10:13am  Weight  347.00 lb  









 Weight  157.399 kg  

 

 Height  69 inches  5'9"

 

 Height in cm's  175.3 cm  

 

 BMI (Body Mass Index)  51.2 kg/m2  









 01/10/2019  9:23am  Weight  347.00 lb  









 Weight  157.399 kg  

 

 Height  69 inches  5'9"

 

 Height in cm's  175.3 cm  

 

 BMI (Body Mass Index)  51.2 kg/m2  









 2019 11:07am  Weight  347.00 lb  









 Weight  157.399 kg  

 

 Height  69 inches  5'9"

 

 Height in cm's  175.3 cm  

 

 BMI (Body Mass Index)  51.2 kg/m2  









 2018 11:34am  Weight  347.00 lb  









 Weight  157.399 kg  

 

 Height  69 inches  5'9"

 

 Height in cm's  175.3 cm  

 

 BMI (Body Mass Index)  51.2 kg/m2  









 2018 10:50am  Weight  347.00 lb  









 Weight  157.399 kg  

 

 Height  69 inches  5'9"

 

 Height in cm's  175.3 cm  

 

 BMI (Body Mass Index)  51.2 kg/m2  









 2018 10:08am  Weight  338.00 lb  









 Weight  153.317 kg  

 

 Height  68.50 inches  5'8.50"

 

 Height in cm's  174.0 cm  

 

 BMI (Body Mass Index)  50.6 kg/m2  









 2018  9:06am  Weight  335.00 lb  









 Weight  151.956 kg  

 

 Height  68.50 inches  5'8.50"

 

 Height in cm's  174.0 cm  

 

 BMI (Body Mass Index)  50.2 kg/m2  









 2018 10:49am  BP Systolic  159 mmHg  









 BP Diastolic  94 mmHg  

 

 Heart Rate  74 /min  

 

 Respiratory Rate  17 /min  

 

 Weight  335.00 lb  

 

 Weight  151.956 kg  

 

 Height  68.50 inches  5'8.50"

 

 Height in cm's  174.0 cm  

 

 BMI (Body Mass Index)  50.2 kg/m2  









 2017  9:47am  BP Systolic  155 mmHg  









 BP Diastolic  88 mmHg  

 

 Heart Rate  77 /min  

 

 Weight  335.00 lb  

 

 Weight  151.956 kg  

 

 Height  68.50 inches  5'8.50"

 

 Height in cm's  174.0 cm  

 

 BMI (Body Mass Index)  50.2 kg/m2  









 2016  2:46pm  BP Systolic  165 mmHg  









 BP Diastolic  99 mmHg  

 

 Heart Rate  78 /min  

 

 Respiratory Rate  16 /min  

 

 Weight  335.00 lb  

 

 Weight  151.956 kg  

 

 Height  68.50 inches  5'8.50"

 

 Height in cm's  174.0 cm  

 

 BMI (Body Mass Index)  50.2 kg/m2  









 2016 11:17am  BP Systolic  114 mmHg  









 BP Diastolic  89 mmHg  

 

 Heart Rate  64 /min  

 

 Respiratory Rate  17 /min  

 

 Weight  335.00 lb  

 

 Weight  151.956 kg  

 

 Height  68.50 inches  5'8.50"

 

 Height in cm's  174.0 cm  

 

 BMI (Body Mass Index)  50.2 kg/m2  









 2016  9:57am  BP Systolic  107 mmHg  









 BP Diastolic  82 mmHg  

 

 Heart Rate  78 /min  

 

 Respiratory Rate  17 /min  

 

 Weight  335.00 lb  

 

 Weight  151.956 kg  

 

 Height  68.50 inches  5'8.50"

 

 Height in cm's  174.0 cm  

 

 BMI (Body Mass Index)  50.2 kg/m2  









 2015 10:02am  BP Systolic  142 mmHg  









 BP Diastolic  105 mmHg  

 

 Heart Rate  76 /min  

 

 Respiratory Rate  17 /min  

 

 Weight  335.00 lb  

 

 Weight  151.956 kg  

 

 Height  68.50 inches  5'8.50"

 

 Height in cm's  174.0 cm  

 

 BMI (Body Mass Index)  50.2 kg/m2  









 2015 10:35am  BP Systolic  134 mmHg  









 BP Diastolic  86 mmHg  

 

 Heart Rate  72 /min  

 

 Respiratory Rate  17 /min  

 

 Weight  335.00 lb  

 

 Weight  151.956 kg  

 

 Height  68.50 inches  5'8.50"

 

 Height in cm's  174.0 cm  

 

 BMI (Body Mass Index)  50.2 kg/m2  









 10/29/2015  9:50am  BP Systolic  132 mmHg  









 BP Diastolic  92 mmHg  

 

 Heart Rate  91 /min  

 

 Respiratory Rate  18 /min  

 

 Weight  335.00 lb  

 

 Weight  151.956 kg  

 

 Height  68.50 inches  5'8.50"

 

 Height in cm's  174.0 cm  

 

 BMI (Body Mass Index)  50.2 kg/m2  









 2015 10:38am  Respiratory Rate  17 /min  









 Weight  335.00 lb  

 

 Weight  151.956 kg  

 

 Height  68.50 inches  5'8.50"

 

 Height in cm's  174.0 cm  

 

 BMI (Body Mass Index)  50.2 kg/m2  









 2015  3:25pm  BP Systolic  97 mmHg  









 BP Diastolic  61 mmHg  

 

 Heart Rate  78 /min  

 

 Respiratory Rate  16 /min  

 

 Weight  335.00 lb  

 

 Weight  151.956 kg  

 

 Height  68.50 inches  5'8.50"

 

 Height in cm's  174.0 cm  

 

 BMI (Body Mass Index)  50.2 kg/m2  









 2014  9:12am  BP Systolic  131 mmHg  









 BP Diastolic  84 mmHg  

 

 Heart Rate  67 /min  

 

 Respiratory Rate  18 /min  

 

 Weight  335.00 lb  

 

 Weight  151.956 kg  

 

 Height  68.50 inches  5'8.50"

 

 Height in cm's  174.0 cm  

 

 BMI (Body Mass Index)  50.2 kg/m2  









 2014 10:15am  BP Systolic  109 mmHg  









 BP Diastolic  68 mmHg  

 

 Heart Rate  68 /min  

 

 Respiratory Rate  17 /min  

 

 Weight  328.00 lb  

 

 Weight  148.781 kg  

 

 Height  68.50 inches  5'8.50"

 

 Height in cm's  174.0 cm  

 

 BMI (Body Mass Index)  49.1 kg/m2  









 2014  9:02am  BP Systolic  121 mmHg  









 BP Diastolic  79 mmHg  

 

 Heart Rate  77 /min  

 

 Respiratory Rate  16 /min  

 

 Weight  325.00 lb  

 

 Weight  147.420 kg  

 

 Height  68.50 inches  5'8.50"

 

 Height in cm's  174.0 cm  

 

 BMI (Body Mass Index)  48.7 kg/m2  









 2014 10:13am  BP Systolic  138 mmHg  









 BP Diastolic  76 mmHg  

 

 Heart Rate  73 /min  

 

 Respiratory Rate  16 /min  

 

 Weight  325.00 lb  

 

 Weight  147.420 kg  

 

 Height  68.50 inches  5'8.50"

 

 Height in cm's  174.0 cm  

 

 BMI (Body Mass Index)  48.7 kg/m2  









 2014  9:38am  BP Systolic  128 mmHg  









 BP Diastolic  80 mmHg  

 

 Heart Rate  65 /min  

 

 Respiratory Rate  16 /min  

 

 Weight  325.00 lb  

 

 Weight  147.420 kg  

 

 Height  68.50 inches  5'8.50"

 

 Height in cm's  174.0 cm  

 

 BMI (Body Mass Index)  48.7 kg/m2  









 2014  9:28am  Respiratory Rate  17 /min  









 Weight  325.00 lb  

 

 Weight  147.420 kg  

 

 Height  68.50 inches  5'8.50"

 

 Height in cm's  174.0 cm  

 

 BMI (Body Mass Index)  48.7 kg/m2  









 2014 10:01am  BP Systolic  129 mmHg  









 BP Diastolic  82 mmHg  

 

 Heart Rate  87 /min  

 

 Respiratory Rate  17 /min  

 

 Weight  325.00 lb  

 

 Weight  147.420 kg  

 

 Height  68.50 inches  5'8.50"

 

 Height in cm's  174.0 cm  

 

 BMI (Body Mass Index)  48.7 kg/m2  









 2013 10:59am  BP Systolic  131 mmHg  









 BP Diastolic  83 mmHg  

 

 Heart Rate  75 /min  

 

 Respiratory Rate  16 /min  

 

 Weight  325.00 lb  

 

 Weight  147.420 kg  

 

 Height  68.50 inches  5'8.50"

 

 Height in cm's  174.0 cm  

 

 BMI (Body Mass Index)  48.7 kg/m2  









 10/24/2013  9:31am  BP Systolic  132 mmHg  









 BP Diastolic  84 mmHg  

 

 Heart Rate  61 /min  

 

 Respiratory Rate  17 /min  

 

 Weight  325.00 lb  

 

 Weight  147.420 kg  

 

 Height  68.50 inches  5'8.50"

 

 Height in cm's  174.0 cm  

 

 BMI (Body Mass Index)  48.7 kg/m2  









 2013  9:15am  BP Systolic  126 mmHg  









 BP Diastolic  81 mmHg  

 

 Heart Rate  73 /min  

 

 Respiratory Rate  17 /min  

 

 Weight  325.00 lb  

 

 Weight  147.420 kg  

 

 Height  68.50 inches  5'8.50"

 

 Height in cm's  174.0 cm  

 

 BMI (Body Mass Index)  48.7 kg/m2  









 2013  2:36pm  Weight  325.00 lb  









 Weight  147.420 kg  

 

 Height  68.50 inches  5'8.50"

 

 Height in cm's  174.0 cm  

 

 BMI (Body Mass Index)  48.7 kg/m2  









 2013  9:05am  Weight  325.00 lb  









 Weight  147.420 kg  

 

 Height  68.50 inches  5'8.50"

 

 Height in cm's  174.0 cm  

 

 BMI (Body Mass Index)  48.7 kg/m2  









 2013 10:00am  BP Systolic  108 mmHg  









 BP Diastolic  73 mmHg  

 

 Heart Rate  77 /min  

 

 Respiratory Rate  16 /min  

 

 Weight  325.00 lb  

 

 Weight  147.420 kg  

 

 Height  68.50 inches  5'8.50"

 

 Height in cm's  174.0 cm  

 

 BMI (Body Mass Index)  48.7 kg/m2  









 2013  2:49pm  BP Systolic  99 mmHg  









 BP Diastolic  72 mmHg  

 

 Heart Rate  62 /min  

 

 Respiratory Rate  16 /min  

 

 Weight  325.00 lb  

 

 Weight  147.420 kg  

 

 Height  68.50 inches  5'8.50"

 

 Height in cm's  174.0 cm  

 

 BMI (Body Mass Index)  48.7 kg/m2  









 2013  2:04pm  BP Systolic  123 mmHg  









 BP Diastolic  88 mmHg  

 

 Heart Rate  66 /min  

 

 Respiratory Rate  16 /min  

 

 Weight  325.94 lb  

 

 Weight  147.840 kg  

 

 Height  68.50 inches  5'8.50"

 

 Height in cm's  174.0 cm  

 

 BMI (Body Mass Index)  48.8 kg/m2  









 2013 11:43am  BP Systolic  122 mmHg  









 BP Diastolic  84 mmHg  

 

 Heart Rate  64 /min  

 

 Respiratory Rate  16 /min  

 

 Weight  305.00 lb  

 

 Weight  138.348 kg  

 

 Height  69.02 inches  5'9.02"

 

 Height in cm's  175.3 cm  

 

 BMI (Body Mass Index)  45.0 kg/m2  









 10/25/2012  3:08pm  Weight  288.00 lb  









 Weight  130.637 kg  

 

 Height  69.02 inches  5'9"

 

 Height in cm's  175.3 cm  

 

 BMI (Body Mass Index)  42.5 kg/m2  









 10/16/2012  9:18am  BP Systolic  150 mmHg  









 BP Diastolic  90 mmHg  

 

 Heart Rate  66 /min  

 

 Respiratory Rate  20 /min  

 

 Weight  208.00 lb  

 

 Weight  94.349 kg  

 

 Height  69 inches  5'9"

 

 Height in cm's  175.3 cm  

 

 BMI (Body Mass Index)  30.7 kg/m2  









 10/06/2011  2:00pm  BP Systolic  152 mmHg  









 BP Diastolic  100 mmHg  

 

 Heart Rate  62 /min  

 

 Respiratory Rate  16 /min  

 

 Weight  280.00 lb  

 

 Weight  127.008 kg  

 

 Height  69 inches  5'9"

 

 Height in cm's  175.3 cm  

 

 BMI (Body Mass Index)  41.3 kg/m2  









 2011 11:16am  BP Systolic  143 mmHg  









 BP Diastolic  95 mmHg  

 

 Heart Rate  75 /min  

 

 Respiratory Rate  17 /min  









 2009  3:28pm  Heart Rate  64 /min  









 Respiratory Rate  16 /min  

 

 Weight  321.00 lb  

 

 Weight  145.606 kg  









 2008  3:08pm  BP Systolic  170 mmHg  Taken with LG BP cuff









 BP Diastolic  106 mmHg  Taken with LG BP cuff

 

 Heart Rate  79 /min  

 

 Respiratory Rate  18 /min  









 2008  9:33am  BP Systolic  159 mmHg  









 BP Diastolic  114 mmHg  

 

 Heart Rate  79 /min  

 

 Respiratory Rate  16 /min  









 10/23/2008  3:55pm  BP Systolic  157 mmHg  









 BP Diastolic  107 mmHg  

 

 Heart Rate  77 /min  

 

 Respiratory Rate  16 /min  









 2008 10:06am  BP Systolic  133 mmHg  









 BP Diastolic  100 mmHg  

 

 Heart Rate  75 /min  

 

 Respiratory Rate  16 /min  









 2008 11:44am  BP Systolic  160 mmHg  









 BP Diastolic  109 mmHg  

 

 Heart Rate  74 /min  

 

 Respiratory Rate  16 /min  









 2008  3:25pm  BP Systolic  137 mmHg  









 BP Diastolic  85 mmHg  

 

 Heart Rate  75 /min  

 

 Respiratory Rate  16 /min  









 2008 10:15am  BP Systolic  139 mmHg  









 BP Diastolic  88 mmHg  

 

 Heart Rate  85 /min  

 

 Respiratory Rate  12 /min  









 2008  3:17pm  BP Systolic  138 mmHg  









 BP Diastolic  87 mmHg  

 

 Heart Rate  82 /min  

 

 Respiratory Rate  16 /min  









 2008  9:50am  BP Systolic  148 mmHg  









 BP Diastolic  86 mmHg  









 2008  3:43pm  BP Systolic  148 mmHg  









 BP Diastolic  92 mmHg  

 

 Heart Rate  80 /min  

 

 Respiratory Rate  14 /min  









 2007  1:52pm  BP Systolic  119 mmHg  









 BP Diastolic  76 mmHg  

 

 Heart Rate  75 /min  

 

 Respiratory Rate  16 /min  









 2006  9:11am  BP Systolic  176 mmHg  









 BP Diastolic  73 mmHg  

 

 Heart Rate  80 /min  

 

 Respiratory Rate  16 /min  









 2005  1:10pm  BP Systolic  146 mmHg  









 BP Diastolic  82 mmHg  

 

 Heart Rate  80 /min  

 

 Respiratory Rate  16 /min  







Results







 Test  Date  Facility  Test  Result  H/L  Range  Note

 

 CBC No Diff  2015  [facility]  White Blood  5.2 10^3
/uL  N  3.5-10.8  1



     c/o Department of Laboratories  Count        



     [place] [address] [phone]          









 Red Blood Count  5.43 10^6/uL  High  4.0-5.4  

 

 Hemoglobin  15.1 g/dL  N  14.0-18.0  

 

 Hematocrit  47 %  N  42-52  

 

 Mean Corpuscular Volume  87 fL  N  80-94  

 

 Mean Corpuscular Hemoglobin  28 pg  N  27-31  

 

 Mean Corpuscular HGB Conc  32 g/dL  N  31-36  

 

 Red Cell Distribution Width  15 %  N  10.5-15  

 

 Platelet Count  232 10^3/uL  N  150-450  

 

 Mean Platelet Volume  8 um3  N  7.4-10.4  









 Inr/Protime  2015  [facility]  Inr  1.05  N  0.89-
1.11  



     c/o Department of Laboratories          



     [place], [place] [id number] [phone]          

 

 Laboratory test  2015  [facility]  Partial  45.0  
High  26.0-36.3  2



 finding    c/o Department of Laboratories  Thrombo  seconds      



     [place] [address]  Time PTT        



     [phone]          

 

 Basic Metabolic  2015  [facility]  Sodium  141 mmol/
L  N  133-145  



 Panel    c/o Department of Laboratories          



     [place] [address] [phone]          









 Potassium  3.8 mmol/L  N  3.5-5.0  

 

 Chloride  106 mmol/L  N  101-111  

 

 Co2 Carbon Dioxide  28 mmol/L  N  22-32  

 

 Anion Gap  7 mmol/L  N  2-11  

 

 Glucose  91 mg/dL  N    

 

 Blood Urea Nitrogen  14 mg/dL  N  6-24  

 

 Creatinine  1.15 mg/dL  N  0.67-1.17  

 

 BUN/Creatinine Ratio  12.2  N  8-20  

 

 Calcium  9.9 mg/dL  N  8.6-10.3  

 

 Egfr Non-  67.0  N  >60  

 

 Egfr   86.2  N  >60  3









 Laboratory test finding  2013  [facility]  Inr  
0.96    0.87-0.97  4



     c/o Department of Laboratories          



     [place] [id number] [phone]          









 Activated Partial Thrombo Time  44.1 seconds  High  22.18-37.18  5









 Basic Metabolic  2013  [facility]  Sodium  140 mmol/
L    133-145  



 Panel    c/o Department of Laboratories          



     [place] [address] [phone]          









 Potassium  4.3 mmol/L    3.5-5.0  

 

 Chloride  104 mmol/L    101-111  

 

 Co2 Carbon Dioxide  30.0 mmol/L    22-32  

 

 Anion Gap  6.0 mmol/L    2-11  

 

 Glucose  111 mg/dL  High    

 

 Blood Urea Nitrogen  14 mg/dL    6-24  

 

 Creatinine  1.30 mg/dL    0.50-1.40  

 

 BUN/Creatinine Ratio  10.8    8-20  

 

 Calcium  9.7 mg/dL    8.1-9.9  

 

 Egfr Non-  58.7    >60  

 

 Egfr   75.5    >60  6









 CBC Auto  2013  [facility]  White Blood  4.0 10^3/
uL  Low  4.8-10.8  



 Diff    c/o Department of Laboratories  [place]        



     [place] [address] [phone]          









 Red Blood Count  4.93 10^6/uL    4.0-5.4  

 

 Hemoglobin  14.3 g/dL    14.0-18.0  

 

 Hematocrit  43 %    42-52  

 

 Mean Corpuscular Volume  87 fL    80-94  

 

 Mean Corpuscular Hemoglobin  29 pg    27-31  

 

 Mean Corpuscular HGB Conc  33 g/dL    31-36  

 

 Red Cell Distribution Width  14 %    10.5-15  

 

 Platelet Count  184 10^3/uL    150-450  

 

 Mean Platelet Volume  8 um3    7.4-10.4  

 

 Abs Neutrophils  2.2 10^3/uL    1.5-7.7  

 

 Abs Lymphocytes  1.3 10^3/uL    1.0-4.8  

 

 Abs Monocytes  0.4 10^3/uL    0-0.8  

 

 Abs Eosinophils  0.1 10^3/uL    0-0.6  

 

 Abs Basophils  0 10^3/uL    0-0.2  

 

 Abs Nucleated RBC  0.01 10^3/uL      

 

 Granulocyte %  54.0 %    38-83  

 

 Lymphocyte %  32.6 %    25-47  

 

 Monocyte %  9.7 %  High  1-9  

 

 Eosinophil %  3.1 %    0-6  

 

 Basophil %  0.6 %    0-2  

 

 Nucleated Red Blood Cells %  0.2      









 Basic Metabolic  2013  [facility]  Sodium  135 mmol/
L    133-145  



 Panel    c/o Department of Laboratories          



     [place] [address] [phone]          









 Potassium  3.6 mmol/L    3.5-5.0  

 

 Chloride  102 mmol/L    101-111  

 

 Co2 Carbon Dioxide  30.0 mmol/L    22-32  

 

 Anion Gap  3.0 mmol/L    2-11  

 

 Glucose  80 mg/dL      

 

 Blood Urea Nitrogen  11 mg/dL    6-24  

 

 Creatinine  1.10 mg/dL    0.50-1.40  

 

 BUN/Creatinine Ratio  10.0    8-20  

 

 Calcium  9.0 mg/dL    8.1-9.9  

 

 Egfr Non-  71.4    >60  

 

 Egfr   91.9    >60  7









 Laboratory test  12/10/2008  [facility]  Troponin-I (TnI)
  0.04 NG/ML    0-0.06  8



 finding    c/o Department of Laboratories          



     [place] [address] [phone]          

 

 Stat CMP  12/10/2008  [facility]  Sodium  136 mmol/L    
135-145  



     c/o Department of Laboratories          



     [place] [address] [phone]          









 Potassium  3.9 mmol/L    3.5-5.0  

 

 Chloride  104 mmol/L    101-111  

 

 Co2 (Carbon Dioxide)  25.0 mmol/L    22-32  

 

 Anion Gap  7.0 mmol/L    2-11  9

 

 Glucose  98 mg/dL      10

 

 BUN  15 mg/dL    6-24  

 

 Creatinine  1.20 mg/dL    0.50-1.40  

 

 One Over Creatinine  0.80      

 

 BUN/Creatinine Ratio  12.5    8-20  

 

 Calcium  9.3 mg/dL    8.1-9.9  11

 

 Total Protein  6.9 GM/DL    6.2-8.1  

 

 Albumin  3.9 GM/DL    3.6-5.4  

 

 Globulin  3.0 GM/DL    2-4  

 

 Albumin/Globulin Ratio  1.3    1-3  

 

 Bilirubin Total  0.5 mg/dL    0.4-1.5  

 

 Alkaline Phosphatase  112 U/L      

 

 Alt (SGPT)  53 U/L    17-63  

 

 Ast (Sgot)  31 U/L    12-42  









 CBC With  12/10/2008  [facility]  White Blood  6.7 CUMM  
  4.8-10.8  



 Electronic Diff    c/o Department of Laboratories  Count        



 [place] [address]          



     [phone]          









 Red Cell Count  5.28 CUMM    4.6-6.2  

 

 Hemoglobin  15.2 g/dL    14.0-18.0  

 

 Hematocrit  44 %    42-52  

 

 Mean Corpuscular Volume  83 um3    80-94  

 

 Mean Corpuscular Hemoglob  29 pg    27-31  

 

 Mean Corpuscular HGB Cone  35 g/dL    32-36  

 

 Redcell Distribution WDTH  14 %    10.5-15  

 

 Platelet Count  254 CUMM    150-450  

 

 Mean Platelet Volume  7.6 um3    7.4-10.4  

 

 Gran %  60.3 %    38-83  

 

 Lymph %  28.8 %    25-47  

 

 Mononuclear %  7.4 %    1-9  

 

 Eosinophil %  3.0 %    0-6  

 

 Basophil %  0.5 %    0-2  

 

 Abs Lymphs  1.9    1.0-4.8  

 

 Abs Mononuclear  0.5    0-0.8  

 

 Absolute Neutrophil Count  4.0    1.5-7.7  

 

 Abs Eosinophils  0.2    0-0.6  

 

 Abs Basophils  0    0-0.2  









 Laboratory test  2008  [facility]  Culture  FEW 
COLONIES      12



 finding    c/o Department of Laboratories  Sensitivity  OF  <SEE NOTE>      



     [place] [address]          



     [phone]          









 1  SDS 

 

 2  SDS 

 

 3  *******Because ethnic data is not always readily available,



   this report includes an eGFR for both -Americans and



   non- Americans.****



   The National Kidney Disease Education Program (NKDEP) does



   not endorse the use of the MDRD equation for patients that



   are not between the ages of 18 and 70, are pregnant, have



   extremes of body size, muscle mass, or nutritional status,



   or are non- or non-.



   According to the National Kidney Foundation, irrespective of



   diagnosis, the stage of the disease is based on the level of



   kidney function:



   Stage Description                      GFR(mL/min/1.73 m(2))



   1     Kidney damage with normal or decreased GFR       90



   2     Kidney damage with mild decrease in GFR          60-89



   3     Moderate decrease in GFR                         30-59



   4     Severe decrease in GFR                           15-29



   5     Kidney failure                       <15 (or dialysis)

 

 4  SDS 13

 

 5  SDS 13

 

 6  *******Because ethnic data is not always readily available,



   this report includes an eGFR for both -Americans and



   non- Americans.****



   The National Kidney Disease Education Program (NKDEP) does



   not endorse the use of the MDRD equation for patients that



   are not between the ages of 18 and 70, are pregnant, have



   extremes of body size, muscle mass, or nutritional status,



   or are non- or non-.



   According to the National Kidney Foundation, irrespective of



   diagnosis, the stage of the disease is based on the level of



   kidney function:



   Stage Description                      GFR(mL/min/1.73 m(2))



   1     Kidney damage with normal or decreased GFR       90



   2     Kidney damage with mild decrease in GFR          60-89



   3     Moderate decrease in GFR                         30-59



   4     Severe decrease in GFR                           15-29



   5     Kidney failure                       <15 (or dialysis)

 

 7  *******Because ethnic data is not always readily available,



   this report includes an eGFR for both -Americans and



   non- Americans.****



   The National Kidney Disease Education Program (NKDEP) does



   not endorse the use of the MDRD equation for patients that



   are not between the ages of 18 and 70, are pregnant, have



   extremes of body size, muscle mass, or nutritional status,



   or are non- or non-.



   According to the National Kidney Foundation, irrespective of



   diagnosis, the stage of the disease is based on the level of



   kidney function:



   Stage Description                      GFR(mL/min/1.73 m(2))



   1     Kidney damage with normal or decreased GFR       90



   2     Kidney damage with mild decrease in GFR          60-89



   3     Moderate decrease in GFR                         30-59



   4     Severe decrease in GFR                           15-29



   5     Kidney failure                       <15 (or dialysis)

 

 8  New Reference Range and Interpretation effective 10/4/02



   



   TnI (ng/ml)             INTERPRETATION



   



   <0.06 ng/ml             NOT SUPPORTIVE OF DIAGNOSIS OF MI



   



   0.06 - 0.50 ng/ml       INDETERMINATE: SUGGEST SERIAL



   STUDIES IF CLINICALLY INDICATED.



   



   > 0.5 ng/ml             CONSISTENT WITH DIAGNOSIS OF MI



   .

 

 9  Anion gap measurement may be of limited value in the



   presence of any alkalosis, especially in a combined acid



   base disorder.



   .

 

 10  ** Note change in reference range as of 08.  The



   change was based on recommendations from the American



   Diabetes Association.

 

 11  Please note change in reference range effective 08



   



   



   .

 

 12  FEW COLONIES OF NORMAL RESPIRATORY EDMUNDO







Procedures







 Date  Code  Description  Status

 

 2019  30774  Binocular Microscopy  Completed

 

 2018  95324  Debridement Of Mastoid Cavity, Simple  Completed

 

 2018  02932  Nasal Endoscopy, Diagnostic  Completed

 

 2016  91770  Nasal Endoscopy, Diagnostic  Completed

 

 2015  39869  Revision Mastoidectomy Resulting In Modified Radical  
Completed



     Mastoidctomy  

 

 2014  41162  Fiberoptic Laryngoscopy  Completed

 

 2014  07600  Tympanometry  Completed

 

 2014  55555  Comprehensive Audiogram  Completed

 

 10/24/2013  53262  Tympanometry  Completed

 

 10/24/2013  49347  Comprehensive Audiogram  Completed

 

 2013  21617  Binocular Microscopy  Completed

 

 2013  14302  Tympanomastoidectomy W/Ossicular Chain  Completed

 

 2013  61420  Medical Records  Completed

 

 2013  69886  Tympanometry  Completed

 

 2013  16152  Comprehensive Audiogram  Completed

 

 2013  74251  Comprehensive Audiogram  Completed

 

 2013  63497  Tympanometry  Completed

 

 10/16/2012  87713  Binocular Microscopy  Completed

 

 2012  07168  Nasal Endoscopy, Diagnostic  Completed

 

 2012  39960  Tympanometry  Completed

 

 2012  40500  Comprehensive Audiogram  Completed

 

 2012  73149  Tympanometry  Completed

 

 2012  64465  Binocular Microscopy  Completed

 

 2011  33395  Fiberoptic Laryngoscopy  Completed

 

 2011  66682  Tympanometry  Completed

 

 2011  94705  Tympanometry  Completed

 

 2011  05610  Comprehensive Audiogram  Completed

 

 2010  19515  Tympanometry  Completed

 

 2009  62855  Tympanometry  Completed

 

 2009  61231  Binocular Microscopy  Completed

 

 10/22/2009  73884  Comprehensive Audiogram  Completed

 

 10/22/2009  78319  Tympanometry  Completed

 

 2008  12176  Nasal Endoscopy, Diagnostic  Completed

 

 2008  08880  Fiberoptic Laryngoscopy  Completed

 

 2008  97300  No Show Fee  Completed

 

 2008  19785  Demonstration/Eval. Of Patient Utilization Of  Completed



     Spacer/Nebulizer  

 

 2008  29872  Respiratory Flow Volume Loop  Completed

 

 2008  89974  Bronchospasm Evaluation  Completed

 

 2008  54269  Prick Test  Completed

 

 2008  89568  Prick Test  Completed

 

 2008  93701  Nasal Endoscopy, Diagnostic  Completed

 

 2007  10748  Nasopharyngoscopy  Completed

 

 2007  00795  Nasal Endoscopy, Diagnostic  Completed

 

 2007  35812  Comprehensive Audiogram  Completed

 

 2007  03850  Comprehensive Audiogram  Completed

 

 2007  33178  Tympanometry  Completed

 

 2007  58748  Tympanometry  Completed

 

 2007  78974  Tympanostomy W/Tube Local Or Topical Anes.  Completed

 

 2007  45246  Tympanometry  Completed

 

 2007  79487  Tympanometry  Completed

 

 2007  96891  Comprehensive Audiogram  Completed

 

 2007  45141  Comprehensive Audiogram  Completed

 

 2006  96493  Nasal Endoscopy, Diagnostic  Completed

 

 2005  90136  Nasal Endoscopy, Diagnostic  Completed

 

 2005  34042  Nasal Endoscopy W/ Debridement  Completed

 

 2005  52480  Nasal Endoscopy W/ Debridement  Completed

 

 2004  62602  Nasal Endoscopy W/Maxllary Antrostomy W/Excision Of Poylp  
Completed

 

 2004  09925  Nasal Endoscopy With Ethmoidectomy, Partial  Completed

 

 12/10/2004  20242  Nasal Endoscopy, Diagnostic  Completed

 

 2004  80820  Nasal Endoscopy, Diagnostic  Completed

 

 10/13/2004  24565  Nasal Endoscopy, Diagnostic  Completed

 

 2004  69300  No Show Fee  Completed







Encounters







 Type  Date  Location  Provider  Dx  Diagnosis

 

 Office Visit  2019  Christiano Ibarra  H70.92  Unspecified



   10:15a  08  Cryer, MD    mastoiditis, left ear

 

 Office Visit  2019  Christiano Ibarra  H70.92  Unspecified



   1:30p  08  Cryer, MD    mastoiditis, left ear

 

 Office Visit  01/10/2019  [place],After  Wilber PENA.  Cholesteatoma of



   9:45a  08  Cryer, MD    tympanum, left ear

 

 Office Visit  2019  [place],After  BECKY Humphrey.  Cholesteatoma of



   10:30a  08  PEPE    tympanum, left ear

 

 Office Visit  2018  [place],After  Wilber PENA.  Cholesteatoma of



   11:30a  08  Cryer, MD    tympanum, left ear

 

 Office Visit  2018  [place],After  Wilber PENA.  Cholesteatoma of



   10:30a  08  Cryer, MD tympanum, left ear









 J31.0  Chronic rhinitis

 

 G50.1  Atypical facial pain









 Office Visit  2018  [place],After  Wilber PENA.  Cholesteatoma of



   10:00a  08  Cryer, MD    tympanum, left ear

 

 Office Visit  2018  [place],After  Wilber PENA.  Cholesteatoma of



   11:15a  08  Cryer, MD    tympanum, left ear

 

 Office Visit  2017  [place],After  Wilber PENA.  Cholesteatoma of



   11:00a  08  Cryer, MD    tympanum, left ear









 H72.2x1  Other marginal perforations of tympanic membrane, right ear









 Office Visit  2017  [place],After  Wilber PENA.  Cholesteatoma of



   10:30a  08  Cryer, MD    tympanum, left ear

 

 Office Visit  2016  [place],After  Wilber ISALS  J31.0  Chronic rhinitis



   2:45p  08  Cryer, MD    









 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









 Office Visit  2016  [place],After  Wilber PENA.  Cholesteatoma of



   11:00a  08  Cryer, MD tympanum, left ear









 H60.11  Cellulitis of right external ear









 Office Visit  2016  [place],After  Wilber PENA.  Cholesteatoma of



   11:00a  08  Cryer, MD    tympanum, left ear

 

 Office Visit  2016  [place],After  Wilber ISLAS  L23.3  Allergic contact



   3:45p  08  Cryer, MD    dermatitis due to



           drugs in contact w



           skin

 

 Office Visit  2015  [place],After  Wilber ISLAS  H71.12  Cholesteatoma of



   11:30a  08  Cryer, MD    tympanum, left ear

 

 Office Visit  10/29/2015  [place],After  Wilber ISLAS  H71.12  Cholesteatoma of



   10:30a  08  Cryer, MD    tympanum, left ear

 

 Office Visit  10/15/2015  [place],After  Wilber ISLAS  H65.32  Chronic mucoid



   11:00a  08  Cryer, MD    otitis media, left



           ear









 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









 Office Visit  2015 10:15a  [place],After 08  Wilber ISLAS  381.29  
Otitis Media



       Cryer, MD    Chronic Media



           Other









 385.32  Cholesteatoma Of Middle Ear









 Office Visit  2015  3:45p  [place],After 08  Wilber ISLAS  381.29  
Otitis Media



       Cryer, MD    Chronic Media



           Other









 385.32  Cholesteatoma Of Middle Ear









 Office Visit  2014  9:45a  [place],After 08  Wilber ISLAS  381.29  
Otitis Media



       Cryer, MD    Chronic Media



           Other

 

 Office Visit  2014 10:45a  [place],After 08  Wilber ISLAS  381.29  
Otitis Media



       Cryer, MD    Chronic Media



           Other









 787.20  Dysphagia, Unspecified









 Office Visit  2014 10:15a  [place],After 08  Jonathan E. Cryer, MD  
786.2  Cough









 787.20  Dysphagia, Unspecified

 

 493.00  Asthma, Extrinsic/Atopic









 Office  2014  [place],After  Wilber ISLAS  389.03  Hearing Loss,



 Visit  10:00a  08  Cryer, MD    Conductive/Middle Ear









 389.10  Hearing Loss, Sensorineural/Unspecified

 

 462-2  Sore Throat









 Office Visit  2014 10:30a  [place],After 08  Wilber ISLAS  380.10  
Otitis Externa



       Cryer, MD    Acute









 389.03  Hearing Loss, Conductive/Middle Ear









 Office Visit  2014 10:30a  [place],After 08  Wilber ISLAS  380.10  
Otitis Externa



       Cryer, MD    Acute









 381.29  Otitis Media Chronic Media Other









 Office Visit  2014  [place],After  Wilber ISLAS  385.32  Cholesteatoma Of



   9:45a  08  Cryer, MD    Middle Ear

 

 Office Visit  2013  [place],After  Wilber ISLAS  472.0  Rhinitis, Chronic



   11:00a  08  Cryer, MD    









 385.32  Cholesteatoma Of Middle Ear









 Office Visit  10/24/2013  [place],After  Wilber ISLAS  385.32  Cholesteatoma Of



   10:30a  08  Cryer, MD    Middle Ear









 389.03  Hearing Loss, Conductive/Middle Ear

 

 389.10  Hearing Loss, Sensorineural/Unspecified

 

 477.8  Rhinitis, Perennial, Allergy









 Office Visit  2013  [place],After  Nan Wooten32  Cholesteatoma Of



   4:00p  08  Yanelis NP    Middle Ear









 381.29  Otitis Media Chronic Media Other









 Office Visit  2013  [place],After  Wilber ISLAS  385.32  Cholesteatoma Of



   1:45p  08  Cryer, MD    Middle Ear









 381.29  Otitis Media Chronic Media Other

 

 389.03  Hearing Loss, Conductive/Middle Ear









 Office Visit  2013  [place],After  Jamie Wooten.32  Cholesteatoma Of



   2:15p  08  Yanelis NP    Middle Ear









 381.29  Otitis Media Chronic Media Other









 Office Visit  2013  [place],After  Wilber ISLAS  385.32  Cholesteatoma Of



   1:30p  08  Cryer, MD    Middle Ear









 381.29  Otitis Media Chronic Media Other

 

 389.03  Hearing Loss, Conductive/Middle Ear

 

 389.10  Hearing Loss, Sensorineural/Unspecified









 Office Visit  2013  [place],After  Wilber ISLAS  385.32  Cholesteatoma Of



   11:30a  08  Cryer, MD    Middle Ear

 

 Office Visit  2012  [place],After  Wilber ISLAS  381.29  Otitis Media



   11:15a  08  Cryer, MD    Chronic Media



           Other

 

 Office Visit  10/25/2012  [place],After  Wilber ISLAS  381.29  Otitis Media



   3:15p  08  Cryer, MD    Chronic Media



           Other

 

 Office Visit  10/16/2012  [place],After  Sugar,  381.3  Otitis Media,



   9:30a  08  Yanelis NP    Chronic



           Allergic/Unspecifi



           ed

 

 Office Visit  2012  [place],After  Sugar,  350.2  Facial Pain,



   2:45p  08  Yanelis NP    Atypical









 472.0  Rhinitis, Chronic









 Office Visit  2012  3:30p  [place],After 08  Sugar,  477.8  Rhinitis
,



       Yanelis NP    Perennial,



           Allergy









 493.01  Asthma Extrinsic W/ Status Asthmaticus









 Office  2012  [place],After  Wilber ISLAS  389.03  Hearing Loss,



 Visit  11:15a  08  Cryer, MD    Conductive/Middle Ear









 389.10  Hearing Loss, Sensorineural/Unspecified

 

 384.82  Tympanic Membrane, Atrophic Nonflaccid









 Office Visit  2012  4:00p  [place],After  Sugar  384.82  Tympanic



     08  Yanelis NP    Membrane,



           Atrophic



           Nonflaccid

 

 Office Visit  10/06/2011  1:45p  [place],After  Wilber ISLAS  462  Pharyngitis,



     08  Cryer, MD    Acute









 384.20  Perforation Of Tympanic Membrane/Unspecified

 

 381.81  Dysfunction Of Eustachian Tube

 

 389.03  Hearing Loss, Conductive/Middle Ear

 

 389.10  Hearing Loss, Sensorineural/Unspecified









 Office Visit  2011  9:30a  [place],After  Sugar,  478.19  Mucocele Of



     08  Yanelis NP    Sinus/Perf Nasal



           Septum









 530.81  Reflux, Gastroesophageal

 

 493.00  Asthma, Extrinsic/Atopic

 

 530.11  Reflux, Esophagitis









 Office  2011  [place],After  Sugar,  384.20  Perforation Of Tympanic



 Visit  2:00p  08  Yanelis NP    Membrane/Unspecified









 477.8  Rhinitis, Perennial, Allergy

 

 493.00  Asthma, Extrinsic/Atopic









 Office  2011  [place],After  Sugar  384.20  Perforation Of Tympanic



 Visit  1:30p  08  Yanelis NP    Membrane/Unspecified









 381.81  Dysfunction Of Eustachian Tube









 Office  2011  [place],After  Wilber ISLAS  389.03  Hearing Loss,



 Visit  2:00p  08  Cryer, MD    Conductive/Middle Ear









 389.10  Hearing Loss, Sensorineural/Unspecified

 

 381.81  Dysfunction Of Eustachian Tube

 

 384.20  Perforation Of Tympanic Membrane/Unspecified









 Office  2010  [place],After  Sugar  384.20  Perforation Of Tympanic



 Visit  11:30a  08  Yanelis NP    Membrane/Unspecified









 381.81  Dysfunction Of Eustachian Tube

 

 350.2  Facial Pain, Atypical









 Office Visit  2010  1:30p  [place],After 08  [facility] ISLAS  477.8  
Rhinitis, Cryer, MD    Perennial,



           Allergy









 384.20  Perforation Of Tympanic Membrane/Unspecified

 

 381.81  Dysfunction Of Eustachian Tube









 Office Visit  2010 10:00a  [place],After 08  Wilber ISLAS  477.8  
Rhinitis, Cryer, MD    Perennial,



           Allergy









 493.00  Asthma, Extrinsic/Atopic

 

 384.20  Perforation Of Tympanic Membrane/Unspecified

 

 389.03  Hearing Loss, Conductive/Middle Ear









 Office  2009  [place],After  Wilber ISLAS  384.20  Perforation Of Tympanic



 Visit  3:45p  08  Cryer, MD    Membrane/Unspecified









 388.71  Otalgia Otogenic Pain

 

 381.81  Dysfunction Of Eustachian Tube









 Office  2009  [place],After  Wilber ISLAS  384.20  Perforation Of Tympanic



 Visit  2:30p  08  Cryer, MD    Membrane/Unspecified









 388.71  Otalgia Otogenic Pain









 Office Visit  10/22/2009  1:45p  [place],After  Wilber ISLAS  388.31  Tinnitus,



     08  Cryer, MD    Subjective









 478.1-4  Obstruction Of Nasal Airway









 Office Visit  2009  4:00p  [place],After  Wilber ISLAS  478.19  Mucocele Of



     08  Cryer, MD    Sinus/Perf Nasal



           Septum









 478.1-4  Obstruction Of Nasal Airway

 

 477.8  Rhinitis, Perennial, Allergy

 

 493.00  Asthma, Extrinsic/Atopic









 Office Visit  2009  3:15p  [place],After 08  Wilber ISLAS  350.2  
Facial Pain,



       Cryer, MD    Atypical









 473.0  Sinusitis, Chronic Maxillary

 

 473.2  Sinusitis, Chronic Ethmoidal

 

 471.8  Polyps, Nasal/Sinus









 Office Visit  2008  [place],After  Saeid BOWLES  493.01  Asthma Extrinsic



   2:45p  08  BUBBA Ann    W/ Status



           Asthmaticus









 530.81  Reflux, Gastroesophageal

 

 780.57  Apnea, Sleep Not Elsewhere Classified /Unspecified

 

 278.01  Obesity Morbid

 

 429.3  Cardiomegaly









 Office  12/10/2008  [place],After  Saeid BOWLES  493.00  Asthma,



 Visit  1:48p  08  Gonzalez Ann/Atopic



       M.D.    

 

 Office  2008  [place],After  Sugar  473.0  Sinusitis, Chronic



 Visit  9:30a  08  Yanelis NP    Maxillary









 530.81  Reflux, Gastroesophageal









 Office Visit  10/23/2008  [place],After  Wilber ISLAS  780.57  Apnea, Sleep Not



   4:00p  08  Cryer, MD    Elsewhere



           Classified



           /Unspecified









 381.81  Dysfunction Of Eustachian Tube

 

 389.03  Hearing Loss, Conductive/Middle Ear









 Office Visit  2008  [place],After  Wilber ISLAS  780.57  Apnea, Sleep Not



   10:15a  08  Cryer, MD    Elsewhere



           Classified



           /Unspecified









 530.11  Reflux, Esophagitis









 Office Visit  2008  [place],After  Sugar  530.81  Reflux,



   3:30p  08  Yanelis NP    Gastroesophageal









 477.8  Rhinitis, Perennial, Allergy

 

 493.90  Asthma, Allergic/Unspecified









 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



     08  Yanelis NP    Sinus/Perf Nasal



           Septum









 478.1-4  Obstruction Of Nasal Airway

 

 477.8  Rhinitis, Perennial, Allergy









 Office Visit  2008  [place],After  Sugar  478.1-4  Obstruction Of



   3:00p  08  Yanelis NP    Nasal Airway









 381.3  Otitis Media, Chronic Allergic/Unspecified

 

 493.00  Asthma, Extrinsic/Atopic

 

 477.0  Rhinitis, Seasonal -Allergic Due To Pollen

 

 477.8  Rhinitis, Perennial, Allergy









 Office Visit  2008 10:00a  [place],After 08  [place],  477.8  Rhinitis
,



       Yanelis NP    Perennial,



           Allergy









 477.0  Rhinitis, Seasonal -Allergic Due To Pollen









 Office Visit  2008  3:30p  [place],After 08  Wilber ISLAS  477.8  
Rhinitis, Cryer, MD    Perennial,



           Allergy









 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

 

 478.1-4  Obstruction Of Nasal Airway









 Office Visit  2007  9:00a  [place],After 08  Wilber ISLAS  477.8  
Rhinitis, Cryer, MD    Perennial,



           Allergy

 

 Office Visit  2007  2:15p  [place],After 08  Wilber ISLAS  477.8  
Rhinitis, Cryer, MD    Perennial,



           Allergy









 471.8  Polyps, Nasal/Sinus

 

 389.2  Hearing Loss, Mixed/Conductive & Sensorineural

 

 381.81  Dysfunction Of Eustachian Tube

 

 474.12  Hypertrophy, Adenoids









 Office Visit  10/15/2007  2:45p  [place],After 08  Wilber ISLAS  473.0  
Sinusitis,



       Cryer, MD    Chronic



           Maxillary









 474.12  Hypertrophy, Adenoids

 

 381.81  Dysfunction Of Eustachian Tube









 Office Visit  2007  [place],After  Wilber ISLAS  381.81  Dysfunction Of



   3:15p  08  Cryer, MD    Eustachian Tube









 389.2  Hearing Loss, Mixed/Conductive & Sensorineural

 

 471.8  Polyps, Nasal/Sinus

 

 474.12  Hypertrophy, Adenoids









 Office Visit  2007  [place],After  Wilber ISLAS  381.81  Dysfunction Of



   2:30p  08  Cryer, MD    Eustachian Tube









 389.2  Hearing Loss, Mixed/Conductive & Sensorineural









 Office Visit  2006  9:45a  [place],After  Wilber ISLAS  471.8  Polyps,



     08  Cryer, MD    Nasal/Sinus









 473.0  Sinusitis, Chronic Maxillary

 

 461.0  Sinusitis, Acute Maxillary









 Office Visit  2006  1:30p  [place],After  Wilber ISLAS  471.8  Polyps,



     08  Cryer, MD    Nasal/Sinus









 780.57  Apnea, Sleep Not Elsewhere Classified /Unspecified

 

 493.0  Code Needs 5TH Digit









 Office Visit  2005  1:30p  [place],After 08  Wilber ISLAS  473.2  
Sinusitis,



       Cryer, MD    Chronic



           Ethmoidal









 471.8  Polyps, Nasal/Sinus

 

 389.03  Hearing Loss, Conductive/Middle Ear









 Office Visit  2004  [place],After  Saeid BOWLES  473.2  Sinusitis,



   9:30a  08  BUBBA Ann    Chronic



           Ethmoidal









 471.8  Polyps, Nasal/Sinus









 Office Visit  2004  [place],After  Julio FRANCO  780.57  Apnea, Sleep Not



   9:45a  08  BUBBA Desai    Elsewhere



           Classified



           /Unspecified

 

 Office Visit  2004  [place],After  Julio FRANCO  478.1  Ulcer Of Nasal



   11:15a  08  BUBBA Desai    Septum







Plan of Treatment

Future Appointment(s):2019 11:30 am - Jonathan E. Cryer, MD at [place],
After  10:30 am - Jonathan E. Cryer, MD at [place],After 801 - Jonathan E. Cryer, MDH70.92 Unspecified mastoiditis, left ear

## 2019-02-07 NOTE — XMS REPORT
Continuity of Care Document (CCD)

 Created on:2019



Patient:Delta Rodriguez

Sex:Male

:1965

External Reference #:[id number]





Demographics







 Address  [address]



   [place] [address]

 

 Home Phone  [phone]

 

 Mobile Phone  [phone]

 

 Preferred Language  en

 

 Marital Status  Declined to Specify/Unknown

 

 Yazidism Affiliation  Unknown

 

 Race  Unknown

 

 Ethnic Group  Declined to Specify/Unknown









Author







 Name  Jonathan E. Cryer, MD

 

 Address  [address]



   Unavailable



   [place] [address]









Support







 Name  Relationship  Address  Phone

 

 Unavailable  Sibling  Unavailable  [phone]









Care Team Providers







 Name  Role  Phone

 

 Bridger Webb MD  Care Team Information   Unavailable

 

 Bridger Webb MD  Primary Care Physician  Unavailable









Payers







 Type  Date  Identification Numbers  Payment Provider  Subscriber

 

   Effective:  Policy Number: [id number]  Medicare[facility]  Delta Rodriguez



   1999    [employer]  









 PayID: [id number]  [address]









 [place] [address]









     Policy Number: [id number]  Medicaid/C  Delta Rodriguez









 Group Number: [id number]  Medicare Primary

 

 Group Name: [id number]  [address]

 

 PayID: [id number]  [place] [address]







Advance Directives







 Description

 

 No Information Available







Problems







 Date  Description  Provider  Status

 

 Onset: 10/07/2011  Acute pharyngitis  Jonathan E. Cryer, MD  Active

 

 Onset: 10/07/2011  Perforation of tympanic membrane  Jonathan E. Cryer, MD  
Active

 

 Onset: 10/07/2011  Dysfunction of eustachian tube  Jonathan E. Cryer, MD  
Active

 

 Onset: 10/07/2011  Middle ear conductive hearing loss  Jonathan E. Cryer, MD  
Active

 

 Onset: 10/07/2011  Sensorineural hearing loss  Jonathan E. Cryer, MD  Active

 

 Onset: 10/07/2011  Disorder of nasal cavity  Yanelis Wooten NP  Active

 

 Onset: 10/07/2011  Gastroesophageal reflux disease  Yanelis Wooten NP  Active

 

 Onset: 10/07/2011  Extrinsic asthma without status  Yanelis Wooten NP  Active



   asthmaticus    

 

 Onset: 10/07/2011  Peptic reflux disease  Yanelis Wooten NP  Active

 

 Onset: 10/07/2011  Allergic rhinitis  SherylShira conndre NP  Active

 

 Onset: 2018  Impacted cerumen  Katelynn Trevino PA-C  Active

 

 Onset: 2018  Chronic tympanitis  Katelynn Trevino PA-C  Active







Family History







 Date  Family Member(s)  Problem(s)  Comments

 

   Mother  Non Insulin Dependent Diabetes  







Social History







 Type  Date  Description  Comments

 

 Birth Sex    Unknown  

 

 Occupation    tops  

 

 Tobacco Use  Start: Unknown End:  Former Cigarette Smoker  for 25 years



   Unknown  Packs Daily 1  

 

 Tobacco Use  Start: Unknown  has never smoked cigars  

 

 Tobacco Use  Start: Unknown  has never smoked a pipe  

 

 Smokeless Tobacco    has never used smokeless  



     tobacco  

 

 ETOH Use    Current Alcohol Use  



     Occasionally  

 

 Recreational Drug Use    denies drug use  

 

 Tobacco Use  Start: Unknown End:  Patient is a former smoker  



   Unknown    

 

 Smoking Status  Reviewed: 18  Patient is a former smoker  







Allergies, Adverse Reactions, Alerts







 Date  Description  Reaction  Status  Severity  Comments

 

 2008  Augmentin XR    Active    

 

 2013  Penicillins  neck, tongue swells  Active    

 

 2005  NKDA    Inactive    







Medications







 Medication  Date  Status  Form  Strength  Qnty  SIG  Indications  Ordering



                 Provider

 

 Clotrimazole  01/10  Active  Solution  1%  30ml  4 drops to              left ear    E. Cryer,



             twice a    MD



             day for 14    



             days    

 

 Ciprofloxacin HCL  01/10  Active  Tablets  500mg  20tab  1 tablet            s  twice a    E. Cryer,



             day for 10    MD



             days    

 

 Omeprazole    Active  Capsules DR  40mg  60cap  40mg po  530.81  Saeid BOWLES



   /        s  bid for 1    Strominge



             month for    BUBBA donaldson



             esophagiti    



             s    

 

 Advair Hfa    Active  Aerosol  115/21  1unit  2 Puffs  478.19  Saeid BOWLES



   /        s  bid With    Strominge



             Spacer    BUBBA donaldson



                 



                 



                 



                 



                 



             Please    



             Provide    



             Spacer    

 

 Albuterol    Active  Aerosol  90mcg/Dos  2unit  2 Puffs    Wilber



 Inhalation  /      e  s  Q4H prn    E. Cryer, MD

 

 Bystolic    Active    5mg    qd    Unknown



                 

 

 Aspirin    Active  Tablets DR  81mg        Unknown



                 

 

 Nitroglycerin    Active    ?mg    prn Only    Tracey,



                 Bridger CARDENAS

 

 Ranexa    Active  Tablets ER  500mg    1 po bid    Unknown



       12HR          

 

 Atorvastatin    Active  Tablets  40mg    1 po qd    Unknown



 Calcium                

 

 Klor-Con M20    Active  Tablets ER  20Meq        Stefek,Pa



   /              ul M.D.

 

 Metoprolol    Active  Tablets ER  50mg        Unknown



 Succinate ER      24HR          

 

 Triamterene/Hydro    Active  Capsules  37.5-25mg        Tracey,



 chlorothiazide                Bridger CARDENAS

 

 Advair Diskus    Active  Aerosol  250-50mcg        Tracey,



   /0000      /Dose        Bridger CARDENAS

 

 Proair HFA    Active  Aerosol  108(90Bas        Tracey,



   /      e)        Bridger CARDENAS



         mcg/Act        

 

 Atorvastatin    Active  Tablets  20mg        Stefek,Pa



 Calcium                ul M.D.

 

 Clopidogrel    Active  Tablets  75mg        Tracey,



 Bisulfate                Bridger CARDENAS

 

 Xarelto    Active  Tablets  20mg        Tracey,



                 Bridger CARDENAS

 

 Famotidine    Active  Tablets  20mg        Tracey,



                 Bridger CARDENAS

 

                 

 

 Cipro HC    Hx  Suspension  0.2-1%  10ml  Instill  H73.12  Saeid N.



             5-8 drops    Strominge



   -          into the    r, M.D.



             left ear           times a    



             day for 10    



             days    

 

 Tobradex    Hx  Suspension  0.3-0.1%  10ml  Instill    Saeid N.



             5-8 drops    Strominge



   -          in the    r, M.D.



             left ear           times a    



             day for 10    



             days.    

 

 Ciprodex    Hx  Suspension  0.3-0.1%  7.500  4 drops            ml  left ear    E. Cryer,



   -          twice a    MD



             day for 10    



   /2018          days    

 

 Methylprednisolon    Hx  TBPK  4mg  1pack  take as    Wilber



           directed    E. Cryer, - MD



                 

 

 Mupirocin    Hx  Ointment  2%  22gm  apply to              both    E. Cryer, -          nostril    MD



             twice a    



             day    

 

 Triamcinolone    Hx  Cream  0.1%  15gm  Apply to    Wilber



 Acet          left ear    E. Cryer, -          twice    MD



             daily for    



             1 week    

 

 Percocet    Hx  Tablets  5-325mg  30tab  1-2 tabs            s  by mouth    E. Cryer,



   -          every 4-6    MD



             hours as    



   /2018          needed for    



             pain    

 

 Levofloxacin    Hx  Tablets  500mg  10tab  1 by mouth    Wilber



           s  every day    E. Cryer,



   -              MD



                 

 

 Ciprodex    Hx  Suspension  0.3-0.1%  1Bott  4 drops to            le  left ears    E. Cryer,



   -          twice a    MD



   08/21          day apply    



   /2018          rebate rx    



             bin:    



             [id number]    



             rxpcn:    



             loyalty    



             rxgrp:[id number]    



             [id number]    



             issuer:    



             ([id number])    



             id#[id number]    



             25    

 

 Ofloxacin    Hx  Solution  0.3%  1unit  4 drops to    Saeid BOWLES



           s  affected    Strominge



   -          ear daily    r, M.D.



   10/24              



   /2013              

 

 Ciprodex    Hx  Suspension  0.3-0.1%  2unit  4 drops    Saeid BOWLES



           s  infected    Strominge



   -          ear bid    r, M.D.



   10/24          apply    



   /2013          rebate    



             rxbin:    



             [id number]    



             rxpcn:    



             loyalty    



             rxgrp:    



             [id number]    



             issuer:    



             ([id number])    



             id:    



             [id number]    

 

 Oxycodone/Acetami    Hx  Tablets  5-325mg  30tab  1-2 tabs    Saeid randhawa          s  by mouth    Strominge



   -          every 4-6    BUBBA donaldson



             hours as    



   /2018          needed for    



             pain    

 

 Ciprodex    Hx  Suspension  0.3-0.1%  2unit  4 drops  385.32  Saeid BOWLES



           s  infected    Strominge



   -          ear bid    r, M.D.



             rebate    



             rxbin:    



             [id number]    



             rxpcn:    



             loyalty    



             rxgrp:    



             [id number]    



             issuer:    



             ([id number])    



             id:    



             [id number]    

 

 Levofloxacin    Hx  Tablets  500mg  10tab  1 po qd            s      E. Cryer,



   -              MD



                 

 

 Oxycodone/Acetami    Hx  Tablets  5-325mg  30tab  1-2 tabs    Wilber randhawa          s  by mouth    E. Cryer,



   -          every 4-6    MD



             hours as    



   /2013          needed for    



             pain    

 

 Ciprodex  10/16  Hx  Suspension  0.3-0.1%  2unit  4 drops  381.3          s  infected    E. Cryer,



   -          ear bid x    MD



             14 days    



   /2013          apply    



             reuben    



             rxbin:    



             [id number]    



             rxpcn:    



             mamadou    



             rxgrp:    



             [id number]    



             issuer:    



             ([id number])    



             id:    



             [id number]    

 

 Ipratropium    Hx  Solution  0.06%  6unit  2 to 4  J31.0  Saeid N.



 [place]          s  sprays in    Strominge



   -          each    BUBBA donaldson



             nostril           times a    



             day as    



             needed for    



             rhinitis    

 

 Bacitracin    Hx      1Tube  apply to  472.0  Saeid N.



 Ointment            rim of    Turner



   -          nose both    BUBBA donaldson



             sides in    



             the am and    



             the pm.    

 

 Flovent HFA    Hx  Aerosol  110mcg/Ac  1unit  2 puff bid  493.01  Saeid BOWLES



         t  s      Turner donaldson M.D.



                 

 

 Fluticasone Nasal    Hx    50mcg  1unit  2 sprays    Wilber



 Spray  /2011        s  each side    E. Cryer,



   -          bid    MD



                 

 

 Omnaris    Hx  Suspension  50mcg/Act    2 sprays    Saeid BOWLES



           s  each    Strominge



   -          nostril    BUBBA donaldson



   05/01          daily    



   /2012              

 

 Percocet    Hx  Tablets  5-325mg  30tab  1-2 tabs            s  by mouth    E. Cryer,



   -          every 4-6    MD



             hours as    



   /2013          needed for    



             pain    

 

 Ciprofloxacin HCL    Hx  Tablets  500mg  14tab  1 tablet            s  twice a    E. Cryer,



   -          day for 7    MD



   05/24          days    



   /2011              

 

 Astepro Nasal    Hx    0.15% 205  1unit  2 sprays    Saeid Cerrato        mcg  s  once a day    Turner donaldson M.D.



                 

 

 Aciphex    Hx  Tablets DR  20mg  30tab  1 po Daily    Saeid BOWLES



   /        s      Turner donaldson M.D.



                 

 

 Mucinex DM  10/22  Hx  Tablets ER    5Days  1 op qd  478.1-4  Wilber



 Maximum Strength  /    12HR          E. Cryer, - MD



                 

 

 Fexofenadine HCL    Hx  Tabs  180mg  30tab  1qd - Take            s  One Tablet    E. Cryer, -          By Mouth    MD



   01/05          Every Day    



   /2011              

 

 Singulair    Hx  Tabs  10mg  30tab  1qd - Take  477.0          s  One Tablet    E. Cryer, -          By Mouth    MD



   11/27          Every Day    



   /2018              

 

 Medrol Dosepak    Hx  Tablets  4mg  1Pack  Take as  350.2            Directed    E. Cryer, - MD



                 

 

 Prednisone    Hx  Tablets  20mg  5Days  3 PO qd    Saeid N.



             With Food    Turner donaldson M.D.



                 

 

 Augmentin    Hx  Tablets  875mg  14Day  1 po bid  473.0  Saeid SYD.



           s  with food    Turner donaldson M.D.



                 

 

 Aciphex    Hx  Tablets DR  20mg  60tab  1 po bid  530.81          s      E. Cryer, - MD



                 

 

 Fexofenadine HCL    Hx  Tablets  180mg  30tab  1 po qd  477.8  Saeid N.



           maciel donaldson M.D.



                 

 

 Zyrtec    Hx  Tablets  10mg  30tab  1 PO qd    Saeid N.



           maciel donaldson M.D.



                 

 

 Fluticasone    Hx  Suspension  50mcg/Act  1mont  2 sprays  471.8  Wilber



 Propionate  /        h  each    E. Cryer, -          nostril    MD



             daily    



   /              

 

 Prednisone    Hx  Tablets  10mg  40tab  4 Tabs PO  471.8  Wilber



           s  Every Day    E. Cryer, -          X4 D, Then    MD



   10/15          3 Tabs PO    



   /          Every Day    



             X 4D, Then    



             2 Tab PO    



             Daily X4D,    



             Then 1 Tab    



             PO Daily    



             X4D    

 

 Nasacort Aq    Hx  Suspension  55mcg/Act  1unit  2 Sprays  471.8  Wilber



 Intranasal Spray  /      uation  s  Each    E. Cryer,



   -          Nostril qd    MD



                 

 

 Levaquin    Hx  Tablets  500mg  10tab  1 Tablet  471.8          s  By Mouth    E. Cryer,



   -          Daily    MD



   10/15          Until    



   /2007          Finished    

 

 Nexium    Hx            Unknown



   /              



   -              



                 

 

 Singulair    Hx  Tablets  10mg  30tab  1 PO qd  471.8  Wilber



           s      E. Cryer,



   -              MD



                 

 

 Nasacort Aq    Hx  Suspension  55McG/Act  1unit  2 sprays  473.2  Wilber



 Intranasal [place]  /      uation  s  each    E. Cryer,



   -          nostril qd    MD



                 

 

 Clopidrogel    Hx  Tablets  75mg        Unknown



   /              



   -              



                 

 

 Isosorbide    Hx    60mg    1 po qd    Unknown



 Dinitrate SA  /              



   -              



                 

 

 Simvastatin    Hx    ?mg    oen po qd    Tracey,



   /              Bridger CARDENAS



   -              



                 

 

 Lisinopril    Hx            Unknown



   /0000              



   -              



                 

 

 Pepcid ac Ez    Hx            Unknown



 Chews Maximum  /0000              



 Strength  -              



                 

 

 Sertraline HCL    Hx    ?mg    one po qd    Tracey,



   /              Bridger CARDENAS



   -              



                 

 

 Triamterene/Hydro    Hx            Unknown



 chlorothiazide  /              

 

 Clopidogrel    Hx  Tablets  75mg        StefespPa



 Bisulfate  /              ul M.D.



   -              



                 

 

 Fluticasone    Hx  Suspension  50mcg/Act        Unknown



 Propionate  /              



   -              



                 

 

 Famotidine    Hx  Tablets  20mg        Unknown



   /              



   -              



                 







Immunizations







 CPT Code  Status  Date  Vaccine  Lot #

 

 [id number]  Ordered  10/01/2015  Influenza Virus Vaccine, 3 Years Of Age And Above,
  



       Intramuscular  

 

 52723  Given  Unknown  Influenza Virus Vaccine, 3 Years Of Age And Above,  



       Intramuscular  







Vital Signs







 Date  Vital  Result  Comment

 

 2019 10:13am  Weight  347.00 lb  









 Weight  157.399 kg  

 

 Height  69 inches  5'9"

 

 Height in cm's  175.3 cm  

 

 BMI (Body Mass Index)  51.2 kg/m2  









 01/10/2019  9:23am  Weight  347.00 lb  









 Weight  157.399 kg  

 

 Height  69 inches  5'9"

 

 Height in cm's  175.3 cm  

 

 BMI (Body Mass Index)  51.2 kg/m2  









 2019 11:07am  Weight  347.00 lb  









 Weight  157.399 kg  

 

 Height  69 inches  5'9"

 

 Height in cm's  175.3 cm  

 

 BMI (Body Mass Index)  51.2 kg/m2  









 2018 11:34am  Weight  347.00 lb  









 Weight  157.399 kg  

 

 Height  69 inches  5'9"

 

 Height in cm's  175.3 cm  

 

 BMI (Body Mass Index)  51.2 kg/m2  









 2018 10:50am  Weight  347.00 lb  









 Weight  157.399 kg  

 

 Height  69 inches  5'9"

 

 Height in cm's  175.3 cm  

 

 BMI (Body Mass Index)  51.2 kg/m2  









 2018 10:08am  Weight  338.00 lb  









 Weight  153.317 kg  

 

 Height  68.50 inches  5'8.50"

 

 Height in cm's  174.0 cm  

 

 BMI (Body Mass Index)  50.6 kg/m2  









 2018  9:06am  Weight  335.00 lb  









 Weight  151.956 kg  

 

 Height  68.50 inches  5'8.50"

 

 Height in cm's  174.0 cm  

 

 BMI (Body Mass Index)  50.2 kg/m2  









 2018 10:49am  BP Systolic  159 mmHg  









 BP Diastolic  94 mmHg  

 

 Heart Rate  74 /min  

 

 Respiratory Rate  17 /min  

 

 Weight  335.00 lb  

 

 Weight  151.956 kg  

 

 Height  68.50 inches  5'8.50"

 

 Height in cm's  174.0 cm  

 

 BMI (Body Mass Index)  50.2 kg/m2  









 2017  9:47am  BP Systolic  155 mmHg  









 BP Diastolic  88 mmHg  

 

 Heart Rate  77 /min  

 

 Weight  335.00 lb  

 

 Weight  151.956 kg  

 

 Height  68.50 inches  5'8.50"

 

 Height in cm's  174.0 cm  

 

 BMI (Body Mass Index)  50.2 kg/m2  









 2016  2:46pm  BP Systolic  165 mmHg  









 BP Diastolic  99 mmHg  

 

 Heart Rate  78 /min  

 

 Respiratory Rate  16 /min  

 

 Weight  335.00 lb  

 

 Weight  151.956 kg  

 

 Height  68.50 inches  5'8.50"

 

 Height in cm's  174.0 cm  

 

 BMI (Body Mass Index)  50.2 kg/m2  









 2016 11:17am  BP Systolic  114 mmHg  









 BP Diastolic  89 mmHg  

 

 Heart Rate  64 /min  

 

 Respiratory Rate  17 /min  

 

 Weight  335.00 lb  

 

 Weight  151.956 kg  

 

 Height  68.50 inches  5'8.50"

 

 Height in cm's  174.0 cm  

 

 BMI (Body Mass Index)  50.2 kg/m2  









 2016  9:57am  BP Systolic  107 mmHg  









 BP Diastolic  82 mmHg  

 

 Heart Rate  78 /min  

 

 Respiratory Rate  17 /min  

 

 Weight  335.00 lb  

 

 Weight  151.956 kg  

 

 Height  68.50 inches  5'8.50"

 

 Height in cm's  174.0 cm  

 

 BMI (Body Mass Index)  50.2 kg/m2  









 2015 10:02am  BP Systolic  142 mmHg  









 BP Diastolic  105 mmHg  

 

 Heart Rate  76 /min  

 

 Respiratory Rate  17 /min  

 

 Weight  335.00 lb  

 

 Weight  151.956 kg  

 

 Height  68.50 inches  5'8.50"

 

 Height in cm's  174.0 cm  

 

 BMI (Body Mass Index)  50.2 kg/m2  









 2015 10:35am  BP Systolic  134 mmHg  









 BP Diastolic  86 mmHg  

 

 Heart Rate  72 /min  

 

 Respiratory Rate  17 /min  

 

 Weight  335.00 lb  

 

 Weight  151.956 kg  

 

 Height  68.50 inches  5'8.50"

 

 Height in cm's  174.0 cm  

 

 BMI (Body Mass Index)  50.2 kg/m2  









 10/29/2015  9:50am  BP Systolic  132 mmHg  









 BP Diastolic  92 mmHg  

 

 Heart Rate  91 /min  

 

 Respiratory Rate  18 /min  

 

 Weight  335.00 lb  

 

 Weight  151.956 kg  

 

 Height  68.50 inches  5'8.50"

 

 Height in cm's  174.0 cm  

 

 BMI (Body Mass Index)  50.2 kg/m2  









 2015 10:38am  Respiratory Rate  17 /min  









 Weight  335.00 lb  

 

 Weight  151.956 kg  

 

 Height  68.50 inches  5'8.50"

 

 Height in cm's  174.0 cm  

 

 BMI (Body Mass Index)  50.2 kg/m2  









 2015  3:25pm  BP Systolic  97 mmHg  









 BP Diastolic  61 mmHg  

 

 Heart Rate  78 /min  

 

 Respiratory Rate  16 /min  

 

 Weight  335.00 lb  

 

 Weight  151.956 kg  

 

 Height  68.50 inches  5'8.50"

 

 Height in cm's  174.0 cm  

 

 BMI (Body Mass Index)  50.2 kg/m2  









 2014  9:12am  BP Systolic  131 mmHg  









 BP Diastolic  84 mmHg  

 

 Heart Rate  67 /min  

 

 Respiratory Rate  18 /min  

 

 Weight  335.00 lb  

 

 Weight  151.956 kg  

 

 Height  68.50 inches  5'8.50"

 

 Height in cm's  174.0 cm  

 

 BMI (Body Mass Index)  50.2 kg/m2  









 2014 10:15am  BP Systolic  109 mmHg  









 BP Diastolic  68 mmHg  

 

 Heart Rate  68 /min  

 

 Respiratory Rate  17 /min  

 

 Weight  328.00 lb  

 

 Weight  148.781 kg  

 

 Height  68.50 inches  5'8.50"

 

 Height in cm's  174.0 cm  

 

 BMI (Body Mass Index)  49.1 kg/m2  









 2014  9:02am  BP Systolic  121 mmHg  









 BP Diastolic  79 mmHg  

 

 Heart Rate  77 /min  

 

 Respiratory Rate  16 /min  

 

 Weight  325.00 lb  

 

 Weight  147.420 kg  

 

 Height  68.50 inches  5'8.50"

 

 Height in cm's  174.0 cm  

 

 BMI (Body Mass Index)  48.7 kg/m2  









 2014 10:13am  BP Systolic  138 mmHg  









 BP Diastolic  76 mmHg  

 

 Heart Rate  73 /min  

 

 Respiratory Rate  16 /min  

 

 Weight  325.00 lb  

 

 Weight  147.420 kg  

 

 Height  68.50 inches  5'8.50"

 

 Height in cm's  174.0 cm  

 

 BMI (Body Mass Index)  48.7 kg/m2  









 2014  9:38am  BP Systolic  128 mmHg  









 BP Diastolic  80 mmHg  

 

 Heart Rate  65 /min  

 

 Respiratory Rate  16 /min  

 

 Weight  325.00 lb  

 

 Weight  147.420 kg  

 

 Height  68.50 inches  5'8.50"

 

 Height in cm's  174.0 cm  

 

 BMI (Body Mass Index)  48.7 kg/m2  









 2014  9:28am  Respiratory Rate  17 /min  









 Weight  325.00 lb  

 

 Weight  147.420 kg  

 

 Height  68.50 inches  5'8.50"

 

 Height in cm's  174.0 cm  

 

 BMI (Body Mass Index)  48.7 kg/m2  









 2014 10:01am  BP Systolic  129 mmHg  









 BP Diastolic  82 mmHg  

 

 Heart Rate  87 /min  

 

 Respiratory Rate  17 /min  

 

 Weight  325.00 lb  

 

 Weight  147.420 kg  

 

 Height  68.50 inches  5'8.50"

 

 Height in cm's  174.0 cm  

 

 BMI (Body Mass Index)  48.7 kg/m2  









 2013 10:59am  BP Systolic  131 mmHg  









 BP Diastolic  83 mmHg  

 

 Heart Rate  75 /min  

 

 Respiratory Rate  16 /min  

 

 Weight  325.00 lb  

 

 Weight  147.420 kg  

 

 Height  68.50 inches  5'8.50"

 

 Height in cm's  174.0 cm  

 

 BMI (Body Mass Index)  48.7 kg/m2  









 10/24/2013  9:31am  BP Systolic  132 mmHg  









 BP Diastolic  84 mmHg  

 

 Heart Rate  61 /min  

 

 Respiratory Rate  17 /min  

 

 Weight  325.00 lb  

 

 Weight  147.420 kg  

 

 Height  68.50 inches  5'8.50"

 

 Height in cm's  174.0 cm  

 

 BMI (Body Mass Index)  48.7 kg/m2  









 2013  9:15am  BP Systolic  126 mmHg  









 BP Diastolic  81 mmHg  

 

 Heart Rate  73 /min  

 

 Respiratory Rate  17 /min  

 

 Weight  325.00 lb  

 

 Weight  147.420 kg  

 

 Height  68.50 inches  5'8.50"

 

 Height in cm's  174.0 cm  

 

 BMI (Body Mass Index)  48.7 kg/m2  









 2013  2:36pm  Weight  325.00 lb  









 Weight  147.420 kg  

 

 Height  68.50 inches  5'8.50"

 

 Height in cm's  174.0 cm  

 

 BMI (Body Mass Index)  48.7 kg/m2  









 2013  9:05am  Weight  325.00 lb  









 Weight  147.420 kg  

 

 Height  68.50 inches  5'8.50"

 

 Height in cm's  174.0 cm  

 

 BMI (Body Mass Index)  48.7 kg/m2  









 2013 10:00am  BP Systolic  108 mmHg  









 BP Diastolic  73 mmHg  

 

 Heart Rate  77 /min  

 

 Respiratory Rate  16 /min  

 

 Weight  325.00 lb  

 

 Weight  147.420 kg  

 

 Height  68.50 inches  5'8.50"

 

 Height in cm's  174.0 cm  

 

 BMI (Body Mass Index)  48.7 kg/m2  









 2013  2:49pm  BP Systolic  99 mmHg  









 BP Diastolic  72 mmHg  

 

 Heart Rate  62 /min  

 

 Respiratory Rate  16 /min  

 

 Weight  325.00 lb  

 

 Weight  147.420 kg  

 

 Height  68.50 inches  5'8.50"

 

 Height in cm's  174.0 cm  

 

 BMI (Body Mass Index)  48.7 kg/m2  









 2013  2:04pm  BP Systolic  123 mmHg  









 BP Diastolic  88 mmHg  

 

 Heart Rate  66 /min  

 

 Respiratory Rate  16 /min  

 

 Weight  325.94 lb  

 

 Weight  147.840 kg  

 

 Height  68.50 inches  5'8.50"

 

 Height in cm's  174.0 cm  

 

 BMI (Body Mass Index)  48.8 kg/m2  









 2013 11:43am  BP Systolic  122 mmHg  









 BP Diastolic  84 mmHg  

 

 Heart Rate  64 /min  

 

 Respiratory Rate  16 /min  

 

 Weight  305.00 lb  

 

 Weight  138.348 kg  

 

 Height  69.02 inches  5'9.02"

 

 Height in cm's  175.3 cm  

 

 BMI (Body Mass Index)  45.0 kg/m2  









 10/25/2012  3:08pm  Weight  288.00 lb  









 Weight  130.637 kg  

 

 Height  69.02 inches  5'9"

 

 Height in cm's  175.3 cm  

 

 BMI (Body Mass Index)  42.5 kg/m2  









 10/16/2012  9:18am  BP Systolic  150 mmHg  









 BP Diastolic  90 mmHg  

 

 Heart Rate  66 /min  

 

 Respiratory Rate  20 /min  

 

 Weight  208.00 lb  

 

 Weight  94.349 kg  

 

 Height  69 inches  5'9"

 

 Height in cm's  175.3 cm  

 

 BMI (Body Mass Index)  30.7 kg/m2  









 10/06/2011  2:00pm  BP Systolic  152 mmHg  









 BP Diastolic  100 mmHg  

 

 Heart Rate  62 /min  

 

 Respiratory Rate  16 /min  

 

 Weight  280.00 lb  

 

 Weight  127.008 kg  

 

 Height  69 inches  5'9"

 

 Height in cm's  175.3 cm  

 

 BMI (Body Mass Index)  41.3 kg/m2  









 2011 11:16am  BP Systolic  143 mmHg  









 BP Diastolic  95 mmHg  

 

 Heart Rate  75 /min  

 

 Respiratory Rate  17 /min  









 2009  3:28pm  Heart Rate  64 /min  









 Respiratory Rate  16 /min  

 

 Weight  321.00 lb  

 

 Weight  145.606 kg  









 2008  3:08pm  BP Systolic  170 mmHg  Taken with LG BP cuff









 BP Diastolic  106 mmHg  Taken with LG BP cuff

 

 Heart Rate  79 /min  

 

 Respiratory Rate  18 /min  









 2008  9:33am  BP Systolic  159 mmHg  









 BP Diastolic  114 mmHg  

 

 Heart Rate  79 /min  

 

 Respiratory Rate  16 /min  









 10/23/2008  3:55pm  BP Systolic  157 mmHg  









 BP Diastolic  107 mmHg  

 

 Heart Rate  77 /min  

 

 Respiratory Rate  16 /min  









 2008 10:06am  BP Systolic  133 mmHg  









 BP Diastolic  100 mmHg  

 

 Heart Rate  75 /min  

 

 Respiratory Rate  16 /min  









 2008 11:44am  BP Systolic  160 mmHg  









 BP Diastolic  109 mmHg  

 

 Heart Rate  74 /min  

 

 Respiratory Rate  16 /min  









 2008  3:25pm  BP Systolic  137 mmHg  









 BP Diastolic  85 mmHg  

 

 Heart Rate  75 /min  

 

 Respiratory Rate  16 /min  









 2008 10:15am  BP Systolic  139 mmHg  









 BP Diastolic  88 mmHg  

 

 Heart Rate  85 /min  

 

 Respiratory Rate  12 /min  









 2008  3:17pm  BP Systolic  138 mmHg  









 BP Diastolic  87 mmHg  

 

 Heart Rate  82 /min  

 

 Respiratory Rate  16 /min  









 2008  9:50am  BP Systolic  148 mmHg  









 BP Diastolic  86 mmHg  









 2008  3:43pm  BP Systolic  148 mmHg  









 BP Diastolic  92 mmHg  

 

 Heart Rate  80 /min  

 

 Respiratory Rate  14 /min  









 2007  1:52pm  BP Systolic  119 mmHg  









 BP Diastolic  76 mmHg  

 

 Heart Rate  75 /min  

 

 Respiratory Rate  16 /min  









 2006  9:11am  BP Systolic  176 mmHg  









 BP Diastolic  73 mmHg  

 

 Heart Rate  80 /min  

 

 Respiratory Rate  16 /min  









 2005  1:10pm  BP Systolic  146 mmHg  









 BP Diastolic  82 mmHg  

 

 Heart Rate  80 /min  

 

 Respiratory Rate  16 /min  







Results







 Test  Date  Facility  Test  Result  H/L  Range  Note

 

 CBC No Diff  2015  [facility]  White Blood  5.2 10^3
/uL  N  3.5-10.8  1



     c/o Department of Laboratories  Count        



     [place] [address] [phone]          









 Red Blood Count  5.43 10^6/uL  High  4.0-5.4  

 

 Hemoglobin  15.1 g/dL  N  14.0-18.0  

 

 Hematocrit  47 %  N  42-52  

 

 Mean Corpuscular Volume  87 fL  N  80-94  

 

 Mean Corpuscular Hemoglobin  28 pg  N  27-31  

 

 Mean Corpuscular HGB Conc  32 g/dL  N  31-36  

 

 Red Cell Distribution Width  15 %  N  10.5-15  

 

 Platelet Count  232 10^3/uL  N  150-450  

 

 Mean Platelet Volume  8 um3  N  7.4-10.4  









 Inr/Protime  2015  [facility]  Inr  1.05  N  0.89-
1.11  



     c/o Department of Laboratories          



     [place], [place] [id number] [phone]          

 

 Laboratory test  2015  [facility]  Partial  45.0  
High  26.0-36.3  2



 finding    c/o Department of Laboratories  Thrombo  seconds      



     [place] [address]  Time PTT        



     [phone]          

 

 Basic Metabolic  2015  [facility]  Sodium  141 mmol/
L  N  133-145  



 Panel    c/o Department of Laboratories          



     [place] [address] [phone]          









 Potassium  3.8 mmol/L  N  3.5-5.0  

 

 Chloride  106 mmol/L  N  101-111  

 

 Co2 Carbon Dioxide  28 mmol/L  N  22-32  

 

 Anion Gap  7 mmol/L  N  2-11  

 

 Glucose  91 mg/dL  N    

 

 Blood Urea Nitrogen  14 mg/dL  N  6-24  

 

 Creatinine  1.15 mg/dL  N  0.67-1.17  

 

 BUN/Creatinine Ratio  12.2  N  8-20  

 

 Calcium  9.9 mg/dL  N  8.6-10.3  

 

 Egfr Non-  67.0  N  >60  

 

 Egfr   86.2  N  >60  3









 Laboratory test finding  2013  [facility]  Inr  
0.96    0.87-0.97  4



     c/o Department of Laboratories          



     [place] [address]351 [phone]          









 Activated Partial Thrombo Time  44.1 seconds  High  22.18-37.18  5









 Basic Metabolic  2013  [facility]  Sodium  140 mmol/
L    133-145  



 Panel    c/o Department of Laboratories          



     [place] [address] [phone]          









 Potassium  4.3 mmol/L    3.5-5.0  

 

 Chloride  104 mmol/L    101-111  

 

 Co2 Carbon Dioxide  30.0 mmol/L    22-32  

 

 Anion Gap  6.0 mmol/L    2-11  

 

 Glucose  111 mg/dL  High    

 

 Blood Urea Nitrogen  14 mg/dL    6-24  

 

 Creatinine  1.30 mg/dL    0.50-1.40  

 

 BUN/Creatinine Ratio  10.8    8-20  

 

 Calcium  9.7 mg/dL    8.1-9.9  

 

 Egfr Non-  58.7    >60  

 

 Egfr   75.5    >60  6









 CBC Auto  2013  [facility]  White Blood  4.0 10^3/
uL  Low  4.8-10.8  



 Diff    c/o Department of Laboratories  Count        



     [place] [address] [phone]          









 Red Blood Count  4.93 10^6/uL    4.0-5.4  

 

 Hemoglobin  14.3 g/dL    14.0-18.0  

 

 Hematocrit  43 %    42-52  

 

 Mean Corpuscular Volume  87 fL    80-94  

 

 Mean Corpuscular Hemoglobin  29 pg    27-31  

 

 Mean Corpuscular HGB Conc  33 g/dL    31-36  

 

 Red Cell Distribution Width  14 %    10.5-15  

 

 Platelet Count  184 10^3/uL    150-450  

 

 Mean Platelet Volume  8 um3    7.4-10.4  

 

 Abs Neutrophils  2.2 10^3/uL    1.5-7.7  

 

 Abs Lymphocytes  1.3 10^3/uL    1.0-4.8  

 

 Abs Monocytes  0.4 10^3/uL    0-0.8  

 

 Abs Eosinophils  0.1 10^3/uL    0-0.6  

 

 Abs Basophils  0 10^3/uL    0-0.2  

 

 Abs Nucleated RBC  0.01 10^3/uL      

 

 Granulocyte %  54.0 %    38-83  

 

 Lymphocyte %  32.6 %    25-47  

 

 Monocyte %  9.7 %  High  1-9  

 

 Eosinophil %  3.1 %    0-6  

 

 Basophil %  0.6 %    0-2  

 

 Nucleated Red Blood Cells %  0.2      









 Basic Metabolic  2013  [facility]  Sodium  135 mmol/
L    133-145  



 Panel    c/o Department of Laboratories          



     [place] [address] [phone]          









 Potassium  3.6 mmol/L    3.5-5.0  

 

 Chloride  102 mmol/L    101-111  

 

 Co2 Carbon Dioxide  30.0 mmol/L    22-32  

 

 Anion Gap  3.0 mmol/L    2-11  

 

 Glucose  80 mg/dL      

 

 Blood Urea Nitrogen  11 mg/dL    6-24  

 

 Creatinine  1.10 mg/dL    0.50-1.40  

 

 BUN/Creatinine Ratio  10.0    8-20  

 

 Calcium  9.0 mg/dL    8.1-9.9  

 

 Egfr Non-  71.4    >60  

 

 Egfr   91.9    >60  7









 Laboratory test  12/10/2008  [facility]  Troponin-I (TnI)
  0.04 NG/ML    0-0.06  8



 finding    c/o Department of Laboratories          



     [place] [address] [phone]          

 

 Stat CMP  12/10/2008  [facility]  Sodium  136 mmol/L    
135-145  



     c/o Department of Laboratories          



     [place] [address] [phone]          









 Potassium  3.9 mmol/L    3.5-5.0  

 

 Chloride  104 mmol/L    101-111  

 

 Co2 (Carbon Dioxide)  25.0 mmol/L    22-32  

 

 Anion Gap  7.0 mmol/L    2-11  9

 

 Glucose  98 mg/dL      10

 

 BUN  15 mg/dL    6-24  

 

 Creatinine  1.20 mg/dL    0.50-1.40  

 

 One Over Creatinine  0.80      

 

 BUN/Creatinine Ratio  12.5    8-20  

 

 Calcium  9.3 mg/dL    8.1-9.9  11

 

 Total Protein  6.9 GM/DL    6.2-8.1  

 

 Albumin  3.9 GM/DL    3.6-5.4  

 

 Globulin  3.0 GM/DL    2-4  

 

 Albumin/Globulin Ratio  1.3    1-3  

 

 Bilirubin Total  0.5 mg/dL    0.4-1.5  

 

 Alkaline Phosphatase  112 U/L      

 

 Alt (SGPT)  53 U/L    17-63  

 

 Ast (Sgot)  31 U/L    12-42  









 CBC With  12/10/2008  [facility]  White Blood  6.7 CUMM  
  4.8-10.8  



 Electronic Diff    c/o Department of Laboratories  Count        



 [place] [address]          



     [phone]          









 Red Cell Count  5.28 CUMM    4.6-6.2  

 

 Hemoglobin  15.2 g/dL    14.0-18.0  

 

 Hematocrit  44 %    42-52  

 

 Mean Corpuscular Volume  83 um3    80-94  

 

 Mean Corpuscular Hemoglob  29 pg    27-31  

 

 Mean Corpuscular HGB Cone  35 g/dL    32-36  

 

 Redcell Distribution WDTH  14 %    10.5-15  

 

 Platelet Count  254 CUMM    150-450  

 

 Mean Platelet Volume  7.6 um3    7.4-10.4  

 

 Gran %  60.3 %    38-83  

 

 Lymph %  28.8 %    25-47  

 

 Mononuclear %  7.4 %    1-9  

 

 Eosinophil %  3.0 %    0-6  

 

 Basophil %  0.5 %    0-2  

 

 Abs Lymphs  1.9    1.0-4.8  

 

 Abs Mononuclear  0.5    0-0.8  

 

 Absolute Neutrophil Count  4.0    1.5-7.7  

 

 Abs Eosinophils  0.2    0-0.6  

 

 Abs Basophils  0    0-0.2  









 Laboratory test  2008  [facility]  Culture  FEW 
COLONIES      12



 finding    c/o Department of Laboratories  Sensitivity  OF  <SEE NOTE>      



     [place] [address]          



     [phone]          









 1  SDS 

 

 2  SDS 

 

 3  *******Because ethnic data is not always readily available,



   this report includes an eGFR for both -Americans and



   non- Americans.****



   The National Kidney Disease Education Program (NKDEP) does



   not endorse the use of the MDRD equation for patients that



   are not between the ages of 18 and 70, are pregnant, have



   extremes of body size, muscle mass, or nutritional status,



   or are non- or non-.



   According to the National Kidney Foundation, irrespective of



   diagnosis, the stage of the disease is based on the level of



   kidney function:



   Stage Description                      GFR(mL/min/1.73 m(2))



   1     Kidney damage with normal or decreased GFR       90



   2     Kidney damage with mild decrease in GFR          60-89



   3     Moderate decrease in GFR                         30-59



   4     Severe decrease in GFR                           15-29



   5     Kidney failure                       <15 (or dialysis)

 

 4  SDS 13

 

 5  SDS 13

 

 6  *******Because ethnic data is not always readily available,



   this report includes an eGFR for both -Americans and



   non- Americans.****



   The National Kidney Disease Education Program (NKDEP) does



   not endorse the use of the MDRD equation for patients that



   are not between the ages of 18 and 70, are pregnant, have



   extremes of body size, muscle mass, or nutritional status,



   or are non- or non-.



   According to the National Kidney Foundation, irrespective of



   diagnosis, the stage of the disease is based on the level of



   kidney function:



   Stage Description                      GFR(mL/min/1.73 m(2))



   1     Kidney damage with normal or decreased GFR       90



   2     Kidney damage with mild decrease in GFR          60-89



   3     Moderate decrease in GFR                         30-59



   4     Severe decrease in GFR                           15-29



   5     Kidney failure                       <15 (or dialysis)

 

 7  *******Because ethnic data is not always readily available,



   this report includes an eGFR for both -Americans and



   non- Americans.****



   The National Kidney Disease Education Program (NKDEP) does



   not endorse the use of the MDRD equation for patients that



   are not between the ages of 18 and 70, are pregnant, have



   extremes of body size, muscle mass, or nutritional status,



   or are non- or non-.



   According to the National Kidney Foundation, irrespective of



   diagnosis, the stage of the disease is based on the level of



   kidney function:



   Stage Description                      GFR(mL/min/1.73 m(2))



   1     Kidney damage with normal or decreased GFR       90



   2     Kidney damage with mild decrease in GFR          60-89



   3     Moderate decrease in GFR                         30-59



   4     Severe decrease in GFR                           15-29



   5     Kidney failure                       <15 (or dialysis)

 

 8  New Reference Range and Interpretation effective 10/4/02



   



   TnI (ng/ml)             INTERPRETATION



   



   <0.06 ng/ml             NOT SUPPORTIVE OF DIAGNOSIS OF MI



   



   0.06 - 0.50 ng/ml       INDETERMINATE: SUGGEST SERIAL



   STUDIES IF CLINICALLY INDICATED.



   



   > 0.5 ng/ml             CONSISTENT WITH DIAGNOSIS OF MI



   .

 

 9  Anion gap measurement may be of limited value in the



   presence of any alkalosis, especially in a combined acid



   base disorder.



   .

 

 10  ** Note change in reference range as of 08.  The



   change was based on recommendations from the American



   Diabetes Association.

 

 11  Please note change in reference range effective 08



   



   



   .

 

 12  FEW COLONIES OF NORMAL RESPIRATORY EDMUNDO







Procedures







 Date  Code  Description  Status

 

 2019  38688  Binocular Microscopy  Completed

 

 2018  64163  Debridement Of Mastoid Cavity, Simple  Completed

 

 2018  85263  Nasal Endoscopy, Diagnostic  Completed

 

 2016  80901  Nasal Endoscopy, Diagnostic  Completed

 

 2015  86339  Revision Mastoidectomy Resulting In Modified Radical  
Completed



     Mastoidctomy  

 

 2014  78712  Fiberoptic Laryngoscopy  Completed

 

 2014  86815  Tympanometry  Completed

 

 2014  17128  Comprehensive Audiogram  Completed

 

 10/24/2013  72314  Tympanometry  Completed

 

 10/24/2013  08782  Comprehensive Audiogram  Completed

 

 2013  71873  Binocular Microscopy  Completed

 

 2013  79202  Tympanomastoidectomy W/Ossicular Chain  Completed

 

 2013  00598  Medical Records  Completed

 

 2013  01110  Tympanometry  Completed

 

 2013  50416  Comprehensive Audiogram  Completed

 

 2013  68269  Comprehensive Audiogram  Completed

 

 2013  25115  Tympanometry  Completed

 

 10/16/2012  39968  Binocular Microscopy  Completed

 

 2012  12148  Nasal Endoscopy, Diagnostic  Completed

 

 2012  63515  Tympanometry  Completed

 

 2012  28013  Comprehensive Audiogram  Completed

 

 2012  27903  Tympanometry  Completed

 

 2012  89206  Binocular Microscopy  Completed

 

 2011  37365  Fiberoptic Laryngoscopy  Completed

 

 2011  69674  Tympanometry  Completed

 

 2011  81761  Tympanometry  Completed

 

 2011  14301  Comprehensive Audiogram  Completed

 

 2010  37199  Tympanometry  Completed

 

 2009  33187  Tympanometry  Completed

 

 2009  27032  Binocular Microscopy  Completed

 

 10/22/2009  45753  Comprehensive Audiogram  Completed

 

 10/22/2009  74747  Tympanometry  Completed

 

 2008  77928  Nasal Endoscopy, Diagnostic  Completed

 

 2008  55087  Fiberoptic Laryngoscopy  Completed

 

 2008  52805  No Show Fee  Completed

 

 2008  00693  Demonstration/Eval. Of Patient Utilization Of  Completed



     Spacer/Nebulizer  

 

 2008  21831  Respiratory Flow Volume Loop  Completed

 

 2008  38625  Bronchospasm Evaluation  Completed

 

 2008  87467  Prick Test  Completed

 

 2008  62524  Prick Test  Completed

 

 2008  28181  Nasal Endoscopy, Diagnostic  Completed

 

 2007  58098  Nasopharyngoscopy  Completed

 

 2007  09378  Nasal Endoscopy, Diagnostic  Completed

 

 2007  42755  Comprehensive Audiogram  Completed

 

 2007  83526  Comprehensive Audiogram  Completed

 

 2007  67509  Tympanometry  Completed

 

 2007  41173  Tympanometry  Completed

 

 2007  94973  Tympanostomy W/Tube Local Or Topical Anes.  Completed

 

 2007  18748  Tympanometry  Completed

 

 2007  42777  Tympanometry  Completed

 

 2007  69729  Comprehensive Audiogram  Completed

 

 2007  73667  Comprehensive Audiogram  Completed

 

 2006  23626  Nasal Endoscopy, Diagnostic  Completed

 

 2005  19997  Nasal Endoscopy, Diagnostic  Completed

 

 2005  09781  Nasal Endoscopy W/ Debridement  Completed

 

 2005  78156  Nasal Endoscopy W/ Debridement  Completed

 

 2004  52793  Nasal Endoscopy W/Maxllary Antrostomy W/Excision Of Poylp  
Completed

 

 2004  01830  Nasal Endoscopy With Ethmoidectomy, Partial  Completed

 

 12/10/2004  66750  Nasal Endoscopy, Diagnostic  Completed

 

 2004  16837  Nasal Endoscopy, Diagnostic  Completed

 

 10/13/2004  55870  Nasal Endoscopy, Diagnostic  Completed

 

 2004  69293  No Show Fee  Completed







Encounters







 Type  Date  Location  Provider  Dx  Diagnosis

 

 Office Visit  2019  Christiano Ibarra  H70.92  Unspecified



   11:30a  08  Cryer, MD    mastoiditis, left ear

 

 Office Visit  2019  Christiano Ibarra  H70.92  Unspecified



   10:15a  08  Cryer, MD    mastoiditis, left ear

 

 Office Visit  2019  [place]  Wilber ISLAS  H70.92  Unspecified



   1:30p  08  Cryer, MD    mastoiditis, left ear

 

 Office Visit  01/10/2019  [place]  Wilbre PENA.  Cholesteatoma of



   9:45a  08  Cryer, MD    tympanum, left ear

 

 Office Visit  2019  [place]  BECKY Humphrey.12  Cholesteatoma of



   10:30a  08  PA-C    tympanum, left ear

 

 Office Visit  2018  [place]  Wilber PENA.  Cholesteatoma of



   11:30a  08  Cryer, MD    tympanum, left ear

 

 Office Visit  2018  [place]  Wilber PENA.  Cholesteatoma of



   10:30a  08  Cryer, MD    tympanum, left ear









 J31.0  Chronic rhinitis

 

 G50.1  Atypical facial pain









 Office Visit  2018  [place]  Wilber PENA.  Cholesteatoma of



   10:00a  08  Cryer, MD    tympanum, left ear

 

 Office Visit  2018  [place]  Wilber PENA.  Cholesteatoma of



   11:15a  08  Cryer, MD    tympanum, left ear

 

 Office Visit  2017  [place]  Wilber PENA.  Cholesteatoma of



   11:00a  08  Cryer, MD    tympanum, left ear









 H72.2x1  Other marginal perforations of tympanic membrane, right ear









 Office Visit  2017  [place]  Wilber PENA.  Cholesteatoma of



   10:30a  08  Cryer, MD    tympanum, left ear

 

 Office Visit  2016  [place]  Wilber ISLAS  J31.0  Chronic rhinitis



   2:45p  08  Cryer, MD    









 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









 Office Visit  2016  [place]  Wilber PENA.  Cholesteatoma of



   11:00a  08  Cryer, MD    tympanum, left ear









 H60.11  Cellulitis of right external ear









 Office Visit  2016  [place]  Wilber ISLAS  H71.12  Cholesteatoma of



   11:00a  08  Cryer, MD    tympanum, left ear

 

 Office Visit  2016  [place]  Wilber ISLAS  L23.3  Allergic contact



   3:45p  08  Cryer, MD    dermatitis due to



           drugs in contact w



           skin

 

 Office Visit  2015  [place]  Wilber ISLAS  H71.12  Cholesteatoma of



   11:30a  08  Cryer, MD    tympanum, left ear

 

 Office Visit  10/29/2015  [place]  Wilber ISLAS  H71.12  Cholesteatoma of



   10:30a  08  Cryer, MD    tympanum, left ear

 

 Office Visit  10/15/2015  [place]  Wilber ISLAS  H65.32  Chronic mucoid



   11:00a  08  Cryer, MD    otitis media, left



           ear









 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









 Office Visit  2015 10:15a  [place],After 08  Wilber ISLAS  381.29  
Otitis Media



       Cryer, MD    Chronic Media



           Other









 385.32  Cholesteatoma Of Middle Ear









 Office Visit  2015  3:45p  [place] 08  Wilber ISLAS  381.29  
Otitis Media



       Cryer, MD    Chronic Media



           Other









 385.32  Cholesteatoma Of Middle Ear









 Office Visit  2014  9:45a  [place] 08  Wilber ISLAS  381.29  
Otitis Media



       Cryer, MD    Chronic Media



           Other

 

 Office Visit  2014 10:45a  [place] 08  Wilber ISLAS  381.29  
Otitis Media



       Cryer, MD    Chronic Media



           Other









 787.20  Dysphagia, Unspecified









 Office Visit  2014 10:15a  [place] 08  Jonathan E. Cryer, MD  
786.2  Cough









 787.20  Dysphagia, Unspecified

 

 493.00  Asthma, Extrinsic/Atopic









 Office  2014  [place]  Wilber ISLAS  389.03  Hearing Loss,



 Visit  10:00a  08  Cryer, MD    Conductive/Middle Ear









 389.10  Hearing Loss, Sensorineural/Unspecified

 

 462-2  Sore Throat









 Office Visit  2014 10:30a  [place] 08  Wilber ISLAS  380.10  
Otitis Externa



       Cryer, MD    Acute









 389.03  Hearing Loss, Conductive/Middle Ear









 Office Visit  2014 10:30a  [place],After 08  Wilber ISLAS  380.10  
Otitis Externa



       Cryer, MD    Acute









 381.29  Otitis Media Chronic Media Other









 Office Visit  2014  [place]  Wilber ISLAS  385.32  Cholesteatoma Of



   9:45a  08  Cryer, MD    Middle Ear

 

 Office Visit  2013  [place]  Wilber ISLAS  472.0  Rhinitis, Chronic



   11:00a  08  Cryer, MD    









 385.32  Cholesteatoma Of Middle Ear









 Office Visit  10/24/2013  [place]  Wilber ISLAS  385.32  Cholesteatoma Of



   10:30a  08  Cryer, MD    Middle Ear









 389.03  Hearing Loss, Conductive/Middle Ear

 

 389.10  Hearing Loss, Sensorineural/Unspecified

 

 477.8  Rhinitis, Perennial, Allergy









 Office Visit  2013  [place],After  Jamie Wooten.32  Cholesteatoma Of



   4:00p  08  Yanelis NP    Middle Ear









 381.29  Otitis Media Chronic Media Other









 Office Visit  2013  [place]  Wilber ISLAS  385.32  Cholesteatoma Of



   1:45p  08  Cryer, MD    Middle Ear









 381.29  Otitis Media Chronic Media Other

 

 389.03  Hearing Loss, Conductive/Middle Ear









 Office Visit  2013  [place]  Jamie Wooten.32  Cholesteatoma Of



   2:15p  08  Yanelis NP    Middle Ear









 381.29  Otitis Media Chronic Media Other









 Office Visit  2013  [place]  Wilber ISLAS  385.32  Cholesteatoma Of



   1:30p  08  Cryer, MD    Middle Ear









 381.29  Otitis Media Chronic Media Other

 

 389.03  Hearing Loss, Conductive/Middle Ear

 

 389.10  Hearing Loss, Sensorineural/Unspecified









 Office Visit  2013  [place]  Wilber ISLAS  385.32  Cholesteatoma Of



   11:30a  08  Cryer, MD    Middle Ear

 

 Office Visit  2012  [place]  Wilber ISLAS  381.29  Otitis Media



   11:15a  08  Cryer, MD    Chronic Media



           Other

 

 Office Visit  10/25/2012  [place]  Wilber ISLAS  381.29  Otitis Media



   3:15p  08  Cryer, MD    Chronic Media



           Other

 

 Office Visit  10/16/2012  [place]  Sugar  381.3  Otitis Media,



   9:30a  08  Yanelis NP    Chronic



           Allergic/Unspecifi



           ed

 

 Office Visit  2012  [place]  Sugar  350.2  Facial Pain,



   2:45p  08  Yanelis NP    Atypical









 472.0  Rhinitis, Chronic









 Office Visit  2012  3:30p  [place] 08  Sugar  477.8  Rhinitis
,



       Yanelis NP    Perennial,



           Allergy









 493.01  Asthma Extrinsic W/ Status Asthmaticus









 Office  2012  [place]  Wilber ISLAS  389.03  Hearing Loss,



 Visit  11:15a  08  Cryer, MD    Conductive/Middle Ear









 389.10  Hearing Loss, Sensorineural/Unspecified

 

 384.82  Tympanic Membrane, Atrophic Nonflaccid









 Office Visit  2012  4:00p  [place]  Sugar  384.82  Tympanic



     08  Yanelis NP    Membrane,



           Atrophic



           Nonflaccid

 

 Office Visit  10/06/2011  1:45p  [place]  Wilber ISLAS  462  Pharyngitis,



     08  Cryer, MD    Acute









 384.20  Perforation Of Tympanic Membrane/Unspecified

 

 381.81  Dysfunction Of Eustachian Tube

 

 389.03  Hearing Loss, Conductive/Middle Ear

 

 389.10  Hearing Loss, Sensorineural/Unspecified









 Office Visit  2011  9:30a  [place]  Sugar  478.19  Mucocele Of



     08  Yanelis NP    Sinus/Perf Nasal



           Septum









 530.81  Reflux, Gastroesophageal

 

 493.00  Asthma, Extrinsic/Atopic

 

 530.11  Reflux, Esophagitis









 Office  2011  [place]  Sugar  384.20  Perforation Of Tympanic



 Visit  2:00p  08  Yanelis NP    Membrane/Unspecified









 477.8  Rhinitis, Perennial, Allergy

 

 493.00  Asthma, Extrinsic/Atopic









 Office  2011  [place]  Sugar  384.20  Perforation Of Tympanic



 Visit  1:30p  08  Yanelis NP    Membrane/Unspecified









 381.81  Dysfunction Of Eustachian Tube









 Office  2011  [place]  Wilber ISLAS  389.03  Hearing Loss,



 Visit  2:00p  08  Cryer, MD    Conductive/Middle Ear









 389.10  Hearing Loss, Sensorineural/Unspecified

 

 381.81  Dysfunction Of Eustachian Tube

 

 384.20  Perforation Of Tympanic Membrane/Unspecified









 Office  2010  [place],After  Sugar  384.20  Perforation Of Tympanic



 Visit  11:30a  08  Yanelis NP    Membrane/Unspecified









 381.81  Dysfunction Of Eustachian Tube

 

 350.2  Facial Pain, Atypical









 Office Visit  2010  1:30p  [place],After 08  Wilber ISLAS  477.8  
Rhinitis, Cryer, MD    Perennial,



           Allergy









 384.20  Perforation Of Tympanic Membrane/Unspecified

 

 381.81  Dysfunction Of Eustachian Tube









 Office Visit  2010 10:00a  [place],After 08  Wilber ISLAS  477.8  
Rhinitis, Cryer, MD    Perennial,



           Allergy









 493.00  Asthma, Extrinsic/Atopic

 

 384.20  Perforation Of Tympanic Membrane/Unspecified

 

 389.03  Hearing Loss, Conductive/Middle Ear









 Office  2009  [place],After  Wilber ISLAS  384.20  Perforation Of Tympanic



 Visit  3:45p  08  Cryer, MD    Membrane/Unspecified









 388.71  Otalgia Otogenic Pain

 

 381.81  Dysfunction Of Eustachian Tube









 Office  2009  [place]  Wilber ISLAS  384.20  Perforation Of Tympanic



 Visit  2:30p  08  Cryer, MD    Membrane/Unspecified









 388.71  Otalgia Otogenic Pain









 Office Visit  10/22/2009  1:45p  [place]  Wilber ISLAS  388.31  Tinnitus,



     08  Cryer, MD    Subjective









 478.1-4  Obstruction Of Nasal Airway









 Office Visit  2009  4:00p  [place],After  Wilber ISLAS  478.19  Mucocele Of



     08  Cryer, MD    Sinus/Perf Nasal



           Septum









 478.1-4  Obstruction Of Nasal Airway

 

 477.8  Rhinitis, Perennial, Allergy

 

 493.00  Asthma, Extrinsic/Atopic









 Office Visit  2009  3:15p  [place],After 08  Wilber ISLAS  350.2  
Facial Pain,



       Cryer, MD    Atypical









 473.0  Sinusitis, Chronic Maxillary

 

 473.2  Sinusitis, Chronic Ethmoidal

 

 471.8  Polyps, Nasal/Sinus









 Office Visit  2008  [place],After  Saeid BOWLES  493.01  Asthma Extrinsic



   2:45p  08  BUBBA Ann    W/ Status



           Asthmaticus









 530.81  Reflux, Gastroesophageal

 

 780.57  Apnea, Sleep Not Elsewhere Classified /Unspecified

 

 278.01  Obesity Morbid

 

 429.3  Cardiomegaly









 Office  12/10/2008  [place]  Saeid BOWLES  493.00  Asthma,



 Visit  1:48p  08  Gonzalez Ann/Atopic



       M.D.    

 

 Office  2008  [place],After  Sugar  473.0  Sinusitis, Chronic



 Visit  9:30a  08  Yanelis NP    Maxillary









 530.81  Reflux, Gastroesophageal









 Office Visit  10/23/2008  [place]  Wilber ISLAS  780.57  Apnea, Sleep Not



   4:00p  08  Cryer, MD    Elsewhere



           Classified



           /Unspecified









 381.81  Dysfunction Of Eustachian Tube

 

 389.03  Hearing Loss, Conductive/Middle Ear









 Office Visit  2008  [place]  Wilber ISLAS  780.57  Apnea, Sleep Not



   10:15a  08  Cryer, MD    Elsewhere



           Classified



           /Unspecified









 530.11  Reflux, Esophagitis









 Office Visit  2008  [place],After  Sugar  530.81  Reflux,



   3:30p  08  Yanelis NP    Gastroesophageal









 477.8  Rhinitis, Perennial, Allergy

 

 493.90  Asthma, Allergic/Unspecified









 Office Visit  2008 10:15a  [place]  Sugar  478.19  Mucocele Of



     08  Yanelis NP    Sinus/Perf Nasal



           Septum









 478.1-4  Obstruction Of Nasal Airway

 

 477.8  Rhinitis, Perennial, Allergy









 Office Visit  2008  [place],After  Nina Wooten8.1-4  Obstruction Of



   3:00p  08  Yanelis NP    Nasal Airway









 381.3  Otitis Media, Chronic Allergic/Unspecified

 

 493.00  Asthma, Extrinsic/Atopic

 

 477.0  Rhinitis, Seasonal -Allergic Due To Pollen

 

 477.8  Rhinitis, Perennial, Allergy









 Office Visit  2008 10:00a  [place],After 08  Sugar  477.8  Rhinitis
,



       Yanelis NP    Perennial,



           Allergy









 477.0  Rhinitis, Seasonal -Allergic Due To Pollen









 Office Visit  2008  3:30p  [place],After 08  Wilber ISLAS  477.8  
Rhinitis, Cryer, MD    Perennial,



           Allergy









 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

 

 478.1-4  Obstruction Of Nasal Airway









 Office Visit  2007  9:00a  [place],After 08  Wilber ISLAS  477.8  
Rhinitis, Cryer, MD    Perennial,



           Allergy

 

 Office Visit  2007  2:15p  [place],After 08  Wilber ISLAS  477.8  
Rhinitis, Cryer, MD    Perennial,



           Allergy









 471.8  Polyps, Nasal/Sinus

 

 389.2  Hearing Loss, Mixed/Conductive & Sensorineural

 

 381.81  Dysfunction Of Eustachian Tube

 

 474.12  Hypertrophy, Adenoids









 Office Visit  10/15/2007  2:45p  [place],After 08  Wilber ISLAS  473.0  
Sinusitis,



       Cryer, MD    Chronic



           Maxillary









 474.12  Hypertrophy, Adenoids

 

 381.81  Dysfunction Of Eustachian Tube









 Office Visit  2007  [place],After  Wilber ISLAS  381.81  Dysfunction Of



   3:15p  08  Cryer, MD    Eustachian Tube









 389.2  Hearing Loss, Mixed/Conductive & Sensorineural

 

 471.8  Polyps, Nasal/Sinus

 

 474.12  Hypertrophy, Adenoids









 Office Visit  2007  [place],After  Wilber ISLAS  381.81  Dysfunction Of



   2:30p  08  Cryer, MD    Eustachian Tube









 389.2  Hearing Loss, Mixed/Conductive & Sensorineural









 Office Visit  2006  9:45a  [place],After  Wilber E.  471.8  Polyps,



     08  Cryer, MD    Nasal/Sinus









 473.0  Sinusitis, Chronic Maxillary

 

 461.0  Sinusitis, Acute Maxillary









 Office Visit  2006  1:30p  [place],After  Wilber ISLAS  471.8  Polyps,



     08  Cryer, MD    Nasal/Sinus









 780.57  Apnea, Sleep Not Elsewhere Classified /Unspecified

 

 493.0  Code Needs 5TH Digit









 Office Visit  2005  1:30p  [place],After 08  Wilber ISLAS  473.2  
Sinusitis,



       Cryer, MD    Chronic



           Ethmoidal









 471.8  Polyps, Nasal/Sinus

 

 389.03  Hearing Loss, Conductive/Middle Ear









 Office Visit  2004  [place],After  Saeid BOWLES  473.2  Sinusitis,



   9:30a  08  SHAUNA Ann.    Chronic



           Ethmoidal









 471.8  Polyps, Nasal/Sinus









 Office Visit  2004  [place],After  Julio FRANCO  780.57  Apnea, Sleep Not



   9:45a  08  BUBBA Desai    Elsewhere



           Classified



           /Unspecified

 

 Office Visit  2004  [place],After  Jluio FRANCO  478.1  Ulcer Of Nasal



   11:15a  08  BUBBA Desai    Septum







Plan of Treatment

Future Appointment(s):2019 10:30 am - Jonathan E. Cryer, MD at [place],
After  10:30 am - Jonathan E. Cryer, MD at [place],After 801 - Jonathan E. Cryer, MDH70.92 Unspecified mastoiditis, left ear

## 2019-06-09 ENCOUNTER — HOSPITAL ENCOUNTER (EMERGENCY)
Dept: HOSPITAL 25 - ED | Age: 54
Discharge: HOME | End: 2019-06-09
Payer: MEDICARE

## 2019-06-09 VITALS — DIASTOLIC BLOOD PRESSURE: 105 MMHG | SYSTOLIC BLOOD PRESSURE: 157 MMHG

## 2019-06-09 DIAGNOSIS — I25.10: ICD-10-CM

## 2019-06-09 DIAGNOSIS — Z79.02: ICD-10-CM
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
## 2019-06-09 DIAGNOSIS — S39.012A: Primary | ICD-10-CM

## 2019-06-09 DIAGNOSIS — R16.1: ICD-10-CM

## 2019-06-09 DIAGNOSIS — I10: ICD-10-CM

## 2019-06-09 DIAGNOSIS — Z87.891: ICD-10-CM

## 2019-06-09 LAB
ALBUMIN SERPL BCG-MCNC: 4 G/DL (ref 3.2–5.2)
ALBUMIN/GLOB SERPL: 1.5 {RATIO} (ref 1–3)
ALP SERPL-CCNC: 96 U/L (ref 34–104)
ALT SERPL W P-5'-P-CCNC: 24 U/L (ref 7–52)
ANION GAP SERPL CALC-SCNC: 4 MMOL/L (ref 2–11)
AST SERPL-CCNC: 16 U/L (ref 13–39)
BASOPHILS # BLD AUTO: 0 10^3/UL (ref 0–0.2)
BUN SERPL-MCNC: 12 MG/DL (ref 6–24)
BUN/CREAT SERPL: 9.9 (ref 8–20)
CALCIUM SERPL-MCNC: 9.3 MG/DL (ref 8.6–10.3)
CHLORIDE SERPL-SCNC: 108 MMOL/L (ref 101–111)
EOSINOPHIL # BLD AUTO: 0.2 10^3/UL (ref 0–0.6)
GLOBULIN SER CALC-MCNC: 2.7 G/DL (ref 2–4)
GLUCOSE SERPL-MCNC: 109 MG/DL (ref 70–100)
HCO3 SERPL-SCNC: 27 MMOL/L (ref 22–32)
HCT VFR BLD AUTO: 44 % (ref 42–52)
HGB BLD-MCNC: 14.2 G/DL (ref 14–18)
INR PPP/BLD: 1.53 (ref 0.82–1.09)
LYMPHOCYTES # BLD AUTO: 1.4 10^3/UL (ref 1–4.8)
MCH RBC QN AUTO: 28 PG (ref 27–31)
MCHC RBC AUTO-ENTMCNC: 33 G/DL (ref 31–36)
MCV RBC AUTO: 86 FL (ref 80–94)
MONOCYTES # BLD AUTO: 0.4 10^3/UL (ref 0–0.8)
NEUTROPHILS # BLD AUTO: 2.9 10^3/UL (ref 1.5–7.7)
NRBC # BLD AUTO: 0 10^3/UL
NRBC BLD QL AUTO: 0.1
PLATELET # BLD AUTO: 195 10^3/UL (ref 150–450)
POTASSIUM SERPL-SCNC: 4 MMOL/L (ref 3.5–5)
PROT SERPL-MCNC: 6.7 G/DL (ref 6.4–8.9)
RBC # BLD AUTO: 5.07 10^6 /UL (ref 4.18–5.48)
RBC UR QL AUTO: (no result)
SODIUM SERPL-SCNC: 139 MMOL/L (ref 135–145)
TROPONIN I SERPL-MCNC: 0 NG/ML (ref ?–0.04)
WBC # BLD AUTO: 4.9 10^3/UL (ref 3.5–10.8)
WBC UR QL AUTO: (no result)

## 2019-06-09 PROCEDURE — 71045 X-RAY EXAM CHEST 1 VIEW: CPT

## 2019-06-09 PROCEDURE — 36415 COLL VENOUS BLD VENIPUNCTURE: CPT

## 2019-06-09 PROCEDURE — 96374 THER/PROPH/DIAG INJ IV PUSH: CPT

## 2019-06-09 PROCEDURE — 83605 ASSAY OF LACTIC ACID: CPT

## 2019-06-09 PROCEDURE — 87086 URINE CULTURE/COLONY COUNT: CPT

## 2019-06-09 PROCEDURE — 85025 COMPLETE CBC W/AUTO DIFF WBC: CPT

## 2019-06-09 PROCEDURE — 81015 MICROSCOPIC EXAM OF URINE: CPT

## 2019-06-09 PROCEDURE — 80053 COMPREHEN METABOLIC PANEL: CPT

## 2019-06-09 PROCEDURE — 84484 ASSAY OF TROPONIN QUANT: CPT

## 2019-06-09 PROCEDURE — 99284 EMERGENCY DEPT VISIT MOD MDM: CPT

## 2019-06-09 PROCEDURE — 81003 URINALYSIS AUTO W/O SCOPE: CPT

## 2019-06-09 PROCEDURE — 85610 PROTHROMBIN TIME: CPT

## 2019-06-09 PROCEDURE — 93005 ELECTROCARDIOGRAM TRACING: CPT

## 2019-06-09 PROCEDURE — 74176 CT ABD & PELVIS W/O CONTRAST: CPT

## 2019-06-09 NOTE — ED
Back Pain





- HPI Summary


HPI Summary: 





A 55 y/o M presents to ED c/o lower bilateral back pain radiating to groin 

onset three days ago that worsened yesterday. Aggravating factors: movement. 

The pain is constant. He states it "feels like everything is shutting down." 

Associated sx: dysuria, dizziness, SOB. Denies bowel changes. 








- History of Current Complaint


Chief Complaint: EDChestPainROMI


Stated Complaint: CHEST PAIN PER EMS


Time Seen by Provider: 06/09/19 10:44


Hx Obtained From: Patient


Onset/Duration: Gradual Onset, Lasting Days, Still Present


Timing: Constant


Back Pain Location: Is Discrete @ - low back, Radiates To - groin


Severity Initially: Moderate


Severity Currently: Severe


Pain Intensity: 10


Pain Scale Used: 0-10 Numeric


Aggravating Symptom(s): Movement


Associated Signs And Symptoms: Positive: Other - pos: dizziness, SOB, dysuria





- Allergies/Home Medications


Allergies/Adverse Reactions: 


 Allergies











Allergy/AdvReac Type Severity Reaction Status Date / Time


 


amoxicillin [From Augmentin] Allergy  Swelling Verified 12/05/18 17:15


 


clavulanic acid Allergy  Swelling Verified 12/05/18 17:15





[From Augmentin]     


 


mushroom Allergy  Rash Verified 12/05/18 17:15


 


Penicillins Allergy  Anaphylatic Verified 12/05/18 17:15





   Shock  


 


VINEGAR Allergy Intermediate Hives Uncoded 12/05/18 17:15














PMH/Surg Hx/FS Hx/Imm Hx


Previously Healthy: No


Endocrine/Hematology History: Reports: Hx Anticoagulant Therapy - xarelto 


   Denies: Hx Diabetes, Hx Sickle Cell Disease, Hx Thyroid Disease


Cardiovascular History: Reports: Hx Angina, Hx Cardiac Arrest, Hx Coronary 

Artery Disease, Hx Hypercholesterolemia, Hx Hypertension, Hx Myocardial 

Infarction, Other Cardiovascular Problems/Disorders - HEART DISEASE, CARDIAC 

EVENT MONITOR IMPLANTED


   Denies: Hx Congestive Heart Failure, Hx Pacemaker/ICD, Hx Valvular Heart 

Disease


Respiratory History: Reports: Hx Asthma, Hx Sleep Apnea


   Denies: Hx Chronic Obstructive Pulmonary Disease (COPD)


GI History: Reports: Hx Gastroesophageal Reflux Disease


   Denies: Hx Ulcer


 History: Reports: Hx Renal Disease - cysts bilateral kidneys , Other  

Problems/Disorders - bilat cyst on kidneys


Musculoskeletal History: Reports: Hx Arthritis, Hx Back Problems, Other 

Musculoskeletal History - VIPUL


   Denies: Hx Scoliosis


Sensory History: Reports: Hx Cataracts - right eye, Hx Contacts or Glasses, Hx 

Hearing Aid, Hx Hearing Problem


Opthamlomology History: Reports: Hx Cataracts - right eye, Hx Contacts or 

Glasses


Neurological History: Reports: Hx Seizures, Hx Transient Ischemic Attacks (TIA)


   Denies: Hx Dementia, Hx Headaches, Other Neuro Impairments/Disorders


Psychiatric History: Reports: Hx Depression


   Denies: Hx Panic Disorder





- Cancer History


Hx Chemotherapy: No





- Surgical History


Surgery Procedure, Year, and Place[place] [facility] [place]  [place] 

[place] [facility] [place].  2013 left tympanoplasty WITH STAPES IMPLANT ([employer] 

MALLEOUS HEAD SERIAL # [id number] - PER [facility] INFORMATION SENT TO MRI ON 6/ 13/2016; IMPLANT IS STAINLESS STEEL STATES SOME DEGREE OF MAGNETISM TESTED MRI 

SAFE AT 1.5T ONLY - DR VALDEZ APPROVED THIS INFORMATION FOR 1.5T ONLY).  [facility] ;.  

2015 LEFT REVISION TYMPANOPLASTY/ MASTOIDECTOMY [facility] ;.  8/ 2012 HEART CATH - NO 

STENTS ;.  2011 HEART CATH WITH cardiac stents x 4 (PROMUS DRUG-ELUTING STENT 

OKAY IN NORMAL MODE ONLY,  GAUSS/CM @ 1.5T) [facility] ;.  REVEAL LINQ EVENT 

MONITOR PLACED- 7/28/16- ED CAME OVER W/ [facility] MACHINE & DID A DOWNLOAD SO 

THAT NOTHING WOULD BE ERASED


Hx Anesthesia Reactions: Yes - SEIZURE LIKE ACTIVITY; REINTUBATION AFTER LEFT 

EAR 2013





- Immunization History


Date of Tetanus Vaccine: UTD


Date of Influenza Vaccine: 10/17


Infectious Disease History: No


Infectious Disease History: 


   Denies: Hx Clostridium Difficile, Hx Hepatitis, Hx Human Immunodeficiency 

Virus (HIV), Hx of Known/Suspected MRSA, Hx Shingles, Hx Tuberculosis, Hx Known/

Suspected VRE, Hx Known/Suspected VRSA, History Other Infectious Disease, 

Traveled Outside the [place] in Last 30 Days





- Family History


Known Family History: Positive: Cardiac Disease, Hypertension, Diabetes





- Social History


Occupation: Disabled


Lives: Alone


Alcohol Use: None


Hx Substance Use: No


Substance Use Type: Reports: None


Hx Tobacco Use: Yes


Smoking Status (MU): Former Smoker


Type: Cigarettes


Length of Time of Smoking/Using Tobacco: 10-12 YRS


Have You Smoked in the Last Year: No





Review of Systems


Positive: Shortness Of Breath


Positive: dysuria


Musculoskeletal: Other - pos: back pain


Psychological: Other - pos: dizziness


All Other Systems Reviewed And Are Negative: Yes





Physical Exam





- Summary


Physical Exam Summary: 





Appearance: The patient is well-nourished in no acute distress and in no acute 

pain.





Skin: The skin is warm and dry and skin color reflects adequate perfusion.





HEENT:  The head is normocephalic and atraumatic. The pupils are equal and 

reactive. The conjunctivae are clear and without drainage.  Nares are patent 

and without drainage.  Mouth reveals moist mucous membranes and the throat is 

without erythema and exudate.  The external ears are intact. The ear canals are 

patent and without drainage. The tympanic membranes are intact.





Neck: the neck is supple with full range of motion and non-tender. There are no 

carotid bruits.  There is no neck vein distension.





Respiratory: Chest is non-tender.  Lungs are clear to auscultation and breath 

sounds are symmetrical and equal.





Cardiovascular: Heart is regular rate and rhythm.  There is no murmur or rub 

auscultated.   There is no peripheral edema and pulses are symmetrical and 

equal.





Abdomen: The abdomen is soft and non-tender.  There are normal bowel sounds 

heard in all four quadrants and there is no organomegaly palpated.





Musculoskeletal: There is no back tenderness noted.  Extremities are non-tender 

with full range of motion.  There is good capillary refill.  There is no 

peripheral edema or calf tenderness elicited.  Positive straight leg at 5 

degrees. 





Neurological: Patient is alert and oriented to person, place and time.  The 

patient has symmetrical motor strength in all four extremities.  Cranial nerves 

are grossly intact. Deep tendon reflexes are symmetrical and equal in all four 

extremities.





Psychiatric: The patient has an appropriate affect and does not exhibit any 

anxiety or depression.





Triage Information Reviewed: Yes


Vital Signs On Initial Exam: 


 Initial Vitals











Temp Pulse Resp BP Pulse Ox


 


 96.6 F   58   20   158/97   98 


 


 06/09/19 10:35  06/09/19 10:35  06/09/19 10:35  06/09/19 10:35  06/09/19 10:35











Vital Signs Reviewed: Yes





Diagnostics





- Vital Signs


 Vital Signs











  Temp Pulse Resp BP Pulse Ox


 


 06/09/19 10:35  96.6 F  58  20  158/97  98














- Laboratory


Result Diagrams: 


 06/09/19 11:05





 06/09/19 11:05


Lab Statement: Any lab studies that have been ordered have been reviewed, and 

results considered in the medical decision making process.





- Radiology


  ** CXR


Radiology Interpretation Completed By: Radiologist


Summary of Radiographic Findings: IMPRESSION:  No radiographic evidence for 

acute cardiopulmonary abnormality on this portable chest x-ray. ED provider has 

reviewed this report.





- CT


  ** ABD/PEL CT


CT Interpretation Completed By: Radiologist


Summary of CT Findings: IMPRESSION:  1. There are no renal calculi or signs of 

hydronephrosis bilaterally.  2. There is no CT apparent acute inflammatory 

change of the gastrointestinal tract within the limitations of a CT without IV 

or oral contrast.  3. Mild splenomegaly similar to prior CT examinations. ED 

provider has reviewed this report.





- EKG


  ** 1037


Cardiac Rate: Bradycardia - 58


EKG Rhythm: Sinus Bradycardia


ST Segment: Normal - No STEMI


Ectopy: None





  ** 1410


Cardiac Rate: Bradycardia - at 51 bpm


EKG Rhythm: Sinus Bradycardia


ST Segment: Normal - no STEMI


Ectopy: None


EKG Comparison: No Significant Change - from prior EKG on this date.





Re-Evaluation





- Re-Evaluation


  ** 1


Re-Evaluation Time: 16:11


Change: Improved


Comment: Discussing results with patient and plans for discharge.





Back Pain Course/Dx





- Course


Course Of Treatment: Mr. Rodriguez presented with low back pain radiating around 

the bilateral flanks into his groin.  Initially this seemed to be 

musculoskeletal issue however he has multiple medical problems and is a very, 

very vague historian.  His exam was remarkable for a positive straight leg 

raise bilaterally.  He essentially would not sit up for me to do further exam.  

His workup was negative and my final impression was that this was 

musculoskeletal. I recommended a short course of Tramadol and follow-up with 

his PCP





- Diagnoses


Provider Diagnoses: 


 Low back strain








Discharge





- Sign-Out/Discharge


Documenting (check all that apply): Patient Departure - D/C


Patient Received Moderate/Deep Sedation with Procedure: No





- Discharge Plan


Condition: Stable


Disposition: HOME


Prescriptions: 


traMADol TAB* [Ultram*] 50 mg PO Q6HR PRN #20 tab MDD 4


 PRN Reason: Pain


Patient Education Materials:  Tramadol (By mouth), Low Back Strain (ED)


Referrals: 


Bridger Webb MD [Primary Care Provider] - 3 Days


Additional Instructions: 


Please return to the ED if you experience new or worsening symptoms. Follow up 

with your primary care provider in 2-3 days. 











- Billing Disposition and Condition


Condition: STABLE


Disposition: Home





- Attestation Statements


Document Initiated by Scribe: Yes


Documenting Scribe: SooYdbg Laurie


Provider For Whom Scribe is Documenting (Include Credential): Dr. Miguel Myles MD


Scribe Attestation: 


I, Giselle Sullivan, scribed for Dr. Miguel Mlyes MD on 06/09/19 at 1828. 


Scribe Documentation Reviewed: Yes


Provider Attestation: 


The documentation as recorded by the scribe, Giselle Sullivan accurately 

reflects the service I personally performed and the decisions made by me, Dr. Miguel Myles MD


Status of Scribe Document: Viewed

## 2019-10-06 ENCOUNTER — HOSPITAL ENCOUNTER (EMERGENCY)
Dept: HOSPITAL 25 - ED | Age: 54
Discharge: HOME | End: 2019-10-06
Payer: MEDICARE

## 2019-10-06 VITALS — DIASTOLIC BLOOD PRESSURE: 71 MMHG | SYSTOLIC BLOOD PRESSURE: 125 MMHG

## 2019-10-06 DIAGNOSIS — I25.10: ICD-10-CM

## 2019-10-06 DIAGNOSIS — K21.9: ICD-10-CM

## 2019-10-06 DIAGNOSIS — I10: ICD-10-CM

## 2019-10-06 DIAGNOSIS — Z79.01: ICD-10-CM

## 2019-10-06 DIAGNOSIS — F32.9: ICD-10-CM

## 2019-10-06 DIAGNOSIS — R05: ICD-10-CM

## 2019-10-06 DIAGNOSIS — I25.2: ICD-10-CM

## 2019-10-06 DIAGNOSIS — K64.9: Primary | ICD-10-CM

## 2019-10-06 DIAGNOSIS — E78.00: ICD-10-CM

## 2019-10-06 DIAGNOSIS — Z87.891: ICD-10-CM

## 2019-10-06 PROCEDURE — 99282 EMERGENCY DEPT VISIT SF MDM: CPT

## 2019-10-06 NOTE — ED
GI/ HPI





- HPI Summary


HPI Summary: 





54 year old male presents to the ED with a painful lump the size of a golfball 

on near anus that developed several days ago. He gets sharp pain when coughing; 

worse when sitting down. Pain is rated a 9/10 in severity. No dysuria. History 

of CAD, MI (2011), asthma. Does not drink alcohol, smoke tobacco, or do 

recreational drugs. Medications reviewed. Allergies noted.





- History of Current Complaint


Chief Complaint: EDExtremityLower


Time Seen by Provider: 10/06/19 13:44


Stated Complaint: KNEE PAIN PER EMS


Hx Obtained From: Patient


Onset/Duration: Started Days Ago


Timing: Constant, Lasting Days


Severity: Severe


Current Severity: Severe


Pain Intensity: 6


Location of Pain: Rectal, Gluteum


Associated Signs and Symptoms: Positive: External Hemorrhoid


Aggravating Factor(s): Coughing, Sitting


Alleviating Factor(s): Nothing





- Additional Pertinent History


Primary Care Physician: ANA





- Allergy/Home Medications


Allergies/Adverse Reactions: 


 Allergies











Allergy/AdvReac Type Severity Reaction Status Date / Time


 


amoxicillin [From Augmentin] Allergy  Swelling Verified 10/06/19 11:07


 


clavulanic acid Allergy  Swelling Verified 10/06/19 11:07





[From Augmentin]     


 


mushroom Allergy  Rash Verified 10/06/19 11:07


 


Penicillins Allergy  Anaphylatic Verified 10/06/19 11:07





   Shock  


 


VINEGAR Allergy Intermediate Hives Uncoded 10/06/19 11:07














PMH/Surg Hx/FS Hx/Imm Hx


Endocrine/Hematology History: Reports: Hx Anticoagulant Therapy - xarelto 


   Denies: Hx Diabetes, Hx Sickle Cell Disease, Hx Thyroid Disease


Cardiovascular History: Reports: Hx Angina, Hx Cardiac Arrest, Hx Coronary 

Artery Disease, Hx Hypercholesterolemia, Hx Hypertension, Hx Myocardial 

Infarction, Other Cardiovascular Problems/Disorders - HEART DISEASE, CARDIAC 

EVENT MONITOR IMPLANTED


   Denies: Hx Congestive Heart Failure, Hx Pacemaker/ICD, Hx Valvular Heart 

Disease


Respiratory History: Reports: Hx Asthma, Hx Sleep Apnea


   Denies: Hx Chronic Obstructive Pulmonary Disease (COPD)


GI History: Reports: Hx Gastroesophageal Reflux Disease


   Denies: Hx Ulcer


 History: Reports: Hx Renal Disease - cysts bilateral kidneys , Other  

Problems/Disorders - bilat cyst on kidneys


Musculoskeletal History: Reports: Hx Arthritis, Hx Back Problems, Other 

Musculoskeletal History - VIPUL


   Denies: Hx Scoliosis


Sensory History: Reports: Hx Cataracts - right eye, Hx Contacts or Glasses, Hx 

Hearing Aid, Hx Hearing Problem


Opthamlomology History: Reports: Hx Cataracts - right eye, Hx Contacts or 

Glasses


Neurological History: Reports: Hx Seizures, Hx Transient Ischemic Attacks (TIA)


   Denies: Hx Dementia, Hx Headaches, Other Neuro Impairments/Disorders


Psychiatric History: Reports: Hx Depression


   Denies: Hx Panic Disorder





- Cancer History


Hx Chemotherapy: No





- Surgical History


Surgery Procedure, Year, and Place: DEC 2004 sinus [facility] [place] ;.  2008 left knee 

arthroscopy [facility] [place] ;.  2013 left tympanoplasty WITH STAPES IMPLANT ([employer] 

MALLEOUS HEAD SERIAL # [id number] - PER [employer] INFORMATION SENT TO MRI ON 6/ 13/2016; IMPLANT IS STAINLESS STEEL STATES SOME DEGREE OF MAGNETISM TESTED MRI 

SAFE AT 1.5T ONLY - DR VALDEZ APPROVED THIS INFORMATION FOR 1.5T ONLY).  [facility] ;.  

2015 [place] [facility] [place]  [place] CATH - NO 

STENTS ;.  2011 HEART CATH WITH cardiac stents x 4 (PROMUS DRUG-ELUTING STENT 

OKAY IN NORMAL MODE ONLY,  GAUSS/CM @ 1.5T[place] [facility] [place].  REVEAL LINQ EVENT 

MONITOR PLACED- 7/28/16- ED CAME OVER W/ [employer] MACHINE & DID A DOWNLOAD SO 

THAT NOTHING WOULD BE ERASED


Hx Anesthesia Reactions: Yes - SEIZURE LIKE ACTIVITY; REINTUBATION AFTER LEFT 

EAR 2013





- Immunization History


Date of Tetanus Vaccine: UTD


Date of Influenza Vaccine: 10/17


Infectious Disease History: No


Infectious Disease History: 


   Denies: Hx Clostridium Difficile, Hx Hepatitis, Hx Human Immunodeficiency 

Virus (HIV), Hx of Known/Suspected MRSA, Hx Shingles, Hx Tuberculosis, Hx Known/

Suspected VRE, Hx Known/Suspected VRSA, History Other Infectious Disease, 

Traveled Outside the [place] in Last 30 Days





- Family History


Known Family History: Positive: Cardiac Disease, Hypertension, Diabetes





- Social History


Alcohol Use: None


Hx Substance Use: No


Substance Use Type: Reports: None


Hx Tobacco Use: Yes


Smoking Status (MU): Former Smoker


Type: Cigarettes


Length of Time of Smoking/Using Tobacco: 10-12 YRS


Have You Smoked in the Last Year: No





Review of Systems


Positive: Cough


Positive: pain - by anus.  Negative: dysuria


Positive: Other - bulge by anus.


All Other Systems Reviewed And Are Negative: Yes





Physical Exam





- Summary


Physical Exam Summary: 





Constitutional: Well-developed, Well-nourished, Alert. (-) Distressed


Skin: Warm, Dry


HENT: Normocephalic; Atraumatic


Eyes: Conjunctiva normal


Neck: Musculoskeletal ROM normal neck. (-) JVD, (-) Stridor, (-) Tracheal 

deviation


Cardio: Rhythm regular, rate normal, Heart sounds normal; Intact distal pulses; 

Radial pulses are 2+ and symmetric. (-) Murmur


Pulmonary/Chest wall: Effort normal. (-) Respiratory distress, (-) Wheezes, (-) 

Rales


Abd: Soft, (-) tenderness, (-) Distension, (-) Guarding, (-) Rebound


Musculoskeletal: (-) Edema


Lymph: (-) Cervical adenopathy


Neuro: Alert, Oriented x3


Psych: Mood and affect Normal


Triage Information Reviewed: Yes


Vital Signs On Initial Exam: 


 Initial Vitals











Temp Pulse Resp BP Pulse Ox


 


 96.9 F   60   12   180/120   96 


 


 10/06/19 11:03  10/06/19 11:03  10/06/19 11:03  10/06/19 11:03  10/06/19 11:03











Vital Signs Reviewed: Yes





Procedures





- Sedation


Patient Received Moderate/Deep Sedation with Procedure: No





Diagnostics





- Vital Signs


 Vital Signs











  Temp Pulse Resp BP Pulse Ox


 


 10/06/19 13:09  97.6 F  58  18  145/84  97


 


 10/06/19 11:03  96.9 F  60  12  180/120  96














- Laboratory


Lab Statement: Any lab studies that have been ordered have been reviewed, and 

results considered in the medical decision making process.





GIGU Course/Dx





- Course


Course Of Treatment: Patient came in complaining of a lump near his anus.  

Patient had no abnormalities on his physical exam consistent with his history.  

Patient potentially could be having hemorrhoid or partial rectal prolapse that 

resolved spontaneously.  Patient was started on hemorrhoid management given 

surgery clinic follow-up in case that is the issue.





- Diagnoses


Provider Diagnoses: 


 Hemorrhoid








Discharge ED





- Sign-Out/Discharge


Documenting (check all that apply): Patient Departure - discharge





- Discharge Plan


Condition: Stable


Disposition: HOME


Prescriptions: 


Hydrocortisone SUPP* [Anusol HC Supp*] 25 mg TX QAM 7 Days #7 supp


Patient Education Materials:  Hemorrhoids (ED)


Referrals: 


Bridger Webb MD [Primary Care Provider] - 


Additional Instructions: 


Follow up in clinic. Keep doing Sitz baths. Use your medicine as prescribed. 

Return to the emergency room for new or worsening symptoms. 





- Billing Disposition and Condition


Condition: STABLE


Disposition: Home





- Attestation Statements


Document Initiated by Sissy: Yes


Documenting Scribe: Feng Sherwood


Provider For Whom Sissy is Documenting (Include Credential): Clovis Horton MD.


Scribe Attestation: 


Feng NATHAN, scribed for Clovis Horton MD. on 10/06/19 at 2114. 


Scribe Documentation Reviewed: Yes


Provider Attestation: 


The documentation as recorded by the [email or web address] accurately 

reflects the service I personally performed and the decisions made by me, Clovis Horton MD.


Status of Scribe Document: Viewed

## 2019-10-06 NOTE — XMS REPORT
Continuity of Care Document (CCD)

 Created on:2019



Patient:Delta Rodriguez

Sex:Male

:1965

External Reference #:[id number]





Demographics







 Address  [address]



   [place] [address]

 

 Mobile Phone  [phone]

 

 Email Address  [email or web address]

 

 Preferred Language  en

 

 Marital Status  Not  or 

 

 Cheondoism Affiliation  Unknown

 

 Race  Black / 

 

 Ethnic Group  Not  or 









Author







 Name  Bridger Webb M.D. (transmitted by agent of provider Bridget McCheyne
)

 

 Address  [address]



   [place] [address]









Care Team Providers







 Name  Role  Phone

 

 Lorenzo Rosenthal MD - Cardiovascular  Care Team Information   [phone]
[phone]



 Disease    

 

 Arik Lantigua MD -  Care Team Information   [phone]



 Cardiovascular Disease    

 

 Shai Smart MD - Hematology  Care Team Information   [phone]
[phone]









Problems







 Active Problems  Provider  Date

 

 Benign essential hypertension  Bridger Webb M.D.  Onset: 2011

 

 Coronary arteriosclerosis  Bridger Webb M.D.  Onset: 2011

 

 Pain in limb  Bridger Webb M.D.  Onset: 2012

 

 Asthma without status asthmaticus  Bridger Webb M.D.  Onset: 2012

 

 Peripheral venous insufficiency  Bridger Webb M.D.  Onset: 2012

 

 Allergic asthma without status  Bridger Webb M.D.  Onset: 2012



 asthmaticus    

 

 Chronic ischemic heart disease  Bridger Webb M.D.  Onset: 2013

 

 Hyperlipidemia  Bridger Webb M.D.  Onset: 2013

 

 Palpitations  Lorenzo Rosenthal M.D., [facility]  Onset: 10/08/2013

 

 Chronic ischemic heart disease  Lorenzo Rosenthal M.D., [facility]  Onset: 10/08/
2013

 

 Obstructive sleep apnea syndrome  Bridger Webb M.D.  Onset: 2014

 

 Morbid obesity  Bridger Webb M.D.  Onset: 2014

 

 Obesity  Lorenzo Rosenthal M.D., [facility]  Onset: 2014

 

 Myalgia & Myositis Unspecified  Lorenzo Rosenthal M.D., [facility]  Onset: 10/22/
2014

 

 Premature beats  Lorenzo Rosenthal M.D., [facility]  Onset: 2016

 

 Transient cerebral ischemia  Lorenzo Rosenthal M.D., [facility]  Onset: 2016

 

 Cerebral artery occlusion  Lorenzo Rosenthal M.D., [facility]  Onset: 2016

 

 Localized, primary osteoarthritis  Raiza Beckford M.D.  Onset: 2017

 

 Impaired fasting glycaemia  Bridger Webb M.D.  Onset: 2017

 

 Chest pain  Lorenzo Rosenthal M.D., [facility]  Onset: 2017

 

 Mild intermittent asthma  Bridger Webb M.D.  Onset: 2017

 

 Seizure  Robert Arteaga M.D.  Onset: 2018

 

 Altered mental status  Robert Arteaga M.D.  Onset: 2018

 

 Pulmonary embolism  Manisha Keith, MARI  Onset: 2018

 

 Sleep apnea  Robert Arteaga M.D.  Onset: 2018







Social History







 Type  Date  Description  Comments

 

 Birth Sex    Unknown  

 

 Cigarette Use    Quit - Age 26  

 

 ETOH Use    Denies alcohol use  

 

 Tobacco Use  Start: Unknown  Patient is a former  quit smoking



   End: Unknown  smoker  cigarettes ; 1ppd



       max, began age 15

 

 Recreational Drug Use    Denies Drug Use  

 

 Smoking Status  Reviewed: 19  Patient is a former  quit smoking



     smoker  cigarettes ; 1ppd



       max, began age 15

 

 Exercise Type/Frequency    Exercises sporadically  walks on occ







Allergies, Adverse Reactions, Alerts







 Active Allergies  Reaction  Severity  Comments  Date

 

 Augmentin        2009

 

 Penicillin      swollen tongue  2011

 

 Mushroom  swelling, hives      10/08/2013

 

 Vinegar  swelling      10/08/2013







Medications







 Active Medications  SIG  Qnty  Indications  Ordering  Date



         Provider  

 

 Xarelto  1 by mouth every  30tabs    Bridger ISLAS  10/18/2018



       20mg Tablets  day      BUBBA Webb  



           

 

 Klor-Con M20  1 tablet PO daily  90tabs    Lorenzo Rosenthal,  2017



            20Meq        M.D., FACC,  



 Tablets ER        FSCAI  



           

 

 Bystolic  1 by mouth once a  90tabs    Lorenzo Rosenthal,  2017



        10mg Tablets  day      MTANVIR, FACC,  



         FSCAI  

 

 Cpap Mask And  cpap supplies -  units    Bridger ISLAS  2016



 Supplies  Tracey guillen M.D.  



         Device  cushion, tubing,        



   filters, for        



   sleep apnea dx        



   g47.33        

 

 Metoprolol Succinate  1 by mouth every  90tabs    Lorenzo Rosenthal,  2016



 ER  day      MTANVIR, NOLAC,  



  50mg Tablets ER 24HR        [facility]  



           

 

 Proair HFA  inhale two puffs  8.500gm    Bridger ISLAS  10/21/2015



          108(90Base)  by mouth 4 times      BUBBA Webb  



 mcg/Act Aerosol  daily as needed        



           

 

 Compression Stockings  use on both legs  1Pair  782.3  Bridger ISLAS  2015



   for chronic edema      BUBBA Webb  



 Misc          

 

 Triamterene/Hydrochlo  Take 1 Capsule By  90caps    Bridger ISLAS  2014



 rothiazide  Mouth Once Daily      BUBBA Webb  



          37.5-25mg          



 Capsules          



           

 

 Cpap Machine With    1units    Bridger ISLAS  2013



 Setting 16 CM Water        BUBBA Webb  

 

 Nitrostat  Dissolve One  25tabs    Naomy Eddie,  2013



         0.4mg Tablets  Tablet Under The      M.DLottie  



 Sub  Tongue Every 5        



   Minutes as Needed        



   For Chest Pain.        



   DO Not Exceed A        



   Total Of 3 Doses        



   In 15 Minute        

 

 Famotidine  Take One Tablet  60tabs    Bridger ELottie  



          20mg Tablets  By Mouth Twice      BUBBA Webb  



   Daily        

 

 Albuterol Sulfate  1 vial via      Bridger E.  



   nebulizer 4 times      BUBBA Webb  



 (2.5mg/3ML) 0.083%  daily as needed        



 Nebulizer          



           

 

 Atorvastatin Calcium  1 by mouth every  90tabs    Bridger E.  



   day      BUBBA Webb  



 20mg Tablets          



           

 

 Advair Diskus  inhale 1 dose by  60units    Bridger E.  



   mouth twice daily      BUBBA Webb  



 250-50mcg/Dose          



 Aerosol          



           

 

 Aspirin 81 Low Dose  1 by mouth every      Unknown  



   day        



 81mg Chewtabs          



           







Immunizations







 CPT Code  Status  Date  Vaccine  Lot #

 

 [id number]  Given  2017  Influenza Virus Vaccine, Quadrivalent, Split,  [id number]



       Preservative Free  

 

 98974  Given  2016  Influ Virus Vaccine, Quadrivalent, Split Virus,  
[id number]



       Im Fluzone not PF  

 

 73511  Given  2015  Influenza Virus Vaccine, Quadrivalent, Split,  [id number]



       Preservative Free  

 

 66221  Given  2015  Tdap - Tetanus/Diptheria/Acellular Pertussis  [id number]

 

 84755  Given  2014  Influenza Virus Vaccine, Quadrivalent, Split,  
[id number]



       Preservative Free  

 

   Given  2014  Fluvirin Im 3Yrs And Older  [id number]

 

   Given  2012  Fluzone Vaccine  [id number]

 

 09598  Given  2012  Pneumonia Vaccine  [id number]

 

 92066  Given  10/19/2010  Influenza Virus 3Yrs & Over  [id number]

 

 11582  Given  2009  Influenza Virus Vaccine, Pandemic Formulation  

 

 96271  Given  2009  Administration Swine Flu Shot  

 

 80512  Given  Unknown  Tetanus And Diptheria (Td) For Adult Use  



       Preservative Free  







Vital Signs







 Date  Vital  Result  Comment

 

 2019  1:05pm  Height  69 inches  5'9"









 Weight  345.00 lb  

 

 Heart Rate  63 /min  

 

 BP Systolic Sitting  138 mmHg  

 

 BP Diastolic Sitting  75 mmHg  

 

 O2 % BldC Oximetry  97 %  

 

 BMI (Body Mass Index)  50.9 kg/m2  









 2019 10:24am  Height  69 inches  5'9"









 Weight  341.00 lb  

 

 Heart Rate  58 /min  

 

 BP Systolic  138 mmHg  

 

 BP Diastolic  88 mmHg  

 

 BMI (Body Mass Index)  50.4 kg/m2  







Results







 Test  Date  Facility  Test  Result  H/L  Range  Note

 

 Laboratory test  2019  [facility]  Troponin-I  0.00 ng/mL    <
0.04  1



 finding    [address]  (TnI)        



     [place] [address] [phone]          

 

 CBC Auto Diff  2019  [facility]  White Blood  5.2 10^3/uL  
Normal  3.5-10.8  



     [address]  Count        



     [place] [address] [phone]          









 Red Blood Count  5.26 10^6/uL  Normal  4.18-5.48  

 

 Hemoglobin  14.8 g/dL  Normal  14.0-18.0  

 

 Hematocrit  45 %  Normal  42-52  

 

 Mean Corpuscular Volume  85 fL  Normal  80-94  

 

 Mean Corpuscular Hemoglobin  28 pg  Normal  27-31  

 

 Mean Corpuscular HGB Conc  33 g/dL  Normal  31-36  

 

 Red Cell Distribution Width  15 %  Normal  10-15  

 

 Platelet Count  215 10^3/uL  Normal  150-450  

 

 Mean Platelet Volume  7.9 fL  Normal  7.4-10.4  

 

 Abs Neutrophils  3.3 10^3/uL  Normal  1.5-7.7  

 

 Abs Lymphocytes  1.2 10^3/uL  Normal  1.0-4.8  

 

 Abs Monocytes  0.5 10^3/uL  Normal  0-0.8  

 

 Abs Eosinophils  0.2 10^3/uL  Normal  0-0.6  

 

 Abs Basophils  0.1 10^3/uL  Normal  0-0.2  

 

 Abs Nucleated RBC  0.0 10^3/uL      

 

 Granulocyte %  63.3 %      

 

 Lymphocyte %  23.0 %      

 

 Monocyte %  9.3 %      

 

 Eosinophil %  3.0 %      

 

 Basophil %  1.4 %      

 

 Nucleated Red Blood Cells %  0.0      









 Laboratory test  2019  [facility]  Partial  55.0  High  26.0-
38.0  



 finding    [address]  Thrombo Time  seconds      



     [place] [address]  PTT        



     [phone]          









 Lactic Acid  1.3 mmol/L  Normal  0.5-2.0  2

 

 B-Type Natriuretic Peptide BNP  95 pg/mL    <=100  









 Comp Metabolic  2019  [facility]  Sodium  139 mmol/L  Normal  
135-145  



 Panel    [address]          



     [place], [place] [id number] [phone]          









 Potassium  3.9 mmol/L  Normal  3.5-5.0  

 

 Chloride  107 mmol/L  Normal  101-111  

 

 Co2 Carbon Dioxide  27 mmol/L  Normal  22-32  

 

 Anion Gap  5 mmol/L  Normal  2-11  

 

 Glucose  107 mg/dL  High    

 

 Blood Urea Nitrogen  11 mg/dL  Normal  6-24  

 

 Creatinine  1.16 mg/dL  Normal  0.67-1.17  

 

 BUN/Creatinine Ratio  9.5  Normal  8-20  

 

 Calcium  9.6 mg/dL  Normal  8.6-10.3  

 

 Total Protein  7.0 g/dL  Normal  6.4-8.9  

 

 Albumin  4.2 g/dL  Normal  3.2-5.2  

 

 Globulin  2.8 g/dL  Normal  2-4  

 

 Albumin/Globulin Ratio  1.5  Normal  1-3  

 

 Total Bilirubin  0.50 mg/dL  Normal  0.2-1.0  

 

 Alkaline Phosphatase  111 U/L  High    

 

 Alt  28 U/L  Normal  7-52  

 

 Ast  17 U/L  Normal  13-39  

 

 Egfr Non-  65.4    >60  

 

 Egfr   79.1    >60  3









 Laboratory test  2019  [facility]  Magnesium  2.0 mg/dL  
Normal  1.9-2.7  



 finding    [address]          



     [place] [address]          



     [phone]          









 Creatine Kinase(CK)  335 U/L  High    

 

 Troponin-I (TnI)  0.01 ng/mL    <0.04  4









 CKMB  2019  [facility]  CKMB  3.1 ng/mL  Normal  0.6-6.3  



     [address]  ng/mL        



     [place], [place] [address] [phone]          

 

 Laboratory test  2019  [facility]  TSH  3.10  Normal  0.34-
5.60  



 finding    [address]  [facility]  (Thyroid  mcIU/mL      



     [place] [address]  Stim Mklj) [phone]          

 

 Urinalysis  2019  [facility]  Urine  Yellow      



 Profile    [address]  [facility]  Color        



     [place] [address] [phone]          









 Urine Appearance  Clear      

 

 Urine Specific Gravity  1.012  Normal  1.010-1.030  

 

 Urine pH  6.0  Normal  5-9  

 

 Urine Urobilinogen  Negative    Negative  

 

 Urine Ketones  Negative    Negative  

 

 Urine Protein  Negative    Negative  

 

 Urine Leukocytes  Negative    Negative  

 

 Urine Blood  2+  Abnormal  Negative  

 

 Urine Nitrite  Negative    Negative  

 

 Urine Bilirubin  Negative    Negative  

 

 Urine Glucose  Negative    Negative  

 

 Urine White Blood Cell  Absent    Absent  

 

 Urine Red Blood Cell  2+(6-10/hpf)  Abnormal  Absent  

 

 Urine Bacteria  Absent    Absent  









 Laboratory test  2019  [facility]  Troponin-I (TnI)  0.00 ng/
mL    <0.04  5



 finding    [address]          



     [place] [address] [phone]          

 

 Urinalysis  2019  [facility]  Urine Color  Yellow      



 Profile    [address]          



     [place] [address] [phone]          









 Urine Appearance  Clear      

 

 Urine Specific Gravity  1.014  Normal  1.010-1.030  

 

 Urine pH  5.0  Normal  5-9  

 

 Urine Urobilinogen  Negative    Negative  

 

 Urine Ketones  Negative    Negative  

 

 Urine Protein  Negative    Negative  

 

 Urine Leukocytes  Negative    Negative  

 

 Urine Blood  2+  Abnormal  Negative  

 

 Urine Nitrite  Negative    Negative  

 

 Urine Bilirubin  Negative    Negative  

 

 Urine Glucose  Negative    Negative  

 

 Urine White Blood Cell  Trace(0-5/hpf)    Absent  

 

 Urine Red Blood Cell  1+(3-5/hpf)  Abnormal  Absent  

 

 Urine Bacteria  Absent    Absent  

 

 Urine Squamous Epithelial Cell  Present  Abnormal  Absent  









 Urine Culture And  2019  [facility]  Urine  SEE RESULT      6



 Sensitivities    [address]  Culture  BELOW      



     [place] [address]          



     [phone]          

 

 Inr/Protime  2019  [facility]  Inr  1.53  High  0.82-  7



     [address]        1.09  



     [place] [id number] [phone]          

 

 CBC Auto Diff  2019  [facility]  White Blood  4.9 10^3/uL  
Normal  3.5-1  



     [address]  Count      0.8  



     [place] [address] [phone]          









 Red Blood Count  5.07 10^6/uL  Normal  4.18-5.48  

 

 Hemoglobin  14.2 g/dL  Normal  14.0-18.0  

 

 Hematocrit  44 %  Normal  42-52  

 

 Mean Corpuscular Volume  86 fL  Normal  80-94  

 

 Mean Corpuscular Hemoglobin  28 pg  Normal  27-31  

 

 Mean Corpuscular HGB Conc  33 g/dL  Normal  31-36  

 

 Red Cell Distribution Width  15 %  Normal  10-15  

 

 Platelet Count  195 10^3/uL  Normal  150-450  

 

 Mean Platelet Volume  7.7 fL  Normal  7.4-10.4  

 

 Abs Neutrophils  2.9 10^3/uL  Normal  1.5-7.7  

 

 Abs Lymphocytes  1.4 10^3/uL  Normal  1.0-4.8  

 

 Abs Monocytes  0.4 10^3/uL  Normal  0-0.8  

 

 Abs Eosinophils  0.2 10^3/uL  Normal  0-0.6  

 

 Abs Basophils  0.0 10^3/uL  Normal  0-0.2  

 

 Abs Nucleated RBC  0.0 10^3/uL      

 

 Granulocyte %  59.0 %      

 

 Lymphocyte %  28.0 %      

 

 Monocyte %  8.6 %      

 

 Eosinophil %  3.7 %      

 

 Basophil %  0.7 %      

 

 Nucleated Red Blood Cells %  0.1      









 Comp Metabolic  2019  [facility]  Sodium  139 mmol/L  Normal  
135-145  



 Panel    [address]          



     [place] [address] [phone]          









 Potassium  4.0 mmol/L  Normal  3.5-5.0  

 

 Chloride  108 mmol/L  Normal  101-111  

 

 Co2 Carbon Dioxide  27 mmol/L  Normal  22-32  

 

 Anion Gap  4 mmol/L  Normal  2-11  

 

 Glucose  109 mg/dL  High    

 

 Blood Urea Nitrogen  12 mg/dL  Normal  6-24  

 

 Creatinine  1.21 mg/dL  High  0.67-1.17  

 

 BUN/Creatinine Ratio  9.9  Normal  8-20  

 

 Calcium  9.3 mg/dL  Normal  8.6-10.3  

 

 Total Protein  6.7 g/dL  Normal  6.4-8.9  

 

 Albumin  4.0 g/dL  Normal  3.2-5.2  

 

 Globulin  2.7 g/dL  Normal  2-4  

 

 Albumin/Globulin Ratio  1.5  Normal  1-3  

 

 Total Bilirubin  0.50 mg/dL  Normal  0.2-1.0  

 

 Alkaline Phosphatase  96 U/L  Normal    

 

 Alt  24 U/L  Normal  7-52  

 

 Ast  16 U/L  Normal  13-39  

 

 Egfr Non-  62.3    >60  

 

 Egfr   75.3    >60  8









 Laboratory test  2019  [facility]  Troponin-I (TnI)  0.00 ng/
mL    <0.04  9



 finding    [address] [place] [address] [phone]          









 Lactic Acid  1.4 mmol/L  Normal  0.5-2.0  10









 Lipid Profile  2019  [facility]  Triglycerides  169 mg/dL    
  11



 (Trig/Chol/HDL)    [address] [place] [address] [phone]          









 Cholesterol  131 mg/dL      12

 

 HDL Cholesterol  32.2 mg/dL      13

 

 LDL Cholesterol  65 mg/dL      14









 1  Troponin-I testing on Plasma Separator Tubes (PST) has a



   known false positive rate of 0.20-0.40%.  All positive



   troponins reflex immediately to secondary confirmatory



   testing.



   



   Using the [employer] DxI 800 Access Immunoassay systems, the



   99th percentile upper reference limit was demonstrated to be



   < 0.03 ng/mL.

 

 2  [facility] Severe Sepsis and Septic Shock Management Bundle Measure



   requires all lactic acids initially measuring >2.0 mmol/L be



   repeated.

 

 3  *******Because ethnic data is not always readily available,



   this report includes an eGFR for both -Americans and



   non- Americans.****



   The National Kidney Disease Education Program (NKDEP) does



   not endorse the use of the MDRD equation for patients that



   are not between the ages of 18 and 70, are pregnant, have



   extremes of body size, muscle mass, or nutritional status,



   or are non- or non-.



   According to the National Kidney Foundation, irrespective of



   diagnosis, the stage of the disease is based on the level of



   kidney function:



   Stage Description                      GFR(mL/min/1.73 m(2))



   1     Kidney damage with normal or decreased GFR       90



   2     Kidney damage with mild decrease in GFR          60-89



   3     Moderate decrease in GFR                         30-59



   4     Severe decrease in GFR                           15-29



   5     Kidney failure                       <15 (or dialysis)

 

 4  Troponin-I testing on Plasma Separator Tubes (PST) has a



   known false positive rate of 0.20-0.40%.  All positive



   troponins reflex immediately to secondary confirmatory



   testing.



   



   Using the [employer] DxI 800 Access Immunoassay systems, the



   99th percentile upper reference limit was demonstrated to be



   < 0.03 ng/mL.

 

 5  Troponin-I testing on Plasma Separator Tubes (PST) has a



   known false positive rate of 0.20-0.40%.  All positive



   troponins reflex immediately to secondary confirmatory



   testing.



   



   Using the [employer] DxI 800 Access Immunoassay systems, the



   99th percentile upper reference limit was demonstrated to be



   < 0.03 ng/mL.

 

 6  SEE RESULT BELOW



   -----------------------------------------------------------------------------
---------------



   Name:  DELTA RODRIGUEZ                    : 1965    Attend Dr: Miguel Myles MD



   Acct:  [id number]  Unit: [id number]  AGE: 54            Location:  [facility]



   Re19                        SEX: M             Status:    DEP ER



   -----------------------------------------------------------------------------
---------------



   



   SPEC: [id number]         SONDRA:       [facility] DR: Miguel Myles MD



   REQ:  [id number]              RECD:   



   STATUS: LORE STOCKTON DR: Bridger Webb III, MD



   _



   [facility]          [facility]



   ORDERED:  Urine Culture



   



   -----------------------------------------------------------------------------
---------------



   Procedure                         Result                         Reported   
        Site



   -----------------------------------------------------------------------------
---------------



   Urine Culture  Final                                             06/10/19-
1227      ML



   



   No growth of clinically significant organisms



   



   -----------------------------------------------------------------------------
---------------



   * ML - Main Lab



   .



   



   



   



   



   



   



   



   



   



   



   



   



   



   



   



   



   



   



   



   



   



   



   



   



   



   



   ** END OF REPORT **



   



   DEPARTMENT OF PATHOLOGY,  [address] [place] [address]



   Phone # [phone]      Fax #[phone]



   Valentino Calvo M.D. Director     [facility] # [id number]

 

 7  Standard intensity warfarin therapeutic range: 2.0-3.0



   High intensity warfarin therapeutic range: 2.5-3.5

 

 8  *******Because ethnic data is not always readily available,



   this report includes an eGFR for both -Americans and



   non- Americans.****



   The National Kidney Disease Education Program (NKDEP) does



   not endorse the use of the MDRD equation for patients that



   are not between the ages of 18 and 70, are pregnant, have



   extremes of body size, muscle mass, or nutritional status,



   or are non- or non-.



   According to the National Kidney Foundation, irrespective of



   diagnosis, the stage of the disease is based on the level of



   kidney function:



   Stage Description                      GFR(mL/min/1.73 m(2))



   1     Kidney damage with normal or decreased GFR       90



   2     Kidney damage with mild decrease in GFR          60-89



   3     Moderate decrease in GFR                         30-59



   4     Severe decrease in GFR                           15-29



   5     Kidney failure                       <15 (or dialysis)

 

 9  Troponin-I testing on Plasma Separator Tubes (PST) has a



   known false positive rate of 0.20-0.40%.  All positive



   troponins reflex immediately to secondary confirmatory



   testing.



   



   Using the [employer] DxI 800 Access Immunoassay systems, the



   99th percentile upper reference limit was demonstrated to be



   < 0.03 ng/mL.

 

 10  [facility] Severe Sepsis and Septic Shock Management Bundle Measure



   requires all lactic acids initially measuring >2.0 mmol/L be



   repeated.

 

 11  Desirable: <150



   Borderline High: 150-199



   High: 200-499



   Very High: >500

 

 12  Desirable: <200



   Borderline High: 200-239



   High: >239

 

 13  Low: <40



   Desirable: 40-60



   High: >60

 

 14  Desirable: <100



   Near Optimal: 100-129



   Borderline High: 130-159



   High: 160-189



   Very High: >189







Procedures







 Date  Code  Description  Status

 

 2016  [id number]  Diabetic Retinal Eye Exam  Completed

 

 2015  [id number]  Colonoscopy  Completed







[employer]







 Description

 

 No Information Available







Encounters







 Type  Date  Location  Provider  Dx  Diagnosis

 

 Office Visit  2019  [facility]  Robert Arteaga  R51  Headache



   10:45a  [facility]    

 

 Office Visit  2019  [facility]  Bridger Webb,  I10  Essential (
primary)



   10:00a  Med[facility]  M.D.    hypertension









 I25.10  Athscl heart disease of native coronary artery w/o ang pctrs

 

 G47.30  Sleep apnea, unspecified

 

 E66.01  Morbid (severe) obesity due to excess calories







Assessments







 Date  Code  Description  Provider

 

 2019  M54.5  Low back pain  Bridger Webb M.D.

 

 2019  R51  Headache  Robert Arteaga M.D.

 

 2019  I10  Essential (primary) hypertension  Bridger Webb M.D.

 

 2019  I25.10  Atherosclerotic heart disease of native  Bridger Webb M.D.



     coronary artery with  

 

 2019  G47.30  Sleep apnea, unspecified  Bridger Webb M.D.

 

 2019  E66.01  Morbid (severe) obesity due to excess  Bridger Webb M.D.



     calories  







Plan of Treatment

Future Appointment(s):2020 10:15 am - Robert Arteaga M.D. at [facility]2019 10:20 am - Bridger Webb M.D. at [facility] 
[facility]2019 - Bridger Webb M.D.M54.5 Low back 
painNew Therapy:Physical TherapyComments:Chronic R low back pain with radiation 
down R leg.  No focal neuromuscular deficits noted.  CT scan in  noted 
multilevel spinal DDD changes.  Back x-rays and PT eval suggested.  Analgesic 
Rx limited with ongoing Xarelto Rx.  Discussed possible Pain Clinic eval for 
back injection Rx



Functional Status







 Description

 

 No Information Available







Mental Status







 Description

 

 No Information Available







Referrals







 Description

 

 No Information Available

## 2020-02-24 ENCOUNTER — HOSPITAL ENCOUNTER (OUTPATIENT)
Dept: HOSPITAL 25 - ED | Age: 55
Setting detail: OBSERVATION
LOS: 2 days | Discharge: HOME | End: 2020-02-26
Attending: INTERNAL MEDICINE | Admitting: NURSE PRACTITIONER
Payer: MEDICARE

## 2020-02-24 DIAGNOSIS — Z86.711: ICD-10-CM

## 2020-02-24 DIAGNOSIS — J45.909: ICD-10-CM

## 2020-02-24 DIAGNOSIS — I25.10: ICD-10-CM

## 2020-02-24 DIAGNOSIS — R07.9: Primary | ICD-10-CM

## 2020-02-24 DIAGNOSIS — Z86.73: ICD-10-CM

## 2020-02-24 DIAGNOSIS — E78.5: ICD-10-CM

## 2020-02-24 DIAGNOSIS — Z88.0: ICD-10-CM

## 2020-02-24 DIAGNOSIS — Z95.9: ICD-10-CM

## 2020-02-24 DIAGNOSIS — I25.2: ICD-10-CM

## 2020-02-24 DIAGNOSIS — E11.9: ICD-10-CM

## 2020-02-24 DIAGNOSIS — Z79.01: ICD-10-CM

## 2020-02-24 DIAGNOSIS — Z23: ICD-10-CM

## 2020-02-24 DIAGNOSIS — G47.33: ICD-10-CM

## 2020-02-24 DIAGNOSIS — I73.9: ICD-10-CM

## 2020-02-24 DIAGNOSIS — M25.519: ICD-10-CM

## 2020-02-24 DIAGNOSIS — Z79.899: ICD-10-CM

## 2020-02-24 DIAGNOSIS — I10: ICD-10-CM

## 2020-02-24 DIAGNOSIS — I44.0: ICD-10-CM

## 2020-02-24 DIAGNOSIS — R00.2: ICD-10-CM

## 2020-02-24 LAB
ALBUMIN SERPL BCG-MCNC: 4.4 G/DL (ref 3.2–5.2)
ALBUMIN/GLOB SERPL: 1.5 {RATIO} (ref 1–3)
ALP SERPL-CCNC: 107 U/L (ref 34–104)
ALT SERPL W P-5'-P-CCNC: 27 U/L (ref 7–52)
ANION GAP SERPL CALC-SCNC: 6 MMOL/L (ref 2–11)
AST SERPL-CCNC: 17 U/L (ref 13–39)
BASOPHILS # BLD AUTO: 0 10^3/UL (ref 0–0.2)
BUN SERPL-MCNC: 17 MG/DL (ref 6–24)
BUN/CREAT SERPL: 12.8 (ref 8–20)
CALCIUM SERPL-MCNC: 9.8 MG/DL (ref 8.6–10.3)
CHLORIDE SERPL-SCNC: 107 MMOL/L (ref 101–111)
EOSINOPHIL # BLD AUTO: 0.1 10^3/UL (ref 0–0.6)
GLOBULIN SER CALC-MCNC: 2.9 G/DL (ref 2–4)
GLUCOSE SERPL-MCNC: 99 MG/DL (ref 70–100)
HCO3 SERPL-SCNC: 29 MMOL/L (ref 22–32)
HCT VFR BLD AUTO: 43 % (ref 42–52)
HGB BLD-MCNC: 14.6 G/DL (ref 14–18)
INR PPP/BLD: 1.49 (ref 0.82–1.09)
LYMPHOCYTES # BLD AUTO: 1.4 10^3/UL (ref 1–4.8)
MCH RBC QN AUTO: 29 PG (ref 27–31)
MCHC RBC AUTO-ENTMCNC: 34 G/DL (ref 31–36)
MCV RBC AUTO: 85 FL (ref 80–94)
MONOCYTES # BLD AUTO: 0.5 10^3/UL (ref 0–0.8)
NEUTROPHILS # BLD AUTO: 4 10^3/UL (ref 1.5–7.7)
NRBC # BLD AUTO: 0 10^3/UL
NRBC BLD QL AUTO: 0.1
PLATELET # BLD AUTO: 229 10^3/UL (ref 150–450)
POTASSIUM SERPL-SCNC: 3.7 MMOL/L (ref 3.5–5)
PROT SERPL-MCNC: 7.3 G/DL (ref 6.4–8.9)
RBC # BLD AUTO: 5.09 10^6 /UL (ref 4.18–5.48)
SODIUM SERPL-SCNC: 142 MMOL/L (ref 135–145)
TROPONIN I SERPL-MCNC: 0.01 NG/ML (ref ?–0.03)
WBC # BLD AUTO: 6.1 10^3/UL (ref 3.5–10.8)

## 2020-02-24 PROCEDURE — 96374 THER/PROPH/DIAG INJ IV PUSH: CPT

## 2020-02-24 PROCEDURE — G0378 HOSPITAL OBSERVATION PER HR: HCPCS

## 2020-02-24 PROCEDURE — 99285 EMERGENCY DEPT VISIT HI MDM: CPT

## 2020-02-24 PROCEDURE — 78452 HT MUSCLE IMAGE SPECT MULT: CPT

## 2020-02-24 PROCEDURE — 93017 CV STRESS TEST TRACING ONLY: CPT

## 2020-02-24 PROCEDURE — 94640 AIRWAY INHALATION TREATMENT: CPT

## 2020-02-24 PROCEDURE — 85610 PROTHROMBIN TIME: CPT

## 2020-02-24 PROCEDURE — 83036 HEMOGLOBIN GLYCOSYLATED A1C: CPT

## 2020-02-24 PROCEDURE — 80048 BASIC METABOLIC PNL TOTAL CA: CPT

## 2020-02-24 PROCEDURE — 94660 CPAP INITIATION&MGMT: CPT

## 2020-02-24 PROCEDURE — G0008 ADMIN INFLUENZA VIRUS VAC: HCPCS

## 2020-02-24 PROCEDURE — 85025 COMPLETE CBC W/AUTO DIFF WBC: CPT

## 2020-02-24 PROCEDURE — A9502 TC99M TETROFOSMIN: HCPCS

## 2020-02-24 PROCEDURE — 83605 ASSAY OF LACTIC ACID: CPT

## 2020-02-24 PROCEDURE — 36415 COLL VENOUS BLD VENIPUNCTURE: CPT

## 2020-02-24 PROCEDURE — 96375 TX/PRO/DX INJ NEW DRUG ADDON: CPT

## 2020-02-24 PROCEDURE — 84484 ASSAY OF TROPONIN QUANT: CPT

## 2020-02-24 PROCEDURE — 90686 IIV4 VACC NO PRSV 0.5 ML IM: CPT

## 2020-02-24 PROCEDURE — 90471 IMMUNIZATION ADMIN: CPT

## 2020-02-24 PROCEDURE — 80053 COMPREHEN METABOLIC PANEL: CPT

## 2020-02-24 PROCEDURE — 80061 LIPID PANEL: CPT

## 2020-02-24 PROCEDURE — 93005 ELECTROCARDIOGRAM TRACING: CPT

## 2020-02-24 PROCEDURE — 71045 X-RAY EXAM CHEST 1 VIEW: CPT

## 2020-02-24 PROCEDURE — 83880 ASSAY OF NATRIURETIC PEPTIDE: CPT

## 2020-02-24 NOTE — XMS REPORT
Continuity of Care Document (CCD)

 Created on:2020



Patient:Delta Rodriguez

Sex:Male

:1965

External Reference #:[id number]





Demographics







 Address  [address]



   [place] [address]

 

 Home Phone  [phone]

 

 Email Address  [email or web address]

 

 Preferred Language  en

 

 Marital Status  Not  or 

 

 Congregational Affiliation  Unknown

 

 Race  Black / 

 

 Ethnic Group  Not  or 









Author







 Name  Robert Arteaga M.D. (transmitted by agent of provider Soraida Sol
)

 

 Address  [address]



   Unavailable



   [place] [address]









Care Team Providers







 Name  Role  Phone

 

 Lorenzo Rosenthal MD - Cardiovascular  Care Team Information   [phone]
[phone]



 Disease    

 

 Arik Lantigua MD -  Care Team Information   [phone]



 Cardiovascular Disease    

 

 Shai Smart MD - Hematology  Care Team Information   [phone]
[phone]









Problems







 Active Problems  Provider  Date

 

 Benign essential hypertension  Bridger Webb M.D.  Onset: 2011

 

 Coronary arteriosclerosis  Bridger Webb M.D.  Onset: 2011

 

 Pain in limb  Bridger Webb M.D.  Onset: 2012

 

 Asthma without status asthmaticus  Bridger Webb M.D.  Onset: 2012

 

 Peripheral venous insufficiency  Bridger Webb M.D.  Onset: 2012

 

 Allergic asthma without status  Bridger Webb M.D.  Onset: 2012



 asthmaticus    

 

 Chronic ischemic heart disease  Bridger Webb M.D.  Onset: 2013

 

 Hyperlipidemia  Bridger Webb M.D.  Onset: 2013

 

 Palpitations  Lorenzo Rosenthal M.D., [facility]  Onset: 10/08/2013

 

 Chronic ischemic heart disease  Lorenzo Rosenthal M.D., [facility]  Onset: 10/08/
2013

 

 Obstructive sleep apnea syndrome  Bridger Webb M.D.  Onset: 2014

 

 Morbid obesity  Bridger Webb M.D.  Onset: 2014

 

 Obesity  Lorenzo Rosenthal M.D., [facility]  Onset: 2014

 

 Myalgia & Myositis Unspecified  Lorenzo Rosenthal M.D., [facility]  Onset: 10/22/
2014

 

 Premature beats  Lorenzo Rosenthal M.D., [facility]  Onset: 2016

 

 Transient cerebral ischemia  Lorenzo Rosenthal M.D., [facility]  Onset: 2016

 

 Cerebral artery occlusion  Lorenzo Rosenthal M.D., [facility]  Onset: 2016

 

 Localized, primary osteoarthritis  Raiza Beckford M.D.  Onset: 2017

 

 Impaired fasting glycaemia  Bridger Webb M.D.  Onset: 2017

 

 Chest pain  Lorenzo Rosenthal M.D., [facility]  Onset: 2017

 

 Mild intermittent asthma  Bridger Webb M.D.  Onset: 2017

 

 Seizure  Robert Arteaga M.D.  Onset: 2018

 

 Altered mental status  Robert Arteaga M.D.  Onset: 2018

 

 Pulmonary embolism  Manisha Keith, MARI  Onset: 2018

 

 Sleep apnea  Robert Arteaga M.D.  Onset: 2018

 

 Headache  Robert Arteaga M.D.  Onset: 2020







Social History







 Type  Date  Description  Comments

 

 Birth Sex    Unknown  

 

 Cigarette Use    Quit - Age 26  

 

 ETOH Use    Denies alcohol use  

 

 Tobacco Use  Start: Unknown  Patient is a former  quit smoking



   End: Unknown  smoker  cigarettes ; 1ppd



       max, began age 15

 

 Recreational Drug Use    Denies Drug Use  

 

 Smoking Status  Reviewed: 20  Patient is a former  quit smoking



     smoker  cigarettes ; 1ppd



       max, began age 15

 

 Exercise Type/Frequency    Exercises sporadically  walks on occ







Allergies, Adverse Reactions, Alerts







 Active Allergies  Reaction  Severity  Comments  Date

 

 Augmentin        2009

 

 Penicillin      swollen tongue  2011

 

 Mushroom  swelling, hives      10/08/2013

 

 Vinegar  swelling      10/08/2013







Medications







 Active Medications  SIG  Qnty  Indications  Ordering  Date



         Provider  

 

 Xarelto  1 by mouth every  30tabs    Bridger ISLAS  10/18/2018



       20mg Tablets  wiley Webb M.D.  



           

 

 Klor-Con M20  1 tablet PO daily  90tabs    Lorenzo Homarisidro,  2017



            20Meq        M.D., FACC,  



 Tablets ER        [facility]  



           

 

 Bystolic  1 by mouth once a  90tabs    Lorenzo Homarisidro,  2017



        10mg Tablets  day      M.D., FACC,  



         FSCAI  

 

 Cpap Mask And  cpap supplies -  1units    Bridger ISLAS  2016



 Supplies  headTracey mckeon M.D.  



         Device  cushion, tubing,        



   filters, for        



   sleep apnea dx        



   g47.33        

 

 Metoprolol Succinate  1 by mouth every  90tabs    Lorenzo Homarisidro,  2016



 ER  day      M.D., NLOAC,  



  50mg Tablets ER 24HR        [facility]  



           

 

 Proair HFA  inhale two puffs  8.500gm    Naomy Eddie,  10/21/2015



          108(90Base)  by mouth 4 times      M.DLottie  



 mcg/Act Aerosol  daily as needed        



           

 

 Compression Stockings  use on both legs  1Pair  782.3  Bridger ISLAS  2015



   for chronic edema      BUBBA Webb  



 Misc          

 

 Triamterene/Hydrochlo  Take 1 Capsule By  90caps    Bridger ISLAS  2014



 rothiazide  Mouth Once Daily      BUBBA Webb  



          37.5-25mg          



 Capsules          



           

 

 Cpap Machine With    1units    Bridger ISLAS  2013



 Setting 16 CM Water        BUBBA Webb  

 

 Nitrostat  dissolve one  25tabs    Bridger ISLAS  2013



         0.4mg Tablets  tablet under the      BUBBA Webb  



 Sub  tongue every 5        



   minutes as needed        



   for chest pain.        



   do not exceed a        



   total of 3 doses        



   in 15 minute        

 

 Famotidine  Take One Tablet  60tabs    Bridger ELottie  



          20mg Tablets  By Mouth Twice      BUBBA Webb  



   Daily        

 

 Albuterol Sulfate  1 vial via      Bridger E.  



   nebulizer 4 times      BUBBA Webb  



 (2.5mg/3ML) 0.083%  daily as needed        



 Nebulizer          



           

 

 Atorvastatin Calcium  1 by mouth every  90tabs    Bridger E.  



   day      BUBBA Webb  



 20mg Tablets          



           

 

 Advair Diskus  inhale 1 dose by  60units    Bridger ELottie  



   mouth twice daily      BUBBA Webb  



 250-50mcg/Dose          



 Aerosol          



           

 

 Aspirin 81 Low Dose  1 by mouth every      Unknown  



   day        



 81mg Chewtabs          



           







Immunizations







 CPT Code  Status  Date  Vaccine  Lot #

 

 [id number]  Given  2017  Influenza Virus Vaccine, Quadrivalent, Split,  [id number]



       Preservative Free  

 

 09332  Given  2016  Influ Virus Vaccine, Quadrivalent, Split Virus,  
[id number]



       Im Fluzone not PF  

 

 51393  Given  2015  Influenza Virus Vaccine, Quadrivalent, Split,  [id number]



       Preservative Free  

 

 76924  Given  2015  Tdap - Tetanus/Diptheria/Acellular Pertussis  [id number]

 

 48149  Given  2014  Influenza Virus Vaccine, Quadrivalent, Split,  
[id number]



       Preservative Free  

 

   Given  2014  Fluvirin Im 3Yrs And Older  [id number]

 

   Given  2012  Fluzone Vaccine  [id number]

 

 23817  Given  2012  Pneumonia Vaccine  [id number]

 

 42005  Given  10/19/2010  Influenza Virus 3Yrs & Over  [id number]

 

 67034  Given  2009  Influenza Virus Vaccine, Pandemic Formulation  

 

 80932  Given  2009  Administration Swine Flu Shot  

 

 10335  Given  Unknown  Tetanus And Diptheria (Td) For Adult Use  



       Preservative Free  







Vital Signs







 Date  Vital  Result  Comment

 

 2020  2:03pm  Height  69 inches  5'9"









 Weight  340.25 lb  

 

 Heart Rate  72 /min  

 

 BP Systolic Sitting  102 mmHg  

 

 BP Diastolic Sitting  76 mmHg  

 

 Respiratory Rate  20 /min  

 

 BMI (Body Mass Index)  50.2 kg/m2  









 2019  1:05pm  Height  69 inches  5'9"









 Weight  345.00 lb  

 

 Heart Rate  63 /min  

 

 BP Systolic Sitting  138 mmHg  

 

 BP Diastolic Sitting  75 mmHg  

 

 O2 % BldC Oximetry  97 %  

 

 BMI (Body Mass Index)  50.9 kg/m2  







Results







 Test  Acquired Date  Facility  Test  Result  H/L  Range  Note

 

 Laboratory test  2019  [facility]  Troponin-I  0.00 ng/mL    <
0.04  1



 finding    [address]  (TnI)        



     [place] [address]          



     [phone]          

 

 CBC Auto Diff  2019  [facility]  White  5.2 10^3/uL  Normal  
3.5-10.8  



     [address]  Blood        



     [place] [address]  Count        



     [phone]          









 Red Blood Count  5.26 10^6/uL  Normal  4.18-5.48  

 

 Hemoglobin  14.8 g/dL  Normal  14.0-18.0  

 

 Hematocrit  45 %  Normal  42-52  

 

 Mean Corpuscular Volume  85 fL  Normal  80-94  

 

 Mean Corpuscular Hemoglobin  28 pg  Normal  27-31  

 

 Mean Corpuscular HGB Conc  33 g/dL  Normal  31-36  

 

 Red Cell Distribution Width  15 %  Normal  10-15  

 

 Platelet Count  215 10^3/uL  Normal  150-450  

 

 Mean Platelet Volume  7.9 fL  Normal  7.4-10.4  

 

 Abs Neutrophils  3.3 10^3/uL  Normal  1.5-7.7  

 

 Abs Lymphocytes  1.2 10^3/uL  Normal  1.0-4.8  

 

 Abs Monocytes  0.5 10^3/uL  Normal  0-0.8  

 

 Abs Eosinophils  0.2 10^3/uL  Normal  0-0.6  

 

 Abs Basophils  0.1 10^3/uL  Normal  0-0.2  

 

 Abs Nucleated RBC  0.0 10^3/uL      

 

 Granulocyte %  63.3 %      

 

 Lymphocyte %  23.0 %      

 

 Monocyte %  9.3 %      

 

 Eosinophil %  3.0 %      

 

 Basophil %  1.4 %      

 

 Nucleated Red Blood Cells %  0.0      









 Laboratory test  2019  [facility]  Partial  55.0  High  26.0-
38.0  



 finding    [address]  Thrombo Time  seconds      



     [place] [address]  PTT        



     [phone]          









 Lactic Acid  1.3 mmol/L  Normal  0.5-2.0  2

 

 B-Type Natriuretic Peptide BNP  95 pg/mL    <=100  









 Comp Metabolic  2019  [facility]  Sodium  139 mmol/L  Normal  
135-145  



 Panel    [address]          



     [place] [address]          



     [phone]          









 Potassium  3.9 mmol/L  Normal  3.5-5.0  

 

 Chloride  107 mmol/L  Normal  101-111  

 

 Co2 Carbon Dioxide  27 mmol/L  Normal  22-32  

 

 Anion Gap  5 mmol/L  Normal  2-11  

 

 Glucose  107 mg/dL  High    

 

 Blood Urea Nitrogen  11 mg/dL  Normal  6-24  

 

 Creatinine  1.16 mg/dL  Normal  0.67-1.17  

 

 BUN/Creatinine Ratio  9.5  Normal  8-20  

 

 Calcium  9.6 mg/dL  Normal  8.6-10.3  

 

 Total Protein  7.0 g/dL  Normal  6.4-8.9  

 

 Albumin  4.2 g/dL  Normal  3.2-5.2  

 

 Globulin  2.8 g/dL  Normal  2-4  

 

 Albumin/Globulin Ratio  1.5  Normal  1-3  

 

 Total Bilirubin  0.50 mg/dL  Normal  0.2-1.0  

 

 Alkaline Phosphatase  111 U/L  High    

 

 Alt  28 U/L  Normal  7-52  

 

 Ast  17 U/L  Normal  13-39  

 

 Egfr Non-  65.4    >60  

 

 Egfr   79.1    >60  3









 Laboratory test  2019  [facility]  Magnesium  2.0 mg/dL  
Normal  1.9-2.7  



 finding    [address]          



     [place] [address]          



     [phone]          









 Creatine Kinase(CK)  335 U/L  High    

 

 Troponin-I (TnI)  0.01 ng/mL    <0.04  4









 CKMB  2019  [facility]  CKMB  3.1 ng/mL  Normal  0.6-6.3  



     [address]  ng/mL        



     [place] [address]          



     [phone]          

 

 Laboratory test  2019  [facility]  TSH  3.10  Normal  0.34-
5.60  



 finding    [address]  (Thyroid  mcIU/mL      



     [place] [address]  Stim Horm)        



     [phone]          

 

 Urinalysis  2019  [facility]  Urine  Yellow      



 Profile    [address]  Color        



     [place] [address] [phone]          









 Urine Appearance  Clear      

 

 Urine Specific Gravity  1.012  Normal  1.010-1.030  

 

 Urine pH  6.0  Normal  5-9  

 

 Urine Urobilinogen  Negative    Negative  

 

 Urine Ketones  Negative    Negative  

 

 Urine Protein  Negative    Negative  

 

 Urine Leukocytes  Negative    Negative  

 

 Urine Blood  2+  Abnormal  Negative  

 

 Urine Nitrite  Negative    Negative  

 

 Urine Bilirubin  Negative    Negative  

 

 Urine Glucose  Negative    Negative  

 

 Urine White Blood Cell  Absent    Absent  

 

 Urine Red Blood Cell  2+(6-10/hpf)  Abnormal  Absent  

 

 Urine Bacteria  Absent    Absent  









 1  Troponin-I testing on Plasma Separator Tubes (PST) has a



   known false positive rate of 0.20-0.40%.  All positive



   troponins reflex immediately to secondary confirmatory



   testing.



   



   Using the [employer] DxI 800 Access Immunoassay systems, the



   99th percentile upper reference limit was demonstrated to be



   < 0.03 ng/mL.

 

 2  [facility] Severe Sepsis and Septic Shock Management Bundle Measure



   requires all lactic acids initially measuring >2.0 mmol/L be



   repeated.

 

 3  *******Because ethnic data is not always readily available,



   this report includes an eGFR for both -Americans and



   non- Americans.****



   The National Kidney Disease Education Program (NKDEP) does



   not endorse the use of the MDRD equation for patients that



   are not between the ages of 18 and 70, are pregnant, have



   extremes of body size, muscle mass, or nutritional status,



   or are non- or non-.



   According to the National Kidney Foundation, irrespective of



   diagnosis, the stage of the disease is based on the level of



   kidney function:



   Stage Description                      GFR(mL/min/1.73 m(2))



   1     Kidney damage with normal or decreased GFR       90



   2     Kidney damage with mild decrease in GFR          60-89



   3     Moderate decrease in GFR                         30-59



   4     Severe decrease in GFR                           15-29



   5     Kidney failure                       <15 (or dialysis)

 

 4  Troponin-I testing on Plasma Separator Tubes (PST) has a



   known false positive rate of 0.20-0.40%.  All positive



   troponins reflex immediately to secondary confirmatory



   testing.



   



   Using the [employer] Access Immunoassay systems, the



   99th percentile upper reference limit was demonstrated to be



   < 0.03 ng/mL.







Procedures







 Date  Code  Description  Status

 

 2016  [id number]  Diabetic Retinal Eye Exam  Completed

 

 2015  [id number]  Colonoscopy  Completed







Medical Devices







 Description

 

 No Information Available







Encounters







 Type  Date  Location  Provider  Dx  Diagnosis

 

 Office Visit  2019  [facility]  Bridger Webb,  M54.5  Low back pain



   1:00p  Medicine - [facility]    







Assessments







 Date  Code  Description  Provider

 

 2020  G47.30  Sleep apnea, unspecified  Robert Arteaga M.D.

 

 2020  E66.01  Morbid (severe) obesity due to excess  Robert Arteaga M.D.



     calories  

 

 2020  R07.9  Chest pain, unspecified  Robert Arteaga M.D.

 

 2020  R51  Headache  Robert Arteaga M.D.

 

 2019  M54.5  Low back pain  Bridger Webb M.D.







Plan of Treatment

Future Appointment(s):2020 10:15 am - Robert Arteaga M.D. at [place] 
[facility]2020 - Robert Arteaga M.D.G47.30 Sleep apnea
, unspecifiedFollow up:Follow up in 6 kvpqxdF43.01 Morbid (severe) obesity due 
to excess odurilvcG60.9 Chest pain, [id number] Headache



Functional Status







 Description

 

 No Information Available







Mental Status







 Description

 

 No Information Available







Referrals







 Description

 

 No Information Available

## 2020-02-24 NOTE — ED
Progress





- Progress Note


Progress Note: 








This pt is a sign out to Dr. Micheal Grande from Dr. Miguel Myles at shift 

change 2200 2/24/2020 pending admission to [facility] for further treatment. His heart 

score is currently a 5. He has non specific EKGs without change and still has 

mild chest discomfort. The pt states that he is not comfortable with a D/C 

home. 





Course/Dx





- Course


Course Of Treatment: This pt is a sign out to Dr. Micheal Grande from Dr. Miguel Myles at shift change 2200 2/24/2020 pending admission to [facility] for 

further treatment. His heart score is currently a 5. He has non specific EKGs 

without change and still has mild chest discomfort. The pt states that he is 

not comfortable with a D/C home.  He will be admitted to [facility] for further 

cardiac work up by Dr. Mcfarlane, Hospitalist with a Dx of CP.





- Diagnoses


Provider Diagnoses: 


 Chest pain








Discharge ED





- Sign-Out/Discharge


Documenting (check all that apply): Patient Departure - admitted, Receiving Sign

-Out


Receiving patient FROM: Miguel Myles





- Discharge Plan


Condition: Stable


Disposition: ADMITTED TO [facility]





- Billing Disposition and Condition


Condition: STABLE


Disposition: Admitted to [facility]





- Attestation Statements


Document Initiated by Sissy: Yes


Documenting Scribe: Ezekiel Josue


Provider For Whom Sissy is Documenting (Include Credential): Micheal Grande MD 


Scribbryn Attestation: 


Ezekiel NATHAN, scribed for Micheal Grande MD  on 02/25/20 at 0440. 


Scribe Documentation Reviewed: Yes


Provider Attestation: 


The documentation as recorded by the Ezekiel song accurately reflects 

the service I personally performed and the decisions made by me, Micheal Grande MD 


Status of Scribe Document: Viewed

## 2020-02-24 NOTE — ED
HPI Chest Pain





- HPI Summary


HPI Summary: 


The patient is a 54-year-old male arriving via ambulance to [facility] emergency 

department with a chief complaint of diffuse chest pain onset this morning 

which resolved and then returned. He reports that he did not wake up with the 

pain but had been sitting down and noticed fluttering palpitations in the chest 

accompanied by mild pain. 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 

work at the food truck, and while he did experience intermittent episodes of 

the symptoms he was previously having. He then went back upstairs and sat down 

but fell asleep. When he woke up, the pain returned and has been constant and 

worsening since. The sharp pain is currently rated 10/10 in severity. He also 

became diaphoretic and then dizzy. He denies any shortness of breath or 

abdominal pain. He endorses mild bilateral lower extremity edema. He did not 

take any medications for treatment prior to arrival. He is medication-compliant 

for his daily medicines. Past medical history significant for angina, cardiac 

arrest, CAD, hyperlipidemia, hypertension, MI, asthma, sleep apnea, GERD, renal 

cysts, arthritis, seizures, TIA. Former smoker, no alcohol use, no substance 

use. Medications reviewed. Allergies noted.








 Home Medications











 Medication  Instructions  Recorded  Confirmed  Type


 


Nebivolol TAB (NF) [Bystolic TAB 10 mg PO DAILY 05/04/17 02/24/20 History





(NF)]    


 


Nitroglycerin TAB 0.4 MG* 0.4 mg SL Q5M PRN 01/18/18 02/24/20 History


 


Famotidine TAB* [Pepcid 20 MG TAB*] 20 mg PO BID 06/03/18 02/24/20 History


 


Atorvastatin* [Lipitor 20 MG*] 20 mg PO DAILY 09/16/18 02/24/20 History


 


Fluticasone-Salmeterol 250-50* 1 dose INH BID 09/16/18 02/24/20 History





[Advair Diskus 250-50*]    


 


Aspirin 81 mg CHEW TAB* 81 mg PO DAILY #30 tab.chew 09/18/18 02/24/20 Rx


 


Potassium Chlor TAB* [Klor Con ER 20 meq PO DAILY 11/02/18 02/24/20 History





TAB*]    


 


Rivaroxaban TAB(*) [Xarelto 20 mg] 20 mg PO DAILY 11/02/18 02/24/20 History


 


Spironolactone TAB* [Aldactone 25 mg PO DAILY 11/02/18 02/24/20 History





TAB*]    


 


traMADol TAB* [Ultram*] 50 mg PO Q6HR PRN #20 tab MDD 4 06/09/19 02/24/20 Rx


 


Hydrocortisone SUPP* [Anusol HC 25 mg NH QAM 7 Days #7 supp 10/06/19 02/24/20 Rx





Supp*]    











- History of Current Complaint


Chief Complaint: EDChestPainROMI


Time Seen by Provider: 02/24/20 19:07


Hx Obtained From: Patient


Onset/Duration: Started Hours Ago, Still Present


Timing: Intermittent, Lasting Hours


Initial Severity: Mild


Current Severity: Severe


Pain Intensity: 10


Pain Scale Used: 0-10 Numeric


Chest Pain Location: Diffuse


Chest Pain Radiates: No


Character: Sharp/Stabbing


Aggravating Factor(s): Nothing


Alleviating Factor(s): Nothing


Associated Signs and Symptoms: Positive: Chest Pain, Diaphoresis, Palpitations, 

Edema.  Negative: Shortness of Breath, Abdominal Pain





- Additional Pertinent History


Primary Care Physician: ANA





- Allergy/Home Medications


Allergies/Adverse Reactions: 


 Allergies











Allergy/AdvReac Type Severity Reaction Status Date / Time


 


amoxicillin [From Augmentin] Allergy  Swelling Verified 10/06/19 11:07


 


clavulanic acid Allergy  Swelling Verified 10/06/19 11:07





[From Augmentin]     


 


mushroom Allergy  Rash Verified 10/06/19 11:07


 


Penicillins Allergy  Anaphylatic Verified 10/06/19 11:07





   Shock  


 


VINEGAR Allergy Intermediate Hives Uncoded 10/06/19 11:07











Home Medications: 


 Home Medications





Nebivolol TAB (NF) [Bystolic TAB (NF)] 10 mg PO DAILY 05/04/17 [History 

Confirmed 02/24/20]


Nitroglycerin TAB 0.4 MG* 0.4 mg SL Q5M PRN 01/18/18 [History Confirmed 02/24/20

]


Famotidine TAB* [Pepcid 20 MG TAB*] 20 mg PO BID 06/03/18 [History Confirmed 02/ 24/20]


Atorvastatin* [Lipitor 20 MG*] 20 mg PO DAILY 09/16/18 [History Confirmed 02/24/ 20]


Fluticasone-Salmeterol 250-50* [Advair Diskus 250-50*] 1 dose INH BID 09/16/18 [

History Confirmed 02/24/20]


Aspirin 81 mg CHEW TAB* 81 mg PO DAILY #30 tab.chew 09/18/18 [Rx Confirmed 02/24 /20]


Potassium Chlor TAB* [Klor Con ER TAB*] 20 meq PO DAILY 11/02/18 [History 

Confirmed 02/24/20]


Rivaroxaban TAB(*) [Xarelto 20 mg] 20 mg PO DAILY 11/02/18 [History Confirmed 02 /24/20]


Spironolactone TAB* [Aldactone TAB*] 25 mg PO DAILY 11/02/18 [History Confirmed 

02/24/20]


traMADol TAB* [Ultram*] 50 mg PO Q6HR PRN #20 tab MDD 4 06/09/19 [Rx Confirmed 

02/24/20]


Hydrocortisone SUPP* [Anusol HC Supp*] 25 mg NH QAM 7 Days #7 supp 10/06/19 [Rx 

Confirmed 02/24/20]











PMH/Surg Hx/FS Hx/Imm Hx


Endocrine/Hematology History: Reports: Hx Anticoagulant Therapy - xarelto 


   Denies: Hx Diabetes, Hx Sickle Cell Disease, Hx Thyroid Disease


Cardiovascular History: Reports: Hx Angina, Hx Cardiac Arrest, Hx Coronary 

Artery Disease, Hx Hypercholesterolemia, Hx Hypertension, Hx Myocardial 

Infarction, Other Cardiovascular Problems/Disorders - HEART DISEASE, CARDIAC 

EVENT MONITOR IMPLANTED


   Denies: Hx Congestive Heart Failure, Hx Pacemaker/ICD, Hx Valvular Heart 

Disease


Respiratory History: Reports: Hx Asthma, Hx Sleep Apnea


   Denies: Hx Chronic Obstructive Pulmonary Disease (COPD)


GI History: Reports: Hx Gastroesophageal Reflux Disease


   Denies: Hx Ulcer


 History: Reports: Hx Renal Disease - cysts bilateral kidneys , Other  

Problems/Disorders - bilat cyst on kidneys


Musculoskeletal History: Reports: Hx Arthritis, Hx Back Problems, Other 

Musculoskeletal History - VIPUL


   Denies: Hx Scoliosis


Sensory History: Reports: Hx Cataracts - right eye, Hx Contacts or Glasses, Hx 

Hearing Aid, Hx Hearing Problem


Opthamlomology History: Reports: Hx Cataracts - right eye, Hx Contacts or 

Glasses


Neurological History: Reports: Hx Seizures, Hx Transient Ischemic Attacks (TIA)


   Denies: Hx Dementia, Hx Headaches, Other Neuro Impairments/Disorders


Psychiatric History: Reports: Hx Depression


   Denies: Hx Panic Disorder





- Cancer History


Hx Chemotherapy: No





- Surgical History


Surgical History: Yes


Surgery Procedure, Year, and Place: DEC 2004 sinus [facility] ;.  2008 left knee 

arthroscopy [facility] ;.  2013 left tympanoplasty WITH STAPES IMPLANT ([employer] 

MALLEOUS HEAD SERIAL # [id number] - PER [employer] INFORMATION SENT TO MRI ON 6/ 13/2016; IMPLANT IS STAINLESS STEEL STATES SOME DEGREE OF MAGNETISM TESTED MRI 

SAFE AT 1.5T ONLY - DR VALDEZ APPROVED THIS INFORMATION FOR 1.5T ONLY).  [facility] ;.  

2015 LEFT REVISION TYMPANOPLASTY/ MASTOIDECTOMY [facility] ;.  8/ 2012 HEART CATH - NO 

STENTS ;.  2011 HEART CATH WITH cardiac stents x 4 (PROMUS DRUG-ELUTING STENT 

OKAY IN NORMAL MODE ONLY,  GAUSS/CM @ 1.5T) [facility] ;.  REVEAL LINQ EVENT 

MONITOR PLACED- 7/28/16- ED CAME OVER W/ [employer] MACHINE & DID A DOWNLOAD SO 

THAT NOTHING WOULD BE ERASED


Hx Anesthesia Reactions: Yes - SEIZURE LIKE ACTIVITY; REINTUBATION AFTER LEFT 

EAR 2013





- Immunization History


Date of Tetanus Vaccine: UTD


Date of Influenza Vaccine: 10/17


Infectious Disease History: No


Infectious Disease History: 


   Denies: Hx Clostridium Difficile, Hx Hepatitis, Hx Human Immunodeficiency 

Virus (HIV), Hx of Known/Suspected MRSA, Hx Shingles, Hx Tuberculosis, Hx Known/

Suspected VRE, Hx Known/Suspected VRSA, History Other Infectious Disease, 

Traveled Outside the [place] in Last 30 Days





- Family History


Known Family History: Positive: Cardiac Disease, Hypertension, Diabetes





- Social History


Alcohol Use: None


Hx Substance Use: No


Substance Use Type: Reports: None


Hx Tobacco Use: Yes


Smoking Status (MU): Former Smoker


Type: Cigarettes


Length of Time of Smoking/Using Tobacco: 10-12 YRS


Have You Smoked in the Last Year: No





Review of Systems


Positive: Skin Diaphoresis


Positive: Palpitations, Chest Pain


Negative: Shortness Of Breath


Negative: Abdominal Pain


Positive: Edema - BLE


Neurological/Mental Status: Other - dizziness


All Other Systems Reviewed And Are Negative: Yes





Physical Exam





- Summary


Physical Exam Summary: 


Appearance: The patient is morbidly obese in no acute distress and in no acute 

pain.


 


Skin: The skin is warm and dry, and skin color reflects adequate perfusion.





HEENT: The head is normocephalic and atraumatic. The pupils are equal and 

reactive. The conjunctivae are clear and without drainage. Nares are patent and 

without drainage. Mouth reveals moist mucous membranes, and the throat is 

without erythema and exudate. The external ears are intact. The ear canals are 

patent and without drainage. The tympanic membranes are intact.


 


Neck: The neck is supple with full range of motion and non-tender. There are no 

carotid bruits. There is no neck vein distension.


 


Respiratory: Chest is non-tender. Lungs are clear to auscultation and breath 

sounds are symmetrical and equal.


 


Cardiovascular: Heart is regularly irregular rate and regular rhythm. There is 

no murmur or rub auscultated. There is no peripheral edema and pulses are 

symmetrical and equal.


 


Abdomen: The abdomen is soft and non-tender. There are normal bowel sounds 

heard in all four quadrants and there is no organomegaly palpated.


 


Musculoskeletal: There is no back tenderness noted. Extremities are non-tender 

with full range of motion. There is good capillary refill. There is no 

peripheral edema or calf tenderness elicited.


 


Neurological: Patient is alert and oriented to person, place and time. The 

patient has symmetrical motor strength in all four extremities. Cranial nerves 

are grossly intact. Deep tendon reflexes are symmetrical and equal in all four 

extremities.


 


Psychiatric: The patient has an appropriate affect and does not exhibit any 

anxiety or depression.


Triage Information Reviewed: Yes


Vital Signs On Initial Exam: 


 Initial Vitals











Temp Pulse Resp BP Pulse Ox


 


 96.1 F   77   20   144/96   95 


 


 02/24/20 18:48  02/24/20 18:48  02/24/20 18:48  02/24/20 18:48  02/24/20 18:48











Vital Signs Reviewed: Yes





Procedures





- Sedation


Patient Received Moderate/Deep Sedation with Procedure: No





Diagnostics





- Vital Signs


 Vital Signs











  Temp Pulse Resp BP Pulse Ox


 


 02/24/20 18:50   58   144/96  95


 


 02/24/20 18:48  96.1 F  77  20  144/96  95














- Laboratory


Result Diagrams: 


 02/24/20 19:27





 02/24/20 19:27


Lab Statement: Any lab studies that have been ordered have been reviewed, and 

results considered in the medical decision making process.





- Radiology


  ** Chest XR


Radiology Interpretation Completed By: ED Physician


Summary of Radiographic Findings: No acute process. This imaging scan was 

reviewed and interpreted by Dr. Myles. Pending official report.





- EKG


  ** 1854


Cardiac Rate: NL - 67 BPM


EKG Rhythm: Sinus Rhythm


Summary of EKG Findings: Ventricular bigeminy with underlying normal sinus 

rhythm, no STEMI. This EKG was reviewed and interpreted by Dr. Myles.





  ** 2001


Cardiac Rate: NL - 69 BPM


EKG Rhythm: Sinus Rhythm


Ectopy: PVCs


Summary of EKG Findings: Normal sinus rhythm, PVCs, no STEMI. This EKG was 

reviewed and interpreted by Dr. Myles.





Chest Pain Course/Dx





- Course


Course Of Treatment: Mr. Rodriguez presented complaining of chest pain essentially 

all day long.  He is well-known to me and reports a similar history 2 previous 

evaluations.  He does have an extensive cardiac history and his heart score is 

3 given that the story is consistent with previous stories that were 

noncardiac.  His initial EKG is unchanged from previous EKGs although he had 

frequent PVCs.  His chest x-rays are unremarkable as are his labs including an 

initial troponin.  At this time we are waiting for repeat troponin.





- Diagnoses


Provider Diagnoses: 


 Chest pain








Discharge ED





- Sign-Out/Discharge


Documenting (check all that apply): Sign-Out Patient


Signing out patient TO: Micheal Grande - Patient is a sign-out to Dr. Micheal Grande MD, at 2200 on 2/24/2020, pending second troponin and disposition.





- Discharge Plan


Condition: Stable


Patient Education Materials:  Chest Pain (DC)


Referrals: 


Bridger Webb MD [Primary Care Provider] - 3 Days


Additional Instructions: 


Follow up with your primary care provider in 2-3 days. Return to the emergency 

department for any new or worsening symptoms.





- Billing Disposition and Condition


Condition: STABLE





- Attestation Statements


Document Initiated by Sissy: Yes


Documenting Scribe: Miya Moya


Provider For Whom Sissy is Documenting (Include Credential): Dr. Miguel Myles MD


Scribe Attestation: 


Miya NATHAN scribed for Dr. Miguel Myles MD on 02/24/20 at 2145. 


Scribe Documentation Reviewed: Yes


Provider Attestation: 


The documentation as recorded by the Miya song accurately reflects 

the service I personally performed and the decisions made by me, Dr. Miguel Myles MD


Status of [place] Document: Viewed

## 2020-02-24 NOTE — XMS REPORT
Summary of Care

 Created on:2020



Patient:Delta Rodriguez

Sex:Male

:1965

External Reference #:[id number]





Demographics







 Address  [address] [place] [address]

 

 Home Phone  [phone]

 

 Work Phone  [phone]

 

 Mobile Phone  [phone]

 

 Preferred Language  English

 

 Marital Status  Not  or 

 

 Rastafarian Affiliation  Unknown

 

 Race  Black or 

 

 Ethnic Group  Not  or 









Author







 Organization  [facility]

 

 Address  [address] [address]









Support







 Name  Relationship  Address  Phone

 

 Anaid Acosta  Unavailable  Unavailable  [phone]









Care Team Providers







 Name  Role  Phone

 

 Bridger Webb MD  Primary Care Provider  [phone]









Encounter Details







 Date  Type  Department  Care Team  Description

 

 02/10/2020  Hospital Encounter  [facility] Cardiac Cath Lab



  Jeff Hebert,  Short Procedure



     [address] [place] [address]



  [address]



  



     [phone]  [address] [address]



  



       [phone] (Fax)  







Allergies







 Active Allergy  Reactions  Severity  Noted Date  Comments

 

 Amoxicillin  Swelling    2017  

 

 Mushrooms  Swelling    2013  

 

 Penicillins  Swelling    2013  



documented as of this encounter (statuses as of 2020)



Medications







 Medication  Sig  Dispensed  Refills  Start Date  End Date  Status

 

 ALBUTEROL IN  Take  by    0      Active



   inhalation.          

 

 Fluticasone  Take  by    0      Active



 Propionate, Inhal,  inhalation.          



 (FLOVENT IN)            

 

 famotidine (PEPCID) 20  Take 20 mg by    0      Active



 MG Oral Tab  mouth TWICE DAILY.          

 

 fluticasone-salmeterol  Take 1 INHL by    0      Active



 diskus (ADVAIR DISKUS)  inhalation TWICE          



 250-50 MCG/DOSE  DAILY.          



 Inhalation AEROSOL            



 POWDER, BREATH            



 ACTIVATED            

 

 rivaroxaban (XARELTO)  Take 20 mg by    0      Active



 20 MG Oral  mouth DAILY.          



 TabIndications:            



 Pulmonary Embolism            

 

 Aspirin 81 MG Oral Tab  Take 81 mg by    0      Active



   mouth DAILY.          

 

 atorvastatin (LIPITOR)  Take 1 Tab by  90 Tab  3  2019    Active



 40 MG Oral  mouth DAILY.          



 TabIndications: ASHD            



 (arteriosclerotic            



 heart disease)            

 

 BYSTOLIC 10 MG Oral  TAKE 1 TABLET BY  180 Tab  3  10/07/2019    Active



 Tab  MOUTH TWICE DAILY          

 

 spironolactone  TAKE 1 TABLET BY  90 Tab  3  2019    Active



 (ALDACTONE) 25 MG Oral  MOUTH ONCE DAILY          



 TabIndications:            



 Essential hypertension            



documented as of this encounter (statuses as of 2020)



Active Problems







 Problem  Noted Date

 

 Encounter for loop recorder at end of battery life  2020









 Overview: 







 Added automatically from request for surgery [id number]









 Palpitations  2019

 

 Cerebrovascular accident (CVA)  2019

 

 Dyslipidemia  2019

 

 Long term current use of anticoagulant therapy  2019

 

 Morbid obesity  2019

 

 Obstructive sleep apnea syndrome  2019

 

 Pulmonary embolism  2019

 

 Hypertension  2019

 

 ASHD (arteriosclerotic heart disease)  2018

 

 Peripheral artery disease  10/13/2017

 

 Primary osteoarthritis of left knee  2017

 

 Pain of right thumb  10/04/2016

 

 Syncope  2016

 

 PVC (premature ventricular contraction)  2016

 

 History of surgical procedure  2015

 

 BMI 45.0-49.9, adult  2013

 

 DJD (degenerative joint disease) of knee  10/04/2013



documented as of this encounter (statuses as of 2020)



Social History







 Tobacco Use  Types  Packs/Day  Years Used  Date

 

 Former Smoker        









 Smokeless Tobacco: Never Used      









 Alcohol Use  Drinks/Week  oz/Week  Comments

 

 No      









 Sex Assigned at Birth  Date Recorded

 

 Not on file  









 Job Start Date  Occupation  Industry

 

 Not on file  Not on file  Not on file









 Travel History  Travel Start  Travel End









 No recent travel history available.



documented as of this encounter



Last Filed Vital Signs







 Vital Sign  Reading  Time Taken  Comments

 

 Blood Pressure  172/92  02/10/2020 11:45 AM  



     EST  

 

 Pulse  55  02/10/2020 11:45 AM  



     EST  

 

 Temperature  36.3 

  02/10/2020  9:15 AM  



   C (97.3 

  EST  



   F)    

 

 Respiratory Rate  14  02/10/2020 11:45 AM  



     EST  

 

 Oxygen Saturation  98%  02/10/2020 11:45 AM  



     EST  

 

 Inhaled Oxygen Concentration  -  -  

 

 Weight  157.2 kg (346 lb 9.6 oz)  02/10/2020  9:15 AM  



     EST  

 

 Height  175.3 cm (5' 9")  02/10/2020  9:15 AM  



     EST  

 

 Body Mass Index  51.18  02/10/2020  9:15 AM  



     EST  



documented in this encounter



Discharge Summaries

Micheal Maxwell NP - 02/10/2020 10:50 AM ESTFormatting of this note might be 
different from the original.

[id number]

[facility] [address]

[address] [address]

    Discharge Summary



Patient ID:

Delta Rodriguez

[id number]

54-y.o.

1965



Admission date: 2/10/20



Discharge date:  2/10/2020



Admitting Physician: Jeff Hebert MD

Primary Care Provider: Bridger Webb III, MD

Discharge Physician: Dr. Jeff Hebert



Reason for Admission:

Loop removal

Encounter for loop recorder at end of battery life



Principal Diagnosis:

Palpitations

Syncope



Contributory Past Medical History:

Hypertension

Cardiovascular disease

ASHD



Discharged Condition: Stable



Hospital Course: Mr. Rodriguez presented to the hospital for Loop recorder removal 
related to his history of palpitations and syncope. The procedure was completed 
without difficulty and was tolerated well. He was monitored post procedure then 
discharged later the same day.



Procedures:

2/10/2020 loop removal



Complications: None



Medications:



Current Discharge Medication List



CONTINUE these medications which have NOT CHANGED

ADVAIR DISKUS 250-50 MCG/DOSE Aepb

Generic drug:  fluticasone-salmeterol diskus

Dose:  1 INHL

Refills:  0

Take 1 INHL by inhalation TWICE DAILY.







ALBUTEROL IN

Refills:  0

Take  by inhalation.







Aspirin 81 MG Tabs

Dose:  81 mg

Refills:  0

Take 81 mg by mouth DAILY.







atorvastatin 40 MG Tabs

Commonly known as:  LIPITOR

Dose:  40 mg

Quantity:  90 Tab

Refills:  3

Take 1 Tab by mouth DAILY.







BYSTOLIC 10 MG Tabs

Generic drug:  Nebivolol HCl

Doctor's comments:  Please consider 90 day supplies to promote better adherence

Quantity:  180 Tab

Refills:  3

TAKE 1 TABLET BY MOUTH TWICE DAILY







famotidine 20 MG Tabs

Commonly known as:  PEPCID

Dose:  20 mg

Refills:  0

Take 20 mg by mouth TWICE DAILY.







FLOVENT IN

Refills:  0

Take  by inhalation.







rivaroxaban 20 MG Tabs

Commonly known as:  XARELTO

Dose:  20 mg

For:  Blockage of Blood Vessel to Lung by a Particle

Refills:  0

Take 20 mg by mouth DAILY.







spironolactone 25 MG Tabs

Commonly known as:  ALDACTONE

Quantity:  90 Tab

Refills:  3

TAKE 1 TABLET BY MOUTH ONCE DAILY











Patient Instructions:



Activity/Restrictions: No heavy lifting or strenuous exercise for  2 days.  No 
driving for two days

Skin/Wound Care: Remove bandage tomorrow. Keep area clean and dry. Inspect 
daily. If redness, edema,or drainage, or if fever, please notify our service at 
[phone].

Discharge Diet: Heart healthy Diet

Special Instructions:

Please keep previously arranged appointments.

Follow up with arrhythmia center per protocol.



Provider Signature: ALEAH Smalls



Electronically signed by Jeff Hebert MD at 2020  5:59 AM 
ESTdocumented in this encounter



Discharge Instructions

Larissa Pollack RN - 02/10/2020Provider's Instructions



Activity/Restrictions: No heavy lifting or strenuous exercise for  2 days.  No 
driving for two days

Skin/Wound Care: Remove bandage tomorrow. Keep area clean and dry. Inspect 
daily. If redness, edema,or drainage, or if fever, please notify our service at 
[phone].

Discharge Diet: Heart healthy Diet

Special Instructions:

Please keep previously arranged appointments.

Follow up with arrhythmia center per protocol.



Discharge Provider: Micheal Maxwell NP

Attending: Jeff Hebert MD

Time: 10:49



Moderate Sedation

WHAT YOU NEED TO KNOW:

Moderate sedation, or conscious sedation, is medicine used during procedures to 
help you feel relaxed and calm. You will be awake and able to follow directions 
without anxiety or pain. You will remember little to none of the procedure. 
Moderate sedation can be used for procedures such as a colonoscopy, wound repair
, cataract removal, or dental work. The medicine is given as a pill, shot, 
inhaled solution, or injection through an IV.

DISCHARGE INSTRUCTIONS:

Self-care:

 Ask when you can drive a car or use heavy equipment. An adult should drive 
you home and stay with you. You may feel sleepy and need help doing things at 
home.



 Follow your healthcare provider's directions about eating, activity, and 
medicines. Do not makeimportant decisions for 24 hours.

Follow up with your healthcare provider as directed: Write down your questions 
so you remember to ask them during your visits.

Contact your healthcare provider if:

 You have a fever.



 You have a cough or headache.



 You have an upset stomach or feel like vomiting.



 You have questions or concerns about your condition or care.

Seek care immediately or call 911 if:

 You have sudden trouble breathing.



 You have a severe headache.



 2016 [employer]. Information is for End User's use only 
and may not be sold, redistributed or otherwise used for commercial purposes. 
All illustrations and images included in CareNotes are the copyrighted 
property of [employer] or [employer].

The above information is an  only. It is not intended as medical 
advice for individual conditions or treatments. Talk to your doctor, nurse or 
pharmacist before following any medical regimen to see if it is safe and 
effective for you.





Cardiac Loop Recorder Insertion

WHAT YOU NEED TO KNOW:

A cardiac loop recorder is a device used to diagnose heart rhythm problems, 
such as a fast or irregular heartbeat. It is implanted in your left chest or 
armpit, just under the skin. The device records a pattern of your heart's rhythm
, called an EKG. Your device records automatic EKGs, depending on howyour 
healthcare provider programs it. You may also receive a handheld controller. 
You press a buttonon the controller when you have symptoms, such as dizziness 
or lightheadedness. The device will record an EKG at that moment. The recording 
can help your healthcare provider see if your symptoms may becaused by heart 
rhythm problems. Your healthcare provider will remove the device after it has 
collected enough data. You may need the device for up to 3 years. The procedure 
to remove the device is similar to the procedure used to implant it.



DISCHARGE INSTRUCTIONS:

Wound care: Remove dressing in 3 days. Continue to keep site clean and dry. Do 
not expose to direct water for an additional 4 days.



Return to activity: Most people can return to normal activities soon after the 
procedure. Your cardiologist may want to know if your work involves electrical 
current or high-voltage equipment. Ask about other electrical items that could 
interfere with your cardiac loop recorder.



Contact your cardiologist if:

 You have a fever or chills.



 Your wound is red, swollen, or draining pus.



 You have questions or concerns about your condition or care.



Seek care immediately or call 911 if:

 You feel weak, dizzy, or faint.



 You lose consciousness.



  [employer]. Information is for End User's use only 
and may not be sold, redistributed or otherwise used for commercial purposes. 
All illustrations and images included in CareNotes are the copyrighted 
property of [employer] or [employer].

The above information is an  only. It is not intended as medical 
advice for individual conditions or treatments. Talk to your doctor, nurse or 
pharmacist before following any medical regimen to see if it is safe and 
effective for you.



For questions or concerns regarding your discharge instructions, you may call 
the Cath Lab at [phone] Mon-Fri 7a-5p to speak with a RN.



If you have any questions or concerns about your loop recorder, please contact 
the Arrhythmia Centerat [phone].



documented in this encounter



Plan of Treatment







 Name  Type  Priority  Associated  Date/Time



       Diagnoses  

 

 ELECTROPHYSIOLOGY  Electrophysiology  Routine  Encounter for  02/10/2020 10:34



 PROCEDURE      loop recorder at  AM EST



       end of battery  



       life  









 Health Maintenance  Due Date  Last Done  Comments

 

 MEDICARE ANNUAL WELLNESS VISIT  1965    

 

 PNEUMOCOCCAL 0-64 YRS (1 of 1  1970    



 - PPSV23)      

 

 DTaP/Tdap/Td Vaccines (1 -  1975    



 Tdap)      

 

 HIV SCREENING  1979    

 

 HEPATITIS C SCREENING  2004    

 

 Colonoscopy  2014    

 

 ZOSTER IMMUNIZATION SERIES (1  2014    



 of 2)      

 

 LIPID DISORDER SCREENING  2019  

 

 INFLUENZA VACCINE (#1)  2019    

 

 DIABETES SCREENING  10/18/2019  10/18/2018,  



     2018,  



     2017  

 

 DEPRESSION SCREENING  2020  

 

 HEPATITIS A IMMUNIZATION  Aged Out    No longer eligible based



 SERIES      on patient's age to



       complete this topic

 

 HPV IMMUNIZATION SERIES  Aged Out    No longer eligible based



       on patient's age to



       complete this topic

 

 MENINGOCOCCAL VACCINE IMM  Aged Out    No longer eligible based



       on patient's age to



       complete this topic



documented as of this encounter



Implants







 Explanted  Type  Area    Device Identifier  Shelf Expiration  
Model /



           Date  Serial / Lot

 

 Linq - Loop Recorder      [employer]       /



 Explanted: Qty: 1 on 02/10/2020 by eJff Hebert MD at [facility] [address]            [id number] /



documented as of this encounter



Procedures







 Procedure Name  Priority  Date/Time  Associated Diagnosis  Comments

 

 SIGN PERMIT    02/10/2020 12:00 PM EST    



documented in this encounter



Results

Not on filedocumented in this encounter



Visit Diagnoses







 Diagnosis

 

 Encounter for loop recorder at end of battery life



documented in this encounter



Administered Medications







 Medication Order  MAR Action  Action Date  Dose  Rate  Site









 acetaminophen (TYLENOL) tablet 650 mg



  



 650 mg, Oral, Q4 HRS PRN, Starting Mon 2/10/20 at 0922, Until Mon 2/10/20 at 
1419,  



 headache  

 

   

 

 aluminum & magnesium hydroxide-simethicone II (MYLANTA II) oral suspension  



 400-400-40 MG/5ML



  



 30 mL, Oral, Q4 HRS PRN, Starting Mon 2/10/20 at 0922, Until Mon 2/10/20 at 
1419,  



 Heartburn - 1st line  

 

   









 FentaNYL (PF) (SUBLIMAZE) injection (PF)



  Given  02/10/2020 10:22 AM EST  100 mcg    



 INTRAOP, Starting Mon 2/10/20 at 1022,          



 Until Mon 2/10/20 at 1419, 3          



 Intra-Operative          









   

 

 flumazenil (ROMAZICON) injection 0.2 mg



  



 0.2 mg, Intravenous, PRN, Starting Mon 2/10/20 at 0922, Until Mon 2/10/20 at 
1419,  



 Respiratory rate &lt; ___, Administer for reversal of Benzodiazepines when RR 
< 10  



 and patient somnolent/difficult to arouse, Administer for reversal of  



 Benzodiazepines when RR < 10 and patient somnolent/difficult to arouse.  Give  



 Flumazenil 0.2 mg IV (drawn up with a TB syringe) over 30 seconds in a rapid  



 infusion IV line.  May repeat every 1 minute as needed, up to a maximum dose 
of 1  



 mg, until a change in alertness or RR is observed. Notify physician if NO 
response  



 is observed.  VS and oxygenation must be monitored and documented every 10 
minutes  



 for at least 90 minutes, and then until stable, when Flumazenil is 
administered for  



 benzodiazepine reversal.,  

 

   









 lidocaine (XYLOCAINE) injection 2  Given  02/10/2020 10:23 AM EST  40 mL    
Chest - Left



 %



          



 INTRAOP, Starting Mon 2/10/20 at          



 1023, Until Mon 2/10/20 at 1419, 3          



 Intra-Operative          









   









 midazolam (VERSED) injection



  Given  02/10/2020 10:22 AM EST  0.5 mg    



 INTRAOP, Starting Mon 2/10/20 at 1017, Until          



 Mon 2/10/20 at 1419, 3 Intra-Operative          









 Given  02/10/2020 10:17 AM EST  2 mg    









   

 

 naloxone (NARCAN) injection 0.2 mg



  



 0.2 mg, Intravenous, PRN, Starting Mon 2/10/20 at 0922, Until Mon 2/10/20 at 
1419,  



 Respiratory rate &lt; ___, Administer for reversal of Opioids when RR < 10 and
  



 patient somnolent/difficult to arouse, Administer for reversal of Opioids when 
RR <  



 10 and patient somnolent/difficult to arouse.  Give Narcan 0.2 mg IV (drawn up 
with  



 a TB syringe) over 30 seconds in a rapid infusion IV line.  May repeat every 2
  



 minutes as needed, until a change in alertness or RR is observed.  Notify 
physician  



 if no response after 3 doses.  VS and oxygenation must be monitored and 
documented  



 every 10 minutes for at least 90 minutes, and then until stable, when Narcan 
is  



 administered for opioid reversal.,  

 

   

 

 normal saline IV



  



 Intravenous, at 30 mL/hr, CONTINUOUS, Starting Mon 2/10/20 at 0930, Until Mon  



 2/10/20 at 1419, 2 Day of Surgery Pre Procedure, Normal Saline at 30 mL via 
pump  



 with extension tubing in Left arm at 6 am,  

 

   



documented in this encounter



Insurance







 Payer  Benefit Plan /  Subscriber ID  Effective Dates  Phone  Address  Type



   Group          

 

 MEDICARE  MEDICARE PART A  xxxxxxxxxxx  1998-Present      Medicare



   & B          

 

 MEDICAID [facility]  xxxxxxxx  2018-Present      Medicaid [place]



   MEDICAID          









 Guarantor Name  Account Type  Relation to  Date of Birth  Phone  Billing



     Patient      Address

 

 Delta Rodriguez  Personal/Family    1965  [phone]  [address]



         (Home)



  [facility]



         [phone]  [facility] [address]







         (Work)  [place]



           [id number]



documented as of this encounter



Advance Directives







 Code Status  Date Activated  Date Inactivated  Comments

 

 Full Code  2/10/2020  9:22 AM    









 Does the patient have decision making  Yes  



 capacity?    

 

 Order was discussed with:  Unable to determine at this time  

 

 I discussed all options and  Unable to determine at this time (full  



 patient/surrogate requested and agreed to:  code until choice is made and new 
order  



   placed)

## 2020-02-24 NOTE — XMS REPORT
Summary of Care

 Created on:2020



Patient:Delta Rodriguez

Sex:Male

:1965

External Reference #:[id number]





Demographics







 Address  [address] [place] [address]

 

 Home Phone  [phone]

 

 Work Phone  [phone]

 

 Mobile Phone  [phone]

 

 Preferred Language  English

 

 Marital Status  Not  or 

 

 Orthodoxy Affiliation  Unknown

 

 Race  Black or 

 

 Ethnic Group  Not  or 









Author







 Organization  [facility]

 

 Address  [address] 



   [place] [address]









Support







 Name  Relationship  Address  Phone

 

 Anaid Acosta  Unavailable  Unavailable  [phone]









Care Team Providers







 Name  Role  Phone

 

 Bridger Webb MD  Primary Care Provider  [phone]









Reason for Visit







 Reason  Comments

 

 Follow Up  







Encounter Details







 Date  Type  Department  Care Team  Description

 

 2020  Office Visit  [facility]



  Pratibha Yumiko  Encounter for loop



     [address]



  [address]



  recorder at end of



     [place] [id number]



  [address]



  battery life (Primary



     [phone]  [place] [id number]



  Dx)



       [phone] (Fax)  







Allergies







 Active Allergy  Reactions  Severity  Noted Date  Comments

 

 Amoxicillin  Swelling    2017  

 

 Mushrooms  Swelling    2013  

 

 Penicillins  Swelling    2013  



documented as of this encounter (statuses as of 2020)



Medications







 Medication  Sig  Dispensed  Refills  Start Date  End Date  Status

 

 ALBUTEROL IN  Take  by    0      Active



   inhalation.          

 

 Fluticasone  Take  by    0      Active



 Propionate, Inhal,  inhalation.          



 (FLOVENT IN)            

 

 famotidine (PEPCID) 20  Take 20 mg by    0      Active



 MG Oral Tab  mouth TWICE DAILY.          

 

 fluticasone-salmeterol  Take 1 INHL by    0      Active



 diskus (ADVAIR DISKUS)  inhalation TWICE          



 250-50 MCG/DOSE  DAILY.          



 Inhalation AEROSOL            



 POWDER, BREATH            



 ACTIVATED            

 

 rivaroxaban (XARELTO)  Take 20 mg by    0      Active



 20 MG Oral  mouth DAILY.          



 TabIndications:            



 Pulmonary Embolism            

 

 Aspirin 81 MG Oral Tab  Take 81 mg by    0      Active



   mouth DAILY.          

 

 atorvastatin (LIPITOR)  Take 1 Tab by  90 Tab  3  2019    Active



 40 MG Oral  mouth DAILY.          



 TabIndications: ASHD            



 (arteriosclerotic            



 heart disease)            

 

 BYSTOLIC 10 MG Oral  TAKE 1 TABLET BY  180 Tab  3  10/07/2019    Active



 Tab  MOUTH TWICE DAILY          

 

 spironolactone  TAKE 1 TABLET BY  90 Tab  3  2019    Active



 (ALDACTONE) 25 MG Oral  MOUTH ONCE DAILY          



 TabIndications:            



 Essential hypertension            



documented as of this encounter (statuses as of 2020)



Active Problems







 Problem  Noted Date

 

 Palpitations  2019

 

 Cerebrovascular accident (CVA)  2019

 

 Dyslipidemia  2019

 

 Long term current use of anticoagulant therapy  2019

 

 Morbid obesity  2019

 

 Obstructive sleep apnea syndrome  2019

 

 Pulmonary embolism  2019

 

 Hypertension  2019

 

 ASHD (arteriosclerotic heart disease)  2018

 

 Peripheral artery disease  10/13/2017

 

 Primary osteoarthritis of left knee  2017

 

 Pain of right thumb  10/04/2016

 

 Syncope  2016

 

 PVC (premature ventricular contraction)  2016

 

 History of surgical procedure  2015

 

 BMI 45.0-49.9, adult  2013

 

 DJD (degenerative joint disease) of knee  10/04/2013



documented as of this encounter (statuses as of 2020)



Social History







 Tobacco Use  Types  Packs/Day  Years Used  Date

 

 Former Smoker        









 Smokeless Tobacco: Never Used      









 Alcohol Use  Drinks/Week  oz/Week  Comments

 

 No      









 Sex Assigned at Birth  Date Recorded

 

 Not on file  









 Job Start Date  Occupation  Industry

 

 Not on file  Not on file  Not on file









 Travel History  Travel Start  Travel End









 No recent travel history available.



documented as of this encounter



Last Filed Vital Signs







 Vital Sign  Reading  Time Taken  Comments

 

 Blood Pressure  134/72  2020  8:19 AM EST  

 

 Pulse  61  2020  8:19 AM EST  

 

 Temperature  -  -  

 

 Respiratory Rate  -  -  

 

 Oxygen Saturation  -  -  

 

 Inhaled Oxygen Concentration  -  -  

 

 Weight  156 kg (344 lb)  2020  8:19 AM EST  

 

 Height  175.3 cm (5' 9")  2020  8:19 AM EST  

 

 Body Mass Index  50.8  2020  8:19 AM EST  



documented in this encounter



Plan of Treatment







 Date  Type  Specialty  Care Team  Description

 

 2020  Office Visit  Cardiology  Tala Ashley PA



  



       [address] [address]



  



       [phone] (Fax)  









 Health Maintenance  Due Date  Last Done  Comments

 

 MEDICARE ANNUAL WELLNESS VISIT  1965    

 

 PNEUMOCOCCAL 0-64 YRS (1 of 1  1970    



 - PPSV23)      

 

 DTaP/Tdap/Td Vaccines (1 -  1975    



 Tdap)      

 

 HIV SCREENING  1979    

 

 HEPATITIS C SCREENING  2004    

 

 Colonoscopy  2014    

 

 ZOSTER IMMUNIZATION SERIES (1  2014    



 of 2)      

 

 INFLUENZA VACCINE (#1)  2019    

 

 DIABETES SCREENING  10/18/2019  10/18/2018,  



     2018,  



     2017  

 

 LIPID DISORDER SCREENING  2020,  



     2018  

 

 DEPRESSION SCREENING  2020  

 

 HEPATITIS A IMMUNIZATION  Aged Out    No longer eligible based



 SERIES      on patient's age to



       complete this topic

 

 HPV IMMUNIZATION SERIES  Aged Out    No longer eligible based



       on patient's age to



       complete this topic

 

 MENINGOCOCCAL VACCINE IMM  Aged Out    No longer eligible based



       on patient's age to



       complete this topic



documented as of this encounter



Results

Not on filedocumented in this encounter



Visit Diagnoses







 Diagnosis

 

 Encounter for loop recorder at end of battery life



documented in this encounter



Insurance







 Payer  Benefit Plan /  Subscriber ID  Effective Dates  Phone  Address  Type



   Group          

 

 MEDICARE  MEDICARE PART A  xxxxxxxxxxx  1998-Present      Medicare



   & B          

 

 MEDICAID [facility]  xxxxxxxx  2018-Present      Medicaid [place]



   MEDICAID          









 Guarantor Name  Account Type  Relation to  Date of Birth  Phone  Billing



     Patient      Address

 

 Delta Rodriguez  Personal/Family    1965  [phone]  [address]



         (Home)



  [facility]



         [phone]  







         (Work)  [place], [place]



           [address]



documented as of this encounter

## 2020-02-25 LAB
ANION GAP SERPL CALC-SCNC: 8 MMOL/L (ref 2–11)
BASOPHILS # BLD AUTO: 0 10^3/UL (ref 0–0.2)
BUN SERPL-MCNC: 17 MG/DL (ref 6–24)
BUN/CREAT SERPL: 15.6 (ref 8–20)
CALCIUM SERPL-MCNC: 9.5 MG/DL (ref 8.6–10.3)
CHLORIDE SERPL-SCNC: 108 MMOL/L (ref 101–111)
CHOLEST SERPL-MCNC: 118 MG/DL
EOSINOPHIL # BLD AUTO: 0.2 10^3/UL (ref 0–0.6)
GLUCOSE SERPL-MCNC: 99 MG/DL (ref 70–100)
HCO3 SERPL-SCNC: 25 MMOL/L (ref 22–32)
HCT VFR BLD AUTO: 42 % (ref 42–52)
HDLC SERPL-MCNC: 33.6 MG/DL
HGB BLD-MCNC: 14.2 G/DL (ref 14–18)
LYMPHOCYTES # BLD AUTO: 1.7 10^3/UL (ref 1–4.8)
MCH RBC QN AUTO: 29 PG (ref 27–31)
MCHC RBC AUTO-ENTMCNC: 34 G/DL (ref 31–36)
MCV RBC AUTO: 85 FL (ref 80–94)
MONOCYTES # BLD AUTO: 0.5 10^3/UL (ref 0–0.8)
NEUTROPHILS # BLD AUTO: 3.4 10^3/UL (ref 1.5–7.7)
NRBC # BLD AUTO: 0 10^3/UL
NRBC BLD QL AUTO: 0
PLATELET # BLD AUTO: 198 10^3/UL (ref 150–450)
POTASSIUM SERPL-SCNC: 3.7 MMOL/L (ref 3.5–5)
RBC # BLD AUTO: 4.97 10^6 /UL (ref 4.18–5.48)
SODIUM SERPL-SCNC: 141 MMOL/L (ref 135–145)
TRIGL SERPL-MCNC: 129 MG/DL
WBC # BLD AUTO: 5.9 10^3/UL (ref 3.5–10.8)

## 2020-02-25 RX ADMIN — TRAMADOL HYDROCHLORIDE PRN MG: 50 TABLET, FILM COATED ORAL at 22:46

## 2020-02-25 RX ADMIN — ASPIRIN SCH MG: 81 TABLET, CHEWABLE ORAL at 08:43

## 2020-02-25 RX ADMIN — NEBIVOLOL HYDROCHLORIDE SCH MG: 2.5 TABLET ORAL at 03:39

## 2020-02-25 RX ADMIN — NEBIVOLOL HYDROCHLORIDE SCH MG: 2.5 TABLET ORAL at 08:44

## 2020-02-25 RX ADMIN — ATORVASTATIN CALCIUM SCH MG: 20 TABLET, FILM COATED ORAL at 08:43

## 2020-02-25 RX ADMIN — SPIRONOLACTONE SCH MG: 25 TABLET ORAL at 08:44

## 2020-02-25 RX ADMIN — FAMOTIDINE SCH MG: 20 TABLET, FILM COATED ORAL at 21:24

## 2020-02-25 RX ADMIN — MOMETASONE FUROATE AND FORMOTEROL FUMARATE DIHYDRATE SCH PUFF: 200; 5 AEROSOL RESPIRATORY (INHALATION) at 19:36

## 2020-02-25 RX ADMIN — MOMETASONE FUROATE AND FORMOTEROL FUMARATE DIHYDRATE SCH PUFF: 200; 5 AEROSOL RESPIRATORY (INHALATION) at 07:56

## 2020-02-25 RX ADMIN — FAMOTIDINE SCH MG: 20 TABLET, FILM COATED ORAL at 08:43

## 2020-02-25 NOTE — PN
Subjective


Date of Service: 02/25/20


Interval History: 





Mr. Rodriguez is feeling okay this morning. He is still having occasional 

palpitations, but not as severe or frequent as yesterday. Very short episodes (

seconds) of mild chest pain. Minimal SOB, but improved after inhaler this 

morning. 





No concerns from nursing.





Family History: Unchanged from Admission


Social History: Unchanged from Admission


Past Medical History: Unchanged from Admission





Objective


Active Medications: 





Acetaminophen (Tylenol Tab*)  650 mg PO Q4H PRN PAIN - MILD


Albuterol/Ipratropium (Duoneb (Albuterol 2.5 Mg/Ipratropium 0.5 Mg))  1 neb INH 

Q4H PRN SOB/WHEEZING


Aspirin (Aspirin 81 Mg Chew Tab*)  81 mg PO DAILY FELY


Atorvastatin Calcium (Lipitor*)  20 mg PO DAILY FELY


Famotidine (Pepcid Tab*)  20 mg PO BID FELY


Hydralazine HCl (Apresoline Iv*)  5 mg IV SLOW PU Q6H PRN BLOOD PRESSURE


Mometasone Furoate/Formoterol Fumar (Dulera 200/5 Mdi*)  2 puff INH BID FELY


Nebivolol (Bystolic Tab (Nf))  10 mg PO DAILY FELY


Ondansetron HCl (Zofran Inj*)  4 mg IV Q6H PRN NAUSEA


Rivaroxaban (Xarelto(*))  20 mg PO QPM FELY


Spironolactone (Aldactone Tab*)  25 mg PO DAILY FELY


Tramadol HCl (Ultram*)  50 mg PO Q6HR PRN PAIN





 Vital Signs - 8 hr











  02/25/20 02/25/20 02/25/20





  01:29 02:40 04:00


 


Temperature   97.8 F


 


Pulse Rate   61


 


Respiratory 18 18 18





Rate   


 


Blood Pressure   129/68





(mmHg)   


 


O2 Sat by Pulse   100





Oximetry   














  02/25/20 02/25/20 02/25/20





  07:15 07:58 08:01


 


Temperature 97.7 F  


 


Pulse Rate 54 54 54


 


Respiratory 16 14 14





Rate   


 


Blood Pressure 138/65  





(mmHg)   


 


O2 Sat by Pulse 96 96 96





Oximetry   











Oxygen Devices in Use Now: None


Appearance: Middle-aged obese male lying in bed in NAD


Ears/Nose/Mouth/Throat: Mucous Membranes Moist


Neck: NL Appearance and Movements; NL JVP, Trachea Midline


Respiratory: Symmetrical Chest Expansion and Respiratory Effort, - - Minimal 

wheezing


Cardiovascular: NL Sounds; No Murmurs; No JVD, RRR


Abdominal: NL Sounds; No Tenderness; No Distention


Extremities: No Edema


Neurological: Alert and Oriented x 3


Lines/Tubes/Other Access: Clean, Dry and Intact Peripheral IV


Result Diagrams: 


 02/25/20 02:48





 02/25/20 02:48





Assess/Plan/Problems-Billing





Assessment: 





Mr. Rodriguez is a 55 yo M with PMH of CAD, VIPUL, HTN, HLD, asthma, PVD, Mobitz type 

1, crytogenic CVA, chronic chest pain, PE; who presented to the ED with c/o CP 

and palpitations and was admitted for inpatient stress.





- Patient Problems


(1) Chest pain


Code(s): R07.9 - CHEST PAIN, UNSPECIFIED   Comment: 


- Presented with CP and palpitations


- Known history of CAD with MI


- EKG without changes from baseline


- Negative trop x3


- 2 day stress test d/t weight   





(2) PVCs (premature ventricular contractions)


Code(s): I49.3 - VENTRICULAR PREMATURE DEPOLARIZATION   Comment: 


- Presented with palpitations


- Noted to have occasional PVCs on tele, no other abnormalities


- May benefit from changing from nebivolol to metoprolol if he continues to 

have symptomatic PVCs   





(3) CAD (coronary artery disease)


Code(s): I25.10 - ATHSCL HEART DISEASE OF NATIVE CORONARY ARTERY W/O ANG PCTRS 

  Comment: 


- MI and 3 stents in 2011


- Continue aspirin, atorvastatin     





(4) History of pulmonary embolism


Code(s): Z86.711 - PERSONAL HISTORY OF PULMONARY EMBOLISM   Comment: 


- Continue Xarelto   





(5) HTN (hypertension)


Code(s): I10 - ESSENTIAL (PRIMARY) HYPERTENSION   Comment: 


- Normotensive


- Continue nebivolol, spironolactone   





(6) Asthma


Code(s): J45.909 - UNSPECIFIED ASTHMA, UNCOMPLICATED   Comment: 


- Faint wheezing on exam


- Ruthann Mcgraw PRN   





(7) Dyslipidemia


Code(s): E78.5 - HYPERLIPIDEMIA, UNSPECIFIED   Comment: 


- Continue atorvastatin   





(8) PVD (peripheral vascular disease)


Code(s): I73.9 - PERIPHERAL VASCULAR DISEASE, UNSPECIFIED   Comment: 


- Continue aspirin, atorvastatin   





(9) Morbid obesity


Code(s): E66.01 - MORBID (SEVERE) OBESITY DUE TO EXCESS CALORIES   Comment: 


- BMI 50   





(10) DVT prophylaxis


Comment: 


- Xarelto   





(11) Full code status


Code(s): Z78.9 - OTHER SPECIFIED HEALTH STATUS   Comment: 


   


Status and Disposition: 





Observation for 2 day stress test. Anticipate d/c home tomorrow if stress test 

is negative.





Attending: Yg Grace

## 2020-02-25 NOTE — HP
CC:  Dr. Webb *

 

HISTORY AND PHYSICAL:

 

DATE OF ADMISSION:  02/24/20

 

PRIMARY CARE PROVIDER:  Dr. Webb.

 

MY ATTENDING PHYSICIAN WHILE IN THE HOSPITAL:  Dr. Mcfarlane * (report dictated by 
Diana Goode NP).

 

CHIEF COMPLAINT:

1.  Chest pain.

2.  Palpitations.

 

HISTORY OF PRESENT ILLNESS:  Mr. Rodriguez is a 54-year-old male patient who 
carries a history of CAD, MI, VIPUL, hypertension, hyperlipidemia, history of 
asthma, question of a seizure disorder, PVD, Mobitz type 1, chronic chest pain, 
history of cryptogenic CVA, and history of PE, on chronic Xarelto, who states 
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 
boxes.  He noticed while doing this, he was having frequent palpitations that 
were coming and going. As he sat down and rested, the palpitations would go 
away.  They were not bothersome.  He went back up to his apartment after he 
finished doing what he was doing and he noted that at that point that 
palpitations were worse.  He sat down, rested for 10 minutes, it did not get 
better, he got up, he went and had something to eat.  He unfortunately states 
that this did not help.  He went and sat down on his computer and the next 
thing he knew he started becoming sweaty, he became lightheaded, he felt like 
he was going to faint.  He started having chest discomfort and pain, described 
as a squeezing sensation that did go up into the right side of his jaw.  He 
notes that he did fall asleep at the computer.  When he woke up, the pain 
returned.  It was 10/10.  He was concerned because he felt like he was going to 
pass out, he did not though.  He states that he has not had any shortness of 
breath.  No recent cough, fever, or chills.  No recent URI-type symptoms.  He 
states that he has not taken his Bystolic for approximately 3 to 4 days now as 
he ran out.  He has been taking his other medications. He was concerned because 
of the lightheadedness, the chest discomfort and we were asked to evaluate for 
admission given his risk factors of coronary artery disease and the fact that 
he was having significant chest discomfort.  We were asked to evaluate for 
admission.

 

PAST MEDICAL HISTORY:  Significant for:

1.  CAD.

2.  MI.

3.  VIPUL.

4.  Hypertension.

5.  Hyperlipidemia.

6.  Asthma.

7.  Seizure disorders, questionable per the patient.

8.  PVD.

9.  Mobitz type 1.

10.  Chronic chest pain.

11.  History of CVA, which is cryptogenic in nature.

12.  PE.

 

PAST SURGICAL HISTORY:

1.  He has had heart catheterization with 3 stents.

2.  Sinus surgery.

3.  Knee arthroscopy.

4.  Inner ear surgery.

 

MEDICATIONS:  Home meds according to the list that he provided includes:

1.  Tramadol 50 mg every 6 hours as needed.

2.  Aldactone 25 mg daily.

3.  Xarelto 20 mg daily.

4.  Potassium 20 mEq daily.

5.  Nitro 0.4 mg sublingual q.5 minutes x3 p.r.n. chest pain.

6.  Bystolic 10 mg daily.

7.  Hydrocortisone suppository 25 mg AR daily.

8.  Advair 1 dose inhaled b.i.d.

9.  Famotidine 20 mg p.o. b.i.d.

10.  Lipitor 20 mg daily.

11.  Aspirin 81 mg daily.

 

ALLERGIES TO MEDICATIONS:  Include PENICILLIN.  He is allergic to VINEGAR and 
MUSHROOMS as well.

 

FAMILY HISTORY:  His mother had CHF.  His father's history is unknown.

 

SOCIAL HISTORY:  He is a former smoker, quit over 10 years ago.  Does not drink 
alcohol.  Surrogate decision maker is his sister.

 

REVIEW OF SYSTEMS:  There is no documented fever.  He denied having any 
significant weight change.  There is no double vision.  He denies having any 
ear discharge. There is no rhinorrhea.  No sore throat.  No thyroid 
enlargement.  There was chest pain from HPI.  There was no shortness of breath.
  No abdominal pain.  No nausea, no vomiting.  No dysuria, no frequency.  No 
seizure, no loss of consciousness reported.  Review of 14 systems completed, 
all others negative.

 

                               PHYSICAL EXAMINATION

 

GENERAL:  At this time, Mr. Rodriguez is a 54-year-old male patient, he is sitting 
in the ED stretcher, he does not appear to be in any acute distress, he appears 
to be well nourished and well developed.

 

VITAL SIGNS:  Blood pressure 156/98, pulse 64, respirations 18, O2 sat 99%, 
temperature 96.1.

 

HEENT:  Head:  Atraumatic and normocephalic.  Eyes:  EOMs are intact.  Sclerae 
anicteric and not pale.  Throat:  Oral mucosa appears to be moist.  No 
oropharyngeal erythema.

 

NECK:  Supple.

 

LUNGS:  Clear to auscultation bilaterally.  There were no rales or rhonchi.  He 
did have wheezing, expiratory bilaterally in the lower lobes.

 

HEART:  Heart sounds S1, S2.  Regular rate and rhythm.  No murmurs, rubs, or 
gallops.

 

ABDOMEN:  Soft.  It was flat.  It was nontender.  Bowel sounds were present.

 

EXTREMITIES:  Pulses were 2+ throughout.  He has some mild peripheral edema. He 
is moving all 4 extremities with 5/5 strength.

 

NEUROLOGICAL:  He is awake.  He is alert.  He is oriented x3.  Speech is clear. 
Tongue midline.  He has no gross focal deficits.

 

SKIN:  Intact.

 

 DIAGNOSTIC STUDIES/LAB DATA:  WBC of 6.1, RBC of 5.09, hemoglobin 14.6, 
hematocrit of 43, platelet count 229.  INR 1.49.  His sodium was 142, potassium 
3.7, chloride of 107, bicarb 29, BUN 17, creatinine 1.33, glucose 99.  Lactate 
1.2.  Calcium 9.8. Total bili 0.5, AST 17, ALT 27, alk phos 107.  Troponin 
0.01.  Albumin 4.4.

 

He did have an EKG obtained today.  EKG at 2000 shows normal sinus rhythm with 
PVC and no ST elevation.  He did have elevated T-wave inversion in V6, biphasic 
in V5. If you review that to the EKG from earlier, his T wave changes are 
similar.  He was in trigeminy on the previous EKG, but no ST elevations or T 
wave inversions were noted.  Old medical records were reviewed.

 

ASSESSMENT AND PLAN:  Mr. Rodriguez is a 54-year-old male patient coming in to the 
ED today with complaints of chest discomfort and palpitations.  We were asked 
to evaluate for admission.  He will be admitted under observation status for:

 

1.  Chest pain.  Again, he does have significant risk factors for coronary 
artery disease.  He did describe the pain as consistent with his previous MIs 
and because of this, I do think he is warranted to undergo a stress test, which 
I have ordered. His last stress test that I have here was in 2017, so at this 
point I will repeat. He is on aspirin already.  Currently, he states his chest 
pain is better.  He is on a beta-blocker, which he has not taken in 3 days, I 
am going to restart that tonight.  Continue his statin therapy.

2.  Coronary artery disease with a history of MI.  Again, we will continue his 
aspirin, beta-blocker, and statin therapy.

3.  History of pulmonary embolism.  Continue his Xarelto.

4.  Hypertension.  Continue meds as prescribed.  We will restart the medication
, his Bystolic.

5.  Hyperlipidemia.  Continue statin therapy.

6.  Asthma.  We will continue his inhalers.  He did have wheezing on exam.  I 
have ordered p.r.n. DuoNebs.

7.  History of possible seizure disorder.  We will continue to monitor.  There 
has been no report of seizure-like activity.

8.  Peripheral vascular disease.  Continue his aspirin and statin therapy.

9.  Mobitz type 1.  He will be placed on telemetry.

10.  History of cerebrovascular accident.  Continue aspirin, statin therapy and 
secondary prevention.

11.  DVT prophylaxis:  Xarelto is ordered.  He is high risk.

12.  Code status:  Full code.

13.  Fluids, electrolytes, and nutrition:  He is n.p.o. after midnight.

 

TIME SPENT:  Time spent on admission was 60 minutes; greater than half time 
spent face-to-face with the patient, obtaining my history and physical and the 
other half time was spent going over the plan of care with the patient and 
implementing the plan of care.

 

I did discuss the plan of care with my attending, Dr. Mcfarlane.

 

____________________________________ DIANA GOODE NP

 

[id number]/CPS #: [id number]

STEVE

## 2020-02-26 VITALS — DIASTOLIC BLOOD PRESSURE: 86 MMHG | SYSTOLIC BLOOD PRESSURE: 151 MMHG

## 2020-02-26 RX ADMIN — NEBIVOLOL HYDROCHLORIDE SCH MG: 2.5 TABLET ORAL at 10:42

## 2020-02-26 RX ADMIN — ATORVASTATIN CALCIUM SCH MG: 20 TABLET, FILM COATED ORAL at 10:03

## 2020-02-26 RX ADMIN — ASPIRIN SCH MG: 81 TABLET, CHEWABLE ORAL at 10:03

## 2020-02-26 RX ADMIN — MOMETASONE FUROATE AND FORMOTEROL FUMARATE DIHYDRATE SCH PUFF: 200; 5 AEROSOL RESPIRATORY (INHALATION) at 10:07

## 2020-02-26 RX ADMIN — FAMOTIDINE SCH MG: 20 TABLET, FILM COATED ORAL at 10:03

## 2020-02-26 RX ADMIN — NEBIVOLOL HYDROCHLORIDE SCH: 2.5 TABLET ORAL at 10:44

## 2020-02-26 RX ADMIN — TRAMADOL HYDROCHLORIDE PRN MG: 50 TABLET, FILM COATED ORAL at 10:04

## 2020-02-26 RX ADMIN — SPIRONOLACTONE SCH MG: 25 TABLET ORAL at 10:04

## 2020-02-27 NOTE — DS
CC:  Dr. Bridger Webb; Dr. Arik Lantigua *

 

DISCHARGE SUMMARY:

 

DATE OF ADMISSION:  02/24/20

 

DATE OF DISCHARGE:  02/26/20

 

PRIMARY CARE PROVIDER:  Dr. Bridger Webb.

 

OUTPATIENT CARDIOLOGIST:  Dr. Arik Lantigua.

 

MY ATTENDING WHILE IN THE HOSPITAL:  Dr. Yg Grace.* (DICTATED BY SUSAN DUNCAN)

 

PRIMARY DISCHARGE DIAGNOSES:  Chest pain, shoulder pain, likely musculoskeletal 
in origin.  Diabetes mellitus type 2.

 

SECONDARY DISCHARGE DIAGNOSES:

1.  History of myocardial infarction in 2011, status post 3 stents.

2.  History of unprovoked pulmonary embolus, on chronic anticoagulation.

3.  Hypertension.

4.  Hyperlipidemia.

5.  Chronic intermittent chest pain.

6.  Obstructive sleep apnea.

7.  Asthma.

8.  Questionable seizure disorder.

9.  Peripheral vascular disease.

10.  Heart block, type 1.

11.  History of cryptogenic cerebrovascular accident.

 

STUDIES DONE WHILE IN THE HOSPITAL:  Chest x-ray from 02/24/20 read as no 
evidence of acute cardiopulmonary disease.

 

The patient's 2 EKGs are nonischemic.

 

Nuclear medicine scan from 02/26/20 read as high risk patient on the nuclear 
portion given large perfusion defect consistent with infarct and LV dilatation. 
These are consistent with 3 previous stress tests.

 

Shoulder x-ray read as negative for fracture.  Glenohumeral and AC joints are 
unremarkable.

 

MEDICATIONS AT DISCHARGE:

1.  Nebivolol 10 mg p.o. daily.

2.  Nitroglycerin 0.4 mg sublingually q.5 minutes as needed.

3.  Famotidine 20 mg p.o. b.i.d.

4.  Advair Diskus 250/50 one dose inhalation b.i.d.

5.  Lipitor 20 mg p.o. daily.

6.  Aspirin 81 mg p.o. daily.

7.  Spironolactone 25 mg p.o. daily.

8.  Xarelto 20 mg p.o. daily.

9.  Potassium chloride 20 mEq p.o. daily.

10.  Tramadol 50 mg p.o. q.6 hours as needed.

11.  Hydrocortisone suppository 25 mg LA daily as needed.

12.  Tylenol 650 mg p.o. q.4 hours as needed.

 

New medication at discharge:  None.

 

Medications discontinued at discharge:  None.

 

HOSPITAL COURSE:  This is a brief summary of the patient's presentation.  For 
more details, please see the history and physical from Babak Goode NP on 02/
25/20. In brief, the patient is a 54-year-old male with past medical history 
significant for the above, who presented to the emergency department after 
missing 4 days of his beta-blocker and then helping people unload boxes at a 
super-kitchen, he noticed intermittent palpitations and some chest discomfort.  
The patient missed no other doses of his medications.  The patient in the 
emergency department continued to have chest pain, felt uncomfortable going 
home.  The patient was admitted to the hospital.  The patient had 3 negative 
troponins.  The patient's hemoglobin A1c was 6.6.  The patient continued to 
have on and off palpitations and chest pain, though this decreased in frequency 
over the course of the hospitalization; however, on the night of 02/25/20, the 
patient had shoulder pain in his left shoulder, which then radiated back into 
his chest.  He does not remember any trauma to the shoulder. The pain was 
reproduced with palpation and not associated with any symptoms and worst he got 
was 7/10 and did respond to the medications.  The patient had a 2-day nuclear 
medicine stress test, which was read as high risk as above, but consistent with 
his previous studies.  The patient's case and report were discussed with Dr. Francisca Vann of Cardiology, who recommended no further workup while inpatient, 
but follow up with Cardiology as outpatient.  The patient had shoulder x-ray as 
above due to the character of the patient's chest pain.  The patient was stable 
and amenable for discharge on 02/26/20.

 

PHYSICAL EXAMINATION ON DAY OF DISCHARGE:  General:  The patient is an 54-year-
old male who appears older than stated age and sitting comfortably in bed, in 
no acute distress.  Vital Signs:  Temperature 97.5, pulse rate 58, respiratory 
rate 16, oxygen saturation 96% on room air, blood pressure 143/84.  HEENT:  
Normocephalic, atraumatic.  Sclerae anicteric.  No conjunctival injection.  
Nasal mucosa moist. Oral mucosa moist.  No oropharyngeal erythema, discharge, 
or exudate.  Neck: Supple, nontender.  No lymphadenopathy.  No carotid bruits 
auscultated.  No JVDs. Cardiac:  Regular rate and rhythm.  No clicks, murmurs, 
gallops, or rubs.  Pulses 2+ in bilateral dorsalis pedis, posterior tibialis, 
and radial areas.  Respiratory: Clear to auscultation bilaterally.  No wheezes, 
rales, or rhonchi.  Good air exchange bilaterally.  Abdomen:  Soft, nontender, 
and nondistended.  Bowel sounds present, normoactive in all 4 quadrants.  No 
hepatosplenomegaly.  No abdominal bruits auscultated.  No hepatojugular reflux.
  Genitourinary:  No suprapubic or CVA tenderness.  Skin:  Clean, dry, and 
intact.  No rashes.  Neuro:  Cranial nerves II through XII intact.  Alert and 
oriented x3.  Musculoskeletal:  Tenderness to palpation with movement and 
palpation of the left shoulder.  No other crepitus or abnormalities.

 

DISCHARGE PLAN BY PROBLEM:

1.  Chest pain, shoulder pain.  The patient's pain sounds musculoskeletal.  It 
is unclear why it got worse during his hospitalization.  He may have re-
aggravated something.  However, the patient's pain was controllable with 
tramadol and Tylenol. The patient  has been instructed to rest his arms, use 
ice and heat, avoid NSAIDs. The patient's stress test showed no reversible 
infarct and the patient had negative troponins and nonischemic EKGs.  The 
patient is on the same dose of Xarelto as this is unlikely to be a pulmonary 
embolism.  The patient's chest pain was not found to be gastrointestinal in 
origin.  The patient to follow up with his primary care provider within 1 week 
for monitoring of his chest and shoulder pain and follow up with cardiologist 
for continued secondary prevention of myocardial infarction. Continue the 
patient's aspirin and statin.

2.  History of pulmonary embolism.  As above, the patient is on the same dose 
of Xarelto.  Continue indefinite anticoagulation due to unprovoked pulmonary 
embolism.

3.  Hypertension.  The patient is generally normotensive.  Continue the patient'
s nebivolol and spironolactone.

4.  History of myocardial infarction.  Continue secondary prevention as above. 
Stress test without new perfusion defect or significantly decreased ejection 
fraction.

5.  Obstructive sleep apnea.  Continue the patient's home CPAP.

6.  Asthma.  Continue the patient's Advair.

7.  Disposition:  Home.

8.  Condition:  Stable. 



TIME SPENT:  Approximately 60 minutes was spent on this patient's discharge, 30 
of which was spent face-to-face with the patient obtaining history and physical 
and discussing treatment plan.

 

____________________________________ SUSAN DUNCAN

 

[id number]/CPS #: [id number]

STEVE